# Patient Record
Sex: FEMALE | Race: WHITE | Employment: OTHER
[De-identification: names, ages, dates, MRNs, and addresses within clinical notes are randomized per-mention and may not be internally consistent; named-entity substitution may affect disease eponyms.]

---

## 2017-01-03 ENCOUNTER — OFFICE VISIT (OUTPATIENT)
Dept: FAMILY MEDICINE CLINIC | Facility: CLINIC | Age: 67
End: 2017-01-03

## 2017-01-03 VITALS
OXYGEN SATURATION: 98 % | WEIGHT: 224 LBS | HEART RATE: 87 BPM | SYSTOLIC BLOOD PRESSURE: 124 MMHG | DIASTOLIC BLOOD PRESSURE: 80 MMHG | BODY MASS INDEX: 32.14 KG/M2

## 2017-01-03 DIAGNOSIS — R10.31 RIGHT LOWER QUADRANT ABDOMINAL PAIN: Primary | ICD-10-CM

## 2017-01-03 PROCEDURE — 99214 OFFICE O/P EST MOD 30 MIN: CPT | Performed by: FAMILY MEDICINE

## 2017-01-11 DIAGNOSIS — R10.31 RIGHT LOWER QUADRANT ABDOMINAL PAIN: ICD-10-CM

## 2017-01-30 ENCOUNTER — TELEPHONE (OUTPATIENT)
Dept: FAMILY MEDICINE CLINIC | Facility: CLINIC | Age: 67
End: 2017-01-30

## 2017-01-30 DIAGNOSIS — R10.31 RIGHT LOWER QUADRANT ABDOMINAL PAIN: Primary | ICD-10-CM

## 2017-02-10 ENCOUNTER — HOSPITAL ENCOUNTER (OUTPATIENT)
Age: 67
Setting detail: SPECIMEN
Discharge: HOME | End: 2017-02-10
Payer: MEDICARE

## 2017-02-10 ENCOUNTER — OFFICE VISIT (OUTPATIENT)
Dept: GASTROENTEROLOGY | Facility: CLINIC | Age: 67
End: 2017-02-10

## 2017-02-10 VITALS
TEMPERATURE: 97.6 F | SYSTOLIC BLOOD PRESSURE: 151 MMHG | DIASTOLIC BLOOD PRESSURE: 88 MMHG | HEIGHT: 70 IN | OXYGEN SATURATION: 97 % | RESPIRATION RATE: 14 BRPM | WEIGHT: 228 LBS | HEART RATE: 78 BPM | BODY MASS INDEX: 32.64 KG/M2

## 2017-02-10 DIAGNOSIS — R10.31 RLQ ABDOMINAL PAIN: Primary | ICD-10-CM

## 2017-02-10 DIAGNOSIS — R10.31 RLQ ABDOMINAL PAIN: ICD-10-CM

## 2017-02-10 LAB
BILIRUBIN URINE: NEGATIVE
COLOR: YELLOW
COMMENT UA: NORMAL
GLUCOSE URINE: NEGATIVE
KETONES, URINE: NEGATIVE
LEUKOCYTE ESTERASE, URINE: NEGATIVE
NITRITE, URINE: NEGATIVE
PH UA: 5.5 (ref 5–8)
PROTEIN UA: NEGATIVE
SPECIFIC GRAVITY UA: 1.02 (ref 1–1.03)
TURBIDITY: CLEAR
URINE HGB: NEGATIVE
UROBILINOGEN, URINE: NORMAL

## 2017-02-10 PROCEDURE — 99204 OFFICE O/P NEW MOD 45 MIN: CPT | Performed by: INTERNAL MEDICINE

## 2017-02-10 ASSESSMENT — ENCOUNTER SYMPTOMS
TROUBLE SWALLOWING: 0
NAUSEA: 0
ABDOMINAL DISTENTION: 1
COUGH: 0
RESPIRATORY NEGATIVE: 1
DIARRHEA: 0
ANAL BLEEDING: 0
SHORTNESS OF BREATH: 0
VOMITING: 0
WHEEZING: 0
BLOOD IN STOOL: 0
CONSTIPATION: 1
ABDOMINAL PAIN: 1
RECTAL PAIN: 0
SINUS PRESSURE: 0
SORE THROAT: 0

## 2017-02-14 RX ORDER — POLYETHYLENE GLYCOL 3350 17 G/17G
POWDER, FOR SOLUTION ORAL
Qty: 255 G | Refills: 0 | Status: SHIPPED | OUTPATIENT
Start: 2017-02-14 | End: 2017-03-12

## 2017-02-15 ENCOUNTER — PATIENT MESSAGE (OUTPATIENT)
Dept: FAMILY MEDICINE CLINIC | Facility: CLINIC | Age: 67
End: 2017-02-15

## 2017-02-23 ENCOUNTER — HOSPITAL ENCOUNTER (OUTPATIENT)
Age: 67
Setting detail: SPECIMEN
Discharge: HOME | End: 2017-02-23
Payer: MEDICARE

## 2017-02-23 DIAGNOSIS — I10 ESSENTIAL HYPERTENSION: ICD-10-CM

## 2017-02-23 DIAGNOSIS — Z11.59 NEED FOR HEPATITIS C SCREENING TEST: ICD-10-CM

## 2017-02-23 DIAGNOSIS — E55.9 VITAMIN D DEFICIENCY: ICD-10-CM

## 2017-02-23 LAB
ABSOLUTE EOS #: 0.1 K/UL (ref 0–0.4)
ABSOLUTE LYMPH #: 1.4 K/UL (ref 1–4.8)
ABSOLUTE MONO #: 0.4 K/UL (ref 0.1–1.2)
ALBUMIN SERPL-MCNC: 4.3 G/DL (ref 3.5–5.2)
ALBUMIN/GLOBULIN RATIO: 1.6 (ref 1–2.5)
ALP BLD-CCNC: 59 U/L (ref 35–104)
ALT SERPL-CCNC: 16 U/L (ref 5–33)
ANION GAP SERPL CALCULATED.3IONS-SCNC: 13 MMOL/L (ref 9–17)
AST SERPL-CCNC: 21 U/L
BASOPHILS # BLD: 1 % (ref 0–2)
BASOPHILS ABSOLUTE: 0 K/UL (ref 0–0.2)
BILIRUB SERPL-MCNC: 0.36 MG/DL (ref 0.3–1.2)
BUN BLDV-MCNC: 12 MG/DL (ref 8–23)
BUN/CREAT BLD: NORMAL (ref 9–20)
CALCIUM SERPL-MCNC: 9.3 MG/DL (ref 8.6–10.4)
CHLORIDE BLD-SCNC: 103 MMOL/L (ref 98–107)
CHOLESTEROL/HDL RATIO: 2.1
CHOLESTEROL: 164 MG/DL
CO2: 25 MMOL/L (ref 20–31)
CREAT SERPL-MCNC: 0.6 MG/DL (ref 0.5–0.9)
DIFFERENTIAL TYPE: ABNORMAL
EOSINOPHILS RELATIVE PERCENT: 1 % (ref 1–4)
GFR AFRICAN AMERICAN: >60 ML/MIN
GFR NON-AFRICAN AMERICAN: >60 ML/MIN
GFR SERPL CREATININE-BSD FRML MDRD: NORMAL ML/MIN/{1.73_M2}
GFR SERPL CREATININE-BSD FRML MDRD: NORMAL ML/MIN/{1.73_M2}
GLUCOSE BLD-MCNC: 90 MG/DL (ref 70–99)
HCT VFR BLD CALC: 36 % (ref 36–46)
HDLC SERPL-MCNC: 77 MG/DL
HEMOGLOBIN: 12.2 G/DL (ref 12–16)
HEPATITIS C ANTIBODY: NONREACTIVE
LDL CHOLESTEROL: 72 MG/DL (ref 0–130)
LYMPHOCYTES # BLD: 33 % (ref 24–44)
MCH RBC QN AUTO: 33.2 PG (ref 26–34)
MCHC RBC AUTO-ENTMCNC: 33.8 G/DL (ref 31–37)
MCV RBC AUTO: 98.3 FL (ref 80–100)
MONOCYTES # BLD: 10 % (ref 2–11)
PDW BLD-RTO: 16.6 % (ref 12.5–15.4)
PLATELET # BLD: 140 K/UL (ref 140–450)
PLATELET ESTIMATE: ABNORMAL
PMV BLD AUTO: 8.9 FL (ref 6–12)
POTASSIUM SERPL-SCNC: 4.2 MMOL/L (ref 3.7–5.3)
RBC # BLD: 3.66 M/UL (ref 4–5.2)
RBC # BLD: ABNORMAL 10*6/UL
SEG NEUTROPHILS: 55 % (ref 36–66)
SEGMENTED NEUTROPHILS ABSOLUTE COUNT: 2.4 K/UL (ref 1.8–7.7)
SODIUM BLD-SCNC: 141 MMOL/L (ref 135–144)
TOTAL PROTEIN: 7 G/DL (ref 6.4–8.3)
TRIGL SERPL-MCNC: 75 MG/DL
TSH SERPL DL<=0.05 MIU/L-ACNC: 1.78 MIU/L (ref 0.3–5)
VITAMIN D 25-HYDROXY: 37.1 NG/ML (ref 30–100)
VLDLC SERPL CALC-MCNC: NORMAL MG/DL (ref 1–30)
WBC # BLD: 4.3 K/UL (ref 3.5–11)
WBC # BLD: ABNORMAL 10*3/UL

## 2017-03-02 ENCOUNTER — OFFICE VISIT (OUTPATIENT)
Dept: FAMILY MEDICINE CLINIC | Facility: CLINIC | Age: 67
End: 2017-03-02

## 2017-03-02 VITALS
SYSTOLIC BLOOD PRESSURE: 120 MMHG | BODY MASS INDEX: 32.03 KG/M2 | WEIGHT: 223.25 LBS | OXYGEN SATURATION: 98 % | HEART RATE: 78 BPM | DIASTOLIC BLOOD PRESSURE: 70 MMHG

## 2017-03-02 DIAGNOSIS — E78.00 PURE HYPERCHOLESTEROLEMIA: ICD-10-CM

## 2017-03-02 DIAGNOSIS — I10 ESSENTIAL HYPERTENSION: Primary | ICD-10-CM

## 2017-03-02 PROCEDURE — 99213 OFFICE O/P EST LOW 20 MIN: CPT | Performed by: FAMILY MEDICINE

## 2017-04-04 DIAGNOSIS — R10.31 RLQ ABDOMINAL PAIN: ICD-10-CM

## 2017-04-07 ENCOUNTER — OFFICE VISIT (OUTPATIENT)
Dept: GASTROENTEROLOGY | Age: 67
End: 2017-04-07
Payer: MEDICARE

## 2017-04-07 VITALS
SYSTOLIC BLOOD PRESSURE: 157 MMHG | BODY MASS INDEX: 33.36 KG/M2 | WEIGHT: 233 LBS | TEMPERATURE: 97.6 F | HEART RATE: 71 BPM | OXYGEN SATURATION: 97 % | HEIGHT: 70 IN | DIASTOLIC BLOOD PRESSURE: 84 MMHG

## 2017-04-07 DIAGNOSIS — K63.5 HYPERPLASTIC POLYP OF INTESTINE: ICD-10-CM

## 2017-04-07 DIAGNOSIS — K63.5 COLON POLYP: ICD-10-CM

## 2017-04-07 DIAGNOSIS — D36.9 ADENOMATOUS POLYP: ICD-10-CM

## 2017-04-07 DIAGNOSIS — R10.31 RLQ ABDOMINAL PAIN: Primary | ICD-10-CM

## 2017-04-07 PROCEDURE — 99213 OFFICE O/P EST LOW 20 MIN: CPT | Performed by: INTERNAL MEDICINE

## 2017-04-07 ASSESSMENT — ENCOUNTER SYMPTOMS
SHORTNESS OF BREATH: 0
RESPIRATORY NEGATIVE: 1
CONSTIPATION: 0
SINUS PRESSURE: 0
ANAL BLEEDING: 0
ABDOMINAL DISTENTION: 0
ABDOMINAL PAIN: 0
GASTROINTESTINAL NEGATIVE: 1
SORE THROAT: 0
WHEEZING: 0
DIARRHEA: 0
TROUBLE SWALLOWING: 0
RECTAL PAIN: 0
VOMITING: 0
BLOOD IN STOOL: 0
COUGH: 0
NAUSEA: 0

## 2017-05-05 RX ORDER — PRAVASTATIN SODIUM 40 MG
TABLET ORAL
Qty: 90 TABLET | Refills: 3 | Status: SHIPPED | OUTPATIENT
Start: 2017-05-05 | End: 2018-04-05 | Stop reason: SDUPTHER

## 2017-07-17 RX ORDER — METOPROLOL TARTRATE 50 MG/1
TABLET, FILM COATED ORAL
Qty: 180 TABLET | Refills: 2 | Status: SHIPPED | OUTPATIENT
Start: 2017-07-17 | End: 2018-04-05 | Stop reason: SDUPTHER

## 2017-09-13 ENCOUNTER — OFFICE VISIT (OUTPATIENT)
Dept: FAMILY MEDICINE CLINIC | Age: 67
End: 2017-09-13
Payer: MEDICARE

## 2017-09-13 VITALS
BODY MASS INDEX: 32.96 KG/M2 | HEIGHT: 70 IN | HEART RATE: 76 BPM | OXYGEN SATURATION: 98 % | DIASTOLIC BLOOD PRESSURE: 70 MMHG | SYSTOLIC BLOOD PRESSURE: 110 MMHG | WEIGHT: 230.2 LBS

## 2017-09-13 DIAGNOSIS — E78.00 PURE HYPERCHOLESTEROLEMIA: ICD-10-CM

## 2017-09-13 DIAGNOSIS — R79.89 LOW SERUM VITAMIN D: ICD-10-CM

## 2017-09-13 DIAGNOSIS — D36.9 ADENOMATOUS POLYP: ICD-10-CM

## 2017-09-13 DIAGNOSIS — R79.89 LOW VITAMIN D LEVEL: ICD-10-CM

## 2017-09-13 DIAGNOSIS — I10 ESSENTIAL HYPERTENSION: Primary | ICD-10-CM

## 2017-09-13 PROCEDURE — G0008 ADMIN INFLUENZA VIRUS VAC: HCPCS | Performed by: FAMILY MEDICINE

## 2017-09-13 PROCEDURE — 90688 IIV4 VACCINE SPLT 0.5 ML IM: CPT | Performed by: FAMILY MEDICINE

## 2017-09-13 PROCEDURE — 99213 OFFICE O/P EST LOW 20 MIN: CPT | Performed by: FAMILY MEDICINE

## 2017-09-13 ASSESSMENT — PATIENT HEALTH QUESTIONNAIRE - PHQ9
2. FEELING DOWN, DEPRESSED OR HOPELESS: 0
SUM OF ALL RESPONSES TO PHQ QUESTIONS 1-9: 0
1. LITTLE INTEREST OR PLEASURE IN DOING THINGS: 0
SUM OF ALL RESPONSES TO PHQ9 QUESTIONS 1 & 2: 0

## 2018-03-28 ENCOUNTER — HOSPITAL ENCOUNTER (OUTPATIENT)
Age: 68
Setting detail: SPECIMEN
Discharge: HOME | End: 2018-03-28
Payer: MEDICARE

## 2018-03-28 DIAGNOSIS — I10 ESSENTIAL HYPERTENSION: ICD-10-CM

## 2018-03-28 DIAGNOSIS — R79.89 LOW VITAMIN D LEVEL: ICD-10-CM

## 2018-03-28 DIAGNOSIS — R79.89 LOW SERUM VITAMIN D: ICD-10-CM

## 2018-03-28 LAB
ABSOLUTE EOS #: 0.1 K/UL (ref 0–0.44)
ABSOLUTE IMMATURE GRANULOCYTE: <0.03 K/UL (ref 0–0.3)
ABSOLUTE LYMPH #: 1.41 K/UL (ref 1.1–3.7)
ABSOLUTE MONO #: 0.53 K/UL (ref 0.1–1.2)
ALBUMIN SERPL-MCNC: 4.2 G/DL (ref 3.5–5.2)
ALBUMIN/GLOBULIN RATIO: 1.5 (ref 1–2.5)
ALP BLD-CCNC: 56 U/L (ref 35–104)
ALT SERPL-CCNC: 17 U/L (ref 5–33)
ANION GAP SERPL CALCULATED.3IONS-SCNC: 14 MMOL/L (ref 9–17)
AST SERPL-CCNC: 22 U/L
BASOPHILS # BLD: 0 % (ref 0–2)
BASOPHILS ABSOLUTE: <0.03 K/UL (ref 0–0.2)
BILIRUB SERPL-MCNC: 0.4 MG/DL (ref 0.3–1.2)
BUN BLDV-MCNC: 15 MG/DL (ref 8–23)
BUN/CREAT BLD: NORMAL (ref 9–20)
CALCIUM SERPL-MCNC: 9 MG/DL (ref 8.6–10.4)
CHLORIDE BLD-SCNC: 105 MMOL/L (ref 98–107)
CHOLESTEROL/HDL RATIO: 2.3
CHOLESTEROL: 176 MG/DL
CO2: 24 MMOL/L (ref 20–31)
CREAT SERPL-MCNC: 0.54 MG/DL (ref 0.5–0.9)
DIFFERENTIAL TYPE: ABNORMAL
EOSINOPHILS RELATIVE PERCENT: 3 % (ref 1–4)
GFR AFRICAN AMERICAN: >60 ML/MIN
GFR NON-AFRICAN AMERICAN: >60 ML/MIN
GFR SERPL CREATININE-BSD FRML MDRD: NORMAL ML/MIN/{1.73_M2}
GFR SERPL CREATININE-BSD FRML MDRD: NORMAL ML/MIN/{1.73_M2}
GLUCOSE BLD-MCNC: 96 MG/DL (ref 70–99)
HCT VFR BLD CALC: 37 % (ref 36.3–47.1)
HDLC SERPL-MCNC: 78 MG/DL
HEMOGLOBIN: 11.7 G/DL (ref 11.9–15.1)
IMMATURE GRANULOCYTES: 0 %
LDL CHOLESTEROL: 80 MG/DL (ref 0–130)
LYMPHOCYTES # BLD: 36 % (ref 24–43)
MCH RBC QN AUTO: 32.2 PG (ref 25.2–33.5)
MCHC RBC AUTO-ENTMCNC: 31.6 G/DL (ref 28.4–34.8)
MCV RBC AUTO: 101.9 FL (ref 82.6–102.9)
MONOCYTES # BLD: 13 % (ref 3–12)
NRBC AUTOMATED: 0 PER 100 WBC
PDW BLD-RTO: 17.4 % (ref 11.8–14.4)
PLATELET # BLD: ABNORMAL K/UL (ref 138–453)
PLATELET ESTIMATE: ABNORMAL
PLATELET, FLUORESCENCE: 109 K/UL (ref 138–453)
PLATELET, IMMATURE FRACTION: 6.6 % (ref 1.1–10.3)
PMV BLD AUTO: ABNORMAL FL (ref 8.1–13.5)
POTASSIUM SERPL-SCNC: 4.2 MMOL/L (ref 3.7–5.3)
RBC # BLD: 3.63 M/UL (ref 3.95–5.11)
RBC # BLD: ABNORMAL 10*6/UL
SEG NEUTROPHILS: 48 % (ref 36–65)
SEGMENTED NEUTROPHILS ABSOLUTE COUNT: 1.92 K/UL (ref 1.5–8.1)
SODIUM BLD-SCNC: 143 MMOL/L (ref 135–144)
TOTAL PROTEIN: 7 G/DL (ref 6.4–8.3)
TRIGL SERPL-MCNC: 90 MG/DL
TSH SERPL DL<=0.05 MIU/L-ACNC: 1.97 MIU/L (ref 0.3–5)
VITAMIN D 25-HYDROXY: 36 NG/ML (ref 30–100)
VLDLC SERPL CALC-MCNC: NORMAL MG/DL (ref 1–30)
WBC # BLD: 4 K/UL (ref 3.5–11.3)
WBC # BLD: ABNORMAL 10*3/UL

## 2018-04-05 ENCOUNTER — OFFICE VISIT (OUTPATIENT)
Dept: FAMILY MEDICINE CLINIC | Age: 68
End: 2018-04-05
Payer: MEDICARE

## 2018-04-05 VITALS
HEART RATE: 69 BPM | DIASTOLIC BLOOD PRESSURE: 80 MMHG | OXYGEN SATURATION: 98 % | HEIGHT: 70 IN | BODY MASS INDEX: 33.79 KG/M2 | SYSTOLIC BLOOD PRESSURE: 130 MMHG | WEIGHT: 236 LBS

## 2018-04-05 DIAGNOSIS — I10 ESSENTIAL HYPERTENSION: Primary | ICD-10-CM

## 2018-04-05 DIAGNOSIS — E78.00 PURE HYPERCHOLESTEROLEMIA: ICD-10-CM

## 2018-04-05 PROCEDURE — 99213 OFFICE O/P EST LOW 20 MIN: CPT | Performed by: FAMILY MEDICINE

## 2018-04-05 RX ORDER — PRAVASTATIN SODIUM 40 MG
TABLET ORAL
Qty: 90 TABLET | Refills: 3 | Status: SHIPPED | OUTPATIENT
Start: 2018-04-05 | End: 2019-05-23 | Stop reason: SDUPTHER

## 2018-04-05 RX ORDER — MELOXICAM 15 MG/1
15 TABLET ORAL DAILY PRN
Qty: 90 TABLET | Refills: 3 | Status: SHIPPED | OUTPATIENT
Start: 2018-04-05 | End: 2020-04-30 | Stop reason: ALTCHOICE

## 2018-04-05 RX ORDER — METOPROLOL TARTRATE 50 MG/1
TABLET, FILM COATED ORAL
Qty: 180 TABLET | Refills: 3 | Status: SHIPPED | OUTPATIENT
Start: 2018-04-05 | End: 2018-10-08 | Stop reason: SDUPTHER

## 2018-09-25 ENCOUNTER — TELEPHONE (OUTPATIENT)
Dept: FAMILY MEDICINE CLINIC | Age: 68
End: 2018-09-25

## 2018-09-25 DIAGNOSIS — R19.7 DIARRHEA, UNSPECIFIED TYPE: Primary | ICD-10-CM

## 2018-09-25 RX ORDER — DIPHENOXYLATE HYDROCHLORIDE AND ATROPINE SULFATE 2.5; .025 MG/1; MG/1
1 TABLET ORAL 4 TIMES DAILY PRN
Qty: 20 TABLET | Refills: 0 | Status: SHIPPED | OUTPATIENT
Start: 2018-09-25 | End: 2018-09-30

## 2018-09-25 NOTE — TELEPHONE ENCOUNTER
Diarrhea    Patient complains of diarrhea:  Onset started 3 pm Sunday afternoon , got a little better for  Awhile, started again last night  Diarrhea approximately  16  times per day  Diarrhea is described as watery no formation, brown in color  Other symptoms include fatigue  Can patient keep fluids down yes  Does eating make it worse  yes    *This condition is eligible for an eVisit. If not already active, sign patient up for MyChart to improve access and communication with the provider. *

## 2018-10-08 ENCOUNTER — OFFICE VISIT (OUTPATIENT)
Dept: FAMILY MEDICINE CLINIC | Age: 68
End: 2018-10-08
Payer: MEDICARE

## 2018-10-08 VITALS
DIASTOLIC BLOOD PRESSURE: 70 MMHG | OXYGEN SATURATION: 98 % | HEART RATE: 70 BPM | BODY MASS INDEX: 27.91 KG/M2 | WEIGHT: 194.5 LBS | SYSTOLIC BLOOD PRESSURE: 110 MMHG

## 2018-10-08 DIAGNOSIS — E78.00 PURE HYPERCHOLESTEROLEMIA: ICD-10-CM

## 2018-10-08 DIAGNOSIS — I10 ESSENTIAL HYPERTENSION: Primary | ICD-10-CM

## 2018-10-08 DIAGNOSIS — Z23 NEED FOR PNEUMOCOCCAL VACCINATION: ICD-10-CM

## 2018-10-08 DIAGNOSIS — Z23 NEED FOR INFLUENZA VACCINATION: ICD-10-CM

## 2018-10-08 PROCEDURE — 90688 IIV4 VACCINE SPLT 0.5 ML IM: CPT | Performed by: FAMILY MEDICINE

## 2018-10-08 PROCEDURE — G0008 ADMIN INFLUENZA VIRUS VAC: HCPCS | Performed by: FAMILY MEDICINE

## 2018-10-08 PROCEDURE — G0009 ADMIN PNEUMOCOCCAL VACCINE: HCPCS | Performed by: FAMILY MEDICINE

## 2018-10-08 PROCEDURE — 99214 OFFICE O/P EST MOD 30 MIN: CPT | Performed by: FAMILY MEDICINE

## 2018-10-08 PROCEDURE — 90670 PCV13 VACCINE IM: CPT | Performed by: FAMILY MEDICINE

## 2018-10-08 RX ORDER — METOPROLOL TARTRATE 50 MG/1
TABLET, FILM COATED ORAL
Qty: 90 TABLET | Refills: 3 | Status: SHIPPED | OUTPATIENT
Start: 2018-10-08 | End: 2019-10-09 | Stop reason: SDUPTHER

## 2018-10-08 ASSESSMENT — PATIENT HEALTH QUESTIONNAIRE - PHQ9
1. LITTLE INTEREST OR PLEASURE IN DOING THINGS: 0
2. FEELING DOWN, DEPRESSED OR HOPELESS: 0
SUM OF ALL RESPONSES TO PHQ QUESTIONS 1-9: 0
SUM OF ALL RESPONSES TO PHQ QUESTIONS 1-9: 0
SUM OF ALL RESPONSES TO PHQ9 QUESTIONS 1 & 2: 0

## 2018-10-08 NOTE — PROGRESS NOTES
HYPERTENSION visit     BP Readings from Last 3 Encounters:   04/05/18 130/80   09/13/17 110/70   04/07/17 (!) 157/84       LDL Cholesterol (mg/dL)   Date Value   03/28/2018 80     HDL (mg/dL)   Date Value   03/28/2018 78     BUN (mg/dL)   Date Value   03/28/2018 15     CREATININE (mg/dL)   Date Value   03/28/2018 0.54     Glucose (mg/dL)   Date Value   03/28/2018 96   06/07/2012 91              Have you changed or started any medications since your last visit including any over-the-counter medicines, vitamins, or herbal medicines? no   Have you stopped taking any of your medications? Is so, why? -  no  Are you having any side effects from any of your medications? - no  How often do you miss doses of your medication? rare      Have you seen any other physician or provider since your last visit?  no   Have you had any other diagnostic tests since your last visit?  no   Have you been seen in the emergency room and/or had an admission in a hospital since we last saw you?  no   Have you had your routine dental cleaning in the past 6 months? Do you have an active MyChart account? If no, what is the barrier?   Yes    Patient Care Team:  Hermelindo Hagen MD as PCP - General (Family Medicine)  Hermelindo Hagen MD as PCP - S Attributed Provider  Rafy Allan (Allergy & Immunology)    Medical History Review  Past Medical, Family, and Social History reviewed and  contribute to the patient presenting condition    Health Maintenance   Topic Date Due    Shingles Vaccine (1 of 2 - 2 Dose Series) 07/28/2013    Pneumococcal low/med risk (2 of 2 - PCV13) 08/31/2016    Flu vaccine (1) 09/01/2018    Breast cancer screen  09/13/2019    DTaP/Tdap/Td vaccine (2 - Td) 06/09/2020    Colon cancer screen colonoscopy  03/10/2022    Lipid screen  03/28/2023    DEXA (modify frequency per FRAX score)  Completed    Hepatitis C screen  Completed

## 2019-04-02 ENCOUNTER — HOSPITAL ENCOUNTER (OUTPATIENT)
Age: 69
Setting detail: SPECIMEN
Discharge: HOME | End: 2019-04-02
Payer: MEDICARE

## 2019-04-02 DIAGNOSIS — I10 ESSENTIAL HYPERTENSION: ICD-10-CM

## 2019-04-02 LAB
ABSOLUTE EOS #: 0.08 K/UL (ref 0–0.44)
ABSOLUTE IMMATURE GRANULOCYTE: <0.03 K/UL (ref 0–0.3)
ABSOLUTE LYMPH #: 1.14 K/UL (ref 1.1–3.7)
ABSOLUTE MONO #: 0.47 K/UL (ref 0.1–1.2)
ALBUMIN SERPL-MCNC: 4.5 G/DL (ref 3.5–5.2)
ALBUMIN/GLOBULIN RATIO: 1.8 (ref 1–2.5)
ALP BLD-CCNC: 52 U/L (ref 35–104)
ALT SERPL-CCNC: 17 U/L (ref 5–33)
ANION GAP SERPL CALCULATED.3IONS-SCNC: 13 MMOL/L (ref 9–17)
AST SERPL-CCNC: 31 U/L
BASOPHILS # BLD: 1 % (ref 0–2)
BASOPHILS ABSOLUTE: <0.03 K/UL (ref 0–0.2)
BILIRUB SERPL-MCNC: 0.46 MG/DL (ref 0.3–1.2)
BUN BLDV-MCNC: 16 MG/DL (ref 8–23)
BUN/CREAT BLD: ABNORMAL (ref 9–20)
CALCIUM SERPL-MCNC: 9.5 MG/DL (ref 8.6–10.4)
CHLORIDE BLD-SCNC: 105 MMOL/L (ref 98–107)
CHOLESTEROL/HDL RATIO: 2
CHOLESTEROL: 161 MG/DL
CO2: 23 MMOL/L (ref 20–31)
CREAT SERPL-MCNC: 0.48 MG/DL (ref 0.5–0.9)
DIFFERENTIAL TYPE: ABNORMAL
EOSINOPHILS RELATIVE PERCENT: 3 % (ref 1–4)
GFR AFRICAN AMERICAN: >60 ML/MIN
GFR NON-AFRICAN AMERICAN: >60 ML/MIN
GFR SERPL CREATININE-BSD FRML MDRD: ABNORMAL ML/MIN/{1.73_M2}
GFR SERPL CREATININE-BSD FRML MDRD: ABNORMAL ML/MIN/{1.73_M2}
GLUCOSE BLD-MCNC: 82 MG/DL (ref 70–99)
HCT VFR BLD CALC: 35.2 % (ref 36.3–47.1)
HDLC SERPL-MCNC: 82 MG/DL
HEMOGLOBIN: 11.4 G/DL (ref 11.9–15.1)
IMMATURE GRANULOCYTES: 1 %
LDL CHOLESTEROL: 67 MG/DL (ref 0–130)
LYMPHOCYTES # BLD: 36 % (ref 24–43)
MCH RBC QN AUTO: 32.5 PG (ref 25.2–33.5)
MCHC RBC AUTO-ENTMCNC: 32.4 G/DL (ref 28.4–34.8)
MCV RBC AUTO: 100.3 FL (ref 82.6–102.9)
MONOCYTES # BLD: 15 % (ref 3–12)
NRBC AUTOMATED: 0 PER 100 WBC
PDW BLD-RTO: 19 % (ref 11.8–14.4)
PLATELET # BLD: ABNORMAL K/UL (ref 138–453)
PLATELET ESTIMATE: ABNORMAL
PLATELET, FLUORESCENCE: 79 K/UL (ref 138–453)
PLATELET, IMMATURE FRACTION: 9.9 % (ref 1.1–10.3)
PMV BLD AUTO: ABNORMAL FL (ref 8.1–13.5)
POTASSIUM SERPL-SCNC: 4.1 MMOL/L (ref 3.7–5.3)
RBC # BLD: 3.51 M/UL (ref 3.95–5.11)
RBC # BLD: ABNORMAL 10*6/UL
SEG NEUTROPHILS: 46 % (ref 36–65)
SEGMENTED NEUTROPHILS ABSOLUTE COUNT: 1.44 K/UL (ref 1.5–8.1)
SODIUM BLD-SCNC: 141 MMOL/L (ref 135–144)
TOTAL PROTEIN: 7 G/DL (ref 6.4–8.3)
TRIGL SERPL-MCNC: 59 MG/DL
TSH SERPL DL<=0.05 MIU/L-ACNC: 1.98 MIU/L (ref 0.3–5)
VLDLC SERPL CALC-MCNC: NORMAL MG/DL (ref 1–30)
WBC # BLD: 3.2 K/UL (ref 3.5–11.3)
WBC # BLD: ABNORMAL 10*3/UL

## 2019-04-08 ENCOUNTER — OFFICE VISIT (OUTPATIENT)
Dept: FAMILY MEDICINE CLINIC | Age: 69
End: 2019-04-08
Payer: MEDICARE

## 2019-04-08 VITALS
OXYGEN SATURATION: 99 % | SYSTOLIC BLOOD PRESSURE: 134 MMHG | WEIGHT: 187.5 LBS | DIASTOLIC BLOOD PRESSURE: 82 MMHG | BODY MASS INDEX: 26.84 KG/M2 | HEART RATE: 68 BPM | HEIGHT: 70 IN

## 2019-04-08 DIAGNOSIS — E78.00 PURE HYPERCHOLESTEROLEMIA: ICD-10-CM

## 2019-04-08 DIAGNOSIS — I10 ESSENTIAL HYPERTENSION: Primary | ICD-10-CM

## 2019-04-08 PROCEDURE — 99213 OFFICE O/P EST LOW 20 MIN: CPT | Performed by: FAMILY MEDICINE

## 2019-04-08 PROCEDURE — G8510 SCR DEP NEG, NO PLAN REQD: HCPCS | Performed by: FAMILY MEDICINE

## 2019-04-08 ASSESSMENT — PATIENT HEALTH QUESTIONNAIRE - PHQ9
1. LITTLE INTEREST OR PLEASURE IN DOING THINGS: 0
SUM OF ALL RESPONSES TO PHQ9 QUESTIONS 1 & 2: 0
SUM OF ALL RESPONSES TO PHQ QUESTIONS 1-9: 0
2. FEELING DOWN, DEPRESSED OR HOPELESS: 0
SUM OF ALL RESPONSES TO PHQ QUESTIONS 1-9: 0

## 2019-04-08 NOTE — PATIENT INSTRUCTIONS
about supplementation. Her educational sophistication was such that she was given this meta-analysis and was told that no evidence supported supplementation but that the decision was hers. What she decided is not yet known. These results clearly show that recommendations for calcium, vitamin D, or combined supplementation in frail older adults or those living in institutions should not be applied to community-living adults. The difference in outcomes with supplementation between these groups might be explained by higher fracture risk in frail or institutionalized elders. Juan Manuel Alanis MD reviewing Lorna rivas. CRUZ 2017 Dec 26. CITATION(S):  Ivery Skiff J-G et al. Association between calcium or vitamin D supplementation and fracture incidence in community-dwelling older adults: A systematic review and meta-analysis. CRUZ 2017 Dec 26; 318:2466.   [PubMed® abstract]                  The following is the website to get the FRAX score;    https://www.ki work.NetDevices/                  Below is from the [employer]  ([email or web address])

## 2019-04-08 NOTE — PROGRESS NOTES
HYPERTENSION visit     BP Readings from Last 3 Encounters:   10/08/18 110/70   04/05/18 130/80   09/13/17 110/70       LDL Cholesterol (mg/dL)   Date Value   04/02/2019 67     HDL (mg/dL)   Date Value   04/02/2019 82     BUN (mg/dL)   Date Value   04/02/2019 16     CREATININE (mg/dL)   Date Value   04/02/2019 0.48 (L)     Glucose (mg/dL)   Date Value   04/02/2019 82   06/07/2012 91              Have you changed or started any medications since your last visit including any over-the-counter medicines, vitamins, or herbal medicines? no   Have you stopped taking any of your medications? Is so, why? -  no  Are you having any side effects from any of your medications? - no  How often do you miss doses of your medication? rare      Have you seen any other physician or provider since your last visit?  no   Have you had any other diagnostic tests since your last visit?  no   Have you been seen in the emergency room and/or had an admission in a hospital since we last saw you?  no   Have you had your routine dental cleaning in the past 6 months? Do you have an active MyChart account? If no, what is the barrier?   Yes    Patient Care Team:  Mateo Carrington MD as PCP - General (Family Medicine)  Mateo Carrington MD as PCP - S Attributed Provider  Gisel Berger (Allergy & Immunology)    Medical History Review  Past Medical, Family, and Social History reviewed and  contribute to the patient presenting condition    Health Maintenance   Topic Date Due    Shingles Vaccine (3 of 3) 10/21/2018    Breast cancer screen  09/13/2019    DTaP/Tdap/Td vaccine (2 - Td) 06/09/2020    Colon cancer screen colonoscopy  03/10/2022    Lipid screen  04/02/2024    DEXA (modify frequency per FRAX score)  Completed    Flu vaccine  Completed    Pneumococcal 65+ years Vaccine  Completed    Hepatitis C screen  Completed

## 2019-04-08 NOTE — PROGRESS NOTES
Subjective:  Joselyn Abdul presents for   Chief Complaint   Patient presents with    Hypertension    Hyperlipidemia   feeling well    Had a nother dexa scan - it was normla    Still dong [employer]      Patient Active Problem List   Diagnosis    HTN (hypertension)    High cholesterol    Hyperlipidemia    Primary osteoarthritis involving multiple joints    RLQ abdominal pain    Colon polyp    Adenomatous polyp    Hyperplastic polyp of intestine       Review of Systems:  · General: no significant weight changes. · Respiratory: no cough, pleuritic chest pain, dyspnea, or wheezing  · Cardiovascular: no pain, NJ, orthopnea, palpitations, or claudication  · Gastrointestinal: no chronic nausea, vomiting, heartburn, diarrhea, constipation, bloating, or abdominal pain. No bloody or black stools. Objective:  Physical Exam   Vitals:   Vitals:    04/08/19 1025   BP: 134/82   Pulse: 68   SpO2: 99%   Weight: 187 lb 8 oz (85 kg)   Height: 5' 10\" (1.778 m)     Wt Readings from Last 3 Encounters:   04/08/19 187 lb 8 oz (85 kg)   10/08/18 194 lb 8 oz (88.2 kg)   04/05/18 236 lb (107 kg)     Ht Readings from Last 3 Encounters:   04/08/19 5' 10\" (1.778 m)   04/05/18 5' 10\" (1.778 m)   09/13/17 5' 10\" (1.778 m)     Body mass index is 26.9 kg/m². Constitutional: She is oriented to person, place, and time. She appears well-developed and well-nourished and in no acute distress. Answers all my questions appropriately. Head: Normocephalic and atraumatic. Eyes:conjunctiva appear normal.  Nose: pink, non-edematous mucosa. No polyps. No septal deviation  Throat: no erythema, tonsillar hypertrophy or exudate. No ulcerations noted. Lips/Teeth/Gums all appear normal.  Neck: Normal range of motion. Neck supple. No tracheal deviation present. No abnormal lymphadenopathy. No JVD noted. Carotids are clear bilaterally. No thyroid masses noted. Heart: RRR without murmur. No S3, S4, or gallop noted.   Chest: Clear to auscultation bilaterally. Good breath sounds noted. No rales, wheezes, or rhonchi noted. No respiratory retractions noted. Wall has symmetrical movement with respirations. Assessment:   Encounter Diagnoses   Name Primary?  Essential hypertension Yes    Pure hypercholesterolemia          Plan:   reveiwed her dexa scan - it looks fine. As fa as ACP is concerned she doesn't have to repeat it. Handout given    Her weight is good    There are no discontinued medications. THE ABOVE NOTED DISCONTINUED MEDS MAY ONLY BE FROM 'CLEANING UP' THE MED LIST AND WERE NOT ACTUALLY CANCELED;  SEE CHART FOR DETAILS! No orders of the defined types were placed in this encounter. No orders of the defined types were placed in this encounter. Return in about 6 months (around 10/8/2019). Patient Instructions   WHO FRAX risk assessment tool    Alendronate = Fosamax  Risedronate = Actonel  Zoledronic acid = Reclast (IV infusion yearly)  Ibandronate = Boniva    Denosumab = Prolia (SQ injection every 6 months)    Teriparatide = Forteo (SQ injection daily for 2 years only)    Raloxifene = Evista    Information sourced from  [facility]:  Supplemental Calcium or Vitamin D Doesnt Lower Fracture Risk in Community-Dwelling Adults  Guidelines that recommend supplementation for older adults living in institutions should not be applied to adults living independently. Several guidelines recommend calcium, vitamin D, or both types of supplementation to prevent fractures in older adults (focusing mostly on frail elders and those living in institutions; [place] Int 2010; 19:3285). In this meta-analysis, researchers identified 33 randomized controlled trials that involved community-dwelling adults (age, ?48; total patients, 51,145). The trials comprised 44 separate studies (14 of calcium supplementation alone, 17 of vitamin D supplementation alone, and 13 of combined supplementation).  Studies that involved patients with corticosteroid-induced osteoporosis, and those in which other drugs or dietary interventions were combined with vitamin D or calcium supplementation were excluded. The pooled analyses for all three study clusters showed no benefit for any supplementation intervention in reducing relative risk for hip fracture (the primary outcome). Secondary outcomes (vertebral, nonvertebral, and total fractures) also were not affected by any of the interventions. The overall results applied to subgroups with 25-hydroxyvitamin D levels lower than 20 ng/mL or daily dietary calcium intake lower than 900 mg, to women, and to people with previous fractures. COMMENT  A colleague of [facility] recently saw a healthy 79-year-old woman who was persistent in her desire for a vitamin D test. When the result came back at 13 ng/mL, she asked about supplementation. Her educational sophistication was such that she was given this meta-analysis and was told that no evidence supported supplementation but that the decision was hers. What she decided is not yet known. These results clearly show that recommendations for calcium, vitamin D, or combined supplementation in frail older adults or those living in institutions should not be applied to community-living adults. The difference in outcomes with supplementation between these groups might be explained by higher fracture risk in frail or institutionalized elders. Juan Manuel Mckinney MD reviewing Susannah rivas. CRUZ 2017 Dec 26. CITATION(S):  Mike KATHLEEN et al. Association between calcium or vitamin D supplementation and fracture incidence in community-dwelling older adults: A systematic review and meta-analysis. CRUZ 2017 Dec 26; 318:3746.   [PubMed® abstract]                  The following is the website to get the FRAX score;    https://www.Ment[employer] Me.[employer]/                  Below is from the

## 2019-05-23 RX ORDER — PRAVASTATIN SODIUM 40 MG
TABLET ORAL
Qty: 90 TABLET | Refills: 3 | Status: SHIPPED | OUTPATIENT
Start: 2019-05-23 | End: 2020-08-07

## 2019-06-10 ENCOUNTER — OFFICE VISIT (OUTPATIENT)
Dept: FAMILY MEDICINE CLINIC | Age: 69
End: 2019-06-10
Payer: MEDICARE

## 2019-06-10 VITALS
HEART RATE: 64 BPM | WEIGHT: 191 LBS | OXYGEN SATURATION: 99 % | BODY MASS INDEX: 27.41 KG/M2 | SYSTOLIC BLOOD PRESSURE: 130 MMHG | DIASTOLIC BLOOD PRESSURE: 78 MMHG

## 2019-06-10 DIAGNOSIS — I88.9 LYMPHADENITIS: ICD-10-CM

## 2019-06-10 DIAGNOSIS — R53.83 FATIGUE, UNSPECIFIED TYPE: Primary | ICD-10-CM

## 2019-06-10 DIAGNOSIS — R51.9 NONINTRACTABLE HEADACHE, UNSPECIFIED CHRONICITY PATTERN, UNSPECIFIED HEADACHE TYPE: ICD-10-CM

## 2019-06-10 PROCEDURE — 99214 OFFICE O/P EST MOD 30 MIN: CPT | Performed by: FAMILY MEDICINE

## 2019-06-10 RX ORDER — AMOXICILLIN 875 MG/1
875 TABLET, COATED ORAL 2 TIMES DAILY
Qty: 20 TABLET | Refills: 0 | Status: SHIPPED | OUTPATIENT
Start: 2019-06-10 | End: 2019-06-20

## 2019-06-10 NOTE — PROGRESS NOTES
Subjective:  Joe Linder presents for   Chief Complaint   Patient presents with    Pharyngitis     can feel a bump in throat x3 weeks    Headache    Fatigue     This has been for a month. Has no temp    Sometimes feel like there is a foreign body in the throat. Appetite and \fluids are good    Tried tyelnol and it haelped a little. No cough now. Voice is not hoarse. Sleeping fine    Patient Active Problem List   Diagnosis    HTN (hypertension)    High cholesterol    Hyperlipidemia    Primary osteoarthritis involving multiple joints    RLQ abdominal pain    Colon polyp    Adenomatous polyp    Hyperplastic polyp of intestine       Review of Systems:  · General: no significant weight changes. · Respiratory: no cough, pleuritic chest pain, dyspnea, or wheezing  · Cardiovascular: no pain, NJ, orthopnea, palpitations, or claudication  · Gastrointestinal: no chronic nausea, vomiting, heartburn, diarrhea, constipation, bloating, or abdominal pain. No bloody or black stools. Objective:  Physical Exam   Vitals:   Vitals:    06/10/19 1306   BP: (!) 140/78   Site: Left Upper Arm   Position: Sitting   Cuff Size: Medium Adult   Pulse: 64   SpO2: 99%   Weight: 191 lb (86.6 kg)     Wt Readings from Last 3 Encounters:   06/10/19 191 lb (86.6 kg)   04/08/19 187 lb 8 oz (85 kg)   10/08/18 194 lb 8 oz (88.2 kg)     Ht Readings from Last 3 Encounters:   04/08/19 5' 10\" (1.778 m)   04/05/18 5' 10\" (1.778 m)   09/13/17 5' 10\" (1.778 m)     Body mass index is 27.41 kg/m². Constitutional: She is oriented to person, place, and time. She appears well-developed and well-nourished and in no acute distress. Answers all my questions appropriately. Head: Normocephalic and atraumatic. Eyes:conjunctiva appear normal.  Right Ear: External ear normal. TM is clear  Left Ear: External ear normal. TM is clear  Nose: pink, non-edematous mucosa. No polyps.   No septal deviation  Throat: no erythema, tonsillar

## 2019-06-10 NOTE — PROGRESS NOTES
HYPERTENSION visit     BP Readings from Last 3 Encounters:   04/08/19 134/82   10/08/18 110/70   04/05/18 130/80       LDL Cholesterol (mg/dL)   Date Value   04/02/2019 67     HDL (mg/dL)   Date Value   04/02/2019 82     BUN (mg/dL)   Date Value   04/02/2019 16     CREATININE (mg/dL)   Date Value   04/02/2019 0.48 (L)     Glucose (mg/dL)   Date Value   04/02/2019 82   06/07/2012 91              Have you changed or started any medications since your last visit including any over-the-counter medicines, vitamins, or herbal medicines? no   Have you stopped taking any of your medications? Is so, why? -  no  Are you having any side effects from any of your medications? - no  How often do you miss doses of your medication? rare      Have you seen any other physician or provider since your last visit?  no   Have you had any other diagnostic tests since your last visit?  no   Have you been seen in the emergency room and/or had an admission in a hospital since we last saw you?  no   Have you had your routine dental cleaning in the past 6 months? Do you have an active MyChart account? If no, what is the barrier?   Yes    Patient Care Team:  Yani Norman MD as PCP - General (Family Medicine)  Yani Norman MD as PCP - [facility]  Alonzo Acuña (Allergy & Immunology)    Medical History Review  Past Medical, Family, and Social History reviewed and contribute to the patient presenting condition    Health Maintenance   Topic Date Due    Shingles Vaccine (3 of 3) 10/21/2018    Breast cancer screen  09/13/2019    DTaP/Tdap/Td vaccine (2 - Td) 06/09/2020    Colon cancer screen colonoscopy  03/10/2022    Lipid screen  04/02/2024    DEXA (modify frequency per FRAX score)  Completed    Flu vaccine  Completed    Pneumococcal 65+ years Vaccine  Completed    Hepatitis C screen  Completed

## 2019-07-31 ENCOUNTER — PATIENT MESSAGE (OUTPATIENT)
Dept: FAMILY MEDICINE CLINIC | Age: 69
End: 2019-07-31

## 2019-10-09 ENCOUNTER — OFFICE VISIT (OUTPATIENT)
Dept: FAMILY MEDICINE CLINIC | Age: 69
End: 2019-10-09
Payer: MEDICARE

## 2019-10-09 VITALS
SYSTOLIC BLOOD PRESSURE: 120 MMHG | BODY MASS INDEX: 27.58 KG/M2 | OXYGEN SATURATION: 98 % | WEIGHT: 192.2 LBS | HEART RATE: 73 BPM | DIASTOLIC BLOOD PRESSURE: 72 MMHG | RESPIRATION RATE: 18 BRPM

## 2019-10-09 DIAGNOSIS — E78.00 PURE HYPERCHOLESTEROLEMIA: Chronic | ICD-10-CM

## 2019-10-09 DIAGNOSIS — I10 ESSENTIAL HYPERTENSION: Primary | Chronic | ICD-10-CM

## 2019-10-09 PROCEDURE — G0008 ADMIN INFLUENZA VIRUS VAC: HCPCS | Performed by: FAMILY MEDICINE

## 2019-10-09 PROCEDURE — 90653 IIV ADJUVANT VACCINE IM: CPT | Performed by: FAMILY MEDICINE

## 2019-10-09 PROCEDURE — 3288F FALL RISK ASSESSMENT DOCD: CPT | Performed by: FAMILY MEDICINE

## 2019-10-09 PROCEDURE — 99213 OFFICE O/P EST LOW 20 MIN: CPT | Performed by: FAMILY MEDICINE

## 2019-10-09 RX ORDER — METOPROLOL TARTRATE 50 MG/1
TABLET, FILM COATED ORAL
Qty: 90 TABLET | Refills: 3 | Status: SHIPPED | OUTPATIENT
Start: 2019-10-09 | End: 2021-04-28

## 2020-01-22 ENCOUNTER — OFFICE VISIT (OUTPATIENT)
Dept: FAMILY MEDICINE CLINIC | Age: 70
End: 2020-01-22
Payer: MEDICARE

## 2020-01-22 VITALS
BODY MASS INDEX: 28.12 KG/M2 | WEIGHT: 196 LBS | DIASTOLIC BLOOD PRESSURE: 76 MMHG | TEMPERATURE: 97.9 F | OXYGEN SATURATION: 99 % | SYSTOLIC BLOOD PRESSURE: 130 MMHG | HEART RATE: 71 BPM

## 2020-01-22 PROCEDURE — G8510 SCR DEP NEG, NO PLAN REQD: HCPCS | Performed by: FAMILY MEDICINE

## 2020-01-22 PROCEDURE — 99213 OFFICE O/P EST LOW 20 MIN: CPT | Performed by: FAMILY MEDICINE

## 2020-01-22 RX ORDER — AMMONIUM LACTATE 12 G/100G
CREAM TOPICAL
COMMUNITY
Start: 2019-11-11 | End: 2021-04-28

## 2020-01-22 RX ORDER — CETIRIZINE HYDROCHLORIDE 10 MG/1
10 TABLET ORAL DAILY
COMMUNITY

## 2020-01-22 RX ORDER — DOXYCYCLINE 100 MG/1
100 CAPSULE ORAL 2 TIMES DAILY WITH MEALS
Qty: 20 CAPSULE | Refills: 0 | Status: ON HOLD | OUTPATIENT
Start: 2020-01-22 | End: 2020-04-03

## 2020-01-22 ASSESSMENT — PATIENT HEALTH QUESTIONNAIRE - PHQ9
2. FEELING DOWN, DEPRESSED OR HOPELESS: 0
SUM OF ALL RESPONSES TO PHQ QUESTIONS 1-9: 0
SUM OF ALL RESPONSES TO PHQ9 QUESTIONS 1 & 2: 0
SUM OF ALL RESPONSES TO PHQ QUESTIONS 1-9: 0
1. LITTLE INTEREST OR PLEASURE IN DOING THINGS: 0

## 2020-01-22 NOTE — PROGRESS NOTES
Subjective:  Lilly Llamas presents for   Chief Complaint   Patient presents with    Headache     head cold x10 days     No fevers or chilss    Lots of sinus pressure. otc not helping    Minimal cough    Lots of head congestion    Ears are ok    Fluids are great. Patient Active Problem List   Diagnosis    HTN (hypertension)    High cholesterol    Hyperlipidemia    Primary osteoarthritis involving multiple joints    RLQ abdominal pain    Colon polyp    Adenomatous polyp    Hyperplastic polyp of intestine       Review of Systems:  · General: no significant weight changes. · Respiratory: no cough, pleuritic chest pain, dyspnea, or wheezing  · Cardiovascular: no pain, NJ, orthopnea, palpitations, or claudication  · Gastrointestinal: no chronic nausea, vomiting, heartburn, diarrhea, constipation, bloating, or abdominal pain. No bloody or black stools. Objective:  Physical Exam   Vitals:   Vitals:    01/22/20 0941   BP: 130/76   Site: Left Upper Arm   Position: Sitting   Cuff Size: Medium Adult   Pulse: 71   Temp: 97.9 °F (36.6 °C)   SpO2: 99%   Weight: 196 lb (88.9 kg)     Wt Readings from Last 3 Encounters:   01/22/20 196 lb (88.9 kg)   10/09/19 192 lb 3.2 oz (87.2 kg)   06/10/19 191 lb (86.6 kg)     Ht Readings from Last 3 Encounters:   04/08/19 5' 10\" (1.778 m)   04/05/18 5' 10\" (1.778 m)   09/13/17 5' 10\" (1.778 m)     Body mass index is 28.12 kg/m². Constitutional: She is oriented to person, place, and time. She appears well-developed and well-nourished and in no acute distress. Answers all my questions appropriately. Head: Normocephalic and atraumatic. Eyes:conjunctiva appear normal.  Right Ear: External ear normal. TM is clear  Left Ear: External ear normal. TM is clear  Nose: pink, non-edematous mucosa. No polyps. No septal deviation  Throat: no erythema, tonsillar hypertrophy or exudate. No ulcerations noted. Lips/Teeth/Gums all appear normal.  Neck: Normal range of motion.  Neck supple. No tracheal deviation present. No abnormal lymphadenopathy. No JVD noted. Carotids are clear bilaterally. No thyroid masses noted. Heart: RRR without murmur. No S3, S4, or gallop noted. Chest: Clear to auscultation bilaterally. Good breath sounds noted. No rales, wheezes, or rhonchi noted. No respiratory retractions noted. Wall has symmetrical movement with respirations. Assessment:   Encounter Diagnosis   Name Primary?  Subacute sinusitis, unspecified location Yes         Plan:   There are no discontinued medications. THE ABOVE NOTED DISCONTINUED MEDS MAY ONLY BE FROM 'CLEANING UP' THE MED LIST AND WERE NOT ACTUALLY CANCELED;  SEE CHART FOR DETAILS! Orders Placed This Encounter   Medications    doxycycline monohydrate (MONODOX) 100 MG capsule     Sig: Take 1 capsule by mouth 2 times daily (with meals) Avoid calcium, mtv's and dairy 2 hours before and after     Dispense:  20 capsule     Refill:  0     No orders of the defined types were placed in this encounter. No follow-ups on file. There are no Patient Instructions on file for this visit.   Data Unavailable

## 2020-03-09 ENCOUNTER — OFFICE VISIT (OUTPATIENT)
Dept: FAMILY MEDICINE CLINIC | Age: 70
End: 2020-03-09
Payer: MEDICARE

## 2020-03-09 VITALS
DIASTOLIC BLOOD PRESSURE: 80 MMHG | OXYGEN SATURATION: 100 % | WEIGHT: 198 LBS | SYSTOLIC BLOOD PRESSURE: 134 MMHG | HEART RATE: 71 BPM | BODY MASS INDEX: 28.41 KG/M2 | TEMPERATURE: 98.2 F

## 2020-03-09 PROCEDURE — 99213 OFFICE O/P EST LOW 20 MIN: CPT | Performed by: FAMILY MEDICINE

## 2020-03-09 NOTE — PROGRESS NOTES
Cefuroxime Axetil Hives    Biaxin [Clarithromycin] Rash    Sulfa Antibiotics Hives        Medical History:     Past Medical History:   Diagnosis Date    Hyperlipidemia     Hypertension     Seasonal allergies        Past Surgical History:   Procedure Laterality Date    COLONOSCOPY      COLONOSCOPY  03/10/2017    diverticulosis and [id number] and hyperplastic and adenoma . Has spasms ,redundant colon    JOINT REPLACEMENT      OVARY REMOVAL         Family History   Problem Relation Age of Onset    Cancer Brother         Social History:     Social History     Socioeconomic History    Marital status:      Spouse name: Not on file    Number of children: Not on file    Years of education: Not on file    Highest education level: Not on file   Occupational History    Not on file   Social Needs    Financial resource strain: Not on file    Food insecurity     Worry: Not on file     Inability: Not on file    Transportation needs     Medical: Not on file     Non-medical: Not on file   Tobacco Use    Smoking status: Never Smoker    Smokeless tobacco: Never Used   Substance and Sexual Activity    Alcohol use:  Yes     Alcohol/week: 0.0 standard drinks     Comment: social    Drug use: No    Sexual activity: Not on file   Lifestyle    Physical activity     Days per week: Not on file     Minutes per session: Not on file    Stress: Not on file   Relationships    Social connections     Talks on phone: Not on file     Gets together: Not on file     Attends Orthodoxy service: Not on file     Active member of club or organization: Not on file     Attends meetings of clubs or organizations: Not on file     Relationship status: Not on file    Intimate partner violence     Fear of current or ex partner: Not on file     Emotionally abused: Not on file     Physically abused: Not on file     Forced sexual activity: Not on file   Other Topics Concern    Not on file   Social History Narrative    Comment:    LDL Guidelines:     <100    Desirable   100-129   Near to/above Desirable   130-159   Borderline      >159   Undesirable     Direct (measured) LDL and calculated LDL are not interchangeable tests.  Chol/HDL Ratio 04/02/2019 2.0  <5 Final            Triglycerides 04/02/2019 59  <150 mg/dL Final    Comment:    Triglyceride Guidelines:     <150   Desirable   150-199  Borderline   200-499  High     >499   Very high   Based on AHA Guidelines for fasting triglyceride, October 2012.  VLDL 04/02/2019 NOT REPORTED  1 - 30 mg/dL Final    Glucose 04/02/2019 82  70 - 99 mg/dL Final    BUN 04/02/2019 16  8 - 23 mg/dL Final    CREATININE 04/02/2019 0.48* 0.50 - 0.90 mg/dL Final    Bun/Cre Ratio 04/02/2019 NOT REPORTED  9 - 20 Final    Calcium 04/02/2019 9.5  8.6 - 10.4 mg/dL Final    Sodium 04/02/2019 141  135 - 144 mmol/L Final    Potassium 04/02/2019 4.1  3.7 - 5.3 mmol/L Final    Chloride 04/02/2019 105  98 - 107 mmol/L Final    CO2 04/02/2019 23  20 - 31 mmol/L Final    Anion Gap 04/02/2019 13  9 - 17 mmol/L Final    Alkaline Phosphatase 04/02/2019 52  35 - 104 U/L Final    ALT 04/02/2019 17  5 - 33 U/L Final    AST 04/02/2019 31  <32 U/L Final    Total Bilirubin 04/02/2019 0.46  0.3 - 1.2 mg/dL Final    Total Protein 04/02/2019 7.0  6.4 - 8.3 g/dL Final    Alb 04/02/2019 4.5  3.5 - 5.2 g/dL Final    Albumin/Globulin Ratio 04/02/2019 1.8  1.0 - 2.5 Final    GFR Non- 04/02/2019 >60  >60 mL/min Final    GFR  04/02/2019 >60  >60 mL/min Final    GFR Comment 04/02/2019        Final    Comment: Average GFR for 61-76 years old:   80 mL/min/1.73sq m  Chronic Kidney Disease:   <60 mL/min/1.73sq m  Kidney failure:   <15 mL/min/1.73sq m              eGFR calculated using average adult body mass.  Additional eGFR calculator available at:        [email or web address]            GFR Staging 04/02/2019 NOT REPORTED   Final    WBC 04/02/2019 3.2* 3.5 - 11.3 k/uL Final    RBC 04/02/2019 3.51* 3.95 - 5.11 m/uL Final    Hemoglobin 04/02/2019 11.4* 11.9 - 15.1 g/dL Final    Hematocrit 04/02/2019 35.2* 36.3 - 47.1 % Final    MCV 04/02/2019 100.3  82.6 - 102.9 fL Final    MCH 04/02/2019 32.5  25.2 - 33.5 pg Final    MCHC 04/02/2019 32.4  28.4 - 34.8 g/dL Final    RDW 04/02/2019 19.0* 11.8 - 14.4 % Final    Platelets 01/07/1774 See Reflexed IPF Result  138 - 453 k/uL Final    MPV 04/02/2019 NOT REPORTED  8.1 - 13.5 fL Final    NRBC Automated 04/02/2019 0.0  0.0 per 100 WBC Final    Differential Type 04/02/2019 NOT REPORTED   Final    WBC Morphology 04/02/2019 NOT REPORTED   Final    RBC Morphology 04/02/2019 ANISOCYTOSIS PRESENT   Final    Platelet Estimate 92/34/0520 NOT REPORTED   Final    Seg Neutrophils 04/02/2019 46  36 - 65 % Final    Lymphocytes 04/02/2019 36  24 - 43 % Final    Monocytes 04/02/2019 15* 3 - 12 % Final    Eosinophils % 04/02/2019 3  1 - 4 % Final    Basophils 04/02/2019 1  0 - 2 % Final    Immature Granulocytes 04/02/2019 1* 0 % Final    Segs Absolute 04/02/2019 1.44* 1.50 - 8.10 k/uL Final    Absolute Lymph # 04/02/2019 1.14  1.10 - 3.70 k/uL Final    Absolute Mono # 04/02/2019 0.47  0.10 - 1.20 k/uL Final    Absolute Eos # 04/02/2019 0.08  0.00 - 0.44 k/uL Final    Basophils Absolute 04/02/2019 <0.03  0.00 - 0.20 k/uL Final    Absolute Immature Granulocyte 04/02/2019 <0.03  0.00 - 0.30 k/uL Final    Platelet, Immature Fraction 04/02/2019 9.9  1.1 - 10.3 % Final    Platelet, Fluorescence 04/02/2019 79* 138 - 453 k/uL Final       No results found for this visit on 03/09/20. Assessment/Plan:     Valentina De Los Santos was seen today for pharyngitis, fever and fatigue. Diagnoses and all orders for this visit:    Sore throat    Viral URI    Non-toxic on exam. Very mild symptoms. Suspect viral URI. Conservative measures discussed. OTC therapy recommended.     All questions answered and addressed to

## 2020-04-01 ENCOUNTER — TELEPHONE (OUTPATIENT)
Dept: ADMINISTRATIVE | Age: 70
End: 2020-04-01

## 2020-04-02 ENCOUNTER — TELEMEDICINE (OUTPATIENT)
Dept: FAMILY MEDICINE CLINIC | Age: 70
End: 2020-04-02
Payer: MEDICARE

## 2020-04-02 PROCEDURE — 99214 OFFICE O/P EST MOD 30 MIN: CPT | Performed by: FAMILY MEDICINE

## 2020-04-03 ENCOUNTER — HOSPITAL ENCOUNTER (OUTPATIENT)
Age: 70
Setting detail: SPECIMEN
Discharge: HOME | End: 2020-04-03
Payer: MEDICARE

## 2020-04-03 ENCOUNTER — HOSPITAL ENCOUNTER (INPATIENT)
Age: 70
LOS: 3 days | Discharge: HOME | DRG: 804 | End: 2020-04-06
Attending: EMERGENCY MEDICINE | Admitting: FAMILY MEDICINE
Payer: MEDICARE

## 2020-04-03 ENCOUNTER — APPOINTMENT (OUTPATIENT)
Dept: GENERAL RADIOLOGY | Age: 70
DRG: 804 | End: 2020-04-03
Payer: MEDICARE

## 2020-04-03 PROBLEM — D61.818 PANCYTOPENIA (HCC): Status: ACTIVE | Noted: 2020-04-03

## 2020-04-03 LAB
-: NORMAL
ABSOLUTE EOS #: 0 K/UL (ref 0–0.4)
ABSOLUTE EOS #: 0 K/UL (ref 0–0.4)
ABSOLUTE IMMATURE GRANULOCYTE: 0 K/UL (ref 0–0.3)
ABSOLUTE IMMATURE GRANULOCYTE: 0.01 K/UL (ref 0–0.3)
ABSOLUTE LYMPH #: 0.71 K/UL (ref 1–4.8)
ABSOLUTE LYMPH #: 0.78 K/UL (ref 1–4.8)
ABSOLUTE MONO #: 0.02 K/UL (ref 0.1–0.8)
ABSOLUTE MONO #: 0.04 K/UL (ref 0.2–0.8)
ABSOLUTE RETIC #: 0.02 M/UL (ref 0.03–0.08)
ALBUMIN SERPL-MCNC: 4 G/DL (ref 3.5–5.2)
ALBUMIN SERPL-MCNC: 4.2 G/DL (ref 3.5–5.2)
ALBUMIN/GLOBULIN RATIO: 1.4 (ref 1–2.5)
ALBUMIN/GLOBULIN RATIO: NORMAL (ref 1–2.5)
ALP BLD-CCNC: 53 U/L (ref 35–104)
ALP BLD-CCNC: 53 U/L (ref 35–104)
ALT SERPL-CCNC: 15 U/L (ref 5–33)
ALT SERPL-CCNC: 16 U/L (ref 5–33)
AMORPHOUS: NORMAL
ANION GAP SERPL CALCULATED.3IONS-SCNC: 11 MMOL/L (ref 9–17)
ANION GAP SERPL CALCULATED.3IONS-SCNC: 14 MMOL/L (ref 9–17)
AST SERPL-CCNC: 17 U/L
AST SERPL-CCNC: 18 U/L
BACTERIA: NORMAL
BASOPHILS # BLD: 0 %
BASOPHILS # BLD: 0 % (ref 0–2)
BASOPHILS ABSOLUTE: 0 K/UL (ref 0–0.2)
BASOPHILS ABSOLUTE: 0 K/UL (ref 0–0.2)
BILIRUB SERPL-MCNC: 0.32 MG/DL (ref 0.3–1.2)
BILIRUB SERPL-MCNC: 0.46 MG/DL (ref 0.3–1.2)
BILIRUBIN DIRECT: 0.11 MG/DL
BILIRUBIN URINE: NEGATIVE
BILIRUBIN, INDIRECT: 0.21 MG/DL (ref 0–1)
BUN BLDV-MCNC: 17 MG/DL (ref 8–23)
BUN BLDV-MCNC: 21 MG/DL (ref 8–23)
BUN/CREAT BLD: 41 (ref 9–20)
BUN/CREAT BLD: ABNORMAL (ref 9–20)
CALCIUM SERPL-MCNC: 9.3 MG/DL (ref 8.6–10.4)
CALCIUM SERPL-MCNC: 9.3 MG/DL (ref 8.6–10.4)
CASTS UA: NORMAL /LPF
CHLORIDE BLD-SCNC: 101 MMOL/L (ref 98–107)
CHLORIDE BLD-SCNC: 103 MMOL/L (ref 98–107)
CHOLESTEROL/HDL RATIO: 2.2
CHOLESTEROL: 132 MG/DL
CO2: 22 MMOL/L (ref 20–31)
CO2: 24 MMOL/L (ref 20–31)
COLOR: YELLOW
COMMENT UA: ABNORMAL
CREAT SERPL-MCNC: 0.49 MG/DL (ref 0.5–0.9)
CREAT SERPL-MCNC: 0.51 MG/DL (ref 0.5–0.9)
CRYSTALS, UA: NORMAL /HPF
DIFFERENTIAL TYPE: ABNORMAL
DIFFERENTIAL TYPE: ABNORMAL
EKG ATRIAL RATE: 78 BPM
EKG P AXIS: 44 DEGREES
EKG P-R INTERVAL: 160 MS
EKG Q-T INTERVAL: 378 MS
EKG QRS DURATION: 90 MS
EKG QTC CALCULATION (BAZETT): 430 MS
EKG R AXIS: 17 DEGREES
EKG T AXIS: 39 DEGREES
EKG VENTRICULAR RATE: 78 BPM
EOSINOPHILS RELATIVE PERCENT: 0 % (ref 1–4)
EOSINOPHILS RELATIVE PERCENT: 0 % (ref 1–4)
EPITHELIAL CELLS UA: NORMAL /HPF (ref 0–5)
FERRITIN: 419 UG/L (ref 13–150)
FOLATE: >20 NG/ML
GFR AFRICAN AMERICAN: >60 ML/MIN
GFR AFRICAN AMERICAN: >60 ML/MIN
GFR NON-AFRICAN AMERICAN: >60 ML/MIN
GFR NON-AFRICAN AMERICAN: >60 ML/MIN
GFR SERPL CREATININE-BSD FRML MDRD: ABNORMAL ML/MIN/{1.73_M2}
GLOBULIN: NORMAL G/DL (ref 1.5–3.8)
GLUCOSE BLD-MCNC: 103 MG/DL (ref 70–99)
GLUCOSE BLD-MCNC: 132 MG/DL (ref 70–99)
GLUCOSE URINE: NEGATIVE
HCT VFR BLD CALC: 20.5 % (ref 36.3–47.1)
HCT VFR BLD CALC: 22.4 % (ref 36.3–47.1)
HCT VFR BLD CALC: 23.1 % (ref 36.3–47.1)
HDLC SERPL-MCNC: 60 MG/DL
HEMOGLOBIN: 6.9 G/DL (ref 11.9–15.1)
HEMOGLOBIN: 7.3 G/DL (ref 11.9–15.1)
HEMOGLOBIN: 7.3 G/DL (ref 11.9–15.1)
IMMATURE GRANULOCYTES: 0 %
IMMATURE GRANULOCYTES: 1 %
IMMATURE RETIC FRACT: 12.9 % (ref 2.7–18.3)
INR BLD: 1
IRON SATURATION: 71 % (ref 20–55)
IRON: 187 UG/DL (ref 37–145)
KETONES, URINE: NEGATIVE
LACTATE DEHYDROGENASE: 228 U/L (ref 135–214)
LACTIC ACID: 0.6 MMOL/L (ref 0.5–2.2)
LDL CHOLESTEROL: 63 MG/DL (ref 0–130)
LEUKOCYTE ESTERASE, URINE: NEGATIVE
LYMPHOCYTES # BLD: 78 % (ref 24–44)
LYMPHOCYTES # BLD: 79 % (ref 24–44)
MCH RBC QN AUTO: 30.9 PG (ref 25.2–33.5)
MCH RBC QN AUTO: 31.6 PG (ref 25.2–33.5)
MCHC RBC AUTO-ENTMCNC: 31.6 G/DL (ref 28.4–34.8)
MCHC RBC AUTO-ENTMCNC: 32.6 G/DL (ref 28.4–34.8)
MCV RBC AUTO: 97 FL (ref 82.6–102.9)
MCV RBC AUTO: 97.9 FL (ref 82.6–102.9)
MONOCYTES # BLD: 2 % (ref 1–7)
MONOCYTES # BLD: 4 % (ref 1–7)
MORPHOLOGY: ABNORMAL
MUCUS: NORMAL
NITRITE, URINE: NEGATIVE
NRBC AUTOMATED: 0 PER 100 WBC
NRBC AUTOMATED: 0 PER 100 WBC
NUCLEATED RED BLOOD CELLS: 1 PER 100 WBC
OTHER OBSERVATIONS UA: NORMAL
PARTIAL THROMBOPLASTIN TIME: 24.9 SEC (ref 23–31)
PDW BLD-RTO: 19.8 % (ref 11.8–14.4)
PDW BLD-RTO: 19.9 % (ref 11.8–14.4)
PH UA: 7 (ref 5–8)
PLATELET # BLD: ABNORMAL K/UL (ref 138–453)
PLATELET # BLD: ABNORMAL K/UL (ref 138–453)
PLATELET ESTIMATE: ABNORMAL
PLATELET ESTIMATE: ABNORMAL
PLATELET, FLUORESCENCE: 10 K/UL (ref 138–453)
PLATELET, FLUORESCENCE: 12 K/UL (ref 138–453)
PLATELET, IMMATURE FRACTION: 8 % (ref 1.1–10.3)
PLATELET, IMMATURE FRACTION: 9.4 % (ref 1.1–10.3)
PMV BLD AUTO: ABNORMAL FL (ref 8.1–13.5)
PMV BLD AUTO: ABNORMAL FL (ref 8.1–13.5)
POTASSIUM SERPL-SCNC: 3.9 MMOL/L (ref 3.7–5.3)
POTASSIUM SERPL-SCNC: 4 MMOL/L (ref 3.7–5.3)
PROTEIN UA: NEGATIVE
PROTHROMBIN TIME: 10.5 SEC (ref 9.7–11.6)
RBC # BLD: 2.31 M/UL (ref 3.95–5.11)
RBC # BLD: 2.36 M/UL (ref 3.95–5.11)
RBC # BLD: ABNORMAL 10*6/UL
RBC # BLD: ABNORMAL 10*6/UL
RBC UA: NORMAL /HPF (ref 0–2)
RENAL EPITHELIAL, UA: NORMAL /HPF
RETIC %: 0.9 % (ref 0.5–1.9)
RETIC HEMOGLOBIN: 36.2 PG (ref 28.2–35.7)
SEG NEUTROPHILS: 16 % (ref 36–66)
SEG NEUTROPHILS: 20 % (ref 36–66)
SEGMENTED NEUTROPHILS ABSOLUTE COUNT: 0.14 K/UL (ref 1.8–7.7)
SEGMENTED NEUTROPHILS ABSOLUTE COUNT: 0.2 K/UL (ref 1.8–7.7)
SODIUM BLD-SCNC: 137 MMOL/L (ref 135–144)
SODIUM BLD-SCNC: 138 MMOL/L (ref 135–144)
SPECIFIC GRAVITY UA: 1.01 (ref 1–1.03)
TOTAL IRON BINDING CAPACITY: 263 UG/DL (ref 250–450)
TOTAL PROTEIN: 6.9 G/DL (ref 6.4–8.3)
TOTAL PROTEIN: 7.1 G/DL (ref 6.4–8.3)
TRICHOMONAS: NORMAL
TRIGL SERPL-MCNC: 43 MG/DL
TSH SERPL DL<=0.05 MIU/L-ACNC: 2.07 MIU/L (ref 0.3–5)
TURBIDITY: CLEAR
UNSATURATED IRON BINDING CAPACITY: 76 UG/DL (ref 112–347)
URINE HGB: ABNORMAL
UROBILINOGEN, URINE: NORMAL
VITAMIN B-12: 458 PG/ML (ref 232–1245)
VLDLC SERPL CALC-MCNC: NORMAL MG/DL (ref 1–30)
WBC # BLD: 0.9 K/UL (ref 3.5–11.3)
WBC # BLD: 1 K/UL (ref 3.5–11.3)
WBC # BLD: ABNORMAL 10*3/UL
WBC # BLD: ABNORMAL 10*3/UL
WBC UA: NORMAL /HPF (ref 0–5)
YEAST: NORMAL

## 2020-04-03 PROCEDURE — 80048 BASIC METABOLIC PNL TOTAL CA: CPT

## 2020-04-03 PROCEDURE — 1200000000 HC SEMI PRIVATE

## 2020-04-03 PROCEDURE — 36415 COLL VENOUS BLD VENIPUNCTURE: CPT

## 2020-04-03 PROCEDURE — 86920 COMPATIBILITY TEST SPIN: CPT

## 2020-04-03 PROCEDURE — 82746 ASSAY OF FOLIC ACID SERUM: CPT

## 2020-04-03 PROCEDURE — 86850 RBC ANTIBODY SCREEN: CPT

## 2020-04-03 PROCEDURE — 85014 HEMATOCRIT: CPT

## 2020-04-03 PROCEDURE — 85018 HEMOGLOBIN: CPT

## 2020-04-03 PROCEDURE — 99223 1ST HOSP IP/OBS HIGH 75: CPT | Performed by: FAMILY MEDICINE

## 2020-04-03 PROCEDURE — 85730 THROMBOPLASTIN TIME PARTIAL: CPT

## 2020-04-03 PROCEDURE — 83615 LACTATE (LD) (LDH) ENZYME: CPT

## 2020-04-03 PROCEDURE — 93005 ELECTROCARDIOGRAM TRACING: CPT | Performed by: EMERGENCY MEDICINE

## 2020-04-03 PROCEDURE — 85055 RETICULATED PLATELET ASSAY: CPT

## 2020-04-03 PROCEDURE — 81001 URINALYSIS AUTO W/SCOPE: CPT

## 2020-04-03 PROCEDURE — 83540 ASSAY OF IRON: CPT

## 2020-04-03 PROCEDURE — 85045 AUTOMATED RETICULOCYTE COUNT: CPT

## 2020-04-03 PROCEDURE — 83550 IRON BINDING TEST: CPT

## 2020-04-03 PROCEDURE — 71046 X-RAY EXAM CHEST 2 VIEWS: CPT

## 2020-04-03 PROCEDURE — 82607 VITAMIN B-12: CPT

## 2020-04-03 PROCEDURE — 83605 ASSAY OF LACTIC ACID: CPT

## 2020-04-03 PROCEDURE — 86901 BLOOD TYPING SEROLOGIC RH(D): CPT

## 2020-04-03 PROCEDURE — 6370000000 HC RX 637 (ALT 250 FOR IP): Performed by: NURSE PRACTITIONER

## 2020-04-03 PROCEDURE — 99284 EMERGENCY DEPT VISIT MOD MDM: CPT

## 2020-04-03 PROCEDURE — 80076 HEPATIC FUNCTION PANEL: CPT

## 2020-04-03 PROCEDURE — 87040 BLOOD CULTURE FOR BACTERIA: CPT

## 2020-04-03 PROCEDURE — 85610 PROTHROMBIN TIME: CPT

## 2020-04-03 PROCEDURE — 86900 BLOOD TYPING SEROLOGIC ABO: CPT

## 2020-04-03 PROCEDURE — 82728 ASSAY OF FERRITIN: CPT

## 2020-04-03 PROCEDURE — 85025 COMPLETE CBC W/AUTO DIFF WBC: CPT

## 2020-04-03 RX ORDER — ACETAMINOPHEN 325 MG/1
650 TABLET ORAL EVERY 6 HOURS PRN
Status: DISCONTINUED | OUTPATIENT
Start: 2020-04-03 | End: 2020-04-06 | Stop reason: HOSPADM

## 2020-04-03 RX ORDER — SODIUM CHLORIDE 0.9 % (FLUSH) 0.9 %
10 SYRINGE (ML) INJECTION EVERY 12 HOURS SCHEDULED
Status: DISCONTINUED | OUTPATIENT
Start: 2020-04-03 | End: 2020-04-06 | Stop reason: HOSPADM

## 2020-04-03 RX ORDER — CETIRIZINE HYDROCHLORIDE 10 MG/1
10 TABLET ORAL DAILY
Status: DISCONTINUED | OUTPATIENT
Start: 2020-04-03 | End: 2020-04-06 | Stop reason: HOSPADM

## 2020-04-03 RX ORDER — POLYETHYLENE GLYCOL 3350 17 G/17G
17 POWDER, FOR SOLUTION ORAL DAILY PRN
Status: DISCONTINUED | OUTPATIENT
Start: 2020-04-03 | End: 2020-04-06 | Stop reason: HOSPADM

## 2020-04-03 RX ORDER — PRAVASTATIN SODIUM 40 MG
40 TABLET ORAL NIGHTLY
Status: DISCONTINUED | OUTPATIENT
Start: 2020-04-03 | End: 2020-04-06 | Stop reason: HOSPADM

## 2020-04-03 RX ORDER — MAGNESIUM SULFATE 1 G/100ML
1 INJECTION INTRAVENOUS PRN
Status: DISCONTINUED | OUTPATIENT
Start: 2020-04-03 | End: 2020-04-06 | Stop reason: HOSPADM

## 2020-04-03 RX ORDER — METOPROLOL TARTRATE 50 MG/1
50 TABLET, FILM COATED ORAL DAILY
Status: DISCONTINUED | OUTPATIENT
Start: 2020-04-03 | End: 2020-04-06 | Stop reason: HOSPADM

## 2020-04-03 RX ORDER — POTASSIUM CHLORIDE 7.45 MG/ML
10 INJECTION INTRAVENOUS PRN
Status: DISCONTINUED | OUTPATIENT
Start: 2020-04-03 | End: 2020-04-06 | Stop reason: HOSPADM

## 2020-04-03 RX ORDER — AZELASTINE 1 MG/ML
2 SPRAY, METERED NASAL 2 TIMES DAILY
Status: DISCONTINUED | OUTPATIENT
Start: 2020-04-03 | End: 2020-04-04

## 2020-04-03 RX ORDER — AMMONIUM LACTATE 12 G/100G
LOTION TOPICAL PRN
Status: DISCONTINUED | OUTPATIENT
Start: 2020-04-03 | End: 2020-04-06 | Stop reason: HOSPADM

## 2020-04-03 RX ORDER — NICOTINE 21 MG/24HR
1 PATCH, TRANSDERMAL 24 HOURS TRANSDERMAL DAILY PRN
Status: DISCONTINUED | OUTPATIENT
Start: 2020-04-03 | End: 2020-04-06 | Stop reason: HOSPADM

## 2020-04-03 RX ORDER — ESTRADIOL 1 MG/1
0.5 TABLET ORAL DAILY
Status: DISCONTINUED | OUTPATIENT
Start: 2020-04-03 | End: 2020-04-06 | Stop reason: HOSPADM

## 2020-04-03 RX ORDER — PROMETHAZINE HYDROCHLORIDE 12.5 MG/1
12.5 TABLET ORAL EVERY 6 HOURS PRN
Status: DISCONTINUED | OUTPATIENT
Start: 2020-04-03 | End: 2020-04-06 | Stop reason: HOSPADM

## 2020-04-03 RX ORDER — ACETAMINOPHEN 650 MG/1
650 SUPPOSITORY RECTAL EVERY 6 HOURS PRN
Status: DISCONTINUED | OUTPATIENT
Start: 2020-04-03 | End: 2020-04-06 | Stop reason: HOSPADM

## 2020-04-03 RX ORDER — SODIUM CHLORIDE 0.9 % (FLUSH) 0.9 %
10 SYRINGE (ML) INJECTION PRN
Status: DISCONTINUED | OUTPATIENT
Start: 2020-04-03 | End: 2020-04-06 | Stop reason: HOSPADM

## 2020-04-03 RX ORDER — POTASSIUM CHLORIDE 20 MEQ/1
40 TABLET, EXTENDED RELEASE ORAL PRN
Status: DISCONTINUED | OUTPATIENT
Start: 2020-04-03 | End: 2020-04-06 | Stop reason: HOSPADM

## 2020-04-03 RX ORDER — ONDANSETRON 2 MG/ML
4 INJECTION INTRAMUSCULAR; INTRAVENOUS EVERY 6 HOURS PRN
Status: DISCONTINUED | OUTPATIENT
Start: 2020-04-03 | End: 2020-04-06 | Stop reason: HOSPADM

## 2020-04-03 RX ADMIN — PRAVASTATIN SODIUM 40 MG: 40 TABLET ORAL at 21:46

## 2020-04-03 ASSESSMENT — ENCOUNTER SYMPTOMS
WHEEZING: 0
SHORTNESS OF BREATH: 0
SORE THROAT: 0
COUGH: 0
EYE DISCHARGE: 0
COLOR CHANGE: 0
DIARRHEA: 0
BLOOD IN STOOL: 0
CHEST TIGHTNESS: 0
CONSTIPATION: 0
VOMITING: 0
NAUSEA: 0
EYE REDNESS: 0
RHINORRHEA: 0
ABDOMINAL PAIN: 0

## 2020-04-03 ASSESSMENT — PAIN SCALES - GENERAL: PAINLEVEL_OUTOF10: 0

## 2020-04-03 NOTE — PROGRESS NOTES
Patient admitted to  2011 from ED. Ambulatory to bed and alert and oriented x 4. Oriented to room and use of call light.

## 2020-04-03 NOTE — ED PROVIDER NOTES
diverticulosis and kthyfl-cafykttfn-tcwwxoy and hyperplastic and adenoma . Has spasms ,redundant colon    JOINT REPLACEMENT      OVARY REMOVAL       CURRENT MEDICATIONS       Previous Medications    AMMONIUM LACTATE (AMLACTIN) 12 % CREAM    APPLY AFTER SHOWERING TO SKIN FOR DRYNESS    AZELASTINE (ASTELIN) 137 MCG/SPRAY NASAL SPRAY    2 sprays by Nasal route 2 times daily. Use in each nostril as directed     CETIRIZINE (ZYRTEC ALLERGY) 10 MG TABLET    Take 10 mg by mouth daily    DOXYCYCLINE MONOHYDRATE (MONODOX) 100 MG CAPSULE    Take 1 capsule by mouth 2 times daily (with meals) Avoid calcium, mtv's and dairy 2 hours before and after    ESTRADIOL (ESTRACE) 1 MG TABLET    Take 0.5 mg by mouth daily     FEXOFENADINE (ALLEGRA) 180 MG TABLET    Take 180 mg by mouth daily. MELOXICAM (MOBIC) 15 MG TABLET    Take 1 tablet by mouth daily as needed for Pain    METOPROLOL TARTRATE (LOPRESSOR) 50 MG TABLET    50mg once a day    MULTIPLE VITAMINS-MINERALS (ICAPS AREDS 2 PO)    Take by mouth daily    PRAVASTATIN (PRAVACHOL) 40 MG TABLET    TAKE 1 TABLET DAILY    VITAMIN D (CHOLECALCIFEROL) 1000 UNITS CAPS CAPSULE    Take 2 capsules by mouth daily. ALLERGIES     is allergic to amoxicillin-pot clavulanate; cefuroxime axetil; biaxin [clarithromycin]; and sulfa antibiotics. FAMILY HISTORY     She indicated that her mother is . She indicated that her father is . She indicated that her brother is . SOCIAL HISTORY       Social History     Tobacco Use    Smoking status: Never Smoker    Smokeless tobacco: Never Used   Substance Use Topics    Alcohol use: Yes     Alcohol/week: 0.0 standard drinks     Comment: social    Drug use: No     PHYSICAL EXAM     INITIAL VITALS: BP (!) 150/72   Pulse 76   Temp 99.2 °F (37.3 °C) (Oral)   Resp 14   Ht 5' 10\" (1.778 m)   Wt 194 lb (88 kg)   SpO2 98%   BMI 27.84 kg/m²    Physical Exam  Constitutional:       Appearance: Normal appearance.  She is well-developed. She is not ill-appearing or toxic-appearing. HENT:      Head: Normocephalic and atraumatic. Eyes:      Conjunctiva/sclera: Conjunctivae normal.      Pupils: Pupils are equal, round, and reactive to light. Neck:      Musculoskeletal: Normal range of motion and neck supple. Trachea: Trachea normal.   Cardiovascular:      Rate and Rhythm: Normal rate and regular rhythm. Heart sounds: S1 normal and S2 normal. No murmur. Pulmonary:      Effort: Pulmonary effort is normal. No accessory muscle usage or respiratory distress. Breath sounds: Normal breath sounds. Chest:      Chest wall: No deformity or tenderness. Abdominal:      General: Bowel sounds are normal. There is no distension or abdominal bruit. Palpations: Abdomen is not rigid. Tenderness: There is no abdominal tenderness. There is no guarding or rebound. Skin:     General: Skin is warm. Findings: No rash. Neurological:      Mental Status: She is alert and oriented to person, place, and time. GCS: GCS eye subscore is 4. GCS verbal subscore is 5. GCS motor subscore is 6. Psychiatric:         Speech: Speech normal.         MEDICAL DECISION MAKIN-year-old female presents with complaints of abnormal labs. Plan is recheck labs, we will add some further laboratory studies and reevaluate. 4:44 PM EDT  Discussed with the admitting physician who will place orders and obtain an hematology oncology consult. CRITICAL CARE:       PROCEDURES:    Procedures    DIAGNOSTIC RESULTS   EKG:All EKG's are interpreted by the Emergency Department Physician who either signs or Co-signs this chart in the absence of a cardiologist.        RADIOLOGY:All plain film, CT, MRI, and formal ultrasound images (except ED bedside ultrasound) are read by the radiologist, see reports below, unless otherwisenoted in MDM or here.   No orders to display     LABS: All lab results were reviewed by myself, and all abnormals are listed below. Labs Reviewed   CBC WITH AUTO DIFFERENTIAL - Abnormal; Notable for the following components:       Result Value    WBC 0.9 (*)     RBC 2.31 (*)     Hemoglobin 7.3 (*)     Hematocrit 22.4 (*)     RDW 19.9 (*)     Seg Neutrophils 16 (*)     Lymphocytes 79 (*)     Eosinophils % 0 (*)     Immature Granulocytes 1 (*)     Segs Absolute 0.14 (*)     Absolute Lymph # 0.71 (*)     Absolute Mono # 0.04 (*)     All other components within normal limits   BASIC METABOLIC PANEL - Abnormal; Notable for the following components:    Glucose 132 (*)     Bun/Cre Ratio 41 (*)     All other components within normal limits   IMMATURE PLATELET FRACTION - Abnormal; Notable for the following components:    Platelet, Fluorescence 12 (*)     All other components within normal limits   APTT   PROTIME-INR   HEPATIC FUNCTION PANEL   TYPE AND SCREEN       EMERGENCY DEPARTMENTCOURSE:         Vitals:    Vitals:    04/03/20 1558 04/03/20 1600 04/03/20 1609 04/03/20 1612   BP:       Pulse: 78 79 76 76   Resp: 11 15  14   Temp:       TempSrc:       SpO2: 98% 98%  98%   Weight:       Height:           The patient was given the following medications while in the emergency department:  No orders of the defined types were placed in this encounter. CONSULTS:  IP CONSULT TO HOSPITALIST    FINAL IMPRESSION      1. Pancytopenia [facility])          DISPOSITION/PLAN   DISPOSITION Decision To Admit 04/03/2020 04:44:15 PM      PATIENT REFERRED TO:  No follow-up provider specified.   DISCHARGE MEDICATIONS:  New Prescriptions    No medications on file     Karena Granger MD  Attending Emergency Physician                   Karena Granger MD  04/03/20 6211

## 2020-04-03 NOTE — H&P
[address]      HISTORY AND PHYSICAL EXAMINATION            Date:   4/3/2020  Patient name:  Yessenia Restrepo  Date of admission:  4/3/2020  3:20 PM  MRN:   [id number]  Account:  [de-identified]  YOB: 1950  PCP:    Crystal James MD  Room:   [place] [address]  Code Status:    No Order Full Code     Chief Complaint:     Chief Complaint   Patient presents with    Labs Only     abnormal CBC sent by PCP       History Obtained From:     Patient, Electronic medical record    History of Present Illness: The patient is a 78 y.o. Non-/non  female who presents with Labs Only (abnormal CBC sent by PCP)   and she is admitted to the hospital for the management of  Pancytopenia ([facility] [address] Patient was sent to emergency room by PCP after blood work showed pancytopenia. Blood work was done for routine annual visit. Patient had no major symptoms. Retrospectively she thinks that she was feeling fatigue, palpitations. She also noticed some skin rash for last few days. Patient denies any weight loss, fever, chills, cough, sore throat. She has underlying history of hypertension, hyperlipidemia, seasonal allergies. Patient is on metoprolol, pravastatin. She also takes multivitamin supplements and \"cure A Med\" turmeric supplement which she buys online. Past Medical History:     Past Medical History:   Diagnosis Date    Hyperlipidemia     Hypertension     Seasonal allergies         Past Surgical History:     Past Surgical History:   Procedure Laterality Date    COLONOSCOPY      COLONOSCOPY  03/10/2017    diverticulosis and recwjl-wkwswfjrv-lmtygww and hyperplastic and adenoma . Has spasms ,redundant colon    JOINT REPLACEMENT      OVARY REMOVAL          Medications Prior to Admission:     Prior to Admission medications    Medication Sig Start Date End Date Taking?  Authorizing Provider   cetirizine (ZYRTEC ALLERGY) 10 MG tablet Take 10 mg by mouth daily   Yes Historical and wheezing. Cardiovascular: Negative for chest pain and palpitations. Gastrointestinal: Negative for abdominal pain, blood in stool, constipation, diarrhea, nausea and vomiting. Genitourinary: Negative for dysuria, enuresis, frequency and hematuria. Musculoskeletal: Negative for arthralgias and myalgias. Skin: Positive for rash. Neurological: Negative for dizziness, weakness, light-headedness and headaches. Hematological: Does not bruise/bleed easily. Psychiatric/Behavioral: Negative for dysphoric mood and sleep disturbance. Physical Exam:   BP (!) 150/72   Pulse 76   Temp 99.2 °F (37.3 °C) (Oral)   Resp 14   Ht 5' 10\" (1.778 m)   Wt 194 lb (88 kg)   SpO2 98%   BMI 27.84 kg/m²   Temp (24hrs), Av.2 °F (37.3 °C), Min:99.2 °F (37.3 °C), Max:99.2 °F (37.3 °C)    No results for input(s): POCGLU in the last 72 hours. No intake or output data in the 24 hours ending 20 0092  Physical Exam  Vitals signs and nursing note reviewed. Constitutional:       General: She is not in acute distress. Appearance: She is not diaphoretic. HENT:      Head: Normocephalic and atraumatic. Nose:      Right Sinus: No maxillary sinus tenderness or frontal sinus tenderness. Left Sinus: No maxillary sinus tenderness or frontal sinus tenderness. Mouth/Throat:      Pharynx: No oropharyngeal exudate. Eyes:      General: No scleral icterus. Conjunctiva/sclera: Conjunctivae normal.      Pupils: Pupils are equal, round, and reactive to light. Comments: Palor    Neck:      Musculoskeletal: Full passive range of motion without pain and neck supple. Thyroid: No thyromegaly. Vascular: No JVD. Cardiovascular:      Rate and Rhythm: Normal rate and regular rhythm. Pulses:           Dorsalis pedis pulses are 2+ on the right side and 2+ on the left side. Heart sounds: Normal heart sounds. No murmur.    Pulmonary:      Effort: Pulmonary effort is normal.      Breath Absolute Lymph # 0.78 (L) 1.0 - 4.8 k/uL    Absolute Mono # 0.02 (L) 0.1 - 0.8 k/uL    Absolute Eos # 0.00 0.0 - 0.4 k/uL    Basophils Absolute 0.00 0.0 - 0.2 k/uL    Morphology ANISOCYTOSIS PRESENT     Morphology 1+ SCHISTOCYTES     Morphology 1+ ELLIPTOCYTES    Comprehensive Metabolic Panel    Collection Time: 04/03/20  8:19 AM   Result Value Ref Range    Glucose 103 (H) 70 - 99 mg/dL    BUN 17 8 - 23 mg/dL    CREATININE 0.49 (L) 0.50 - 0.90 mg/dL    Bun/Cre Ratio NOT REPORTED 9 - 20    Calcium 9.3 8.6 - 10.4 mg/dL    Sodium 138 135 - 144 mmol/L    Potassium 4.0 3.7 - 5.3 mmol/L    Chloride 103 98 - 107 mmol/L    CO2 24 20 - 31 mmol/L    Anion Gap 11 9 - 17 mmol/L    Alkaline Phosphatase 53 35 - 104 U/L    ALT 16 5 - 33 U/L    AST 17 <32 U/L    Total Bilirubin 0.46 0.3 - 1.2 mg/dL    Total Protein 6.9 6.4 - 8.3 g/dL    Alb 4.0 3.5 - 5.2 g/dL    Albumin/Globulin Ratio 1.4 1.0 - 2.5    GFR Non-African American >60 >60 mL/min    GFR African American >60 >60 mL/min    GFR Comment          GFR Staging NOT REPORTED    Immature Platelet Fraction    Collection Time: 04/03/20  8:19 AM   Result Value Ref Range    Platelet, Immature Fraction 9.4 1.1 - 10.3 %    Platelet, Fluorescence 10 (LL) 138 - 453 k/uL   EKG 12 Lead    Collection Time: 04/03/20  3:36 PM   Result Value Ref Range    Ventricular Rate 78 BPM    Atrial Rate 78 BPM    P-R Interval 160 ms    QRS Duration 90 ms    Q-T Interval 378 ms    QTc Calculation (Bazett) 430 ms    P Axis 44 degrees    R Axis 17 degrees    T Axis 39 degrees   APTT    Collection Time: 04/03/20  3:45 PM   Result Value Ref Range    PTT 24.9 23 - 31 sec   Protime-INR    Collection Time: 04/03/20  3:45 PM   Result Value Ref Range    Protime 10.5 9.7 - 11.6 sec    INR 1.0    TYPE AND SCREEN    Collection Time: 04/03/20  3:45 PM   Result Value Ref Range    Expiration Date 04/06/2020,2359     Arm Band Number BE [id number]     ABO/Rh A POSITIVE     Antibody Screen NEGATIVE    CBC Auto Differential Collection Time: 04/03/20  3:45 PM   Result Value Ref Range    WBC 0.9 (LL) 3.5 - 11.3 k/uL    RBC 2.31 (L) 3.95 - 5.11 m/uL    Hemoglobin 7.3 (L) 11.9 - 15.1 g/dL    Hematocrit 22.4 (L) 36.3 - 47.1 %    MCV 97.0 82.6 - 102.9 fL    MCH 31.6 25.2 - 33.5 pg    MCHC 32.6 28.4 - 34.8 g/dL    RDW 19.9 (H) 11.8 - 14.4 %    Platelets See Reflexed IPF Result 138 - 453 k/uL    MPV Abnormal 8.1 - 13.5 fL    NRBC Automated 0.0 0.0 per 100 WBC    Differential Type NOT REPORTED     WBC Morphology NOT REPORTED     RBC Morphology NOT REPORTED     Platelet Estimate NOT REPORTED     Seg Neutrophils 16 (L) 36 - 66 %    Lymphocytes 79 (H) 24 - 44 %    Monocytes 4 1 - 7 %    Eosinophils % 0 (L) 1 - 4 %    Basophils 0 %    Immature Granulocytes 1 (H) 0 %    Segs Absolute 0.14 (L) 1.8 - 7.7 k/uL    Absolute Lymph # 0.71 (L) 1.0 - 4.8 k/uL    Absolute Mono # 0.04 (L) 0.2 - 0.8 k/uL    Absolute Eos # 0.00 0.0 - 0.4 k/uL    Basophils Absolute 0.00 0.0 - 0.2 k/uL    Absolute Immature Granulocyte 0.01 0.00 - 0.30 k/uL    Morphology ANISOCYTOSIS PRESENT     Morphology 1+ SCHISTOCYTES    Basic Metabolic Panel    Collection Time: 04/03/20  3:45 PM   Result Value Ref Range    Glucose 132 (H) 70 - 99 mg/dL    BUN 21 8 - 23 mg/dL    CREATININE 0.51 0.50 - 0.90 mg/dL    Bun/Cre Ratio 41 (H) 9 - 20    Calcium 9.3 8.6 - 10.4 mg/dL    Sodium 137 135 - 144 mmol/L    Potassium 3.9 3.7 - 5.3 mmol/L    Chloride 101 98 - 107 mmol/L    CO2 22 20 - 31 mmol/L    Anion Gap 14 9 - 17 mmol/L    GFR Non-African American >60 >60 mL/min    GFR African American >60 >60 mL/min    GFR Comment          GFR Staging NOT REPORTED    Hepatic Function Panel    Collection Time: 04/03/20  3:45 PM   Result Value Ref Range    Alb 4.2 3.5 - 5.2 g/dL    Alkaline Phosphatase 53 35 - 104 U/L    ALT 15 5 - 33 U/L    AST 18 <32 U/L    Total Bilirubin 0.32 0.3 - 1.2 mg/dL    Bilirubin, Direct 0.11 <0.31 mg/dL    Bilirubin, Indirect 0.21 0.00 - 1.00 mg/dL    Total Protein 7.1 6.4 - 8.3 g/dL    Globulin NOT REPORTED 1.5 - 3.8 g/dL    Albumin/Globulin Ratio NOT REPORTED 1.0 - 2.5   Immature Platelet Fraction    Collection Time: 04/03/20  3:45 PM   Result Value Ref Range    Platelet, Immature Fraction 8.0 1.1 - 10.3 %    Platelet, Fluorescence 12 (LL) 138 - 453 k/uL       Imaging/Diagonstics:    Xr Chest Standard (2 Vw)    Result Date: 4/3/2020  No acute cardiopulmonary disease. Assessment :      Primary Problem  Pancytopenia [facility])    Active Hospital Problems    Diagnosis Date Noted    Pancytopenia [facility]) [D61.818] 04/03/2020    Hyperlipidemia [E78.5] 08/31/2015    HTN (hypertension) [I10] 05/16/2012       Plan:     Patient status Admit as inpatient in the  Med/Surge    1. Pancytopenia-check peripheral smear, reticulocyte count, B12, folate, lactic acid, LDH, urinalysis, chest x-ray, blood cultures. Monitor off antibiotics. Consult hematology. Neutropenic precautions. 2. Essential hypertension-continue metoprolol  3. Dyslipidemia on pravastatin. Consultations:   IP CONSULT TO HOSPITALIST  IP CONSULT TO ONCOLOGY    Patient is admitted as inpatient status because of co-morbidities listed above, severity of signs and symptoms as outlined, requirement for current medical therapies and most importantly because of direct risk to patient if care not provided in a hospital setting.     Saritha Urbano MD  4/3/2020    Copy sent to Dr. Ana Wise MD    (Please note that portions of this note were completed with a voice recognition program. Efforts were made to edit the dictations but occasionally words are mis-transcribed.)

## 2020-04-04 LAB
ANION GAP SERPL CALCULATED.3IONS-SCNC: 9 MMOL/L (ref 9–17)
BUN BLDV-MCNC: 14 MG/DL (ref 8–23)
BUN/CREAT BLD: ABNORMAL (ref 9–20)
C-REACTIVE PROTEIN: 7.3 MG/L (ref 0–5)
CALCIUM SERPL-MCNC: 8.6 MG/DL (ref 8.6–10.4)
CHLORIDE BLD-SCNC: 109 MMOL/L (ref 98–107)
CO2: 24 MMOL/L (ref 20–31)
CREAT SERPL-MCNC: <0.4 MG/DL (ref 0.5–0.9)
GFR AFRICAN AMERICAN: ABNORMAL ML/MIN
GFR NON-AFRICAN AMERICAN: ABNORMAL ML/MIN
GFR SERPL CREATININE-BSD FRML MDRD: ABNORMAL ML/MIN/{1.73_M2}
GFR SERPL CREATININE-BSD FRML MDRD: ABNORMAL ML/MIN/{1.73_M2}
GLUCOSE BLD-MCNC: 100 MG/DL (ref 70–99)
HCT VFR BLD CALC: 21.9 % (ref 36.3–47.1)
HCT VFR BLD CALC: 23 % (ref 36.3–47.1)
HCT VFR BLD CALC: 23.6 % (ref 36.3–47.1)
HCT VFR BLD CALC: 24.7 % (ref 36.3–47.1)
HEMOGLOBIN: 7.3 G/DL (ref 11.9–15.1)
HEMOGLOBIN: 7.6 G/DL (ref 11.9–15.1)
HEMOGLOBIN: 7.9 G/DL (ref 11.9–15.1)
HEMOGLOBIN: 8.3 G/DL (ref 11.9–15.1)
INR BLD: 1.1
MCH RBC QN AUTO: 31.6 PG (ref 25.2–33.5)
MCHC RBC AUTO-ENTMCNC: 33.3 G/DL (ref 28.4–34.8)
MCV RBC AUTO: 94.8 FL (ref 82.6–102.9)
NRBC AUTOMATED: 0 PER 100 WBC
PDW BLD-RTO: 18.7 % (ref 11.8–14.4)
PLATELET # BLD: ABNORMAL K/UL (ref 138–453)
PLATELET, FLUORESCENCE: 10 K/UL (ref 138–453)
PLATELET, IMMATURE FRACTION: 7.4 % (ref 1.1–10.3)
PMV BLD AUTO: ABNORMAL FL (ref 8.1–13.5)
POTASSIUM SERPL-SCNC: 4.1 MMOL/L (ref 3.7–5.3)
PROTHROMBIN TIME: 10.9 SEC (ref 9.7–11.6)
RBC # BLD: 2.31 M/UL (ref 3.95–5.11)
SODIUM BLD-SCNC: 142 MMOL/L (ref 135–144)
VITAMIN D 25-HYDROXY: 44.6 NG/ML (ref 30–100)
WBC # BLD: 1.1 K/UL (ref 3.5–11.3)

## 2020-04-04 PROCEDURE — 86140 C-REACTIVE PROTEIN: CPT

## 2020-04-04 PROCEDURE — 85018 HEMOGLOBIN: CPT

## 2020-04-04 PROCEDURE — 86078 PHYS BLOOD BANK SERV REACTJ: CPT

## 2020-04-04 PROCEDURE — 99223 1ST HOSP IP/OBS HIGH 75: CPT | Performed by: INTERNAL MEDICINE

## 2020-04-04 PROCEDURE — 6360000002 HC RX W HCPCS

## 2020-04-04 PROCEDURE — P9035 PLATELET PHERES LEUKOREDUCED: HCPCS

## 2020-04-04 PROCEDURE — P9040 RBC LEUKOREDUCED IRRADIATED: HCPCS

## 2020-04-04 PROCEDURE — 6370000000 HC RX 637 (ALT 250 FOR IP): Performed by: NURSE PRACTITIONER

## 2020-04-04 PROCEDURE — 86880 COOMBS TEST DIRECT: CPT

## 2020-04-04 PROCEDURE — 85014 HEMATOCRIT: CPT

## 2020-04-04 PROCEDURE — 80048 BASIC METABOLIC PNL TOTAL CA: CPT

## 2020-04-04 PROCEDURE — 30243R1 TRANSFUSION OF NONAUTOLOGOUS PLATELETS INTO CENTRAL VEIN, PERCUTANEOUS APPROACH: ICD-10-PCS | Performed by: FAMILY MEDICINE

## 2020-04-04 PROCEDURE — 6360000002 HC RX W HCPCS: Performed by: INTERNAL MEDICINE

## 2020-04-04 PROCEDURE — 86900 BLOOD TYPING SEROLOGIC ABO: CPT

## 2020-04-04 PROCEDURE — 85055 RETICULATED PLATELET ASSAY: CPT

## 2020-04-04 PROCEDURE — 1200000000 HC SEMI PRIVATE

## 2020-04-04 PROCEDURE — 85610 PROTHROMBIN TIME: CPT

## 2020-04-04 PROCEDURE — 36430 TRANSFUSION BLD/BLD COMPNT: CPT

## 2020-04-04 PROCEDURE — 99232 SBSQ HOSP IP/OBS MODERATE 35: CPT | Performed by: FAMILY MEDICINE

## 2020-04-04 PROCEDURE — 85027 COMPLETE CBC AUTOMATED: CPT

## 2020-04-04 PROCEDURE — 36415 COLL VENOUS BLD VENIPUNCTURE: CPT

## 2020-04-04 PROCEDURE — 82306 VITAMIN D 25 HYDROXY: CPT

## 2020-04-04 RX ORDER — AZELASTINE 1 MG/ML
2 SPRAY, METERED NASAL 2 TIMES DAILY PRN
Status: DISCONTINUED | OUTPATIENT
Start: 2020-04-04 | End: 2020-04-06 | Stop reason: HOSPADM

## 2020-04-04 RX ORDER — 0.9 % SODIUM CHLORIDE 0.9 %
20 INTRAVENOUS SOLUTION INTRAVENOUS ONCE
Status: DISCONTINUED | OUTPATIENT
Start: 2020-04-04 | End: 2020-04-05

## 2020-04-04 RX ORDER — DIPHENHYDRAMINE HYDROCHLORIDE 50 MG/ML
INJECTION INTRAMUSCULAR; INTRAVENOUS
Status: COMPLETED
Start: 2020-04-04 | End: 2020-04-04

## 2020-04-04 RX ORDER — DEXAMETHASONE SODIUM PHOSPHATE 10 MG/ML
10 INJECTION INTRAMUSCULAR; INTRAVENOUS ONCE
Status: COMPLETED | OUTPATIENT
Start: 2020-04-04 | End: 2020-04-04

## 2020-04-04 RX ORDER — DIPHENHYDRAMINE HYDROCHLORIDE 50 MG/ML
25 INJECTION INTRAMUSCULAR; INTRAVENOUS ONCE
Status: COMPLETED | OUTPATIENT
Start: 2020-04-04 | End: 2020-04-04

## 2020-04-04 RX ADMIN — CETIRIZINE HYDROCHLORIDE 10 MG: 10 TABLET, FILM COATED ORAL at 08:48

## 2020-04-04 RX ADMIN — ESTRADIOL 0.5 MG: 1 TABLET ORAL at 08:48

## 2020-04-04 RX ADMIN — DIPHENHYDRAMINE HYDROCHLORIDE 25 MG: 50 INJECTION INTRAMUSCULAR; INTRAVENOUS at 13:24

## 2020-04-04 RX ADMIN — PRAVASTATIN SODIUM 40 MG: 40 TABLET ORAL at 20:08

## 2020-04-04 RX ADMIN — DIPHENHYDRAMINE HYDROCHLORIDE 25 MG: 50 INJECTION, SOLUTION INTRAMUSCULAR; INTRAVENOUS at 13:24

## 2020-04-04 RX ADMIN — METOPROLOL TARTRATE 50 MG: 50 TABLET, FILM COATED ORAL at 08:48

## 2020-04-04 RX ADMIN — DEXAMETHASONE SODIUM PHOSPHATE 10 MG: 10 INJECTION INTRAMUSCULAR; INTRAVENOUS at 13:30

## 2020-04-04 ASSESSMENT — ENCOUNTER SYMPTOMS
VOMITING: 0
CHEST TIGHTNESS: 0
CONSTIPATION: 0
WHEEZING: 0
DIARRHEA: 0
SHORTNESS OF BREATH: 0
RHINORRHEA: 0
BLOOD IN STOOL: 0
ABDOMINAL PAIN: 0
COUGH: 0
NAUSEA: 0

## 2020-04-04 ASSESSMENT — PAIN SCALES - GENERAL: PAINLEVEL_OUTOF10: 0

## 2020-04-04 NOTE — CONSULTS
_                         Today's Date: 4/4/2020  Patient Name: Myles Gaspar  Date of admission: 4/3/2020  3:20 PM  Patient's age: 78 y. o., 1950  Admission Dx: Pancytopenia [facility]) [T25.044]      Requesting Physician: King Jony MD    CHIEF COMPLAINT: Increasing weakness and fatigue. Increasing bruising. Consult for severe pancytopenia. History Obtained From:  patient, electronic medical record    HISTORY OF PRESENT ILLNESS:      The patient is a 78 y.o.  female who is admitted to the hospital for further management of severe pancytopenia. The patient was clinically fine. Actually she is busy doing boxes with house movement. She noticed increasing weakness and fatigue. This is getting worse over the last few weeks. She gets shortness of breath on exertion. No dizziness. She denies any active bleeding. She has increasing bruising. No fever or infections. No enlarged lymph nodes. No weight loss or decreased appetite. The patient had blood work which showed severe pancytopenia. She is receiving platelet transfusion. No history of smoking or alcohol drinking. Past Medical History:   has a past medical history of Arthritis, Hyperlipidemia, Hypertension, Pneumonia, and Seasonal allergies. Past Surgical History:   has a past surgical history that includes Colonoscopy; joint replacement; Ovary removal; and Colonoscopy (03/10/2017). Family History: family history includes Cancer in her brother. Social History:   reports that she has never smoked. She has never used smokeless tobacco. She reports current alcohol use. She reports that she does not use drugs. Medications:    Prior to Admission medications    Medication Sig Start Date End Date Taking?  Authorizing Provider   ammonium lactate (AMLACTIN) 12 % cream APPLY AFTER SHOWERING TO SKIN FOR DRYNESS 11/11/19  Yes Historical Provider, MD   cetirizine (ZYRTEC ALLERGY) 10 MG · Musculoskeletal: No muscle aches or pains. No limitation of movement. No back pain. No gait disturbance, No joint complaints. · Dermatologic: No skin rashes or pruritus. No skin lesions or discolorations. · Psychiatric: No depression, anxiety, or stress or signs of schizophrenia. No change in mood or affect. · Hematologic: As above. · Infectious disease: No fever, chills or frequent infections. · Endocrine: No problems with obesity. No polydipsia or polyuria. No temperature intolerance. · Neurologic: No headaches or dizziness. No weakness or numbness of the extremities. No changes in balance, coordination,  memory, mentation, behavior. · Allergic/Immunologic: No nasal congestion or hives. No repeated infections.        PHYSICAL EXAM:      /64   Pulse 71   Temp 98.5 °F (36.9 °C) (Oral)   Resp 14   Ht 5' 10\" (1.778 m)   Wt 195 lb (88.5 kg)   SpO2 99%   BMI 27.98 kg/m²    Temp (24hrs), Av.5 °F (36.9 °C), Min:98.1 °F (36.7 °C), Max:99.2 °F (37.3 °C)      General appearance - well appearing, not in pain or distress  Mental status - good mood, alert and oriented  Eyes - pupils equal and reactive, extraocular eye movements intact  Ears - bilateral TM's and external ear canals normal  Nose - normal and patent, no erythema, discharge or polyps  Mouth - mucous membranes moist, pharynx normal without lesions  Neck - supple, no significant adenopathy  Lymphatics - no palpable lymphadenopathy, no hepatosplenomegaly  Chest - clear to auscultation, no wheezes, rales or rhonchi, symmetric air entry  Heart - normal rate, regular rhythm, normal S1, S2, no murmurs, rubs, clicks or gallops  Abdomen - soft, nontender, nondistended, no masses or organomegaly  Neurological - alert, oriented, normal speech, no focal findings or movement disorder noted  Musculoskeletal - no joint tenderness, deformity or swelling  Extremities - peripheral pulses normal, no pedal edema, no clubbing or cyanosis  Skin -multiple skin bruisings. DATA:      Labs:       CBC:   Recent Labs     04/03/20  1545  04/04/20  0605 04/04/20  1030   WBC 0.9*  --  1.1*  --    HGB 7.3*   < > 7.3* 8.3*   HCT 22.4*   < > 21.9* 24.7*   PLT See Reflexed IPF Result  --  See Reflexed IPF Result  --     < > = values in this interval not displayed. BMP:   Recent Labs     04/03/20  1545 04/04/20  0605    142   K 3.9 4.1   CO2 22 24   BUN 21 14   CREATININE 0.51 <0.40*   LABGLOM >60 CANNOT BE CALCULATED   GLUCOSE 132* 100*     PT/INR:   Recent Labs     04/03/20  1545 04/04/20  0605   PROTIME 10.5 10.9   INR 1.0 1.1     APTT:  Recent Labs     04/03/20  1545   APTT 24.9     LIVER PROFILE:  Recent Labs     04/03/20  0819 04/03/20  1545   AST 17 18   ALT 16 15   LABALBU 4.0 4.2               IMPRESSION:    Primary Problem  Pancytopenia [facility])    Active Hospital Problems    Diagnosis Date Noted    [facility] [D61.818] 04/03/2020    Hyperlipidemia [E78.5] 08/31/2015    HTN (hypertension) [I10] 05/16/2012       RECOMMENDATIONS:  1. Records and labs and images were reviewed and discussed with the patient. Case was discussed with the PCP as well. 2. All records showed platelet counts of 79 in April 2019. Repeated labs this week showed severe pancytopenia. Patient asymptomatic. Her iron studies and vitamin B12 and folate are normal.  3. The overall hematologic picture is most consistent with bone marrow disease. Concern about possible underlying leukemia. We will do flow cytometry and will check BCR ABL. Patient will need bone marrow testing. Explained the procedure. She agreed. Likely will be done by interventional radiology on Monday. Further recommendations will be based on the results after establishing a diagnosis. 4. Patient is receiving platelets today. She will have daily labs and she will be transfused as needed.   5. Patient's questions were answered to the best of her satisfaction and she verbalized full understanding and agreement. 6. Thank you for allowing us to participate in the care of this pleasant patient. Discussed with patient and Nurse. MD Kayla Beal MD  Cell: [phone]    This note is created with the assistance of a speech recognition program.  While intending to generate a document that actually reflects the content of the visit, the document can still have some errors including those of syntax and sound a like substitutions which may escape proof reading. It such instances, actual meaning can be extrapolated by contextual diversion.

## 2020-04-04 NOTE — FLOWSHEET NOTE
Most of the pts hives have disappeared. Some small non raised hives still present on lower extremities. Pt resting quietly in bed.

## 2020-04-04 NOTE — PROGRESS NOTES
98 % -- --   04/03/20 1915 (!) 159/83 98.1 °F (36.7 °C) Oral 86 16 100 % 5' 10\" (1.778 m) 197 lb 1.6 oz (89.4 kg)   04/03/20 1612 -- -- -- 76 14 98 % -- --   04/03/20 1609 -- -- -- 76 -- -- -- --   04/03/20 1600 -- -- -- 79 15 98 % -- --   04/03/20 1558 -- -- -- 78 11 98 % -- --   04/03/20 1522 (!) 150/72 99.2 °F (37.3 °C) Oral 80 16 99 % 5' 10\" (1.778 m) 194 lb (88 kg)     Intake / output   04/03 0701 - 04/04 0700  In: 319.5   Out: 500 [Urine:500]  Physical Exam:  Physical Exam  Vitals signs and nursing note reviewed. Constitutional:       General: She is not in acute distress. Appearance: She is not diaphoretic. HENT:      Head: Normocephalic and atraumatic. Nose:      Right Sinus: No maxillary sinus tenderness or frontal sinus tenderness. Left Sinus: No maxillary sinus tenderness or frontal sinus tenderness. Mouth/Throat:      Pharynx: No oropharyngeal exudate. Eyes:      General: No scleral icterus. Conjunctiva/sclera: Conjunctivae normal.      Pupils: Pupils are equal, round, and reactive to light. Neck:      Musculoskeletal: Full passive range of motion without pain and neck supple. Thyroid: No thyromegaly. Vascular: No JVD. Cardiovascular:      Rate and Rhythm: Normal rate and regular rhythm. Pulses:           Dorsalis pedis pulses are 2+ on the right side and 2+ on the left side. Heart sounds: Normal heart sounds. No murmur. Pulmonary:      Effort: Pulmonary effort is normal.      Breath sounds: Normal breath sounds. No wheezing or rales. Abdominal:      Palpations: Abdomen is soft. There is no mass. Tenderness: There is no abdominal tenderness. Lymphadenopathy:      Head:      Right side of head: No submandibular adenopathy. Left side of head: No submandibular adenopathy. Cervical: No cervical adenopathy. Skin:     General: Skin is warm. Findings: Petechiae present.    Neurological:      Mental Status: She is alert and oriented

## 2020-04-05 LAB
ABO/RH: NORMAL
ABSOLUTE EOS #: 0 K/UL (ref 0–0.4)
ABSOLUTE IMMATURE GRANULOCYTE: 0.01 K/UL (ref 0–0.3)
ABSOLUTE LYMPH #: 0.72 K/UL (ref 1–4.8)
ABSOLUTE MONO #: 0.03 K/UL (ref 0.2–0.8)
ANTIBODY SCREEN: NEGATIVE
ARM BAND NUMBER: NORMAL
BASOPHILS # BLD: 0 %
BASOPHILS ABSOLUTE: 0 K/UL (ref 0–0.2)
BLD PROD TYP BPU: NORMAL
BLD PROD TYP BPU: NORMAL
CROSSMATCH RESULT: NORMAL
DIFFERENTIAL TYPE: ABNORMAL
DISPENSE STATUS BLOOD BANK: NORMAL
DISPENSE STATUS BLOOD BANK: NORMAL
EOSINOPHILS RELATIVE PERCENT: 0 % (ref 1–4)
EXPIRATION DATE: NORMAL
HCT VFR BLD CALC: 22.5 % (ref 36.3–47.1)
HEMOGLOBIN: 7.2 G/DL (ref 11.9–15.1)
IMMATURE GRANULOCYTES: 1 %
LYMPHOCYTES # BLD: 80 % (ref 24–44)
MCH RBC QN AUTO: 31.6 PG (ref 25.2–33.5)
MCHC RBC AUTO-ENTMCNC: 32 G/DL (ref 28.4–34.8)
MCV RBC AUTO: 98.7 FL (ref 82.6–102.9)
MONOCYTES # BLD: 3 % (ref 1–7)
MORPHOLOGY: ABNORMAL
MORPHOLOGY: ABNORMAL
NRBC AUTOMATED: 0 PER 100 WBC
PDW BLD-RTO: 19.6 % (ref 11.8–14.4)
PLATELET # BLD: ABNORMAL K/UL (ref 138–453)
PLATELET ESTIMATE: ABNORMAL
PLATELET, FLUORESCENCE: 32 K/UL (ref 138–453)
PLATELET, IMMATURE FRACTION: 2.7 % (ref 1.1–10.3)
PMV BLD AUTO: ABNORMAL FL (ref 8.1–13.5)
RBC # BLD: 2.28 M/UL (ref 3.95–5.11)
RBC # BLD: ABNORMAL 10*6/UL
SEG NEUTROPHILS: 16 % (ref 36–66)
SEGMENTED NEUTROPHILS ABSOLUTE COUNT: 0.14 K/UL (ref 1.8–7.7)
TRANSFUSION STATUS: NORMAL
TRANSFUSION STATUS: NORMAL
UNIT DIVISION: 0
UNIT DIVISION: 0
UNIT NUMBER: NORMAL
UNIT NUMBER: NORMAL
WBC # BLD: 0.9 K/UL (ref 3.5–11.3)
WBC # BLD: ABNORMAL 10*3/UL

## 2020-04-05 PROCEDURE — 99232 SBSQ HOSP IP/OBS MODERATE 35: CPT | Performed by: FAMILY MEDICINE

## 2020-04-05 PROCEDURE — 36415 COLL VENOUS BLD VENIPUNCTURE: CPT

## 2020-04-05 PROCEDURE — 81206 BCR/ABL1 GENE MAJOR BP: CPT

## 2020-04-05 PROCEDURE — 6370000000 HC RX 637 (ALT 250 FOR IP): Performed by: NURSE PRACTITIONER

## 2020-04-05 PROCEDURE — 2580000003 HC RX 258: Performed by: NURSE PRACTITIONER

## 2020-04-05 PROCEDURE — 88184 FLOWCYTOMETRY/ TC 1 MARKER: CPT

## 2020-04-05 PROCEDURE — 1200000000 HC SEMI PRIVATE

## 2020-04-05 PROCEDURE — 85055 RETICULATED PLATELET ASSAY: CPT

## 2020-04-05 PROCEDURE — 88185 FLOWCYTOMETRY/TC ADD-ON: CPT

## 2020-04-05 PROCEDURE — 99232 SBSQ HOSP IP/OBS MODERATE 35: CPT | Performed by: INTERNAL MEDICINE

## 2020-04-05 PROCEDURE — 85025 COMPLETE CBC W/AUTO DIFF WBC: CPT

## 2020-04-05 RX ADMIN — PRAVASTATIN SODIUM 40 MG: 40 TABLET ORAL at 21:07

## 2020-04-05 RX ADMIN — Medication 10 ML: at 21:11

## 2020-04-05 RX ADMIN — ESTRADIOL 0.5 MG: 1 TABLET ORAL at 08:43

## 2020-04-05 RX ADMIN — METOPROLOL TARTRATE 50 MG: 50 TABLET, FILM COATED ORAL at 08:43

## 2020-04-05 RX ADMIN — CETIRIZINE HYDROCHLORIDE 10 MG: 10 TABLET, FILM COATED ORAL at 08:43

## 2020-04-05 ASSESSMENT — ENCOUNTER SYMPTOMS
SHORTNESS OF BREATH: 0
RHINORRHEA: 0
CHEST TIGHTNESS: 0
ABDOMINAL PAIN: 0
DIARRHEA: 0
WHEEZING: 0
NAUSEA: 0
COUGH: 0
BLOOD IN STOOL: 0
VOMITING: 0
CONSTIPATION: 0

## 2020-04-05 NOTE — PLAN OF CARE
Problem: Pain:  Goal: Pain level will decrease  Description: Pain level will decrease  4/5/2020 0048 by Fermin Leyva RN  Outcome: Ongoing     Problem: PROTECTIVE PRECAUTIONS  Goal: Patient will remain free of nosocomial Infections  4/5/2020 0048 by Fermin Leyva RN  Outcome: Ongoing

## 2020-04-05 NOTE — PROGRESS NOTES
reviewed. Constitutional:       General: She is not in acute distress. Appearance: She is not diaphoretic. HENT:      Head: Normocephalic and atraumatic. Nose:      Right Sinus: No maxillary sinus tenderness or frontal sinus tenderness. Left Sinus: No maxillary sinus tenderness or frontal sinus tenderness. Mouth/Throat:      Pharynx: No oropharyngeal exudate. Eyes:      General: No scleral icterus. Conjunctiva/sclera: Conjunctivae normal.      Pupils: Pupils are equal, round, and reactive to light. Neck:      Musculoskeletal: Full passive range of motion without pain and neck supple. Thyroid: No thyromegaly. Vascular: No JVD. Cardiovascular:      Rate and Rhythm: Normal rate and regular rhythm. Pulses:           Dorsalis pedis pulses are 2+ on the right side and 2+ on the left side. Heart sounds: Normal heart sounds. No murmur. Pulmonary:      Effort: Pulmonary effort is normal.      Breath sounds: Normal breath sounds. No wheezing or rales. Abdominal:      Palpations: Abdomen is soft. There is no mass. Tenderness: There is no abdominal tenderness. Lymphadenopathy:      Head:      Right side of head: No submandibular adenopathy. Left side of head: No submandibular adenopathy. Cervical: No cervical adenopathy. Skin:     General: Skin is warm. Findings: Petechiae present. Neurological:      Mental Status: She is alert and oriented to person, place, and time. Motor: No tremor. Psychiatric:         Behavior: Behavior is cooperative.            Laboratory findings:    Recent Labs     04/03/20  0819 04/03/20  1545  04/04/20  0605 04/04/20  1030 04/04/20  1610 04/04/20  2307   WBC 1.0* 0.9*  --  1.1*  --   --   --    HGB 7.3* 7.3*   < > 7.3* 8.3* 7.9* 7.6*   HCT 23.1* 22.4*   < > 21.9* 24.7* 23.6* 23.0*   PLT See Reflexed IPF Result See Reflexed IPF Result  --  See Reflexed IPF Result  --   --   --    INR  --  1.0  --  1.1  --   -- --     < > = values in this interval not displayed. Recent Labs     04/03/20  0819 04/03/20  1545 04/04/20  0605    137 142   K 4.0 3.9 4.1    101 109*   CO2 24 22 24   GLUCOSE 103* 132* 100*   BUN 17 21 14   CREATININE 0.49* 0.51 <0.40*   CALCIUM 9.3 9.3 8.6     Recent Labs     04/03/20  0819 04/03/20  1545   PROT 6.9 7.1   LABALBU 4.0 4.2   TSH 2.07  --    AST 17 18   ALT 16 15   LDH  --  228*   ALKPHOS 53 53   BILITOT 0.46 0.32   BILIDIR  --  0.11   CHOL 132  --    HDL 60  --    LDLCHOLESTEROL 63  --    CHOLHDLRATIO 2.2  --    TRIG 43  --    VLDL NOT REPORTED  --           Specific Gravity, UA   Date Value Ref Range Status   04/03/2020 1.015 1.005 - 1.030 Final     Protein, UA   Date Value Ref Range Status   04/03/2020 NEGATIVE NEGATIVE Final     RBC, UA   Date Value Ref Range Status   04/03/2020 2 TO 5 0 - 2 /HPF Final     Bacteria, UA   Date Value Ref Range Status   04/03/2020 NOT REPORTED None Final     Nitrite, Urine   Date Value Ref Range Status   04/03/2020 NEGATIVE NEGATIVE Final     WBC, UA   Date Value Ref Range Status   04/03/2020 0 TO 2 0 - 5 /HPF Final     Leukocyte Esterase, Urine   Date Value Ref Range Status   04/03/2020 NEGATIVE NEGATIVE Final       Imaging / Clinical Data :-   Xr Chest Standard (2 Vw)    Result Date: 4/3/2020  No acute cardiopulmonary disease. Clinical Course : stable  Assessment and Plan  :        1. Pancytopenia- peripheral smear pending, flow cytometry. Will need bone marrow biopsy . Transfuse Platelet . 2. Essential hypertension-continue metoprolol  3. Dyslipidemia on pravastatin. 4. Transfusion reaction - PLT stopped. imporved  with decadron and Benadryl.        Monitor off antibiotics. Continue to monitor vitals , Intake / output ,  Cell count , HGB , Kidney function, oxygenation  as indicated . Plan and updates discussed with patient ,  answers  explained to satisfaction.    Plan discussed with Staff    RN     (Please note that portions of this

## 2020-04-06 ENCOUNTER — APPOINTMENT (OUTPATIENT)
Dept: CT IMAGING | Age: 70
DRG: 804 | End: 2020-04-06
Payer: MEDICARE

## 2020-04-06 VITALS
RESPIRATION RATE: 19 BRPM | OXYGEN SATURATION: 100 % | TEMPERATURE: 99.1 F | SYSTOLIC BLOOD PRESSURE: 138 MMHG | WEIGHT: 200.1 LBS | HEART RATE: 82 BPM | DIASTOLIC BLOOD PRESSURE: 72 MMHG | BODY MASS INDEX: 28.65 KG/M2 | HEIGHT: 70 IN

## 2020-04-06 LAB
ABSOLUTE EOS #: 0 K/UL (ref 0–0.4)
ABSOLUTE IMMATURE GRANULOCYTE: 0.02 K/UL (ref 0–0.3)
ABSOLUTE LYMPH #: 1.39 K/UL (ref 1–4.8)
ABSOLUTE MONO #: 0.03 K/UL (ref 0.2–0.8)
BASOPHILS # BLD: 0 %
BASOPHILS ABSOLUTE: 0 K/UL (ref 0–0.2)
DIFFERENTIAL TYPE: ABNORMAL
EOSINOPHILS RELATIVE PERCENT: 0 % (ref 1–4)
HCT VFR BLD CALC: 23.2 % (ref 36.3–47.1)
HEMOGLOBIN: 7.7 G/DL (ref 11.9–15.1)
IMMATURE GRANULOCYTES: 1 %
LYMPHOCYTES # BLD: 87 % (ref 24–44)
MCH RBC QN AUTO: 31.7 PG (ref 25.2–33.5)
MCHC RBC AUTO-ENTMCNC: 33.2 G/DL (ref 28.4–34.8)
MCV RBC AUTO: 95.5 FL (ref 82.6–102.9)
MONOCYTES # BLD: 2 % (ref 1–7)
MORPHOLOGY: ABNORMAL
MORPHOLOGY: ABNORMAL
NRBC AUTOMATED: 0 PER 100 WBC
PDW BLD-RTO: 19.3 % (ref 11.8–14.4)
PLATELET # BLD: ABNORMAL K/UL (ref 138–453)
PLATELET ESTIMATE: ABNORMAL
PLATELET, FLUORESCENCE: 30 K/UL (ref 138–453)
PLATELET, IMMATURE FRACTION: 4.2 % (ref 1.1–10.3)
PMV BLD AUTO: ABNORMAL FL (ref 8.1–13.5)
RBC # BLD: 2.43 M/UL (ref 3.95–5.11)
RBC # BLD: ABNORMAL 10*6/UL
SEG NEUTROPHILS: 10 % (ref 36–66)
SEGMENTED NEUTROPHILS ABSOLUTE COUNT: 0.16 K/UL (ref 1.8–7.7)
WBC # BLD: 1.6 K/UL (ref 3.5–11.3)
WBC # BLD: ABNORMAL 10*3/UL

## 2020-04-06 PROCEDURE — 88280 CHROMOSOME KARYOTYPE STUDY: CPT

## 2020-04-06 PROCEDURE — 0QB23ZX EXCISION OF RIGHT PELVIC BONE, PERCUTANEOUS APPROACH, DIAGNOSTIC: ICD-10-PCS | Performed by: RADIOLOGY

## 2020-04-06 PROCEDURE — 88184 FLOWCYTOMETRY/ TC 1 MARKER: CPT

## 2020-04-06 PROCEDURE — 88185 FLOWCYTOMETRY/TC ADD-ON: CPT

## 2020-04-06 PROCEDURE — 77012 CT SCAN FOR NEEDLE BIOPSY: CPT

## 2020-04-06 PROCEDURE — 88271 CYTOGENETICS DNA PROBE: CPT

## 2020-04-06 PROCEDURE — 6360000002 HC RX W HCPCS: Performed by: RADIOLOGY

## 2020-04-06 PROCEDURE — 2580000003 HC RX 258: Performed by: NURSE PRACTITIONER

## 2020-04-06 PROCEDURE — 88237 TISSUE CULTURE BONE MARROW: CPT

## 2020-04-06 PROCEDURE — 99232 SBSQ HOSP IP/OBS MODERATE 35: CPT | Performed by: FAMILY MEDICINE

## 2020-04-06 PROCEDURE — 81455 SO/HL 51/>GSAP DNA/DNA&RNA: CPT

## 2020-04-06 PROCEDURE — 6370000000 HC RX 637 (ALT 250 FOR IP): Performed by: NURSE PRACTITIONER

## 2020-04-06 PROCEDURE — 99232 SBSQ HOSP IP/OBS MODERATE 35: CPT | Performed by: INTERNAL MEDICINE

## 2020-04-06 PROCEDURE — 88275 CYTOGENETICS 100-300: CPT

## 2020-04-06 PROCEDURE — 2709999900 CT BIOPSY BONE MARROW

## 2020-04-06 PROCEDURE — 85055 RETICULATED PLATELET ASSAY: CPT

## 2020-04-06 PROCEDURE — 88305 TISSUE EXAM BY PATHOLOGIST: CPT

## 2020-04-06 PROCEDURE — 88313 SPECIAL STAINS GROUP 2: CPT

## 2020-04-06 PROCEDURE — 88311 DECALCIFY TISSUE: CPT

## 2020-04-06 PROCEDURE — 85025 COMPLETE CBC W/AUTO DIFF WBC: CPT

## 2020-04-06 PROCEDURE — 07DR3ZX EXTRACTION OF ILIAC BONE MARROW, PERCUTANEOUS APPROACH, DIAGNOSTIC: ICD-10-PCS | Performed by: RADIOLOGY

## 2020-04-06 PROCEDURE — 88264 CHROMOSOME ANALYSIS 20-25: CPT

## 2020-04-06 PROCEDURE — 36415 COLL VENOUS BLD VENIPUNCTURE: CPT

## 2020-04-06 RX ORDER — FENTANYL CITRATE 50 UG/ML
INJECTION, SOLUTION INTRAMUSCULAR; INTRAVENOUS
Status: COMPLETED | OUTPATIENT
Start: 2020-04-06 | End: 2020-04-06

## 2020-04-06 RX ORDER — M-VIT,TX,IRON,MINS/CALC/FOLIC 27MG-0.4MG
1 TABLET ORAL DAILY
COMMUNITY
End: 2021-04-28

## 2020-04-06 RX ORDER — MIDAZOLAM HYDROCHLORIDE 1 MG/ML
INJECTION INTRAMUSCULAR; INTRAVENOUS
Status: COMPLETED | OUTPATIENT
Start: 2020-04-06 | End: 2020-04-06

## 2020-04-06 RX ADMIN — Medication 10 ML: at 08:48

## 2020-04-06 RX ADMIN — MIDAZOLAM 1 MG: 1 INJECTION INTRAMUSCULAR; INTRAVENOUS at 13:44

## 2020-04-06 RX ADMIN — METOPROLOL TARTRATE 50 MG: 50 TABLET, FILM COATED ORAL at 08:53

## 2020-04-06 RX ADMIN — Medication 50 MCG: at 13:44

## 2020-04-06 RX ADMIN — ESTRADIOL 0.5 MG: 1 TABLET ORAL at 08:53

## 2020-04-06 RX ADMIN — CETIRIZINE HYDROCHLORIDE 10 MG: 10 TABLET, FILM COATED ORAL at 08:53

## 2020-04-06 ASSESSMENT — ENCOUNTER SYMPTOMS
COUGH: 0
SHORTNESS OF BREATH: 0
WHEEZING: 0
BLOOD IN STOOL: 0
VOMITING: 0
CONSTIPATION: 0
NAUSEA: 0
DIARRHEA: 0
RHINORRHEA: 0
CHEST TIGHTNESS: 0
ABDOMINAL PAIN: 0

## 2020-04-06 NOTE — DISCHARGE INSTR - DIET

## 2020-04-06 NOTE — PROGRESS NOTES
MCG (1000 UT) TABS tablet Take 3,000 Units by mouth daily   Yes Historical Provider, MD   Multiple Vitamins-Minerals (THERAPEUTIC MULTIVITAMIN-MINERALS) tablet Take 1 tablet by mouth daily   Yes Historical Provider, MD   ammonium lactate (AMLACTIN) 12 % cream APPLY AFTER SHOWERING TO SKIN FOR DRYNESS 11/11/19  Yes Historical Provider, MD   cetirizine (ZYRTEC ALLERGY) 10 MG tablet Take 10 mg by mouth daily   Yes Historical Provider, MD   metoprolol tartrate (LOPRESSOR) 50 MG tablet 50mg once a day 10/9/19  Yes Steve Acharya MD   pravastatin (PRAVACHOL) 40 MG tablet TAKE 1 TABLET DAILY 5/23/19  Yes Steve Acharya MD   Multiple Vitamins-Minerals (ICAPS AREDS 2 PO) Take by mouth daily   Yes Historical Provider, MD   meloxicam (MOBIC) 15 MG tablet Take 1 tablet by mouth daily as needed for Pain 4/5/18  Yes Steve Acharya MD   estradiol (ESTRACE) 1 MG tablet Take 0.5 mg by mouth daily  1/11/16  Yes Historical Provider, MD   azelastine (ASTELIN) 137 MCG/SPRAY nasal spray 2 sprays by Nasal route 2 times daily as needed Use in each nostril as directed    Yes Historical Provider, MD     Current Facility-Administered Medications   Medication Dose Route Frequency Provider Last Rate Last Dose    azelastine (ASTELIN) 0.1 % nasal spray 2 spray  2 spray Each Nostril BID PRN Cleave Furbish, APRN - NP        ammonium lactate (LAC-HYDRIN) 12 % lotion   Topical PRN Cleave Furbish, APRN - NP        cetirizine (ZYRTEC) tablet 10 mg  10 mg Oral Daily Cleave Furbish, APRN - NP   10 mg at 04/06/20 1438    estradiol (ESTRACE) tablet 0.5 mg  0.5 mg Oral Daily Cleave Furbish, APRN - NP   0.5 mg at 04/06/20 0853    metoprolol tartrate (LOPRESSOR) tablet 50 mg  50 mg Oral Daily Cleave Furbish, APRN - NP   50 mg at 04/06/20 0853    pravastatin (PRAVACHOL) tablet 40 mg  40 mg Oral Nightly Cleave Furbish, APRN - NP   40 mg at 04/05/20 2107    sodium chloride flush 0.9 % injection 10 mL  10 mL Intravenous 2 times per day Cleave Furbish, APRN - NP   10 mL at 04/06/20 0848    sodium chloride flush 0.9 % injection 10 mL  10 mL Intravenous PRN Katharina Rising, APRN - NP        potassium chloride (KLOR-CON M) extended release tablet 40 mEq  40 mEq Oral PRN Katharina Rising, APRN - NP        Or    potassium bicarb-citric acid (EFFER-K) effervescent tablet 40 mEq  40 mEq Oral PRN Katharina Rising, APRN - NP        Or   Aga Manna potassium chloride 10 mEq/100 mL IVPB (Peripheral Line)  10 mEq Intravenous PRN Katharina Rising, APRN - NP        magnesium sulfate 1 g in dextrose 5% 100 mL IVPB  1 g Intravenous PRN Katharina Rising, APRN - NP        acetaminophen (TYLENOL) tablet 650 mg  650 mg Oral Q6H PRN Katharina Rising, APRN - NP        Or   Aga Manna acetaminophen (TYLENOL) suppository 650 mg  650 mg Rectal Q6H PRN Katharina Rising, APRN - NP        polyethylene glycol (GLYCOLAX) packet 17 g  17 g Oral Daily PRN Katharina Rising, APRN - NP        promethazine (PHENERGAN) tablet 12.5 mg  12.5 mg Oral Q6H PRN Katharina Rising, APRN - NP        Or    ondansetron (ZOFRAN) injection 4 mg  4 mg Intravenous Q6H PRN Katharina Rising, APRN - NP        nicotine (NICODERM CQ) 21 MG/24HR 1 patch  1 patch Transdermal Daily PRN Katharina Rising, APRN - NP           Allergies:  Amoxicillin-pot clavulanate; Cefuroxime axetil; Biaxin [clarithromycin]; and Sulfa antibiotics    REVIEW OF SYSTEMS:      · General: Positive for weakness and fatigue. . No unanticipated weight loss or decreased appetite. No fever or chills. · Eyes: No blurred vision, eye pain or double vision. · Ears: No hearing problems or drainage. No tinnitus. · Throat: No sore throat, problems with swallowing or dysphagia. · Respiratory: No cough, sputum or hemoptysis. No shortness of breath. No pleuritic chest pain. · Cardiovascular: No chest pain, orthopnea or PND. No lower extremity edema. No palpitation. · Gastrointestinal: No problems with swallowing. No abdominal pain or bloating. No nausea or vomiting. No diarrhea or constipation. No GI bleeding. · Genitourinary: No dysuria, hematuria, frequency or urgency. · Musculoskeletal: No muscle aches or pains. No limitation of movement. No back pain. No gait disturbance, No joint complaints. · Dermatologic: No skin rashes or pruritus. No skin lesions or discolorations. · Psychiatric: No depression, anxiety, or stress or signs of schizophrenia. No change in mood or affect. · Hematologic: As above. · Infectious disease: No fever, chills or frequent infections. · Endocrine: No problems with obesity. No polydipsia or polyuria. No temperature intolerance. · Neurologic: No headaches or dizziness. No weakness or numbness of the extremities. No changes in balance, coordination,  memory, mentation, behavior. · Allergic/Immunologic: No nasal congestion or hives. No repeated infections.        PHYSICAL EXAM:      /72   Pulse 82   Temp 99.1 °F (37.3 °C) (Oral)   Resp 19   Ht 5' 10\" (1.778 m)   Wt 200 lb 1.6 oz (90.8 kg)   SpO2 100%   BMI 28.71 kg/m²    Temp (24hrs), Av.3 °F (36.8 °C), Min:97.8 °F (36.6 °C), Max:99.1 °F (37.3 °C)      General appearance - well appearing, not in pain or distress  Mental status - good mood, alert and oriented  Eyes - pupils equal and reactive, extraocular eye movements intact  Ears - bilateral TM's and external ear canals normal  Nose - normal and patent, no erythema, discharge or polyps  Mouth - mucous membranes moist, pharynx normal without lesions  Neck - supple, no significant adenopathy  Lymphatics - no palpable lymphadenopathy, no hepatosplenomegaly  Chest - clear to auscultation, no wheezes, rales or rhonchi, symmetric air entry  Heart - normal rate, regular rhythm, normal S1, S2, no murmurs, rubs, clicks or gallops  Abdomen - soft, nontender, nondistended, no masses or organomegaly  Neurological - alert, oriented, normal speech, no focal findings or movement disorder noted  Musculoskeletal - no joint tenderness, deformity or swelling  Extremities - peripheral pulses normal, no pedal edema, no clubbing or cyanosis  Skin -multiple skin bruisings. DATA:      Labs:       CBC:   Recent Labs     04/05/20  0650 04/06/20  0529   WBC 0.9* 1.6*   HGB 7.2* 7.7*   HCT 22.5* 23.2*   PLT See Reflexed IPF Result See Reflexed IPF Result     BMP:   Recent Labs     04/03/20  1545 04/04/20  0605    142   K 3.9 4.1   CO2 22 24   BUN 21 14   CREATININE 0.51 <0.40*   LABGLOM >60 CANNOT BE CALCULATED   GLUCOSE 132* 100*     PT/INR:   Recent Labs     04/03/20  1545 04/04/20  0605   PROTIME 10.5 10.9   INR 1.0 1.1     APTT:  Recent Labs     04/03/20  1545   APTT 24.9     LIVER PROFILE:  Recent Labs     04/03/20  1545   AST 18   ALT 15   LABALBU 4.2               IMPRESSION:    Primary Problem  Pancytopenia [facility])    Active Hospital Problems    Diagnosis Date Noted    Pancytopenia ([facility] [address] [D61.818] 04/03/2020    Hyperlipidemia [E78.5] 08/31/2015    HTN (hypertension) [I10] 05/16/2012       RECOMMENDATIONS:  1. Records and labs and images were reviewed and discussed with the patient. Case was discussed with the PCP as well. 2. All records showed platelet counts of 79 in April 2019. Repeated labs this week showed severe pancytopenia. Patient asymptomatic. Her iron studies and vitamin B12 and folate are normal.  3. The overall hematologic picture is most consistent with bone marrow disease. Concern about possible underlying leukemia. 4.   We will do flow cytometry and will check BCR ABL. Results pending. 5. Bone marrow test was done today. Tolerated well. 6. Okay for discharge today. We will see her next week to discuss the results of the bone marrow test and further management plans. 7. Patient's questions were answered to the best of her satisfaction and she verbalized full understanding and agreement. Discussed with patient and Nurse.     MD Pat Colunga  Cell: [phone]    This note is created

## 2020-04-06 NOTE — PROGRESS NOTES
Provider, MD   cetirizine (ZYRTEC ALLERGY) 10 MG tablet Take 10 mg by mouth daily   Yes Historical Provider, MD   metoprolol tartrate (LOPRESSOR) 50 MG tablet 50mg once a day 10/9/19  Yes Erika Pang MD   pravastatin (PRAVACHOL) 40 MG tablet TAKE 1 TABLET DAILY 5/23/19  Yes Erika Pang MD   Multiple Vitamins-Minerals (ICAPS AREDS 2 PO) Take by mouth daily   Yes Historical Provider, MD   meloxicam (MOBIC) 15 MG tablet Take 1 tablet by mouth daily as needed for Pain 4/5/18  Yes Erika Pang MD   estradiol (ESTRACE) 1 MG tablet Take 0.5 mg by mouth daily  1/11/16  Yes Historical Provider, MD   Vitamin D (CHOLECALCIFEROL) 1000 UNITS CAPS capsule Take 2 capsules by mouth daily.  12/12/14  Yes Erika Pang MD   azelastine (ASTELIN) 137 MCG/SPRAY nasal spray 2 sprays by Nasal route 2 times daily as needed Use in each nostril as directed     Historical Provider, MD     Current Facility-Administered Medications   Medication Dose Route Frequency Provider Last Rate Last Dose    azelastine (ASTELIN) 0.1 % nasal spray 2 spray  2 spray Each Nostril BID PRN Dora Cue, APRN - NP        ammonium lactate (LAC-HYDRIN) 12 % lotion   Topical PRN Dora Cue, APRN - NP        cetirizine (ZYRTEC) tablet 10 mg  10 mg Oral Daily Dora Cue, APRN - NP   10 mg at 04/05/20 8645    estradiol (ESTRACE) tablet 0.5 mg  0.5 mg Oral Daily Dora Cue, APRN - NP   0.5 mg at 04/05/20 0843    metoprolol tartrate (LOPRESSOR) tablet 50 mg  50 mg Oral Daily Dora Cue, APRN - NP   50 mg at 04/05/20 0843    pravastatin (PRAVACHOL) tablet 40 mg  40 mg Oral Nightly Dora Cue, APRN - NP   40 mg at 04/05/20 2107    sodium chloride flush 0.9 % injection 10 mL  10 mL Intravenous 2 times per day Dora Cue, APRN - NP   10 mL at 04/05/20 2111    sodium chloride flush 0.9 % injection 10 mL  10 mL Intravenous PRN Dora Cue, APRN - NP        potassium chloride (KLOR-CON M) extended release tablet 40 mEq  40 mEq Oral program.  While intending to generate a document that actually reflects the content of the visit, the document can still have some errors including those of syntax and sound a like substitutions which may escape proof reading. It such instances, actual meaning can be extrapolated by contextual diversion.

## 2020-04-06 NOTE — PROGRESS NOTES
Writer reviewed discharge instructions with patient. Patient verbalize understanding signature obtained.  Patient discharge with belongings no scripts

## 2020-04-06 NOTE — DISCHARGE SUMMARY
[address]      Discharge Summary     Patient ID: Justin Vazquez  :  1950   MRN: [id number]     ACCOUNT:  [de-identified]   Patient Location :   Patient's PCP: Honora Eisenmenger, MD  Admit Date: 4/3/2020   Discharge Date: 2020     Length of Stay: 3  Code Status:  Full Code  Admitting Physician: Wen Cee MD  Discharge Physician: Wen Cee MD     Active Discharge Diagnosis :     Primary Problem  Pancytopenia [facility])      [place] Problems    Diagnosis Date Noted    Pancytopenia [facility]) [D61.818] 2020    Hyperlipidemia [E78.5] 2015    HTN (hypertension) [I10] 2012       Admission Condition:  fair     Discharged Condition: stable    Hospital Stay:     Hospital Course:  Justin Vazquez is a 78 y.o. female who was admitted for the management of   [facility] , presented to ER with Labs Only (abnormal CBC sent by PCP)    Patient was sent to emergency room by PCP after blood work showed pancytopenia. [address] [place] [address] work was done for routine annual visit. Mame Navarro had no major symptoms.  Retrospectively she thinks that she was feeling fatigue, palpitations.  She also noticed some skin rash for last few days.  Patient denies any weight loss, fever, chills, cough, sore throat.  She has underlying history of hypertension, hyperlipidemia, seasonal allergies.  Patient is on metoprolol, pravastatin.  She also takes multivitamin supplements and \"cure A Med\" turmeric supplement which she buys online. on chart review, patient had thrombocytopenia about a year ago to 78. Patient was PLT transfusion and was stopped due to allergic reaction. she underwent bone marrow biopsy for further evaluation. Significant therapeutic interventions:   1. Pancytopenia- peripheral smear pending, flow cytometry,  Bone marrow biopsy pending. follow up with Oncology Dr Stephon Hale. 2. Essential hypertension-continue metoprolol  3.  Dyslipidemia on of discharge. Discharge plan:     Disposition: Home    Physician Follow Up:     Davis Rios MD  [address] [facility] [address] [facility] [address]  [address] [address] [address] [place] [address]  [phone]    Schedule an appointment as soon as possible for a visit in 1 week  hospital follow up       Requiring Further Evaluation/Follow Up POST HOSPITALIZATION/Incidental Findings: follow up with oncology in 1 week     Diet: regular diet    Activity: As tolerated. Instructions to Patient: follow up ws advised. Fall precautions . Discharge Medications:      Medication List      CONTINUE taking these medications    ammonium lactate 12 % cream  Commonly known as:  AMLACTIN     Astelin 0.1 % nasal spray  Generic drug:  azelastine     estradiol 1 MG tablet  Commonly known as:  ESTRACE     * ICAPS AREDS 2 PO     * therapeutic multivitamin-minerals tablet     meloxicam 15 MG tablet  Commonly known as:  MOBIC  Take 1 tablet by mouth daily as needed for Pain     metoprolol tartrate 50 MG tablet  Commonly known as:  LOPRESSOR  50mg once a day     pravastatin 40 MG tablet  Commonly known as:  PRAVACHOL  TAKE 1 TABLET DAILY     vitamin D 25 MCG (1000 UT) Tabs tablet  Commonly known as:  CHOLECALCIFEROL     ZyrTEC Allergy 10 MG tablet  Generic drug:  cetirizine         * This list has 2 medication(s) that are the same as other medications prescribed for you. Read the directions carefully, and ask your doctor or other care provider to review them with you. Time Spent on discharge is  33 mins in patient examination, evaluation, counseling as well as medication reconciliation, prescriptions for required medications, discharge plan and follow up. Electronically signed by   Monica Basurto MD  4/6/2020        Thank you Dr. Davis Rios MD for the opportunity to be involved in this patient's care.

## 2020-04-06 NOTE — PROGRESS NOTES
Allergen Reactions    Amoxicillin-Pot Clavulanate      Stomach ache    Cefuroxime Axetil Hives    Biaxin [Clarithromycin] Rash    Sulfa Antibiotics Hives      Medications :  cetirizine, 10 mg, Oral, Daily  estradiol, 0.5 mg, Oral, Daily  metoprolol tartrate, 50 mg, Oral, Daily  pravastatin, 40 mg, Oral, Nightly  sodium chloride flush, 10 mL, Intravenous, 2 times per day        Review of Systems   Review of Systems   Constitutional: Positive for fatigue. Negative for appetite change, fever and unexpected weight change. HENT: Negative for congestion, rhinorrhea and sneezing. Eyes: Negative for visual disturbance. Respiratory: Negative for cough, chest tightness, shortness of breath and wheezing. Cardiovascular: Negative for chest pain and palpitations. Gastrointestinal: Negative for abdominal pain, blood in stool, constipation, diarrhea, nausea and vomiting. Genitourinary: Negative for dysuria, enuresis, frequency and hematuria. Musculoskeletal: Negative for arthralgias and myalgias. Skin: Negative for rash. Neurological: Negative for dizziness, weakness, light-headedness and headaches. Hematological: Does not bruise/bleed easily. Psychiatric/Behavioral: Negative for dysphoric mood and sleep disturbance.      Objective :      Current Vitals : Temp: 98.4 °F (36.9 °C),  Pulse: 73, Resp: 16, BP: (!) 132/59, SpO2: 98 %  Last 24 Hrs Vitals   Patient Vitals for the past 24 hrs:   BP Temp Temp src Pulse Resp SpO2 Weight   04/06/20 0708 (!) 132/59 98.4 °F (36.9 °C) Oral 73 16 98 % --   04/06/20 0530 -- -- -- -- -- -- 200 lb 1.6 oz (90.8 kg)   04/06/20 0412 (!) 102/56 98.4 °F (36.9 °C) Oral 72 18 98 % --   04/06/20 0015 (!) 115/50 97.8 °F (36.6 °C) Oral 73 18 98 % --   04/05/20 1844 119/67 98.4 °F (36.9 °C) Oral 81 18 100 % --   04/05/20 1528 (!) 101/56 97.9 °F (36.6 °C) Oral 73 18 100 % --   04/05/20 1120 (!) 114/55 97.3 °F (36.3 °C) Oral 69 18 100 % --     Intake / output   No intake/output data recorded. Physical Exam:  Physical Exam  Vitals signs and nursing note reviewed. Constitutional:       General: She is not in acute distress. Appearance: She is not diaphoretic. HENT:      Head: Normocephalic and atraumatic. Nose:      Right Sinus: No maxillary sinus tenderness or frontal sinus tenderness. Left Sinus: No maxillary sinus tenderness or frontal sinus tenderness. Mouth/Throat:      Pharynx: No oropharyngeal exudate. Eyes:      General: No scleral icterus. Conjunctiva/sclera: Conjunctivae normal.      Pupils: Pupils are equal, round, and reactive to light. Neck:      Musculoskeletal: Full passive range of motion without pain and neck supple. Thyroid: No thyromegaly. Vascular: No JVD. Cardiovascular:      Rate and Rhythm: Normal rate and regular rhythm. Pulses:           Dorsalis pedis pulses are 2+ on the right side and 2+ on the left side. Heart sounds: Normal heart sounds. No murmur. Pulmonary:      Effort: Pulmonary effort is normal.      Breath sounds: Normal breath sounds. No wheezing or rales. Abdominal:      Palpations: Abdomen is soft. There is no mass. Tenderness: There is no abdominal tenderness. Lymphadenopathy:      Head:      Right side of head: No submandibular adenopathy. Left side of head: No submandibular adenopathy. Cervical: No cervical adenopathy. Skin:     General: Skin is warm. Findings: Petechiae present. Neurological:      Mental Status: She is alert and oriented to person, place, and time. Motor: No tremor. Psychiatric:         Behavior: Behavior is cooperative.            Laboratory findings:    Recent Labs     04/03/20  1545  04/04/20  0605  04/04/20  2307 04/05/20  0650 04/06/20  0529   WBC 0.9*  --  1.1*  --   --  0.9* 1.6*   HGB 7.3*   < > 7.3*   < > 7.6* 7.2* 7.7*   HCT 22.4*   < > 21.9*   < > 23.0* 22.5* 23.2*   PLT See Reflexed IPF Result  --  See Reflexed IPF Result  --   --  See mis-transcribed.)      Casey Corral MD  4/6/2020

## 2020-04-07 ENCOUNTER — TELEPHONE (OUTPATIENT)
Dept: INTERNAL MEDICINE CLINIC | Age: 70
End: 2020-04-07

## 2020-04-07 LAB
ABO/RH: NORMAL
DAT, POLYSPECIFIC: NEGATIVE
FLOW CYTOMETRY BL: NORMAL
PATHOLOGIST: NORMAL
SEROLOGICAL REPORT: NORMAL
SURGICAL PATHOLOGY REPORT: NORMAL
TRANSFUSION REACTION REPORT: NORMAL

## 2020-04-07 NOTE — TELEPHONE ENCOUNTER
[facility] Trans[facility] Initial Follow Up Call    Outreach made within 2 business days of discharge: Yes    Patient: Alan Ambrose Patient : 1950   MRN: [id number]  Reason for Admission: There are no discharge diagnoses documented for the most recent discharge. Discharge Date: 20       Spoke with: patient    Discharge department/facility: [facility] Interactive Patient Contact:  Was patient able to fill all prescriptions: Yes  Was patient instructed to bring all medications to the follow-up visit: Yes  Is patient taking all medications as directed in the discharge summary? Yes  Does patient understand their discharge instructions: Yes  Does patient have questions or concerns that need addressed prior to 7-14 day follow up office visit: no    Appt 20 with hematology so no need for appt with PCP.  Does have one scheduled 20    Scheduled appointment with PCP within 7-14 days    Follow Up  Future Appointments   Date Time Provider Raquel Strauss   2020 12:30 PM Carmen Kennedy MD [address] [place] [address]   2020 10:00 AM MD Boyd Roberts RN

## 2020-04-08 LAB
BONE MARROW REPORT: NORMAL
FLOW CYTOMETRY, BM: NORMAL

## 2020-04-10 LAB
CULTURE: NORMAL
CULTURE: NORMAL
Lab: NORMAL
Lab: NORMAL
SPECIMEN DESCRIPTION: NORMAL
SPECIMEN DESCRIPTION: NORMAL

## 2020-04-11 LAB — CHROMOSOME STUDY: NORMAL

## 2020-04-12 LAB
BCR-ABL QUANTITATIVE: NOT DETECTED
BCR-ABL1, PERCENT: 0 %
BCR/ABL SOURCE: NORMAL
EER BCR-ABL1, MAJOR: NORMAL

## 2020-04-14 ENCOUNTER — TELEPHONE (OUTPATIENT)
Dept: ONCOLOGY | Age: 70
End: 2020-04-14

## 2020-04-14 ENCOUNTER — HOSPITAL ENCOUNTER (OUTPATIENT)
Facility: MEDICAL CENTER | Age: 70
Discharge: HOME | End: 2020-04-14
Payer: MEDICARE

## 2020-04-14 ENCOUNTER — OFFICE VISIT (OUTPATIENT)
Dept: ONCOLOGY | Age: 70
End: 2020-04-14
Payer: MEDICARE

## 2020-04-14 VITALS
WEIGHT: 194.2 LBS | DIASTOLIC BLOOD PRESSURE: 85 MMHG | HEART RATE: 80 BPM | TEMPERATURE: 96.8 F | BODY MASS INDEX: 27.86 KG/M2 | RESPIRATION RATE: 18 BRPM | SYSTOLIC BLOOD PRESSURE: 134 MMHG

## 2020-04-14 DIAGNOSIS — C92.00 ACUTE MYELOID LEUKEMIA NOT HAVING ACHIEVED REMISSION (HCC): ICD-10-CM

## 2020-04-14 LAB
ABSOLUTE EOS #: 0 K/UL (ref 0–0.4)
ABSOLUTE IMMATURE GRANULOCYTE: 0 K/UL (ref 0–0.3)
ABSOLUTE LYMPH #: 1.23 K/UL (ref 1–4.8)
ABSOLUTE MONO #: 0.07 K/UL (ref 0.2–0.8)
ATYPICAL LYMPHOCYTE ABSOLUTE COUNT: 0.03 K/UL
ATYPICAL LYMPHOCYTES: 2 %
BASOPHILS # BLD: 0 %
BASOPHILS ABSOLUTE: 0 K/UL (ref 0–0.2)
DIFFERENTIAL TYPE: ABNORMAL
EOSINOPHILS RELATIVE PERCENT: 0 % (ref 1–4)
HCT VFR BLD CALC: 23 % (ref 36.3–47.1)
HEMOGLOBIN: 7.6 G/DL (ref 11.9–15.1)
IMMATURE GRANULOCYTES: 0 %
LYMPHOCYTES # BLD: 88 % (ref 24–44)
MCH RBC QN AUTO: 31.7 PG (ref 25.2–33.5)
MCHC RBC AUTO-ENTMCNC: 33 G/DL (ref 28.4–34.8)
MCV RBC AUTO: 95.8 FL (ref 82.6–102.9)
MONOCYTES # BLD: 5 % (ref 1–7)
MORPHOLOGY: ABNORMAL
NRBC AUTOMATED: 0 PER 100 WBC
PDW BLD-RTO: 18.6 % (ref 11.8–14.4)
PLATELET # BLD: ABNORMAL K/UL (ref 138–453)
PLATELET ESTIMATE: ABNORMAL
PLATELET, FLUORESCENCE: 6 K/UL (ref 138–453)
PLATELET, IMMATURE FRACTION: 9.7 % (ref 1.1–10.3)
PMV BLD AUTO: ABNORMAL FL (ref 8.1–13.5)
RBC # BLD: 2.4 M/UL (ref 3.95–5.11)
RBC # BLD: ABNORMAL 10*6/UL
SEG NEUTROPHILS: 5 % (ref 36–66)
SEGMENTED NEUTROPHILS ABSOLUTE COUNT: 0.07 K/UL (ref 1.8–7.7)
WBC # BLD: 1.4 K/UL (ref 3.5–11.3)
WBC # BLD: ABNORMAL 10*3/UL

## 2020-04-14 PROCEDURE — 85025 COMPLETE CBC W/AUTO DIFF WBC: CPT

## 2020-04-14 PROCEDURE — 85055 RETICULATED PLATELET ASSAY: CPT

## 2020-04-14 PROCEDURE — 36415 COLL VENOUS BLD VENIPUNCTURE: CPT

## 2020-04-14 PROCEDURE — 99215 OFFICE O/P EST HI 40 MIN: CPT | Performed by: INTERNAL MEDICINE

## 2020-04-14 PROCEDURE — 99212 OFFICE O/P EST SF 10 MIN: CPT | Performed by: INTERNAL MEDICINE

## 2020-04-14 RX ORDER — SODIUM CHLORIDE 0.9 % (FLUSH) 0.9 %
5 SYRINGE (ML) INJECTION PRN
Status: CANCELLED | OUTPATIENT
Start: 2020-04-24

## 2020-04-14 RX ORDER — SODIUM CHLORIDE 9 MG/ML
20 INJECTION, SOLUTION INTRAVENOUS ONCE
Status: CANCELLED | OUTPATIENT
Start: 2020-05-04

## 2020-04-14 RX ORDER — SODIUM CHLORIDE 9 MG/ML
INJECTION, SOLUTION INTRAVENOUS CONTINUOUS
Status: CANCELLED | OUTPATIENT
Start: 2020-05-05

## 2020-04-14 RX ORDER — SODIUM CHLORIDE 0.9 % (FLUSH) 0.9 %
10 SYRINGE (ML) INJECTION PRN
Status: CANCELLED | OUTPATIENT
Start: 2020-05-06

## 2020-04-14 RX ORDER — SODIUM CHLORIDE 0.9 % (FLUSH) 0.9 %
10 SYRINGE (ML) INJECTION PRN
Status: CANCELLED | OUTPATIENT
Start: 2020-04-23

## 2020-04-14 RX ORDER — PALONOSETRON 0.05 MG/ML
0.25 INJECTION, SOLUTION INTRAVENOUS ONCE
Status: CANCELLED | OUTPATIENT
Start: 2020-04-20

## 2020-04-14 RX ORDER — SODIUM CHLORIDE 9 MG/ML
INJECTION, SOLUTION INTRAVENOUS CONTINUOUS
Status: CANCELLED | OUTPATIENT
Start: 2020-04-23

## 2020-04-14 RX ORDER — HEPARIN SODIUM (PORCINE) LOCK FLUSH IV SOLN 100 UNIT/ML 100 UNIT/ML
500 SOLUTION INTRAVENOUS PRN
Status: CANCELLED | OUTPATIENT
Start: 2020-05-05

## 2020-04-14 RX ORDER — METHYLPREDNISOLONE SODIUM SUCCINATE 125 MG/2ML
125 INJECTION, POWDER, LYOPHILIZED, FOR SOLUTION INTRAMUSCULAR; INTRAVENOUS ONCE
Status: CANCELLED | OUTPATIENT
Start: 2020-04-22

## 2020-04-14 RX ORDER — SODIUM CHLORIDE 0.9 % (FLUSH) 0.9 %
5 SYRINGE (ML) INJECTION PRN
Status: CANCELLED | OUTPATIENT
Start: 2020-04-22

## 2020-04-14 RX ORDER — DIPHENHYDRAMINE HYDROCHLORIDE 50 MG/ML
50 INJECTION INTRAMUSCULAR; INTRAVENOUS ONCE
Status: CANCELLED | OUTPATIENT
Start: 2020-04-23

## 2020-04-14 RX ORDER — DIPHENHYDRAMINE HYDROCHLORIDE 50 MG/ML
50 INJECTION INTRAMUSCULAR; INTRAVENOUS ONCE
Status: CANCELLED | OUTPATIENT
Start: 2020-04-20

## 2020-04-14 RX ORDER — METHYLPREDNISOLONE SODIUM SUCCINATE 125 MG/2ML
125 INJECTION, POWDER, LYOPHILIZED, FOR SOLUTION INTRAMUSCULAR; INTRAVENOUS ONCE
Status: CANCELLED | OUTPATIENT
Start: 2020-05-06

## 2020-04-14 RX ORDER — SODIUM CHLORIDE 0.9 % (FLUSH) 0.9 %
10 SYRINGE (ML) INJECTION PRN
Status: CANCELLED | OUTPATIENT
Start: 2020-04-20

## 2020-04-14 RX ORDER — SODIUM CHLORIDE 0.9 % (FLUSH) 0.9 %
5 SYRINGE (ML) INJECTION PRN
Status: CANCELLED | OUTPATIENT
Start: 2020-05-04

## 2020-04-14 RX ORDER — SODIUM CHLORIDE 0.9 % (FLUSH) 0.9 %
10 SYRINGE (ML) INJECTION PRN
Status: CANCELLED | OUTPATIENT
Start: 2020-05-04

## 2020-04-14 RX ORDER — SODIUM CHLORIDE 9 MG/ML
INJECTION, SOLUTION INTRAVENOUS CONTINUOUS
Status: CANCELLED | OUTPATIENT
Start: 2020-04-22

## 2020-04-14 RX ORDER — SODIUM CHLORIDE 9 MG/ML
20 INJECTION, SOLUTION INTRAVENOUS ONCE
Status: CANCELLED | OUTPATIENT
Start: 2020-04-23

## 2020-04-14 RX ORDER — SODIUM CHLORIDE 0.9 % (FLUSH) 0.9 %
10 SYRINGE (ML) INJECTION PRN
Status: CANCELLED | OUTPATIENT
Start: 2020-04-22

## 2020-04-14 RX ORDER — EPINEPHRINE 1 MG/ML
0.3 INJECTION, SOLUTION, CONCENTRATE INTRAVENOUS PRN
Status: CANCELLED | OUTPATIENT
Start: 2020-05-06

## 2020-04-14 RX ORDER — HEPARIN SODIUM (PORCINE) LOCK FLUSH IV SOLN 100 UNIT/ML 100 UNIT/ML
500 SOLUTION INTRAVENOUS PRN
Status: CANCELLED | OUTPATIENT
Start: 2020-04-20

## 2020-04-14 RX ORDER — METHYLPREDNISOLONE SODIUM SUCCINATE 125 MG/2ML
125 INJECTION, POWDER, LYOPHILIZED, FOR SOLUTION INTRAMUSCULAR; INTRAVENOUS ONCE
Status: CANCELLED | OUTPATIENT
Start: 2020-05-05

## 2020-04-14 RX ORDER — EPINEPHRINE 1 MG/ML
0.3 INJECTION, SOLUTION, CONCENTRATE INTRAVENOUS PRN
Status: CANCELLED | OUTPATIENT
Start: 2020-04-20

## 2020-04-14 RX ORDER — HEPARIN SODIUM (PORCINE) LOCK FLUSH IV SOLN 100 UNIT/ML 100 UNIT/ML
500 SOLUTION INTRAVENOUS PRN
Status: CANCELLED | OUTPATIENT
Start: 2020-04-23

## 2020-04-14 RX ORDER — SODIUM CHLORIDE 9 MG/ML
20 INJECTION, SOLUTION INTRAVENOUS ONCE
Status: CANCELLED | OUTPATIENT
Start: 2020-05-06

## 2020-04-14 RX ORDER — EPINEPHRINE 1 MG/ML
0.3 INJECTION, SOLUTION, CONCENTRATE INTRAVENOUS PRN
Status: CANCELLED | OUTPATIENT
Start: 2020-04-23

## 2020-04-14 RX ORDER — SODIUM CHLORIDE 9 MG/ML
INJECTION, SOLUTION INTRAVENOUS CONTINUOUS
Status: CANCELLED | OUTPATIENT
Start: 2020-04-24

## 2020-04-14 RX ORDER — EPINEPHRINE 1 MG/ML
0.3 INJECTION, SOLUTION, CONCENTRATE INTRAVENOUS PRN
Status: CANCELLED | OUTPATIENT
Start: 2020-04-24

## 2020-04-14 RX ORDER — SODIUM CHLORIDE 9 MG/ML
20 INJECTION, SOLUTION INTRAVENOUS ONCE
Status: CANCELLED | OUTPATIENT
Start: 2020-04-24

## 2020-04-14 RX ORDER — HEPARIN SODIUM (PORCINE) LOCK FLUSH IV SOLN 100 UNIT/ML 100 UNIT/ML
500 SOLUTION INTRAVENOUS PRN
Status: CANCELLED | OUTPATIENT
Start: 2020-05-04

## 2020-04-14 RX ORDER — SODIUM CHLORIDE 0.9 % (FLUSH) 0.9 %
5 SYRINGE (ML) INJECTION PRN
Status: CANCELLED | OUTPATIENT
Start: 2020-05-06

## 2020-04-14 RX ORDER — SODIUM CHLORIDE 9 MG/ML
20 INJECTION, SOLUTION INTRAVENOUS ONCE
Status: CANCELLED | OUTPATIENT
Start: 2020-05-05

## 2020-04-14 RX ORDER — SODIUM CHLORIDE 0.9 % (FLUSH) 0.9 %
5 SYRINGE (ML) INJECTION PRN
Status: CANCELLED | OUTPATIENT
Start: 2020-04-20

## 2020-04-14 RX ORDER — EPINEPHRINE 1 MG/ML
0.3 INJECTION, SOLUTION, CONCENTRATE INTRAVENOUS PRN
Status: CANCELLED | OUTPATIENT
Start: 2020-05-04

## 2020-04-14 RX ORDER — DIPHENHYDRAMINE HYDROCHLORIDE 50 MG/ML
50 INJECTION INTRAMUSCULAR; INTRAVENOUS ONCE
Status: CANCELLED | OUTPATIENT
Start: 2020-05-06

## 2020-04-14 RX ORDER — METHYLPREDNISOLONE SODIUM SUCCINATE 125 MG/2ML
125 INJECTION, POWDER, LYOPHILIZED, FOR SOLUTION INTRAMUSCULAR; INTRAVENOUS ONCE
Status: CANCELLED | OUTPATIENT
Start: 2020-04-23

## 2020-04-14 RX ORDER — SODIUM CHLORIDE 0.9 % (FLUSH) 0.9 %
10 SYRINGE (ML) INJECTION PRN
Status: CANCELLED | OUTPATIENT
Start: 2020-04-24

## 2020-04-14 RX ORDER — DIPHENHYDRAMINE HYDROCHLORIDE 50 MG/ML
50 INJECTION INTRAMUSCULAR; INTRAVENOUS ONCE
Status: CANCELLED | OUTPATIENT
Start: 2020-05-05

## 2020-04-14 RX ORDER — SODIUM CHLORIDE 0.9 % (FLUSH) 0.9 %
5 SYRINGE (ML) INJECTION PRN
Status: CANCELLED | OUTPATIENT
Start: 2020-04-23

## 2020-04-14 RX ORDER — EPINEPHRINE 1 MG/ML
0.3 INJECTION, SOLUTION, CONCENTRATE INTRAVENOUS PRN
Status: CANCELLED | OUTPATIENT
Start: 2020-04-22

## 2020-04-14 RX ORDER — DIPHENHYDRAMINE HYDROCHLORIDE 50 MG/ML
50 INJECTION INTRAMUSCULAR; INTRAVENOUS ONCE
Status: CANCELLED | OUTPATIENT
Start: 2020-05-04

## 2020-04-14 RX ORDER — HEPARIN SODIUM (PORCINE) LOCK FLUSH IV SOLN 100 UNIT/ML 100 UNIT/ML
500 SOLUTION INTRAVENOUS PRN
Status: CANCELLED | OUTPATIENT
Start: 2020-05-06

## 2020-04-14 RX ORDER — SODIUM CHLORIDE 0.9 % (FLUSH) 0.9 %
10 SYRINGE (ML) INJECTION PRN
Status: CANCELLED | OUTPATIENT
Start: 2020-05-05

## 2020-04-14 RX ORDER — SODIUM CHLORIDE 9 MG/ML
20 INJECTION, SOLUTION INTRAVENOUS ONCE
Status: CANCELLED | OUTPATIENT
Start: 2020-04-20

## 2020-04-14 RX ORDER — SODIUM CHLORIDE 9 MG/ML
INJECTION, SOLUTION INTRAVENOUS CONTINUOUS
Status: CANCELLED | OUTPATIENT
Start: 2020-04-20

## 2020-04-14 RX ORDER — METHYLPREDNISOLONE SODIUM SUCCINATE 125 MG/2ML
125 INJECTION, POWDER, LYOPHILIZED, FOR SOLUTION INTRAMUSCULAR; INTRAVENOUS ONCE
Status: CANCELLED | OUTPATIENT
Start: 2020-04-24

## 2020-04-14 RX ORDER — SODIUM CHLORIDE 0.9 % (FLUSH) 0.9 %
5 SYRINGE (ML) INJECTION PRN
Status: CANCELLED | OUTPATIENT
Start: 2020-05-05

## 2020-04-14 RX ORDER — SODIUM CHLORIDE 9 MG/ML
20 INJECTION, SOLUTION INTRAVENOUS ONCE
Status: CANCELLED | OUTPATIENT
Start: 2020-04-22

## 2020-04-14 RX ORDER — DIPHENHYDRAMINE HYDROCHLORIDE 50 MG/ML
50 INJECTION INTRAMUSCULAR; INTRAVENOUS ONCE
Status: CANCELLED | OUTPATIENT
Start: 2020-04-22

## 2020-04-14 RX ORDER — HEPARIN SODIUM (PORCINE) LOCK FLUSH IV SOLN 100 UNIT/ML 100 UNIT/ML
500 SOLUTION INTRAVENOUS PRN
Status: CANCELLED | OUTPATIENT
Start: 2020-04-24

## 2020-04-14 RX ORDER — METHYLPREDNISOLONE SODIUM SUCCINATE 125 MG/2ML
125 INJECTION, POWDER, LYOPHILIZED, FOR SOLUTION INTRAMUSCULAR; INTRAVENOUS ONCE
Status: CANCELLED | OUTPATIENT
Start: 2020-05-04

## 2020-04-14 RX ORDER — EPINEPHRINE 1 MG/ML
0.3 INJECTION, SOLUTION, CONCENTRATE INTRAVENOUS PRN
Status: CANCELLED | OUTPATIENT
Start: 2020-05-05

## 2020-04-14 RX ORDER — METHYLPREDNISOLONE SODIUM SUCCINATE 125 MG/2ML
125 INJECTION, POWDER, LYOPHILIZED, FOR SOLUTION INTRAMUSCULAR; INTRAVENOUS ONCE
Status: CANCELLED | OUTPATIENT
Start: 2020-04-20

## 2020-04-14 RX ORDER — HEPARIN SODIUM (PORCINE) LOCK FLUSH IV SOLN 100 UNIT/ML 100 UNIT/ML
500 SOLUTION INTRAVENOUS PRN
Status: CANCELLED | OUTPATIENT
Start: 2020-04-22

## 2020-04-14 RX ORDER — DIPHENHYDRAMINE HYDROCHLORIDE 50 MG/ML
50 INJECTION INTRAMUSCULAR; INTRAVENOUS ONCE
Status: CANCELLED | OUTPATIENT
Start: 2020-04-24

## 2020-04-14 RX ORDER — SODIUM CHLORIDE 9 MG/ML
INJECTION, SOLUTION INTRAVENOUS CONTINUOUS
Status: CANCELLED | OUTPATIENT
Start: 2020-05-04

## 2020-04-14 RX ORDER — SODIUM CHLORIDE 9 MG/ML
INJECTION, SOLUTION INTRAVENOUS CONTINUOUS
Status: CANCELLED | OUTPATIENT
Start: 2020-05-06

## 2020-04-14 NOTE — PROGRESS NOTES
Marvin Shetty was in for md exam today. Received notice of labs after she had left. Reviewed labs with md  Orders received for pltx transfusion, sd irradiated. Pink slip to clerical to coordinate. Orders placed in epic as well.

## 2020-04-14 NOTE — PROGRESS NOTES
_    Chief Complaint   Patient presents with    Follow-up     review status of disease    Medication Check     medications reviewed with patient. verified they are all correct. DIAGNOSIS:       AML  Pancytopenia      CURRENT THERAPY:         Transfusion as needed  Start Vidaza/ Venetoclax    BRIEF CASE HISTORY:      The patient is a 78 y.o.  female who is admitted to the hospital for further management of severe pancytopenia. The patient was clinically fine. Actually she is busy doing boxes with house movement. She noticed increasing weakness and fatigue. This is getting worse over the last few weeks. She gets shortness of breath on exertion. No dizziness. She denies any active bleeding. She has increasing bruising. No fever or infections. No enlarged lymph nodes. No weight loss or decreased appetite. The patient had blood work which showed severe pancytopenia. She is receiving platelet transfusion. No history of smoking or alcohol drinking. BM test done. Results showed AML. INTERIM HISTORY:   The patient is seen for follow up after recent hospitalization. She was admitted with pancytopenia. BM test was done. It showed AML. Cytogenetic still pending. Patient continues to have easy bruising. No active bleeding. No fever or infection. She feels tired. Past Medical History:   has a past medical history of Arthritis, Hyperlipidemia, Hypertension, Pneumonia, and Seasonal allergies. Past Surgical History:   has a past surgical history that includes Colonoscopy; joint replacement; Ovary removal; and Colonoscopy (03/10/2017). Family History: family history includes Cancer in her brother. Social History:   reports that she has never smoked. She has never used smokeless tobacco. She reports current alcohol use. She reports that she does not use drugs.    Medications:    Prior to Admission medications    Medication Sig Start Date End Date Taking? Authorizing Provider   Venetoclax 100 MG TABS One tablet (100 mg) po Day 1, two tablets (200 mg) day 2, four tablets (400 mg) daily starting day 3 and continue. 4/14/20  Yes Miesha Claudio MD   vitamin D (CHOLECALCIFEROL) 25 MCG (1000 UT) TABS tablet Take 3,000 Units by mouth daily    Historical Provider, MD   Multiple Vitamins-Minerals (THERAPEUTIC MULTIVITAMIN-MINERALS) tablet Take 1 tablet by mouth daily    Historical Provider, MD   ammonium lactate (AMLACTIN) 12 % cream APPLY AFTER SHOWERING TO SKIN FOR DRYNESS 11/11/19   Historical Provider, MD   cetirizine (ZYRTEC ALLERGY) 10 MG tablet Take 10 mg by mouth daily    Historical Provider, MD   metoprolol tartrate (LOPRESSOR) 50 MG tablet 50mg once a day 10/9/19   Tiffany Peter MD   pravastatin (PRAVACHOL) 40 MG tablet TAKE 1 TABLET DAILY 5/23/19   Tiffany Peter MD   Multiple Vitamins-Minerals (ICAPS AREDS 2 PO) Take by mouth daily    Historical Provider, MD   meloxicam (MOBIC) 15 MG tablet Take 1 tablet by mouth daily as needed for Pain 4/5/18   Tiffany Peter MD   estradiol (ESTRACE) 1 MG tablet Take 0.5 mg by mouth daily  1/11/16   Historical Provider, MD   azelastine (ASTELIN) 137 MCG/SPRAY nasal spray 2 sprays by Nasal route 2 times daily as needed Use in each nostril as directed     Historical Provider, MD     Current Outpatient Medications   Medication Sig Dispense Refill    Venetoclax 100 MG TABS One tablet (100 mg) po Day 1, two tablets (200 mg) day 2, four tablets (400 mg) daily starting day 3 and continue.  120 tablet 1    vitamin D (CHOLECALCIFEROL) 25 MCG (1000 UT) TABS tablet Take 3,000 Units by mouth daily      Multiple Vitamins-Minerals (THERAPEUTIC MULTIVITAMIN-MINERALS) tablet Take 1 tablet by mouth daily      ammonium lactate (AMLACTIN) 12 % cream APPLY AFTER SHOWERING TO SKIN FOR DRYNESS      cetirizine (ZYRTEC ALLERGY) 10 MG tablet Take 10 mg by mouth daily      metoprolol tartrate (LOPRESSOR) 50 MG tablet PCP as well. 2. Old records showed platelet counts of 79 in April 2019. Repeated labs this week showed severe pancytopenia. 3. 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   4. Explained to patient and her daughter the nature of AML, prognosis and treatment. Considering patient's age and current ongoing Corona Virus epidemic, I believe 7+3 is very risky. I recommend treatment with Vidaza and Venetoclax with supportive transfusions as needed. Explained benefits and side effects and she agreed. 5. I will also refer to [facility] for second Opinion. 6. Patient's questions were answered to the best of her satisfaction and she verbalized full understanding and agreement. 7. Time spent during this visit included 40 minutes of face to face discussion. MD Kayla Martin MD  Cell: [phone]    This note is created with the assistance of a speech recognition program.  While intending to generate a document that actually reflects the content of the visit, the document can still have some errors including those of syntax and sound a like substitutions which may escape proof reading. It such instances, actual meaning can be extrapolated by contextual diversion.

## 2020-04-15 ENCOUNTER — HOSPITAL ENCOUNTER (OUTPATIENT)
Dept: INFUSION THERAPY | Facility: MEDICAL CENTER | Age: 70
Discharge: HOME | End: 2020-04-15
Payer: MEDICARE

## 2020-04-15 ENCOUNTER — TELEPHONE (OUTPATIENT)
Dept: ONCOLOGY | Age: 70
End: 2020-04-15

## 2020-04-15 VITALS
RESPIRATION RATE: 18 BRPM | HEART RATE: 73 BPM | TEMPERATURE: 98.8 F | SYSTOLIC BLOOD PRESSURE: 107 MMHG | DIASTOLIC BLOOD PRESSURE: 56 MMHG

## 2020-04-15 PROCEDURE — 36430 TRANSFUSION BLD/BLD COMPNT: CPT

## 2020-04-15 PROCEDURE — 6360000002 HC RX W HCPCS: Performed by: INTERNAL MEDICINE

## 2020-04-15 PROCEDURE — 86900 BLOOD TYPING SEROLOGIC ABO: CPT

## 2020-04-15 PROCEDURE — 96374 THER/PROPH/DIAG INJ IV PUSH: CPT

## 2020-04-15 PROCEDURE — 86901 BLOOD TYPING SEROLOGIC RH(D): CPT

## 2020-04-15 PROCEDURE — 2580000003 HC RX 258: Performed by: INTERNAL MEDICINE

## 2020-04-15 PROCEDURE — 96375 TX/PRO/DX INJ NEW DRUG ADDON: CPT

## 2020-04-15 PROCEDURE — 86905 BLOOD TYPING RBC ANTIGENS: CPT

## 2020-04-15 PROCEDURE — 86850 RBC ANTIBODY SCREEN: CPT

## 2020-04-15 PROCEDURE — P9037 PLATE PHERES LEUKOREDU IRRAD: HCPCS

## 2020-04-15 RX ORDER — 0.9 % SODIUM CHLORIDE 0.9 %
20 INTRAVENOUS SOLUTION INTRAVENOUS ONCE
Status: COMPLETED | OUTPATIENT
Start: 2020-04-15 | End: 2020-04-15

## 2020-04-15 RX ORDER — DIPHENHYDRAMINE HYDROCHLORIDE 50 MG/ML
25 INJECTION INTRAMUSCULAR; INTRAVENOUS ONCE
Status: COMPLETED | OUTPATIENT
Start: 2020-04-15 | End: 2020-04-15

## 2020-04-15 RX ORDER — DEXAMETHASONE SODIUM PHOSPHATE 10 MG/ML
10 INJECTION INTRAMUSCULAR; INTRAVENOUS ONCE
Status: COMPLETED | OUTPATIENT
Start: 2020-04-15 | End: 2020-04-15

## 2020-04-15 RX ADMIN — DIPHENHYDRAMINE HYDROCHLORIDE 25 MG: 50 INJECTION, SOLUTION INTRAMUSCULAR; INTRAVENOUS at 11:43

## 2020-04-15 RX ADMIN — SODIUM CHLORIDE 500 ML: 9 INJECTION, SOLUTION INTRAVENOUS at 11:42

## 2020-04-15 RX ADMIN — DEXAMETHASONE SODIUM PHOSPHATE 10 MG: 10 INJECTION INTRAMUSCULAR; INTRAVENOUS at 11:42

## 2020-04-15 NOTE — PROGRESS NOTES
Malu Almanza arrives ambulatory alone  Order for platelet transfusion with pre medications  Malu Almanza reports that she had platelet transfusion in hospital and had hives during infusion   Procedure reviewed and consent for blood transfusion signed and given to   Iv started with #24 cathlon to rt hand per policy  Good blood return noted  See [facility] for pre medications  1212 platelets initiated at 100ml/hr x 10 minutes  No reaction noted  1222  Iv rate increased to 350ml/hr x 10 minutes   No reaction noted  1240 iv rate increased to 750ml/hr until completion of infusion  No reaction noted  Iv line flushed for thirty minutes and no reaction noted  Iv line discontinued with needle intact  Pressure dressing applied and asked to keep pressure until AM  Malu Almanza reports she has appointment at [address] [place] [address] on Tuesday at 1:30pm  Discharged per ambulatory

## 2020-04-16 ENCOUNTER — TELEPHONE (OUTPATIENT)
Dept: ONCOLOGY | Age: 70
End: 2020-04-16

## 2020-04-16 LAB
BLD PROD TYP BPU: NORMAL
DISPENSE STATUS BLOOD BANK: NORMAL
TRANSFUSION STATUS: NORMAL
UNIT DIVISION: 0
UNIT NUMBER: NORMAL

## 2020-04-16 NOTE — TELEPHONE ENCOUNTER
2nd RN check of chemotherapy orders. Ht = 177.8 cm  Wt = 88.1 kg  BSA = 2.09    Cycle = 28 days    Vidaza 75 mg/m2 = 156.75 mg SQ Days 1 through 7    Agree with above.

## 2020-04-17 ENCOUNTER — TELEPHONE (OUTPATIENT)
Dept: INFUSION THERAPY | Facility: MEDICAL CENTER | Age: 70
End: 2020-04-17

## 2020-04-17 ENCOUNTER — HOSPITAL ENCOUNTER (OUTPATIENT)
Facility: MEDICAL CENTER | Age: 70
Discharge: HOME | End: 2020-04-17
Payer: MEDICARE

## 2020-04-17 ENCOUNTER — TELEPHONE (OUTPATIENT)
Dept: INFUSION THERAPY | Age: 70
End: 2020-04-17

## 2020-04-17 ENCOUNTER — HOSPITAL ENCOUNTER (OUTPATIENT)
Dept: ONCOLOGY | Age: 70
Setting detail: OBSERVATION
Discharge: HOME | End: 2020-04-17
Attending: INTERNAL MEDICINE | Admitting: INTERNAL MEDICINE
Payer: MEDICARE

## 2020-04-17 VITALS
TEMPERATURE: 98.6 F | HEART RATE: 86 BPM | DIASTOLIC BLOOD PRESSURE: 74 MMHG | OXYGEN SATURATION: 97 % | SYSTOLIC BLOOD PRESSURE: 137 MMHG | RESPIRATION RATE: 18 BRPM

## 2020-04-17 DIAGNOSIS — C92.00 ACUTE MYELOID LEUKEMIA NOT HAVING ACHIEVED REMISSION (HCC): ICD-10-CM

## 2020-04-17 LAB
ABO/RH: NORMAL
ABSOLUTE EOS #: 0 K/UL (ref 0–0.4)
ABSOLUTE IMMATURE GRANULOCYTE: 0.01 K/UL (ref 0–0.3)
ABSOLUTE LYMPH #: 1.1 K/UL (ref 1–4.8)
ABSOLUTE MONO #: 0.05 K/UL (ref 0.2–0.8)
ALBUMIN SERPL-MCNC: 4.2 G/DL (ref 3.5–5.2)
ALBUMIN/GLOBULIN RATIO: ABNORMAL (ref 1–2.5)
ALP BLD-CCNC: 61 U/L (ref 35–104)
ALT SERPL-CCNC: 17 U/L (ref 5–33)
ANION GAP SERPL CALCULATED.3IONS-SCNC: 7 MMOL/L (ref 9–17)
ANTIBODY SCREEN: NEGATIVE
ANTIGEN TYPE, PATIENT: NORMAL
ARM BAND NUMBER: NORMAL
AST SERPL-CCNC: 17 U/L
BASOPHILS # BLD: 0 %
BASOPHILS ABSOLUTE: 0 K/UL (ref 0–0.2)
BILIRUB SERPL-MCNC: 0.31 MG/DL (ref 0.3–1.2)
BUN BLDV-MCNC: 23 MG/DL (ref 8–23)
BUN/CREAT BLD: 33 (ref 9–20)
CALCIUM SERPL-MCNC: 9.2 MG/DL (ref 8.6–10.4)
CHLORIDE BLD-SCNC: 104 MMOL/L (ref 98–107)
CO2: 24 MMOL/L (ref 20–31)
CREAT SERPL-MCNC: 0.7 MG/DL (ref 0.5–0.9)
DIFFERENTIAL TYPE: ABNORMAL
EOSINOPHILS RELATIVE PERCENT: 0 % (ref 1–4)
EXPIRATION DATE: NORMAL
GFR AFRICAN AMERICAN: >60 ML/MIN
GFR NON-AFRICAN AMERICAN: >60 ML/MIN
GFR SERPL CREATININE-BSD FRML MDRD: ABNORMAL ML/MIN/{1.73_M2}
GFR SERPL CREATININE-BSD FRML MDRD: ABNORMAL ML/MIN/{1.73_M2}
GLUCOSE BLD-MCNC: 132 MG/DL (ref 70–99)
HCT VFR BLD CALC: 22.4 % (ref 36.3–47.1)
HEMOGLOBIN: 7.3 G/DL (ref 11.9–15.1)
IMMATURE GRANULOCYTES: 1 %
LACTATE DEHYDROGENASE: 226 U/L (ref 135–214)
LYMPHOCYTES # BLD: 84 % (ref 24–44)
MCH RBC QN AUTO: 31.9 PG (ref 25.2–33.5)
MCHC RBC AUTO-ENTMCNC: 32.6 G/DL (ref 28.4–34.8)
MCV RBC AUTO: 97.8 FL (ref 82.6–102.9)
MONOCYTES # BLD: 4 % (ref 1–7)
MORPHOLOGY: ABNORMAL
NRBC AUTOMATED: 0 PER 100 WBC
PDW BLD-RTO: 19.1 % (ref 11.8–14.4)
PHOSPHORUS: 3.5 MG/DL (ref 2.6–4.5)
PLATELET # BLD: ABNORMAL K/UL (ref 138–453)
PLATELET ESTIMATE: ABNORMAL
PLATELET, FLUORESCENCE: 6 K/UL (ref 138–453)
PLATELET, IMMATURE FRACTION: 9.4 % (ref 1.1–10.3)
PMV BLD AUTO: ABNORMAL FL (ref 8.1–13.5)
POTASSIUM SERPL-SCNC: 3.8 MMOL/L (ref 3.7–5.3)
RBC # BLD: 2.29 M/UL (ref 3.95–5.11)
RBC # BLD: ABNORMAL 10*6/UL
SEG NEUTROPHILS: 11 % (ref 36–66)
SEGMENTED NEUTROPHILS ABSOLUTE COUNT: 0.14 K/UL (ref 1.8–7.7)
SODIUM BLD-SCNC: 135 MMOL/L (ref 135–144)
TOTAL PROTEIN: 6.9 G/DL (ref 6.4–8.3)
URIC ACID: 3.1 MG/DL (ref 2.4–5.7)
WBC # BLD: 1.3 K/UL (ref 3.5–11.3)
WBC # BLD: ABNORMAL 10*3/UL

## 2020-04-17 PROCEDURE — 36415 COLL VENOUS BLD VENIPUNCTURE: CPT

## 2020-04-17 PROCEDURE — 2580000003 HC RX 258: Performed by: INTERNAL MEDICINE

## 2020-04-17 PROCEDURE — 85025 COMPLETE CBC W/AUTO DIFF WBC: CPT

## 2020-04-17 PROCEDURE — 6360000002 HC RX W HCPCS: Performed by: INTERNAL MEDICINE

## 2020-04-17 PROCEDURE — 96374 THER/PROPH/DIAG INJ IV PUSH: CPT

## 2020-04-17 PROCEDURE — 96365 THER/PROPH/DIAG IV INF INIT: CPT

## 2020-04-17 PROCEDURE — 96375 TX/PRO/DX INJ NEW DRUG ADDON: CPT

## 2020-04-17 PROCEDURE — 83615 LACTATE (LD) (LDH) ENZYME: CPT

## 2020-04-17 PROCEDURE — P9037 PLATE PHERES LEUKOREDU IRRAD: HCPCS

## 2020-04-17 PROCEDURE — G0378 HOSPITAL OBSERVATION PER HR: HCPCS

## 2020-04-17 PROCEDURE — 84100 ASSAY OF PHOSPHORUS: CPT

## 2020-04-17 PROCEDURE — 85055 RETICULATED PLATELET ASSAY: CPT

## 2020-04-17 PROCEDURE — 36430 TRANSFUSION BLD/BLD COMPNT: CPT

## 2020-04-17 PROCEDURE — 80053 COMPREHEN METABOLIC PANEL: CPT

## 2020-04-17 PROCEDURE — 84550 ASSAY OF BLOOD/URIC ACID: CPT

## 2020-04-17 RX ORDER — DEXAMETHASONE SODIUM PHOSPHATE 10 MG/ML
10 INJECTION INTRAMUSCULAR; INTRAVENOUS ONCE
Status: COMPLETED | OUTPATIENT
Start: 2020-04-17 | End: 2020-04-17

## 2020-04-17 RX ORDER — DIPHENHYDRAMINE HYDROCHLORIDE 50 MG/ML
25 INJECTION INTRAMUSCULAR; INTRAVENOUS ONCE
Status: COMPLETED | OUTPATIENT
Start: 2020-04-17 | End: 2020-04-17

## 2020-04-17 RX ORDER — 0.9 % SODIUM CHLORIDE 0.9 %
20 INTRAVENOUS SOLUTION INTRAVENOUS ONCE
Status: COMPLETED | OUTPATIENT
Start: 2020-04-17 | End: 2020-04-17

## 2020-04-17 RX ADMIN — DEXAMETHASONE SODIUM PHOSPHATE 10 MG: 10 INJECTION INTRAMUSCULAR; INTRAVENOUS at 17:56

## 2020-04-17 RX ADMIN — SODIUM CHLORIDE 20 ML: 9 INJECTION, SOLUTION INTRAVENOUS at 17:56

## 2020-04-17 RX ADMIN — DIPHENHYDRAMINE HYDROCHLORIDE 25 MG: 50 INJECTION, SOLUTION INTRAMUSCULAR; INTRAVENOUS at 17:55

## 2020-04-17 NOTE — TELEPHONE ENCOUNTER
Lab calling PLT CT 6 today drawn at 1306. At 1415 approx, writer called [facility] and spoke with Dr Sky Gao and orders received, pt to start oral chemotherapy and to go to [place] next week and pt received platelets on Tuesday. Dr Sky Gao order received for 1 unit irradiated platelets and premedicate with 10 mg IV dexamethasone and benadryl 25 mg IV prior to platelet infusion, due to pt having past reactions. Spoke with Jeannette Cook RN and Gloria Reyes in [address]. Determined pt will need to go to [facility] [place]. Writer spoke with staff at [facility] [place] [phone] and instructions to fax orders to [phone], due to department moved r/t corona virus. Pt instructed to got to ER at [facility] to admitting desk to let them know she will need directed to [address] [place] and to arrive at 1700. Writer called and notified pt of above instructions and MD order faxed to above number with confirmation. Pt states understanding of instructions.

## 2020-04-18 ENCOUNTER — CARE COORDINATION (OUTPATIENT)
Dept: FAMILY MEDICINE CLINIC | Age: 70
End: 2020-04-18

## 2020-04-18 NOTE — CARE COORDINATION
Patient contacted regarding Brayan Tellez. Care Transition Nurse/ Ambulatory Care Manager contacted the patient by telephone to perform post discharge assessment. Verified name and  with patient as identifiers. Provided introduction to self, and explanation of the CTN/ACM role, and reason for call due to risk factors for infection and/or exposure to COVID-19. Symptoms reviewed with patient who verbalized the following symptoms: no new symptoms. Due to no new or worsening symptoms encounter was not routed to provider for escalation. Patient has following risk factors of: immunocompromised. CTN/ACM reviewed discharge instructions, medical action plan and red flags such as increased shortness of breath, increasing fever and signs of decompensation with patient who verbalized understanding. Discussed exposure protocols and quarantine with CDC Guidelines What to do if you are sick with coronavirus disease .  Patient was given an opportunity for questions and concerns. The patient agrees to contact the [facility] exposure line [phone], [facility]: [phone]) and PCP office for questions related to their healthcare. CTN/ACM provided contact information for future needs. Reviewed and educated patient on any new and changed medications related to discharge diagnosis     Patient/family/caregiver given information for GetWell Loop and agrees to enroll yes  Patient's preferred e-mail: [email or web address]   Patient's preferred phone number: [phone]  Based on Loop alert triggers, patient will be contacted by nurse care manager for worsening symptoms. Pt will be further monitored by COVID Loop Team based on severity of symptoms and risk factors.

## 2020-04-19 LAB
BLD PROD TYP BPU: NORMAL
BLD PROD TYP BPU: NORMAL
DISPENSE STATUS BLOOD BANK: NORMAL
DISPENSE STATUS BLOOD BANK: NORMAL
TRANSFUSION STATUS: NORMAL
TRANSFUSION STATUS: NORMAL
UNIT DIVISION: 0
UNIT DIVISION: 0
UNIT NUMBER: NORMAL
UNIT NUMBER: NORMAL

## 2020-04-20 ENCOUNTER — HOSPITAL ENCOUNTER (OUTPATIENT)
Facility: MEDICAL CENTER | Age: 70
End: 2020-04-20
Payer: MEDICARE

## 2020-04-20 ENCOUNTER — TELEPHONE (OUTPATIENT)
Dept: ONCOLOGY | Age: 70
End: 2020-04-20

## 2020-04-20 ENCOUNTER — HOSPITAL ENCOUNTER (OUTPATIENT)
Dept: INFUSION THERAPY | Facility: MEDICAL CENTER | Age: 70
Discharge: HOME | End: 2020-04-20
Payer: MEDICARE

## 2020-04-20 ENCOUNTER — HOSPITAL ENCOUNTER (OUTPATIENT)
Facility: MEDICAL CENTER | Age: 70
Discharge: HOME | End: 2020-04-20
Payer: MEDICARE

## 2020-04-20 VITALS
TEMPERATURE: 98.5 F | RESPIRATION RATE: 20 BRPM | SYSTOLIC BLOOD PRESSURE: 138 MMHG | HEART RATE: 83 BPM | DIASTOLIC BLOOD PRESSURE: 79 MMHG

## 2020-04-20 DIAGNOSIS — C92.00 ACUTE MYELOID LEUKEMIA NOT HAVING ACHIEVED REMISSION (HCC): ICD-10-CM

## 2020-04-20 DIAGNOSIS — C92.00 ACUTE MYELOID LEUKEMIA NOT HAVING ACHIEVED REMISSION (HCC): Primary | ICD-10-CM

## 2020-04-20 LAB
ABO/RH: NORMAL
ABSOLUTE EOS #: 0 K/UL (ref 0–0.4)
ABSOLUTE IMMATURE GRANULOCYTE: 0.01 K/UL (ref 0–0.3)
ABSOLUTE LYMPH #: 0.84 K/UL (ref 1–4.8)
ABSOLUTE MONO #: 0.03 K/UL (ref 0.2–0.8)
ANTIBODY SCREEN: NEGATIVE
ARM BAND NUMBER: NORMAL
BASOPHILS # BLD: 0 %
BASOPHILS ABSOLUTE: 0 K/UL (ref 0–0.2)
DIFFERENTIAL TYPE: ABNORMAL
EOSINOPHILS RELATIVE PERCENT: 0 % (ref 1–4)
EXPIRATION DATE: NORMAL
HCT VFR BLD CALC: 22.4 % (ref 36.3–47.1)
HEMOGLOBIN: 7.4 G/DL (ref 11.9–15.1)
IMMATURE GRANULOCYTES: 1 %
LYMPHOCYTES # BLD: 84 % (ref 24–44)
MCH RBC QN AUTO: 31.8 PG (ref 25.2–33.5)
MCHC RBC AUTO-ENTMCNC: 33 G/DL (ref 28.4–34.8)
MCV RBC AUTO: 96.1 FL (ref 82.6–102.9)
MONOCYTES # BLD: 3 % (ref 1–7)
MORPHOLOGY: ABNORMAL
NRBC AUTOMATED: 0 PER 100 WBC
PDW BLD-RTO: 18.8 % (ref 11.8–14.4)
PLATELET # BLD: ABNORMAL K/UL (ref 138–453)
PLATELET ESTIMATE: ABNORMAL
PLATELET, FLUORESCENCE: 7 K/UL (ref 138–453)
PLATELET, IMMATURE FRACTION: 9.4 % (ref 1.1–10.3)
PMV BLD AUTO: ABNORMAL FL (ref 8.1–13.5)
RBC # BLD: 2.33 M/UL (ref 3.95–5.11)
RBC # BLD: ABNORMAL 10*6/UL
SEG NEUTROPHILS: 12 % (ref 36–66)
SEGMENTED NEUTROPHILS ABSOLUTE COUNT: 0.12 K/UL (ref 1.8–7.7)
WBC # BLD: 1 K/UL (ref 3.5–11.3)
WBC # BLD: ABNORMAL 10*3/UL

## 2020-04-20 PROCEDURE — 36430 TRANSFUSION BLD/BLD COMPNT: CPT

## 2020-04-20 PROCEDURE — 6360000002 HC RX W HCPCS: Performed by: INTERNAL MEDICINE

## 2020-04-20 PROCEDURE — 85055 RETICULATED PLATELET ASSAY: CPT

## 2020-04-20 PROCEDURE — 85025 COMPLETE CBC W/AUTO DIFF WBC: CPT

## 2020-04-20 PROCEDURE — 96401 CHEMO ANTI-NEOPL SQ/IM: CPT

## 2020-04-20 PROCEDURE — 96374 THER/PROPH/DIAG INJ IV PUSH: CPT

## 2020-04-20 PROCEDURE — P9037 PLATE PHERES LEUKOREDU IRRAD: HCPCS

## 2020-04-20 PROCEDURE — 2580000003 HC RX 258: Performed by: INTERNAL MEDICINE

## 2020-04-20 PROCEDURE — 96375 TX/PRO/DX INJ NEW DRUG ADDON: CPT

## 2020-04-20 PROCEDURE — 86900 BLOOD TYPING SEROLOGIC ABO: CPT

## 2020-04-20 PROCEDURE — 86901 BLOOD TYPING SEROLOGIC RH(D): CPT

## 2020-04-20 PROCEDURE — 36415 COLL VENOUS BLD VENIPUNCTURE: CPT

## 2020-04-20 PROCEDURE — 86850 RBC ANTIBODY SCREEN: CPT

## 2020-04-20 RX ORDER — ONDANSETRON 2 MG/ML
4 INJECTION INTRAMUSCULAR; INTRAVENOUS ONCE
Status: COMPLETED | OUTPATIENT
Start: 2020-04-20 | End: 2020-04-20

## 2020-04-20 RX ORDER — DEXAMETHASONE SODIUM PHOSPHATE 10 MG/ML
10 INJECTION INTRAMUSCULAR; INTRAVENOUS ONCE
Status: COMPLETED | OUTPATIENT
Start: 2020-04-20 | End: 2020-04-20

## 2020-04-20 RX ORDER — 0.9 % SODIUM CHLORIDE 0.9 %
20 INTRAVENOUS SOLUTION INTRAVENOUS ONCE
Status: COMPLETED | OUTPATIENT
Start: 2020-04-20 | End: 2020-04-20

## 2020-04-20 RX ORDER — DIPHENHYDRAMINE HYDROCHLORIDE 50 MG/ML
25 INJECTION INTRAMUSCULAR; INTRAVENOUS ONCE
Status: COMPLETED | OUTPATIENT
Start: 2020-04-20 | End: 2020-04-20

## 2020-04-20 RX ORDER — ACETAMINOPHEN 325 MG/1
650 TABLET ORAL ONCE
Status: DISCONTINUED | OUTPATIENT
Start: 2020-04-20 | End: 2020-04-21 | Stop reason: HOSPADM

## 2020-04-20 RX ORDER — PROCHLORPERAZINE MALEATE 10 MG
TABLET ORAL
Qty: 60 TABLET | Refills: 3 | Status: SHIPPED | OUTPATIENT
Start: 2020-04-20 | End: 2020-05-01

## 2020-04-20 RX ADMIN — AZACITIDINE 160 MG: 100 INJECTION, POWDER, LYOPHILIZED, FOR SOLUTION INTRAVENOUS; SUBCUTANEOUS at 16:25

## 2020-04-20 RX ADMIN — ONDANSETRON 4 MG: 2 INJECTION INTRAMUSCULAR; INTRAVENOUS at 15:24

## 2020-04-20 RX ADMIN — DIPHENHYDRAMINE HYDROCHLORIDE 25 MG: 50 INJECTION, SOLUTION INTRAMUSCULAR; INTRAVENOUS at 15:29

## 2020-04-20 RX ADMIN — SODIUM CHLORIDE 500 ML: 9 INJECTION, SOLUTION INTRAVENOUS at 14:39

## 2020-04-20 RX ADMIN — DEXAMETHASONE SODIUM PHOSPHATE 10 MG: 10 INJECTION INTRAMUSCULAR; INTRAVENOUS at 15:21

## 2020-04-20 NOTE — TELEPHONE ENCOUNTER
[facility]  PLTX= 7000  WBC= 1.0  HGB= 7.4    PHONE DR Kem SMALLS IN BLOOD BANK NOTIFIED AND ORDER PLACED FOR ONE UNIT IRRADIATED SINGLE DONOR PLTX. PRE MED ORDERS DEXAMETHASONE 10 MG IVP AND BENADRYL 25 MG IVP. PINK SLIP TO OFFICE TO ADD PLTX AND PRE MED TO SCHEDULE. [facility] UPDATED AS WELL.

## 2020-04-20 NOTE — PROGRESS NOTES
[facility] Platelet count=7. Platelets ordered. Vitals obtained. Labs reviewed. IV started. Premeds given for transfusion and chemo. Platelets hung per MAR. Infusing No complaints. Vitals taken and placed on flow sheet. Completed over 30 minutes. No complaints or problems. IV discontinued intact, dressing to site. Vidaza given per [facility], band aids to sites. Has a return visit scheduled. Discharged ambulatory per self.

## 2020-04-21 ENCOUNTER — HOSPITAL ENCOUNTER (OUTPATIENT)
Facility: MEDICAL CENTER | Age: 70
End: 2020-04-21
Payer: MEDICARE

## 2020-04-21 ENCOUNTER — HOSPITAL ENCOUNTER (OUTPATIENT)
Facility: MEDICAL CENTER | Age: 70
Discharge: HOME | End: 2020-04-21
Payer: MEDICARE

## 2020-04-21 DIAGNOSIS — C92.00 ACUTE MYELOID LEUKEMIA NOT HAVING ACHIEVED REMISSION (HCC): ICD-10-CM

## 2020-04-21 LAB
BLD PROD TYP BPU: NORMAL
DISPENSE STATUS BLOOD BANK: NORMAL
MISCELLANEOUS LAB TEST RESULT: NORMAL
TEST NAME: NORMAL
TRANSFUSION STATUS: NORMAL
UNIT DIVISION: 0
UNIT NUMBER: NORMAL

## 2020-04-22 ENCOUNTER — HOSPITAL ENCOUNTER (OUTPATIENT)
Dept: INFUSION THERAPY | Facility: MEDICAL CENTER | Age: 70
Discharge: HOME | End: 2020-04-22
Payer: MEDICARE

## 2020-04-22 VITALS
HEART RATE: 65 BPM | TEMPERATURE: 98.6 F | SYSTOLIC BLOOD PRESSURE: 118 MMHG | DIASTOLIC BLOOD PRESSURE: 70 MMHG | RESPIRATION RATE: 20 BRPM

## 2020-04-22 DIAGNOSIS — C92.00 ACUTE MYELOID LEUKEMIA NOT HAVING ACHIEVED REMISSION (HCC): Primary | ICD-10-CM

## 2020-04-22 LAB
ABSOLUTE EOS #: 0 K/UL (ref 0–0.4)
ABSOLUTE IMMATURE GRANULOCYTE: 0.02 K/UL (ref 0–0.3)
ABSOLUTE LYMPH #: 1.45 K/UL (ref 1–4.8)
ABSOLUTE MONO #: 0.05 K/UL (ref 0.2–0.8)
ALBUMIN SERPL-MCNC: 4.3 G/DL (ref 3.5–5.2)
ALBUMIN/GLOBULIN RATIO: ABNORMAL (ref 1–2.5)
ALP BLD-CCNC: 65 U/L (ref 35–104)
ALT SERPL-CCNC: 16 U/L (ref 5–33)
ANION GAP SERPL CALCULATED.3IONS-SCNC: 15 MMOL/L (ref 9–17)
AST SERPL-CCNC: 15 U/L
BASOPHILS # BLD: 0 %
BASOPHILS ABSOLUTE: 0 K/UL (ref 0–0.2)
BILIRUB SERPL-MCNC: 0.69 MG/DL (ref 0.3–1.2)
BUN BLDV-MCNC: 27 MG/DL (ref 8–23)
BUN/CREAT BLD: 48 (ref 9–20)
CALCIUM SERPL-MCNC: 9.7 MG/DL (ref 8.6–10.4)
CHLORIDE BLD-SCNC: 99 MMOL/L (ref 98–107)
CO2: 23 MMOL/L (ref 20–31)
CREAT SERPL-MCNC: 0.56 MG/DL (ref 0.5–0.9)
DIFFERENTIAL TYPE: ABNORMAL
EOSINOPHILS RELATIVE PERCENT: 0 % (ref 1–4)
GFR AFRICAN AMERICAN: >60 ML/MIN
GFR NON-AFRICAN AMERICAN: >60 ML/MIN
GFR SERPL CREATININE-BSD FRML MDRD: ABNORMAL ML/MIN/{1.73_M2}
GFR SERPL CREATININE-BSD FRML MDRD: ABNORMAL ML/MIN/{1.73_M2}
GLUCOSE BLD-MCNC: 95 MG/DL (ref 70–99)
HCT VFR BLD CALC: 23.7 % (ref 36.3–47.1)
HEMOGLOBIN: 7.8 G/DL (ref 11.9–15.1)
IMMATURE GRANULOCYTES: 1 %
LACTATE DEHYDROGENASE: 289 U/L (ref 135–214)
LYMPHOCYTES # BLD: 91 % (ref 24–44)
MCH RBC QN AUTO: 31.3 PG (ref 25.2–33.5)
MCHC RBC AUTO-ENTMCNC: 32.9 G/DL (ref 28.4–34.8)
MCV RBC AUTO: 95.2 FL (ref 82.6–102.9)
MONOCYTES # BLD: 3 % (ref 1–7)
MORPHOLOGY: ABNORMAL
MORPHOLOGY: ABNORMAL
NRBC AUTOMATED: 0 PER 100 WBC
PDW BLD-RTO: 18.7 % (ref 11.8–14.4)
PHOSPHORUS: 2.6 MG/DL (ref 2.6–4.5)
PLATELET # BLD: ABNORMAL K/UL (ref 138–453)
PLATELET ESTIMATE: ABNORMAL
PLATELET, FLUORESCENCE: 6 K/UL (ref 138–453)
PLATELET, IMMATURE FRACTION: 9 % (ref 1.1–10.3)
PMV BLD AUTO: ABNORMAL FL (ref 8.1–13.5)
POTASSIUM SERPL-SCNC: 3.5 MMOL/L (ref 3.7–5.3)
RBC # BLD: 2.49 M/UL (ref 3.95–5.11)
RBC # BLD: ABNORMAL 10*6/UL
SEG NEUTROPHILS: 5 % (ref 36–66)
SEGMENTED NEUTROPHILS ABSOLUTE COUNT: 0.08 K/UL (ref 1.8–7.7)
SODIUM BLD-SCNC: 137 MMOL/L (ref 135–144)
TOTAL PROTEIN: 7 G/DL (ref 6.4–8.3)
URIC ACID: 2.3 MG/DL (ref 2.4–5.7)
WBC # BLD: 1.6 K/UL (ref 3.5–11.3)
WBC # BLD: ABNORMAL 10*3/UL

## 2020-04-22 PROCEDURE — P9037 PLATE PHERES LEUKOREDU IRRAD: HCPCS

## 2020-04-22 PROCEDURE — 2580000003 HC RX 258: Performed by: INTERNAL MEDICINE

## 2020-04-22 PROCEDURE — 6360000002 HC RX W HCPCS: Performed by: INTERNAL MEDICINE

## 2020-04-22 PROCEDURE — 36415 COLL VENOUS BLD VENIPUNCTURE: CPT

## 2020-04-22 PROCEDURE — 81380 HLA I TYPING 1 LOCUS HR: CPT

## 2020-04-22 PROCEDURE — 96401 CHEMO ANTI-NEOPL SQ/IM: CPT

## 2020-04-22 PROCEDURE — 83615 LACTATE (LD) (LDH) ENZYME: CPT

## 2020-04-22 PROCEDURE — 86807 CYTOTOXIC ANTIBODY SCREENING: CPT

## 2020-04-22 PROCEDURE — 96374 THER/PROPH/DIAG INJ IV PUSH: CPT

## 2020-04-22 PROCEDURE — 84100 ASSAY OF PHOSPHORUS: CPT

## 2020-04-22 PROCEDURE — 85025 COMPLETE CBC W/AUTO DIFF WBC: CPT

## 2020-04-22 PROCEDURE — 96375 TX/PRO/DX INJ NEW DRUG ADDON: CPT

## 2020-04-22 PROCEDURE — 84550 ASSAY OF BLOOD/URIC ACID: CPT

## 2020-04-22 PROCEDURE — 80053 COMPREHEN METABOLIC PANEL: CPT

## 2020-04-22 PROCEDURE — 36430 TRANSFUSION BLD/BLD COMPNT: CPT

## 2020-04-22 PROCEDURE — 85055 RETICULATED PLATELET ASSAY: CPT

## 2020-04-22 RX ORDER — DEXAMETHASONE SODIUM PHOSPHATE 10 MG/ML
10 INJECTION INTRAMUSCULAR; INTRAVENOUS ONCE
Status: COMPLETED | OUTPATIENT
Start: 2020-04-22 | End: 2020-04-22

## 2020-04-22 RX ORDER — DIPHENHYDRAMINE HYDROCHLORIDE 50 MG/ML
25 INJECTION INTRAMUSCULAR; INTRAVENOUS ONCE
Status: COMPLETED | OUTPATIENT
Start: 2020-04-22 | End: 2020-04-22

## 2020-04-22 RX ORDER — 0.9 % SODIUM CHLORIDE 0.9 %
500 INTRAVENOUS SOLUTION INTRAVENOUS ONCE
Status: COMPLETED | OUTPATIENT
Start: 2020-04-22 | End: 2020-04-22

## 2020-04-22 RX ADMIN — WATER 160 MG: 1 INJECTION INTRAMUSCULAR; INTRAVENOUS; SUBCUTANEOUS at 11:07

## 2020-04-22 RX ADMIN — DEXAMETHASONE SODIUM PHOSPHATE 10 MG: 10 INJECTION INTRAMUSCULAR; INTRAVENOUS at 11:01

## 2020-04-22 RX ADMIN — DIPHENHYDRAMINE HYDROCHLORIDE 25 MG: 50 INJECTION, SOLUTION INTRAMUSCULAR; INTRAVENOUS at 11:04

## 2020-04-22 RX ADMIN — SODIUM CHLORIDE 500 ML: 9 INJECTION, SOLUTION INTRAVENOUS at 09:55

## 2020-04-22 NOTE — PROGRESS NOTES
Patient here for Vidaza injection and possible platelet transfusion. Labs done. Platelets=6. Needs HLA typing per [facility]. Vitals obtained. IV started per Seble Carmona RN. Premeds given per [facility]. Vidaza given per [facility], band aids to sites. Platelets hung per flowsheet. Vitals obtained. No complaints. Completed. Tolerated well. Blood tubes here for HLA typing test.  Blood drawn from IV site easily. Tubes labelled and written order sent to lab. IV removed intact, dressing to site. Vomiting x 2 in past 20 minutes. Has nausea medication at home. Told her to take one when she gets home, at bedtime and tomorrow before she comes in. Voices understanding. Given tissue box and emesis basin in a bag to take with her. Discharged ambulatory per self.

## 2020-04-23 ENCOUNTER — TELEPHONE (OUTPATIENT)
Dept: ONCOLOGY | Age: 70
End: 2020-04-23

## 2020-04-23 ENCOUNTER — OFFICE VISIT (OUTPATIENT)
Dept: ONCOLOGY | Age: 70
End: 2020-04-23
Payer: MEDICARE

## 2020-04-23 ENCOUNTER — HOSPITAL ENCOUNTER (OUTPATIENT)
Dept: INFUSION THERAPY | Facility: MEDICAL CENTER | Age: 70
Discharge: HOME | End: 2020-04-23
Payer: MEDICARE

## 2020-04-23 VITALS
SYSTOLIC BLOOD PRESSURE: 130 MMHG | WEIGHT: 195.5 LBS | TEMPERATURE: 97.5 F | HEART RATE: 69 BPM | BODY MASS INDEX: 28.05 KG/M2 | RESPIRATION RATE: 20 BRPM | DIASTOLIC BLOOD PRESSURE: 73 MMHG

## 2020-04-23 DIAGNOSIS — C92.00 ACUTE MYELOID LEUKEMIA NOT HAVING ACHIEVED REMISSION (HCC): Primary | ICD-10-CM

## 2020-04-23 PROCEDURE — 2580000003 HC RX 258: Performed by: INTERNAL MEDICINE

## 2020-04-23 PROCEDURE — 6360000002 HC RX W HCPCS: Performed by: INTERNAL MEDICINE

## 2020-04-23 PROCEDURE — 96401 CHEMO ANTI-NEOPL SQ/IM: CPT

## 2020-04-23 PROCEDURE — 99211 OFF/OP EST MAY X REQ PHY/QHP: CPT

## 2020-04-23 PROCEDURE — 99214 OFFICE O/P EST MOD 30 MIN: CPT | Performed by: INTERNAL MEDICINE

## 2020-04-23 RX ORDER — LEVOFLOXACIN 500 MG/1
500 TABLET, FILM COATED ORAL DAILY
Status: ON HOLD | COMMUNITY
Start: 2020-04-21 | End: 2020-05-25 | Stop reason: HOSPADM

## 2020-04-23 RX ORDER — ACYCLOVIR 400 MG/1
400 TABLET ORAL EVERY 12 HOURS
Status: ON HOLD | COMMUNITY
Start: 2020-04-21 | End: 2020-05-25

## 2020-04-23 RX ORDER — AMINOCAPROIC ACID 500 MG/1
1 TABLET ORAL 2 TIMES DAILY
Status: ON HOLD | COMMUNITY
Start: 2020-04-22 | End: 2020-05-25 | Stop reason: HOSPADM

## 2020-04-23 RX ORDER — ALLOPURINOL 300 MG/1
300 TABLET ORAL DAILY
COMMUNITY
Start: 2020-04-21 | End: 2020-05-28 | Stop reason: ALTCHOICE

## 2020-04-23 RX ADMIN — WATER 160 MG: 1 INJECTION INTRAMUSCULAR; INTRAVENOUS; SUBCUTANEOUS at 09:54

## 2020-04-23 NOTE — PROGRESS NOTES
Patient arrive ambulatory for cycle 1 day 3 treatment after having met with physician in front office. Complaint of some increased nausea after yesterday injection; instructed to continue with anti-emetic medications. Physician note reviewed; order reviewed. Patient tolerate injections well; does have bruising from previous injections.   Patient ambulate off unit per self at discharge; instructed to stop at  for AVS.

## 2020-04-23 NOTE — PROGRESS NOTES
that she has never smoked. She has never used smokeless tobacco. She reports current alcohol use. She reports that she does not use drugs. Medications:    Prior to Admission medications    Medication Sig Start Date End Date Taking? Authorizing Provider   acyclovir (ZOVIRAX) 400 MG tablet Take 400 mg by mouth every 12 hours 4/21/20 5/21/20 Yes Historical Provider, MD   allopurinol (ZYLOPRIM) 300 MG tablet Take 300 mg by mouth daily 4/21/20 5/5/20 Yes Historical Provider, MD   levoFLOXacin (LEVAQUIN) 500 MG tablet Take 500 mg by mouth daily 4/21/20 5/21/20 Yes Historical Provider, MD   prochlorperazine (COMPAZINE) 10 MG tablet Take one tablet by mouth daily prior to Vidaza injection and one tablet every 6 hours as needed for nausea. 4/20/20  Yes Asuncion Mcpherson MD   Venetoclax 100 MG TABS One tablet (100 mg) po Day 1, two tablets (200 mg) day 2, four tablets (400 mg) daily starting day 3 and continue.  4/14/20  Yes Valentine Durham MD   vitamin D (CHOLECALCIFEROL) 25 MCG (1000 UT) TABS tablet Take 3,000 Units by mouth daily   Yes Historical Provider, MD   Multiple Vitamins-Minerals (THERAPEUTIC MULTIVITAMIN-MINERALS) tablet Take 1 tablet by mouth daily   Yes Historical Provider, MD   ammonium lactate (AMLACTIN) 12 % cream APPLY AFTER SHOWERING TO SKIN FOR DRYNESS 11/11/19  Yes Historical Provider, MD   cetirizine (ZYRTEC ALLERGY) 10 MG tablet Take 10 mg by mouth daily   Yes Historical Provider, MD   metoprolol tartrate (LOPRESSOR) 50 MG tablet 50mg once a day 10/9/19  Yes Zita Jose MD   pravastatin (PRAVACHOL) 40 MG tablet TAKE 1 TABLET DAILY 5/23/19  Yes Zita Jose MD   Multiple Vitamins-Minerals (ICAPS AREDS 2 PO) Take by mouth daily   Yes Historical Provider, MD   meloxicam (MOBIC) 15 MG tablet Take 1 tablet by mouth daily as needed for Pain 4/5/18  Yes Zita Jose MD   estradiol (ESTRACE) 1 MG tablet Take 0.5 mg by mouth daily  1/11/16  Yes Historical Provider, MD   azelastine (ASTELIN) Prescribe Zofran. 5. Labs will be done 3 times weekly. Supportive transfusion as needed. 6. Patient's questions were answered to the best of her satisfaction and she verbalized full understanding and agreement. MD Kayla Carrasco MD  Cell: [phone]    This note is created with the assistance of a speech recognition program.  While intending to generate a document that actually reflects the content of the visit, the document can still have some errors including those of syntax and sound a like substitutions which may escape proof reading. It such instances, actual meaning can be extrapolated by contextual diversion.

## 2020-04-23 NOTE — TELEPHONE ENCOUNTER
Sofía Lowe MD VISIT & [id number] [facility] IN TO SEE PATIENT  ORDERS RECEIVED  CBC 3X'S WEEKLY  CONTINUE VIDAZA  RV 4/28  CDP 4/28,4/30,5/1  MD VISIT 04/28/20 @10:45AM  [email or web address]  AVS PRINTED AND GIVEN TO PATIENT WITH INSTRUCTIONS  PATIENT DISCHARGED TO [address] [place] [address]

## 2020-04-24 ENCOUNTER — HOSPITAL ENCOUNTER (OUTPATIENT)
Facility: MEDICAL CENTER | Age: 70
End: 2020-04-24
Payer: MEDICARE

## 2020-04-24 ENCOUNTER — HOSPITAL ENCOUNTER (OUTPATIENT)
Dept: INFUSION THERAPY | Facility: MEDICAL CENTER | Age: 70
Discharge: HOME | End: 2020-04-24
Payer: MEDICARE

## 2020-04-24 ENCOUNTER — HOSPITAL ENCOUNTER (OUTPATIENT)
Facility: MEDICAL CENTER | Age: 70
Discharge: HOME | End: 2020-04-24
Payer: MEDICARE

## 2020-04-24 VITALS
HEART RATE: 76 BPM | SYSTOLIC BLOOD PRESSURE: 105 MMHG | RESPIRATION RATE: 18 BRPM | DIASTOLIC BLOOD PRESSURE: 54 MMHG | TEMPERATURE: 98.9 F

## 2020-04-24 DIAGNOSIS — C92.00 ACUTE MYELOID LEUKEMIA NOT HAVING ACHIEVED REMISSION (HCC): ICD-10-CM

## 2020-04-24 DIAGNOSIS — C92.00 ACUTE MYELOID LEUKEMIA NOT HAVING ACHIEVED REMISSION (HCC): Primary | ICD-10-CM

## 2020-04-24 LAB
ABSOLUTE EOS #: 0 K/UL (ref 0–0.44)
ABSOLUTE IMMATURE GRANULOCYTE: 0 K/UL (ref 0–0.3)
ABSOLUTE LYMPH #: 0.49 K/UL (ref 1.1–3.7)
ABSOLUTE MONO #: 0.07 K/UL (ref 0.1–1.2)
BASOPHILS # BLD: 0 % (ref 0–2)
BASOPHILS ABSOLUTE: 0 K/UL (ref 0–0.2)
DIFFERENTIAL TYPE: ABNORMAL
EOSINOPHILS RELATIVE PERCENT: 0 % (ref 1–4)
HCT VFR BLD CALC: 22.1 % (ref 36.3–47.1)
HEMOGLOBIN: 7 G/DL (ref 11.9–15.1)
IMMATURE GRANULOCYTES: 0 %
LYMPHOCYTES # BLD: 70 % (ref 24–43)
MCH RBC QN AUTO: 30.7 PG (ref 25.2–33.5)
MCHC RBC AUTO-ENTMCNC: 31.7 G/DL (ref 28.4–34.8)
MCV RBC AUTO: 96.9 FL (ref 82.6–102.9)
MONOCYTES # BLD: 10 % (ref 3–12)
MORPHOLOGY: ABNORMAL
NRBC AUTOMATED: 0 PER 100 WBC
PDW BLD-RTO: 18.3 % (ref 11.8–14.4)
PLATELET # BLD: ABNORMAL K/UL (ref 138–453)
PLATELET ESTIMATE: ABNORMAL
PLATELET, FLUORESCENCE: 6 K/UL (ref 138–453)
PLATELET, IMMATURE FRACTION: 10.4 % (ref 1.1–10.3)
PMV BLD AUTO: ABNORMAL FL (ref 8.1–13.5)
RBC # BLD: 2.28 M/UL (ref 3.95–5.11)
RBC # BLD: ABNORMAL 10*6/UL
SEG NEUTROPHILS: 20 % (ref 36–65)
SEGMENTED NEUTROPHILS ABSOLUTE COUNT: 0.14 K/UL (ref 1.5–8.1)
WBC # BLD: 0.7 K/UL (ref 3.5–11.3)
WBC # BLD: ABNORMAL 10*3/UL

## 2020-04-24 PROCEDURE — 2580000003 HC RX 258: Performed by: INTERNAL MEDICINE

## 2020-04-24 PROCEDURE — 85055 RETICULATED PLATELET ASSAY: CPT

## 2020-04-24 PROCEDURE — 85025 COMPLETE CBC W/AUTO DIFF WBC: CPT

## 2020-04-24 PROCEDURE — 96401 CHEMO ANTI-NEOPL SQ/IM: CPT

## 2020-04-24 PROCEDURE — 6360000002 HC RX W HCPCS: Performed by: INTERNAL MEDICINE

## 2020-04-24 PROCEDURE — 96375 TX/PRO/DX INJ NEW DRUG ADDON: CPT

## 2020-04-24 PROCEDURE — 96374 THER/PROPH/DIAG INJ IV PUSH: CPT

## 2020-04-24 PROCEDURE — 36415 COLL VENOUS BLD VENIPUNCTURE: CPT

## 2020-04-24 PROCEDURE — P9037 PLATE PHERES LEUKOREDU IRRAD: HCPCS

## 2020-04-24 PROCEDURE — 36430 TRANSFUSION BLD/BLD COMPNT: CPT

## 2020-04-24 RX ORDER — DEXAMETHASONE SODIUM PHOSPHATE 10 MG/ML
10 INJECTION INTRAMUSCULAR; INTRAVENOUS ONCE
Status: COMPLETED | OUTPATIENT
Start: 2020-04-24 | End: 2020-04-24

## 2020-04-24 RX ORDER — ONDANSETRON 4 MG/1
4 TABLET, ORALLY DISINTEGRATING ORAL 3 TIMES DAILY PRN
Qty: 21 TABLET | Refills: 0 | Status: SHIPPED | OUTPATIENT
Start: 2020-04-24 | End: 2021-04-28

## 2020-04-24 RX ORDER — 0.9 % SODIUM CHLORIDE 0.9 %
20 INTRAVENOUS SOLUTION INTRAVENOUS ONCE
Status: COMPLETED | OUTPATIENT
Start: 2020-04-24 | End: 2020-04-24

## 2020-04-24 RX ORDER — ONDANSETRON 2 MG/ML
4 INJECTION INTRAMUSCULAR; INTRAVENOUS ONCE
Status: COMPLETED | OUTPATIENT
Start: 2020-04-24 | End: 2020-04-24

## 2020-04-24 RX ORDER — DIPHENHYDRAMINE HYDROCHLORIDE 50 MG/ML
25 INJECTION INTRAMUSCULAR; INTRAVENOUS ONCE
Status: COMPLETED | OUTPATIENT
Start: 2020-04-24 | End: 2020-04-24

## 2020-04-24 RX ADMIN — SODIUM CHLORIDE 500 ML: 9 INJECTION, SOLUTION INTRAVENOUS at 09:38

## 2020-04-24 RX ADMIN — ONDANSETRON 4 MG: 2 INJECTION INTRAMUSCULAR; INTRAVENOUS at 09:39

## 2020-04-24 RX ADMIN — DIPHENHYDRAMINE HYDROCHLORIDE 25 MG: 50 INJECTION, SOLUTION INTRAMUSCULAR; INTRAVENOUS at 10:24

## 2020-04-24 RX ADMIN — WATER 160 MG: 1 INJECTION INTRAMUSCULAR; INTRAVENOUS; SUBCUTANEOUS at 10:53

## 2020-04-24 RX ADMIN — DEXAMETHASONE SODIUM PHOSPHATE 10 MG: 10 INJECTION INTRAMUSCULAR; INTRAVENOUS at 10:22

## 2020-04-24 ASSESSMENT — PAIN SCALES - GENERAL: PAINLEVEL_OUTOF10: 2

## 2020-04-24 NOTE — PROGRESS NOTES
Patient here for labs and Vidaza injections. Complains of rib pain due to vomiting last night. Vitals obtained. Labs reviewed and Dr. Vernell Burnham called. Order for platelets received. Platelet ordered and spoke to Praneethyonatan Ramesh in blood bank. HLA typed platelets will need 2-3 days to obtain. Irradiated ordered for today and told Janis Hsiehsins she can get those next week. IV started. Medicated with Zofran 4 mg per MAR. Platelets ready. Medicated with Benadryl and Dexamethasone per MAR. Platelets hung at 358 ml/hr . Infusing. No complaints. Completed. Tolerated well. Maria Victorialyn Littler given per [facility], Coflex wrapped around each arm to prevent bleeding and bruising. IV discontinued intact, dressing to site. Has a return visit scheduled. Discharged ambulatory per self.

## 2020-04-26 ENCOUNTER — APPOINTMENT (OUTPATIENT)
Dept: GENERAL RADIOLOGY | Age: 70
DRG: 809 | End: 2020-04-26
Payer: MEDICARE

## 2020-04-26 ENCOUNTER — HOSPITAL ENCOUNTER (INPATIENT)
Age: 70
LOS: 3 days | Discharge: HOME | DRG: 809 | End: 2020-04-29
Attending: EMERGENCY MEDICINE | Admitting: INTERNAL MEDICINE
Payer: MEDICARE

## 2020-04-26 LAB
-: ABNORMAL
ABSOLUTE EOS #: 0 K/UL (ref 0–0.44)
ABSOLUTE IMMATURE GRANULOCYTE: 0.01 K/UL (ref 0–0.3)
ABSOLUTE LYMPH #: 0.35 K/UL (ref 1.1–3.7)
ABSOLUTE MONO #: 0.05 K/UL (ref 0.1–1.2)
AMORPHOUS: ABNORMAL
ANION GAP SERPL CALCULATED.3IONS-SCNC: 14 MMOL/L (ref 9–17)
APPEARANCE: CLEAR
BACTERIA: ABNORMAL
BASOPHILS # BLD: 0 % (ref 0–2)
BASOPHILS ABSOLUTE: 0 K/UL (ref 0–0.2)
BILIRUBIN URINE: NEGATIVE
BILIRUBIN, POC: NEGATIVE
BLOOD URINE, POC: NORMAL
BUN BLDV-MCNC: 24 MG/DL (ref 8–23)
BUN/CREAT BLD: 36 (ref 9–20)
C-REACTIVE PROTEIN: 12 MG/L (ref 0–5)
CALCIUM SERPL-MCNC: 9.7 MG/DL (ref 8.6–10.4)
CASTS UA: ABNORMAL /LPF
CHLORIDE BLD-SCNC: 97 MMOL/L (ref 98–107)
CHP ED QC CHECK: YES
CLARITY, POC: CLEAR
CO2: 23 MMOL/L (ref 20–31)
COLOR, POC: YELLOW
COLOR: YELLOW
COMMENT UA: ABNORMAL
CREAT SERPL-MCNC: 0.67 MG/DL (ref 0.5–0.9)
CRYSTALS, UA: ABNORMAL /HPF
DIFFERENTIAL TYPE: ABNORMAL
EOSINOPHILS RELATIVE PERCENT: 0 % (ref 1–4)
EPITHELIAL CELLS UA: ABNORMAL /HPF (ref 0–5)
GFR AFRICAN AMERICAN: >60 ML/MIN
GFR NON-AFRICAN AMERICAN: >60 ML/MIN
GFR SERPL CREATININE-BSD FRML MDRD: ABNORMAL ML/MIN/{1.73_M2}
GFR SERPL CREATININE-BSD FRML MDRD: ABNORMAL ML/MIN/{1.73_M2}
GLUCOSE BLD-MCNC: 129 MG/DL (ref 70–99)
GLUCOSE URINE, POC: NEGATIVE
GLUCOSE URINE: NEGATIVE
HCT VFR BLD CALC: 18.5 % (ref 36.3–47.1)
HEMOGLOBIN: 6.3 G/DL (ref 11.9–15.1)
IMMATURE GRANULOCYTES: 2 %
KETONES, POC: NEGATIVE
KETONES, URINE: NEGATIVE
LACTATE DEHYDROGENASE: 208 U/L (ref 135–214)
LEUKOCYTE EST, POC: NEGATIVE
LEUKOCYTE ESTERASE, URINE: NEGATIVE
LYMPHOCYTES # BLD: 72 % (ref 24–43)
MCH RBC QN AUTO: 32 PG (ref 25.2–33.5)
MCHC RBC AUTO-ENTMCNC: 34.1 G/DL (ref 28.4–34.8)
MCV RBC AUTO: 93.9 FL (ref 82.6–102.9)
MONOCYTES # BLD: 9 % (ref 3–12)
MORPHOLOGY: ABNORMAL
MUCUS: ABNORMAL
NITRITE, POC: NEGATIVE
NITRITE, URINE: NEGATIVE
NRBC AUTOMATED: 0 PER 100 WBC
OTHER OBSERVATIONS UA: ABNORMAL
PDW BLD-RTO: 17.8 % (ref 11.8–14.4)
PH UA: 6 (ref 5–8)
PH, POC: 6
PLATELET # BLD: ABNORMAL K/UL (ref 138–453)
PLATELET ESTIMATE: ABNORMAL
PLATELET, FLUORESCENCE: 4 K/UL (ref 138–453)
PLATELET, IMMATURE FRACTION: 9.9 % (ref 1.1–10.3)
PMV BLD AUTO: ABNORMAL FL (ref 8.1–13.5)
POTASSIUM SERPL-SCNC: 3.8 MMOL/L (ref 3.7–5.3)
PROCALCITONIN: 0.05 NG/ML
PROTEIN UA: NEGATIVE
PROTEIN, POC: NEGATIVE
RBC # BLD: 1.97 M/UL (ref 3.95–5.11)
RBC # BLD: ABNORMAL 10*6/UL
RBC UA: ABNORMAL /HPF (ref 0–2)
RENAL EPITHELIAL, UA: ABNORMAL /HPF
SEG NEUTROPHILS: 17 % (ref 36–65)
SEGMENTED NEUTROPHILS ABSOLUTE COUNT: 0.09 K/UL (ref 1.5–8.1)
SODIUM BLD-SCNC: 134 MMOL/L (ref 135–144)
SPECIFIC GRAVITY UA: 1.01 (ref 1–1.03)
SPECIFIC GRAVITY, POC: 1.01
TRICHOMONAS: ABNORMAL
TURBIDITY: CLEAR
URINE HGB: ABNORMAL
UROBILINOGEN, POC: 0.2
UROBILINOGEN, URINE: NORMAL
WBC # BLD: 0.5 K/UL (ref 3.5–11.3)
WBC # BLD: ABNORMAL 10*3/UL
WBC UA: ABNORMAL /HPF (ref 0–5)
YEAST: ABNORMAL

## 2020-04-26 PROCEDURE — 87086 URINE CULTURE/COLONY COUNT: CPT

## 2020-04-26 PROCEDURE — 86850 RBC ANTIBODY SCREEN: CPT

## 2020-04-26 PROCEDURE — 85025 COMPLETE CBC W/AUTO DIFF WBC: CPT

## 2020-04-26 PROCEDURE — 84145 PROCALCITONIN (PCT): CPT

## 2020-04-26 PROCEDURE — 6360000002 HC RX W HCPCS: Performed by: INTERNAL MEDICINE

## 2020-04-26 PROCEDURE — P9037 PLATE PHERES LEUKOREDU IRRAD: HCPCS

## 2020-04-26 PROCEDURE — 80048 BASIC METABOLIC PNL TOTAL CA: CPT

## 2020-04-26 PROCEDURE — 99284 EMERGENCY DEPT VISIT MOD MDM: CPT

## 2020-04-26 PROCEDURE — 36430 TRANSFUSION BLD/BLD COMPNT: CPT

## 2020-04-26 PROCEDURE — 83615 LACTATE (LD) (LDH) ENZYME: CPT

## 2020-04-26 PROCEDURE — 81001 URINALYSIS AUTO W/SCOPE: CPT

## 2020-04-26 PROCEDURE — 86140 C-REACTIVE PROTEIN: CPT

## 2020-04-26 PROCEDURE — 36415 COLL VENOUS BLD VENIPUNCTURE: CPT

## 2020-04-26 PROCEDURE — P9040 RBC LEUKOREDUCED IRRADIATED: HCPCS

## 2020-04-26 PROCEDURE — 2580000003 HC RX 258: Performed by: INTERNAL MEDICINE

## 2020-04-26 PROCEDURE — 85055 RETICULATED PLATELET ASSAY: CPT

## 2020-04-26 PROCEDURE — 86901 BLOOD TYPING SEROLOGIC RH(D): CPT

## 2020-04-26 PROCEDURE — 86900 BLOOD TYPING SEROLOGIC ABO: CPT

## 2020-04-26 PROCEDURE — 6370000000 HC RX 637 (ALT 250 FOR IP): Performed by: INTERNAL MEDICINE

## 2020-04-26 PROCEDURE — 1200000000 HC SEMI PRIVATE

## 2020-04-26 PROCEDURE — 87040 BLOOD CULTURE FOR BACTERIA: CPT

## 2020-04-26 PROCEDURE — 99223 1ST HOSP IP/OBS HIGH 75: CPT | Performed by: INTERNAL MEDICINE

## 2020-04-26 PROCEDURE — 86920 COMPATIBILITY TEST SPIN: CPT

## 2020-04-26 PROCEDURE — 71045 X-RAY EXAM CHEST 1 VIEW: CPT

## 2020-04-26 RX ORDER — SODIUM CHLORIDE 0.9 % (FLUSH) 0.9 %
10 SYRINGE (ML) INJECTION EVERY 12 HOURS SCHEDULED
Status: DISCONTINUED | OUTPATIENT
Start: 2020-04-26 | End: 2020-04-29 | Stop reason: HOSPADM

## 2020-04-26 RX ORDER — ACETAMINOPHEN 325 MG/1
650 TABLET ORAL EVERY 6 HOURS PRN
Status: DISCONTINUED | OUTPATIENT
Start: 2020-04-26 | End: 2020-04-29 | Stop reason: HOSPADM

## 2020-04-26 RX ORDER — POTASSIUM CHLORIDE 7.45 MG/ML
10 INJECTION INTRAVENOUS PRN
Status: DISCONTINUED | OUTPATIENT
Start: 2020-04-26 | End: 2020-04-29 | Stop reason: HOSPADM

## 2020-04-26 RX ORDER — ACETAMINOPHEN 650 MG/1
650 SUPPOSITORY RECTAL EVERY 6 HOURS PRN
Status: DISCONTINUED | OUTPATIENT
Start: 2020-04-26 | End: 2020-04-29 | Stop reason: HOSPADM

## 2020-04-26 RX ORDER — DIPHENHYDRAMINE HYDROCHLORIDE 50 MG/ML
25 INJECTION INTRAMUSCULAR; INTRAVENOUS ONCE
Status: COMPLETED | OUTPATIENT
Start: 2020-04-26 | End: 2020-04-26

## 2020-04-26 RX ORDER — MAGNESIUM SULFATE 1 G/100ML
1 INJECTION INTRAVENOUS PRN
Status: DISCONTINUED | OUTPATIENT
Start: 2020-04-26 | End: 2020-04-29 | Stop reason: HOSPADM

## 2020-04-26 RX ORDER — 0.9 % SODIUM CHLORIDE 0.9 %
20 INTRAVENOUS SOLUTION INTRAVENOUS ONCE
Status: COMPLETED | OUTPATIENT
Start: 2020-04-26 | End: 2020-04-26

## 2020-04-26 RX ORDER — SODIUM CHLORIDE 0.9 % (FLUSH) 0.9 %
10 SYRINGE (ML) INJECTION PRN
Status: DISCONTINUED | OUTPATIENT
Start: 2020-04-26 | End: 2020-04-29 | Stop reason: HOSPADM

## 2020-04-26 RX ORDER — PROMETHAZINE HYDROCHLORIDE 25 MG/1
12.5 TABLET ORAL EVERY 6 HOURS PRN
Status: DISCONTINUED | OUTPATIENT
Start: 2020-04-26 | End: 2020-04-29 | Stop reason: HOSPADM

## 2020-04-26 RX ORDER — 0.9 % SODIUM CHLORIDE 0.9 %
20 INTRAVENOUS SOLUTION INTRAVENOUS ONCE
Status: DISCONTINUED | OUTPATIENT
Start: 2020-04-26 | End: 2020-04-29 | Stop reason: HOSPADM

## 2020-04-26 RX ORDER — POTASSIUM CHLORIDE 20 MEQ/1
40 TABLET, EXTENDED RELEASE ORAL PRN
Status: DISCONTINUED | OUTPATIENT
Start: 2020-04-26 | End: 2020-04-29 | Stop reason: HOSPADM

## 2020-04-26 RX ORDER — POLYETHYLENE GLYCOL 3350 17 G/17G
17 POWDER, FOR SOLUTION ORAL DAILY PRN
Status: DISCONTINUED | OUTPATIENT
Start: 2020-04-26 | End: 2020-04-29 | Stop reason: HOSPADM

## 2020-04-26 RX ORDER — ONDANSETRON 2 MG/ML
4 INJECTION INTRAMUSCULAR; INTRAVENOUS EVERY 6 HOURS PRN
Status: DISCONTINUED | OUTPATIENT
Start: 2020-04-26 | End: 2020-04-29 | Stop reason: HOSPADM

## 2020-04-26 RX ADMIN — DIPHENHYDRAMINE HYDROCHLORIDE 25 MG: 50 INJECTION, SOLUTION INTRAMUSCULAR; INTRAVENOUS at 18:40

## 2020-04-26 RX ADMIN — SODIUM CHLORIDE 20 ML: 9 INJECTION, SOLUTION INTRAVENOUS at 18:48

## 2020-04-26 RX ADMIN — ACETAMINOPHEN 650 MG: 325 TABLET ORAL at 18:40

## 2020-04-26 RX ADMIN — SODIUM CHLORIDE, PRESERVATIVE FREE 10 ML: 5 INJECTION INTRAVENOUS at 22:15

## 2020-04-26 ASSESSMENT — ENCOUNTER SYMPTOMS
COLOR CHANGE: 0
ABDOMINAL PAIN: 0
WHEEZING: 0
BLOOD IN STOOL: 0
COUGH: 0
VOMITING: 0
SHORTNESS OF BREATH: 0
EYE DISCHARGE: 0
DIARRHEA: 0
NAUSEA: 0
BACK PAIN: 0

## 2020-04-26 ASSESSMENT — PAIN SCALES - GENERAL
PAINLEVEL_OUTOF10: 0

## 2020-04-26 NOTE — H&P
°C)    No results for input(s): POCGLU in the last 72 hours. No intake or output data in the 24 hours ending 04/26/20 7987    Physical Exam  Vitals signs reviewed. Constitutional:       General: She is not in acute distress. Appearance: Normal appearance. She is not ill-appearing or diaphoretic. HENT:      Head: Normocephalic and atraumatic. Nose: No congestion or rhinorrhea. Eyes:      Extraocular Movements: Extraocular movements intact. Conjunctiva/sclera: Conjunctivae normal.      Pupils: Pupils are equal, round, and reactive to light. Neck:      Musculoskeletal: Normal range of motion and neck supple. Cardiovascular:      Rate and Rhythm: Normal rate and regular rhythm. Pulses: Normal pulses. Heart sounds: Normal heart sounds. No murmur. Pulmonary:      Effort: Pulmonary effort is normal.      Breath sounds: Normal breath sounds. No wheezing or rales. Abdominal:      General: Bowel sounds are normal. There is no distension. Palpations: Abdomen is soft. Tenderness: There is no abdominal tenderness. Musculoskeletal: Normal range of motion. General: No swelling or tenderness. Skin:     General: Skin is warm and dry. Findings: No rash. Comments: Ecchymosis noted to poor area in the extremities   Neurological:      General: No focal deficit present. Mental Status: She is alert and oriented to person, place, and time. Psychiatric:         Mood and Affect: Mood normal.         Thought Content:  Thought content normal.         Judgment: Judgment normal.         Investigations:      Laboratory Testing:  Recent Results (from the past 24 hour(s))   CBC with DIFF    Collection Time: 04/26/20  3:05 PM   Result Value Ref Range    WBC 0.5 (LL) 3.5 - 11.3 k/uL    RBC 1.97 (L) 3.95 - 5.11 m/uL    Hemoglobin 6.3 (LL) 11.9 - 15.1 g/dL    Hematocrit 18.5 (L) 36.3 - 47.1 %    MCV 93.9 82.6 - 102.9 fL    MCH 32.0 25.2 - 33.5 pg    MCHC 34.1 28.4 - 34.8 g/dL RDW 17.8 (H) 11.8 - 14.4 %    Platelets See Reflexed IPF Result 138 - 453 k/uL    MPV Abnormal 8.1 - 13.5 fL    NRBC Automated 0.0 0.0 per 100 WBC    Differential Type NOT REPORTED     WBC Morphology NOT REPORTED     RBC Morphology NOT REPORTED     Platelet Estimate NOT REPORTED     Seg Neutrophils 17 (L) 36 - 65 %    Lymphocytes 72 (H) 24 - 43 %    Monocytes 9 3 - 12 %    Eosinophils % 0 (L) 1 - 4 %    Basophils 0 0 - 2 %    Immature Granulocytes 2 (H) 0 %    Segs Absolute 0.09 (LL) 1.50 - 8.10 k/uL    Absolute Lymph # 0.35 (L) 1.10 - 3.70 k/uL    Absolute Mono # 0.05 (L) 0.10 - 1.20 k/uL    Absolute Eos # 0.00 0.00 - 0.44 k/uL    Basophils Absolute 0.00 0.00 - 0.20 k/uL    Absolute Immature Granulocyte 0.01 0.00 - 0.30 k/uL    Morphology ANISOCYTOSIS PRESENT    Basic Metabolic Prof    Collection Time: 04/26/20  3:05 PM   Result Value Ref Range    Glucose 129 (H) 70 - 99 mg/dL    BUN 24 (H) 8 - 23 mg/dL    CREATININE 0.67 0.50 - 0.90 mg/dL    Bun/Cre Ratio 36 (H) 9 - 20    Calcium 9.7 8.6 - 10.4 mg/dL    Sodium 134 (L) 135 - 144 mmol/L    Potassium 3.8 3.7 - 5.3 mmol/L    Chloride 97 (L) 98 - 107 mmol/L    CO2 23 20 - 31 mmol/L    Anion Gap 14 9 - 17 mmol/L    GFR Non-African American >60 >60 mL/min    GFR African American >60 >60 mL/min    GFR Comment          GFR Staging NOT REPORTED    LACTATE DEHYDROGENASE    Collection Time: 04/26/20  3:05 PM   Result Value Ref Range     135 - 214 U/L   C-Reactive Protein    Collection Time: 04/26/20  3:05 PM   Result Value Ref Range    CRP 12.0 (H) 0.0 - 5.0 mg/L   Procalcitonin    Collection Time: 04/26/20  3:05 PM   Result Value Ref Range    Procalcitonin 0.05 <0.09 ng/mL   Immature Platelet Fraction    Collection Time: 04/26/20  3:05 PM   Result Value Ref Range    Platelet, Immature Fraction 9.9 1.1 - 10.3 %    Platelet, Fluorescence 4 (LL) 138 - 453 k/uL   Urinalysis, Routine    Collection Time: 04/26/20  3:20 PM   Result Value Ref Range    Color, UA YELLOW Irradiated Plateletpheresis     Unit Divison 00     Dispense Status ISSUED     Transfusion Status OK TO TRANSFUSE        Imaging/Diagnostics:  Xr Chest Portable    Result Date: 4/26/2020  No acute cardiopulmonary abnormality. Assessment :      Hospital Problems           Last Modified POA    * (Principal) Pancytopenia ([facility] [address] 4/26/2020 Yes    Essential hypertension 4/26/2020 Yes    Hyperlipidemia (Chronic) 4/26/2020 Yes    Primary osteoarthritis involving multiple joints (Chronic) 4/26/2020 Yes    Acute myeloid leukemia not having achieved remission ([facility] [address] 4/26/2020 Yes          Plan:     Patient status inpatient in the  Med/Surge     78-year-old female with history of AML with pancytopenia recently diagnosed currently on chemotherapy as well as Levaquin and acyclovir for prophylaxis presenting with low-grade fever and fatigue. She is found to have severe pancytopenia with hemoglobin of less than 7, WBC of 0.5 and platelet of less than 10,000. 1. Continue with Levaquin  2. Obtain blood and urine culture  3. Chest x-ray is clean without any evidence of pneumonia  4. Observe patient for fever. If patient continues to spike fever will consider escalating antibiotics  5. Follow-up urine and blood culture in the morning  6. Oncology is following. Consultations:   IP CONSULT TO ONCOLOGY  IP CONSULT TO HOSPITALIST    Patient is admitted as inpatient status because of co-morbidities listed above, severity of signs and symptoms as outlined, requirement for current medical therapies and most importantly because of direct risk to patient if care not provided in a hospital setting.     Eusebio Allen MD  4/26/2020  5:47 PM    Copy sent to Dr. Leah Forrester MD

## 2020-04-26 NOTE — PROGRESS NOTES
Pt admitted to room 1020 from ER in stable condition. Oriented to room and call light/tv controls. Bed in lowest position, wheels locked, 2/4 side rails up  Call light in reach, room free of clutter, adequate lighting provided.

## 2020-04-26 NOTE — ED PROVIDER NOTES
remission (HCC)      allopurinol (ZYLOPRIM) 300 MG tablet Take 300 mg by mouth daily      aminocaproic acid (AMICAR) 500 MG tablet Take 1 g by mouth 2 times daily      levoFLOXacin (LEVAQUIN) 500 MG tablet Take 500 mg by mouth daily      Venetoclax 100 MG TABS One tablet (100 mg) po Day 1, two tablets (200 mg) day 2, four tablets (400 mg) daily starting day 3 and continue. Qty: 120 tablet, Refills: 1      vitamin D (CHOLECALCIFEROL) 25 MCG (1000 UT) TABS tablet Take 3,000 Units by mouth daily      Multiple Vitamins-Minerals (THERAPEUTIC MULTIVITAMIN-MINERALS) tablet Take 1 tablet by mouth daily      ammonium lactate (AMLACTIN) 12 % cream APPLY AFTER SHOWERING TO SKIN FOR DRYNESS      cetirizine (ZYRTEC ALLERGY) 10 MG tablet Take 10 mg by mouth daily      metoprolol tartrate (LOPRESSOR) 50 MG tablet 50mg once a day  Qty: 90 tablet, Refills: 3      pravastatin (PRAVACHOL) 40 MG tablet TAKE 1 TABLET DAILY  Qty: 90 tablet, Refills: 3      Multiple Vitamins-Minerals (ICAPS AREDS 2 PO) Take by mouth daily      estradiol (ESTRACE) 1 MG tablet Take 0.5 mg by mouth daily   Refills: 2      azelastine (ASTELIN) 137 MCG/SPRAY nasal spray 2 sprays by Nasal route 2 times daily as needed Use in each nostril as directed       acyclovir (ZOVIRAX) 400 MG tablet Take 400 mg by mouth every 12 hours      prochlorperazine (COMPAZINE) 10 MG tablet Take one tablet by mouth daily prior to Vidaza injection and one tablet every 6 hours as needed for nausea. Qty: 60 tablet, Refills: 3      meloxicam (MOBIC) 15 MG tablet Take 1 tablet by mouth daily as needed for Pain  Qty: 90 tablet, Refills: 3             ALLERGIES     Amoxicillin-pot clavulanate; Cefuroxime axetil; Biaxin [clarithromycin]; and Sulfa antibiotics    FAMILY HISTORY       Family History   Problem Relation Age of Onset    Cancer Brother           SOCIAL HISTORY       Social History     Socioeconomic History    Marital status:       Spouse name: None    Number of and normal weight. HENT:      Head: Normocephalic. Right Ear: External ear normal.      Left Ear: External ear normal.      Nose: Nose normal.      Mouth/Throat:      Mouth: Mucous membranes are moist.   Eyes:      Extraocular Movements: Extraocular movements intact. Conjunctiva/sclera: Conjunctivae normal.      Pupils: Pupils are equal, round, and reactive to light. Neck:      Musculoskeletal: Normal range of motion and neck supple. Pulmonary:      Effort: Pulmonary effort is normal. No respiratory distress. Musculoskeletal: Normal range of motion. Skin:     General: Skin is dry. Neurological:      Mental Status: She is alert and oriented to person, place, and time. GCS: GCS eye subscore is 4. GCS verbal subscore is 5. GCS motor subscore is 6. Cranial Nerves: Cranial nerves are intact. Sensory: Sensation is intact. Motor: Motor function is intact. Coordination: Coordination is intact. Gait: Gait is intact. Psychiatric:         Mood and Affect: Mood normal.         Behavior: Behavior normal.         Thought Content: Thought content normal.         Judgment: Judgment normal.           DIAGNOSTIC RESULTS     EKG:All EKG's are interpreted by the Emergency Department Physician who either signs or Co-signs this chart in the absence of a cardiologist.        RADIOLOGY:   Non-plain film images such as CT, Ultrasound and MRI are read by theradiologist. Plain radiographic images are visualized and preliminarily interpreted by the emergency physician with the below findings:      Xr Chest Portable    Result Date: 4/26/2020  EXAMINATION: ONE XRAY VIEW OF THE CHEST 4/26/2020 3:39 pm COMPARISON: 04/03/2020 HISTORY: ORDERING SYSTEM PROVIDED HISTORY: SOB TECHNOLOGIST PROVIDED HISTORY: SOB Reason for Exam: SOB, fever Acuity: Acute Type of Exam: Initial FINDINGS: The lungs are clear. The cardiac and mediastinal contours are normal.  There is no pleural effusion or pneumothorax.

## 2020-04-27 ENCOUNTER — HOSPITAL ENCOUNTER (OUTPATIENT)
Dept: INFUSION THERAPY | Facility: MEDICAL CENTER | Age: 70
End: 2020-04-27
Payer: MEDICARE

## 2020-04-27 LAB
-: ABNORMAL
AMORPHOUS: ABNORMAL
ANION GAP SERPL CALCULATED.3IONS-SCNC: 12 MMOL/L (ref 9–17)
BACTERIA: ABNORMAL
BILIRUBIN URINE: NEGATIVE
BLD PROD TYP BPU: NORMAL
BUN BLDV-MCNC: 15 MG/DL (ref 8–23)
BUN/CREAT BLD: 29 (ref 9–20)
CALCIUM SERPL-MCNC: 9.2 MG/DL (ref 8.6–10.4)
CASTS UA: ABNORMAL /LPF
CHLORIDE BLD-SCNC: 104 MMOL/L (ref 98–107)
CO2: 22 MMOL/L (ref 20–31)
COLOR: YELLOW
COMMENT UA: ABNORMAL
CREAT SERPL-MCNC: 0.51 MG/DL (ref 0.5–0.9)
CRYSTALS, UA: ABNORMAL /HPF
CULTURE: NORMAL
DISPENSE STATUS BLOOD BANK: NORMAL
EPITHELIAL CELLS UA: ABNORMAL /HPF (ref 0–5)
GFR AFRICAN AMERICAN: >60 ML/MIN
GFR NON-AFRICAN AMERICAN: >60 ML/MIN
GFR SERPL CREATININE-BSD FRML MDRD: ABNORMAL ML/MIN/{1.73_M2}
GFR SERPL CREATININE-BSD FRML MDRD: ABNORMAL ML/MIN/{1.73_M2}
GLUCOSE BLD-MCNC: 102 MG/DL (ref 70–99)
GLUCOSE URINE: NEGATIVE
HCT VFR BLD CALC: 19.8 % (ref 36.3–47.1)
HCT VFR BLD CALC: 23.7 % (ref 36.3–47.1)
HEMOGLOBIN: 6.6 G/DL (ref 11.9–15.1)
HEMOGLOBIN: 8.1 G/DL (ref 11.9–15.1)
INR BLD: 1.1
KETONES, URINE: NEGATIVE
LACTIC ACID, SEPSIS WHOLE BLOOD: NORMAL MMOL/L (ref 0.5–1.9)
LACTIC ACID, SEPSIS WHOLE BLOOD: NORMAL MMOL/L (ref 0.5–1.9)
LACTIC ACID, SEPSIS: 1 MMOL/L (ref 0.5–1.9)
LACTIC ACID, SEPSIS: 1.1 MMOL/L (ref 0.5–1.9)
LEUKOCYTE ESTERASE, URINE: NEGATIVE
Lab: NORMAL
MCH RBC QN AUTO: 31.4 PG (ref 25.2–33.5)
MCHC RBC AUTO-ENTMCNC: 34.2 G/DL (ref 28.4–34.8)
MCV RBC AUTO: 91.9 FL (ref 82.6–102.9)
MUCUS: ABNORMAL
NITRITE, URINE: NEGATIVE
NRBC AUTOMATED: ABNORMAL PER 100 WBC
OTHER OBSERVATIONS UA: ABNORMAL
PDW BLD-RTO: 16.3 % (ref 11.8–14.4)
PH UA: 7.5 (ref 5–8)
PLATELET # BLD: ABNORMAL K/UL (ref 138–453)
PLATELET, FLUORESCENCE: 4 K/UL (ref 138–453)
PLATELET, IMMATURE FRACTION: 10 % (ref 1.1–10.3)
PMV BLD AUTO: ABNORMAL FL (ref 6–12)
POTASSIUM SERPL-SCNC: 4.2 MMOL/L (ref 3.7–5.3)
PROTEIN UA: NEGATIVE
PROTHROMBIN TIME: 13.8 SEC (ref 11.5–14.2)
RBC # BLD: 2.58 M/UL (ref 3.95–5.11)
RBC UA: ABNORMAL /HPF (ref 0–2)
RENAL EPITHELIAL, UA: ABNORMAL /HPF
SEND OUT REPORT: NORMAL
SODIUM BLD-SCNC: 138 MMOL/L (ref 135–144)
SPECIFIC GRAVITY UA: 1.01 (ref 1–1.03)
SPECIMEN DESCRIPTION: NORMAL
TEST NAME: NORMAL
TRANSFUSION STATUS: NORMAL
TRICHOMONAS: ABNORMAL
TURBIDITY: ABNORMAL
UNIT DIVISION: 0
UNIT NUMBER: NORMAL
URINE HGB: NEGATIVE
UROBILINOGEN, URINE: NORMAL
WBC # BLD: 0.4 K/UL (ref 3.5–11.3)
WBC UA: ABNORMAL /HPF (ref 0–5)
YEAST: ABNORMAL

## 2020-04-27 PROCEDURE — 80048 BASIC METABOLIC PNL TOTAL CA: CPT

## 2020-04-27 PROCEDURE — 6360000002 HC RX W HCPCS: Performed by: INTERNAL MEDICINE

## 2020-04-27 PROCEDURE — 2580000003 HC RX 258: Performed by: INTERNAL MEDICINE

## 2020-04-27 PROCEDURE — 36415 COLL VENOUS BLD VENIPUNCTURE: CPT

## 2020-04-27 PROCEDURE — 97165 OT EVAL LOW COMPLEX 30 MIN: CPT

## 2020-04-27 PROCEDURE — 86900 BLOOD TYPING SEROLOGIC ABO: CPT

## 2020-04-27 PROCEDURE — 85055 RETICULATED PLATELET ASSAY: CPT

## 2020-04-27 PROCEDURE — 83605 ASSAY OF LACTIC ACID: CPT

## 2020-04-27 PROCEDURE — 85027 COMPLETE CBC AUTOMATED: CPT

## 2020-04-27 PROCEDURE — 1200000000 HC SEMI PRIVATE

## 2020-04-27 PROCEDURE — 36430 TRANSFUSION BLD/BLD COMPNT: CPT

## 2020-04-27 PROCEDURE — 99233 SBSQ HOSP IP/OBS HIGH 50: CPT | Performed by: INTERNAL MEDICINE

## 2020-04-27 PROCEDURE — 99222 1ST HOSP IP/OBS MODERATE 55: CPT | Performed by: INTERNAL MEDICINE

## 2020-04-27 PROCEDURE — 6370000000 HC RX 637 (ALT 250 FOR IP): Performed by: INTERNAL MEDICINE

## 2020-04-27 PROCEDURE — P9037 PLATE PHERES LEUKOREDU IRRAD: HCPCS

## 2020-04-27 PROCEDURE — 85014 HEMATOCRIT: CPT

## 2020-04-27 PROCEDURE — 97530 THERAPEUTIC ACTIVITIES: CPT

## 2020-04-27 PROCEDURE — 85610 PROTHROMBIN TIME: CPT

## 2020-04-27 PROCEDURE — 81001 URINALYSIS AUTO W/SCOPE: CPT

## 2020-04-27 PROCEDURE — 85018 HEMOGLOBIN: CPT

## 2020-04-27 PROCEDURE — P9040 RBC LEUKOREDUCED IRRADIATED: HCPCS

## 2020-04-27 PROCEDURE — 97161 PT EVAL LOW COMPLEX 20 MIN: CPT

## 2020-04-27 RX ORDER — LEVOFLOXACIN 500 MG/1
500 TABLET, FILM COATED ORAL DAILY
Status: DISCONTINUED | OUTPATIENT
Start: 2020-04-27 | End: 2020-04-27

## 2020-04-27 RX ORDER — DIPHENHYDRAMINE HYDROCHLORIDE 50 MG/ML
25 INJECTION INTRAMUSCULAR; INTRAVENOUS ONCE
Status: COMPLETED | OUTPATIENT
Start: 2020-04-27 | End: 2020-04-27

## 2020-04-27 RX ORDER — ONDANSETRON 4 MG/1
4 TABLET, ORALLY DISINTEGRATING ORAL 3 TIMES DAILY PRN
Status: DISCONTINUED | OUTPATIENT
Start: 2020-04-27 | End: 2020-04-29 | Stop reason: HOSPADM

## 2020-04-27 RX ORDER — METOPROLOL TARTRATE 50 MG/1
50 TABLET, FILM COATED ORAL DAILY
Status: DISCONTINUED | OUTPATIENT
Start: 2020-04-28 | End: 2020-04-29 | Stop reason: HOSPADM

## 2020-04-27 RX ORDER — PRAVASTATIN SODIUM 40 MG
40 TABLET ORAL NIGHTLY
Status: DISCONTINUED | OUTPATIENT
Start: 2020-04-27 | End: 2020-04-29 | Stop reason: HOSPADM

## 2020-04-27 RX ORDER — ALLOPURINOL 300 MG/1
300 TABLET ORAL DAILY
Status: DISCONTINUED | OUTPATIENT
Start: 2020-04-27 | End: 2020-04-29 | Stop reason: HOSPADM

## 2020-04-27 RX ORDER — M-VIT,TX,IRON,MINS/CALC/FOLIC 27MG-0.4MG
1 TABLET ORAL DAILY
Status: DISCONTINUED | OUTPATIENT
Start: 2020-04-27 | End: 2020-04-29 | Stop reason: HOSPADM

## 2020-04-27 RX ORDER — CETIRIZINE HYDROCHLORIDE 10 MG/1
10 TABLET ORAL DAILY
Status: DISCONTINUED | OUTPATIENT
Start: 2020-04-27 | End: 2020-04-29 | Stop reason: HOSPADM

## 2020-04-27 RX ORDER — ACYCLOVIR 200 MG/1
400 CAPSULE ORAL EVERY 12 HOURS
Status: DISCONTINUED | OUTPATIENT
Start: 2020-04-27 | End: 2020-04-29 | Stop reason: HOSPADM

## 2020-04-27 RX ORDER — VITAMIN B COMPLEX
3000 TABLET ORAL DAILY
Status: DISCONTINUED | OUTPATIENT
Start: 2020-04-27 | End: 2020-04-29 | Stop reason: HOSPADM

## 2020-04-27 RX ORDER — 0.9 % SODIUM CHLORIDE 0.9 %
20 INTRAVENOUS SOLUTION INTRAVENOUS ONCE
Status: COMPLETED | OUTPATIENT
Start: 2020-04-27 | End: 2020-04-27

## 2020-04-27 RX ORDER — 0.9 % SODIUM CHLORIDE 0.9 %
20 INTRAVENOUS SOLUTION INTRAVENOUS ONCE
Status: DISCONTINUED | OUTPATIENT
Start: 2020-04-27 | End: 2020-04-29 | Stop reason: HOSPADM

## 2020-04-27 RX ORDER — METOPROLOL TARTRATE 50 MG/1
50 TABLET, FILM COATED ORAL 2 TIMES DAILY
Status: DISCONTINUED | OUTPATIENT
Start: 2020-04-27 | End: 2020-04-27

## 2020-04-27 RX ORDER — ESTRADIOL 1 MG/1
0.5 TABLET ORAL DAILY
Status: DISCONTINUED | OUTPATIENT
Start: 2020-04-27 | End: 2020-04-29 | Stop reason: HOSPADM

## 2020-04-27 RX ORDER — MELOXICAM 7.5 MG/1
15 TABLET ORAL DAILY
Status: DISCONTINUED | OUTPATIENT
Start: 2020-04-27 | End: 2020-04-27

## 2020-04-27 RX ORDER — ACETAMINOPHEN 325 MG/1
650 TABLET ORAL ONCE
Status: COMPLETED | OUTPATIENT
Start: 2020-04-27 | End: 2020-04-27

## 2020-04-27 RX ORDER — PROCHLORPERAZINE MALEATE 10 MG
10 TABLET ORAL EVERY 6 HOURS PRN
Status: DISCONTINUED | OUTPATIENT
Start: 2020-04-27 | End: 2020-04-29 | Stop reason: HOSPADM

## 2020-04-27 RX ADMIN — MELATONIN 3000 UNITS: at 11:40

## 2020-04-27 RX ADMIN — ACYCLOVIR 400 MG: 200 CAPSULE ORAL at 23:05

## 2020-04-27 RX ADMIN — MEROPENEM 1 G: 1 INJECTION, POWDER, FOR SOLUTION INTRAVENOUS at 19:57

## 2020-04-27 RX ADMIN — ESTRADIOL 0.5 MG: 1 TABLET ORAL at 11:40

## 2020-04-27 RX ADMIN — ACETAMINOPHEN 650 MG: 325 TABLET ORAL at 06:03

## 2020-04-27 RX ADMIN — DIPHENHYDRAMINE HYDROCHLORIDE 25 MG: 50 INJECTION, SOLUTION INTRAMUSCULAR; INTRAVENOUS at 16:02

## 2020-04-27 RX ADMIN — SODIUM CHLORIDE 20 ML: 9 INJECTION, SOLUTION INTRAVENOUS at 16:03

## 2020-04-27 RX ADMIN — CETIRIZINE HYDROCHLORIDE 10 MG: 10 TABLET, FILM COATED ORAL at 11:40

## 2020-04-27 RX ADMIN — ACETAMINOPHEN 650 MG: 325 TABLET ORAL at 16:02

## 2020-04-27 RX ADMIN — PRAVASTATIN SODIUM 40 MG: 40 TABLET ORAL at 19:55

## 2020-04-27 RX ADMIN — MULTIPLE VITAMINS W/ MINERALS TAB 1 TABLET: TAB at 11:40

## 2020-04-27 RX ADMIN — SODIUM CHLORIDE, PRESERVATIVE FREE 10 ML: 5 INJECTION INTRAVENOUS at 19:58

## 2020-04-27 ASSESSMENT — PAIN SCALES - GENERAL
PAINLEVEL_OUTOF10: 0
PAINLEVEL_OUTOF10: 2
PAINLEVEL_OUTOF10: 0
PAINLEVEL_OUTOF10: 2
PAINLEVEL_OUTOF10: 0

## 2020-04-27 ASSESSMENT — ENCOUNTER SYMPTOMS
NAUSEA: 0
SHORTNESS OF BREATH: 0
VOMITING: 0
BACK PAIN: 0
WHEEZING: 0
BLOOD IN STOOL: 0
COLOR CHANGE: 0
EYE DISCHARGE: 0
ABDOMINAL PAIN: 0

## 2020-04-27 NOTE — CONSULTS
database accordingly. Past Medical History:     Past Medical History:   Diagnosis Date    Arthritis     Cancer ([facility] [address]     Hyperlipidemia     Hypertension     Leukemia ([facility]     Pneumonia     Seasonal allergies      Past Surgical  History:     Past Surgical History:   Procedure Laterality Date    COLONOSCOPY      COLONOSCOPY  03/10/2017    diverticulosis and leftag-[id number]-[id number] and hyperplastic and adenoma . Has spasms ,redundant colon    JOINT REPLACEMENT      OVARY REMOVAL       Medications:      sodium chloride  20 mL Intravenous Once    levoFLOXacin  500 mg Oral Daily    acyclovir  400 mg Oral Q12H    allopurinol  300 mg Oral Daily    cetirizine  10 mg Oral Daily    estradiol  0.5 mg Oral Daily    metoprolol tartrate  50 mg Oral BID    therapeutic multivitamin-minerals  1 tablet Oral Daily    pravastatin  40 mg Oral Nightly    Vitamin D  3,000 Units Oral Daily    sodium chloride  20 mL Intravenous Once    diphenhydrAMINE  25 mg Intravenous Once    acetaminophen  650 mg Oral Once    sodium chloride  20 mL Intravenous Once    sodium chloride flush  10 mL Intravenous 2 times per day     Social History:     Social History     Socioeconomic History    Marital status:      Spouse name: Not on file    Number of children: Not on file    Years of education: Not on file    Highest education level: Not on file   Occupational History    Not on file   Social Needs    Financial resource strain: Not on file    Food insecurity     Worry: Not on file     Inability: Not on file    Transportation needs     Medical: Not on file     Non-medical: Not on file   Tobacco Use    Smoking status: Never Smoker    Smokeless tobacco: Never Used   Substance and Sexual Activity    Alcohol use:  Yes     Alcohol/week: 0.0 standard drinks     Comment: social    Drug use: No    Sexual activity: Not on file   Lifestyle    Physical activity     Days per week: Not on file     Minutes per session: Not
asking us to see this patient.     DARRELL POSADAS [facility] MD [place]  Hematologist/Medical Oncologist  Cell: [phone]

## 2020-04-27 NOTE — PROGRESS NOTES
history that includes Colonoscopy; joint replacement; Ovary removal; and Colonoscopy (03/10/2017). Medications:    Reviewed in Epic     Allergies:  Amoxicillin-pot clavulanate; Cefuroxime axetil; Biaxin [clarithromycin]; and Sulfa antibiotics    Social History:   reports that she has never smoked. She has never used smokeless tobacco. She reports current alcohol use. She reports that she does not use drugs. Family History: family history includes Cancer in her brother. REVIEW OF SYSTEMS:    Constitutional: low grade fever , no chills. No night sweats, no weight loss  , significant fatigue  Eyes: No eye discharge, double vision, or eye pain   HEENT: negative for sore mouth, sore throat, hoarseness and voice change   Respiratory: negative for cough , sputum, dyspnea, wheezing, hemoptysis, chest pain   Cardiovascular: negative for chest pain, dyspnea, palpitations, orthopnea, PND   Gastrointestinal: + nausea,  negative for vomiting, diarrhea, constipation, abdominal pain, Dysphagia, hematemesis and hematochezia   Genitourinary: negative for frequency, dysuria, nocturia, urinary incontinence, and hematuria   Integument: negative for rash, skin lesions, bruises.    Hematologic/Lymphatic: negative for easy bruising, bleeding, lymphadenopathy, or petechiae   Endocrine: negative for heat or cold intolerance,weight changes, change in bowel habits and hair loss   Musculoskeletal: negative for myalgias, arthralgias, pain, joint swelling,and bone pain   Neurological: negative for headaches, dizziness, seizures, weakness, numbness    PHYSICAL EXAM:      /67   Pulse 66   Temp 97.5 °F (36.4 °C) (Oral)   Resp 16   Ht 5' 10\" (1.778 m)   Wt 197 lb 14.4 oz (89.8 kg)   SpO2 99%   BMI 28.40 kg/m²    Temp (24hrs), Av.2 °F (37.3 °C), Min:97.5 °F (36.4 °C), Max:101.1 °F (38.4 °C)    General appearance - well appearing, no in pain or distress , quite pale  Mental status - alert and cooperative   Eyes - pupils but the patient is allergic to cefepime and penicillin. Before I start her on meropenem. I decided to consult infectious disease for their antibiotic stewardship and management for this patient  3. Unfortunately she has severe pancytopenia mostly reactive secondary to her leukemia but also partially due to antineoplastic therapy. All antineoplastic therapy will be stopped. Because of uncontrolled leukemia, I feel comfortable offering her growth factors. 4. I also expect prolonged pancytopenia as the patient leukemia is untreated at this point. The patient was informed that we expect her to be in the hospital until we control her infection. Will await infectious disease input. 5. Continue home medication without any changes  6. Supportive transfusion as needed keep hemoglobin above 7 and platelets above 10      Discussed with patient and Nurse. Thank you for asking us to see this patient.     DARRELL POSADAS [facility] MD [place]  Hematologist/Medical Oncologist  Cell: [phone]

## 2020-04-27 NOTE — PROGRESS NOTES
Physical Therapy    Facility/Department: [facility]  Initial Assessment  D/C PT due to indep. NAME: Jessie Magallon  : 1950  MRN: [id number]    Date of Service: 2020    Discharge Recommendations:    Home Independently       Assessment   Assessment: Pt. is indep. and not in need of PT at this time. Encouraged pt. to be up amb. around room as she is not wanting to go in hallway at this time. Prognosis: Excellent  Patient Education: Provided pt. with [address] and Therapy brochure and handout. She just started chemo pill for leukemia. REQUIRES PT FOLLOW UP: No  Activity Tolerance  Activity Tolerance: Patient Tolerated treatment well       Patient Diagnosis(es): The primary encounter diagnosis was Pancytopenia ([facility] [address] Diagnoses of Acute myeloid leukemia (AML), M0 (HCC) and Fever, unspecified fever cause were also pertinent to this visit. has a past medical history of Arthritis, Cancer ([facility] [address] Hyperlipidemia, Hypertension, Leukemia ([facility] [address] Pneumonia, and Seasonal allergies. has a past surgical history that includes Colonoscopy; joint replacement; Ovary removal; and Colonoscopy (03/10/2017).     Restrictions  Position Activity Restriction  Other position/activity restrictions: LUE IV, up with assist   Vision/Hearing  Vision: Within Functional Limits  Vision Exceptions: Wears glasses at all times  Hearing: Within functional limits     Subjective  General  Chart Reviewed: Yes  Patient assessed for rehabilitation services?: Yes  Family / Caregiver Present: No  Follows Commands: Within Functional Limits  Subjective  Subjective: No C/O pain  Pain Screening  Patient Currently in Pain: Denies          Orientation  Orientation  Overall Orientation Status: Within Normal Limits  Social/Functional History  Social/Functional History  Lives With: Alone  Type of Home: House  Home Layout: One level(laundry on main )  Home Access: Stairs to enter with rails(garage entrance )  Entrance

## 2020-04-27 NOTE — PROGRESS NOTES
Occupational Therapy   Occupational Therapy Initial Assessment  Date: 2020   Patient Name: Janette Franco  MRN: [id number]     : 1950    RN Vernell Waldrop reports patient is medically stable for therapy treatment this date. Chart reviewed prior to treatment and patient is agreeable for therapy. All lines intact and patient positioned comfortably at end of treatment. All patient needs addressed prior to ending therapy session. Date of Service: 2020    Discharge Recommendations:  Home with assist PRN, Home independently       Assessment   Assessment: Pt does not need OT services at this facility. Pt is I in room. DC OT services after OT eval and 1 tx completed for education only. Prognosis: Good  Decision Making: Low Complexity  OT Education: OT Role;Plan of Care  Patient Education: EC/WS tech, call light use/fall prevention, recommendation for no therapy services needed, safety in function   REQUIRES OT FOLLOW UP: No  Activity Tolerance  Activity Tolerance: Patient Tolerated treatment well  Activity Tolerance: fair   Safety Devices  Safety Devices in place: Yes  Type of devices: Call light within reach; Left in chair;Nurse notified           Patient Diagnosis(es): The primary encounter diagnosis was Pancytopenia ([address] Diagnoses of Acute myeloid leukemia (AML), M0 (HCC) and Fever, unspecified fever cause were also pertinent to this visit. has a past medical history of Arthritis, Cancer ([address] Hyperlipidemia, Hypertension, Leukemia ([address] Pneumonia, and Seasonal allergies. has a past surgical history that includes Colonoscopy; joint replacement; Ovary removal; and Colonoscopy (03/10/2017). PER H&P:  Janette Franco is a 78 y.o.  Non-/non  female with history of AML with pancytopenia on chemotherapy (Vidaza plus Venetoclax)  Who resented to send an emergency room after she was advised to come in by her oncology when she called and reported low-grade fever of 100.2 and Ana Maria Bynum, OT

## 2020-04-27 NOTE — PROGRESS NOTES
discharge and visual disturbance. Respiratory: Negative for shortness of breath and wheezing. Cardiovascular: Negative for chest pain, palpitations and leg swelling. Gastrointestinal: Negative for abdominal pain, blood in stool, nausea and vomiting. Endocrine: Negative for cold intolerance and heat intolerance. Genitourinary: Negative for dysuria and frequency. Musculoskeletal: Negative for arthralgias, back pain and joint swelling. Skin: Negative for color change and rash. Neurological: Negative for dizziness, tremors, seizures and light-headedness. Psychiatric/Behavioral: Negative for agitation and confusion. Medications: Allergies: Allergies   Allergen Reactions    Amoxicillin-Pot Clavulanate      Stomach ache    Cefuroxime Axetil Hives    Biaxin [Clarithromycin] Rash    Sulfa Antibiotics Hives       Current Meds:   Scheduled Meds:    sodium chloride  20 mL Intravenous Once    levoFLOXacin  500 mg Oral Daily    acyclovir  400 mg Oral Q12H    allopurinol  300 mg Oral Daily    cetirizine  10 mg Oral Daily    estradiol  0.5 mg Oral Daily    meloxicam  15 mg Oral Daily    metoprolol tartrate  50 mg Oral BID    therapeutic multivitamin-minerals  1 tablet Oral Daily    pravastatin  40 mg Oral Daily    vitamin D  3,000 Units Oral Daily    sodium chloride  20 mL Intravenous Once    sodium chloride flush  10 mL Intravenous 2 times per day     Continuous Infusions:   PRN Meds: ondansetron, prochlorperazine, sodium chloride flush, potassium chloride **OR** potassium alternative oral replacement **OR** potassium chloride, magnesium sulfate, acetaminophen **OR** acetaminophen, polyethylene glycol, promethazine **OR** ondansetron    Data:     Past Medical History:   has a past medical history of Arthritis, Cancer ([facility] [address] Hyperlipidemia, Hypertension, Leukemia ([facility] [address] Pneumonia, and Seasonal allergies. Social History:   reports that she has never smoked.  She has never used

## 2020-04-27 NOTE — PLAN OF CARE
Problem: Falls - Risk of:  Goal: Will remain free from falls  Description: Will remain free from falls  Outcome: Ongoing  Note: Pt fall risk, fall band present, falling star, safety alarm activated and in use as needed. Hourly rounding performed. Pt encouraged to use call light. See Ritika Quach fall risk assessment. Goal: Absence of physical injury  Description: Absence of physical injury  Outcome: Ongoing  Note: Non-skid socks in place, up with assistance, bed in lowest position, bed exit & alarm as needed, provide toileting every 2 hours an d as needed. Problem: Activity:  Goal: Ability to perform activities at highest level will improve  Description: Ability to perform activities at highest level will improve  4/27/2020 1925 by Andrew Sargent RN  Outcome: Ongoing  Note: Assessed musculoskeletal functional level. Assessed ROM & ability to care for self. 4/27/2020 1031 by Leslie Guillaume RN  Outcome: Ongoing     Problem: Bowel/Gastric:  Goal: Occurrences of nausea will decrease  Description: Occurrences of nausea will decrease  4/27/2020 1925 by Andrew Sargent RN  Outcome: Ongoing  Note: Assessed for nausea & vomiting. Assessed ability to tolerate food and/or fluids, & nutritional intake. Medicated as needed for nausea & vomiting.   4/27/2020 1031 by Leslie Guillaume RN  Outcome: Ongoing  Goal: Bowel function will improve  Description: Bowel function will improve  Outcome: Ongoing  Note: Assessed bowel function. Assessed for active bowel sounds and/or flatus. Assessed for number, form, & frequency of stools. Problem: Cardiac:  Goal: Ability to maintain an adequate cardiac output will improve  Description: Ability to maintain an adequate cardiac output will improve  4/27/2020 1925 by Andrew Sargent RN  Outcome: Ongoing  Note: Assess cardiac monitor, heart rate, & pulse. Assess for any arrhythmias. Assessed IV administration. O2 provided as needed.    4/27/2020 1031 by Helena Davis

## 2020-04-27 NOTE — CARE COORDINATION
Case Management Initial Discharge Plan  Mortimer Niachristianne,         Readmission Risk              Risk of Unplanned Readmission:        13             Met with:patient to discuss discharge plans. Information verified: address, contacts, phone number, , insurance Yes  PCP: Connie Sky MD  Date of last visit: tele visit in     Insurance Provider: Rigo Hatch     Discharge Planning  Current Residence:  Private home   Living Arrangements:  [address] has 1 stories/-0 stairs to climb  Support Systems:  Family Members       Current Services PTA:  Chemo IM injection and oral medication   Agency: [facility] under Dr Julieta Hinkle     Patient able to perform ADL's:Independent  DME in home:  Georges Flor from [facility]    DME used to aid ambulation prior to admission: none   DME used during admission:  None     Potential Assistance Needed:  N/A    Pharmacy: [facility] [address]    Potential Assistance Purchasing Medications:  No  Does patient want to participate in local refill/ meds to beds program?  No    Patient agreeable to home care: No  [facility] [place] of choice provided:  n/a      Type of Home Care Services:  None  Patient expects to be discharged to:  home    Prior SNF/Rehab Placement and Facility: [facility] rehab   Agreeable to SNF/Rehab: No  [facility] [place] of choice provided: n/a   Evaluation: n/a    Expected Discharge date:  20  Follow Up Appointment: Best Day/ Time: Monday AM    Transportation provider: per family  Transportation arrangements needed for discharge: No    Discharge Plan:   Patient admitted with pancytopenia. Hem/onc following. Patient goes to the cancer center for Vidaza injections. She was to get day 5 of 7 today. Per DR Geronimo Mckee to hold treatment at this time. Patient has history of AML and per hem/onc acutely ill. Will continue to monitor for any home needs. She is very independent and an active . Will follow.      Electronically signed by Oj Mendez

## 2020-04-28 ENCOUNTER — HOSPITAL ENCOUNTER (OUTPATIENT)
Dept: INFUSION THERAPY | Facility: MEDICAL CENTER | Age: 70
End: 2020-04-28

## 2020-04-28 ENCOUNTER — HOSPITAL ENCOUNTER (OUTPATIENT)
Dept: INFUSION THERAPY | Facility: MEDICAL CENTER | Age: 70
End: 2020-04-28
Payer: MEDICARE

## 2020-04-28 LAB
ABO/RH: NORMAL
ANION GAP SERPL CALCULATED.3IONS-SCNC: 12 MMOL/L (ref 9–17)
ANTIBODY SCREEN: NEGATIVE
ARM BAND NUMBER: NORMAL
BLD PROD TYP BPU: NORMAL
BUN BLDV-MCNC: 15 MG/DL (ref 8–23)
BUN/CREAT BLD: 29 (ref 9–20)
CALCIUM SERPL-MCNC: 9 MG/DL (ref 8.6–10.4)
CHLORIDE BLD-SCNC: 102 MMOL/L (ref 98–107)
CO2: 21 MMOL/L (ref 20–31)
CREAT SERPL-MCNC: 0.51 MG/DL (ref 0.5–0.9)
CROSSMATCH RESULT: NORMAL
CROSSMATCH RESULT: NORMAL
DISPENSE STATUS BLOOD BANK: NORMAL
EXPIRATION DATE: NORMAL
GFR AFRICAN AMERICAN: >60 ML/MIN
GFR NON-AFRICAN AMERICAN: >60 ML/MIN
GFR SERPL CREATININE-BSD FRML MDRD: ABNORMAL ML/MIN/{1.73_M2}
GFR SERPL CREATININE-BSD FRML MDRD: ABNORMAL ML/MIN/{1.73_M2}
GLUCOSE BLD-MCNC: 97 MG/DL (ref 70–99)
HCT VFR BLD CALC: 22.9 % (ref 36.3–47.1)
HCT VFR BLD CALC: 25 % (ref 36.3–47.1)
HEMOGLOBIN: 7.7 G/DL (ref 11.9–15.1)
HEMOGLOBIN: 8.4 G/DL (ref 11.9–15.1)
MCH RBC QN AUTO: 30.9 PG (ref 25.2–33.5)
MCHC RBC AUTO-ENTMCNC: 33.6 G/DL (ref 28.4–34.8)
MCV RBC AUTO: 92 FL (ref 82.6–102.9)
NRBC AUTOMATED: 0 PER 100 WBC
PDW BLD-RTO: 16.4 % (ref 11.8–14.4)
PLATELET # BLD: ABNORMAL K/UL (ref 138–453)
PLATELET, FLUORESCENCE: 4 K/UL (ref 138–453)
PLATELET, IMMATURE FRACTION: 7.2 % (ref 1.1–10.3)
PMV BLD AUTO: ABNORMAL FL (ref 8.1–13.5)
POTASSIUM SERPL-SCNC: 4.3 MMOL/L (ref 3.7–5.3)
RBC # BLD: 2.49 M/UL (ref 3.95–5.11)
SODIUM BLD-SCNC: 135 MMOL/L (ref 135–144)
TRANSFUSION STATUS: NORMAL
UNIT DIVISION: 0
UNIT NUMBER: NORMAL
WBC # BLD: 0.5 K/UL (ref 3.5–11.3)

## 2020-04-28 PROCEDURE — 99232 SBSQ HOSP IP/OBS MODERATE 35: CPT | Performed by: INTERNAL MEDICINE

## 2020-04-28 PROCEDURE — 6360000002 HC RX W HCPCS: Performed by: INTERNAL MEDICINE

## 2020-04-28 PROCEDURE — 85018 HEMOGLOBIN: CPT

## 2020-04-28 PROCEDURE — 6370000000 HC RX 637 (ALT 250 FOR IP): Performed by: NURSE PRACTITIONER

## 2020-04-28 PROCEDURE — 85055 RETICULATED PLATELET ASSAY: CPT

## 2020-04-28 PROCEDURE — 1200000000 HC SEMI PRIVATE

## 2020-04-28 PROCEDURE — 36415 COLL VENOUS BLD VENIPUNCTURE: CPT

## 2020-04-28 PROCEDURE — 85027 COMPLETE CBC AUTOMATED: CPT

## 2020-04-28 PROCEDURE — 85014 HEMATOCRIT: CPT

## 2020-04-28 PROCEDURE — 2580000003 HC RX 258: Performed by: INTERNAL MEDICINE

## 2020-04-28 PROCEDURE — 80048 BASIC METABOLIC PNL TOTAL CA: CPT

## 2020-04-28 PROCEDURE — 6370000000 HC RX 637 (ALT 250 FOR IP): Performed by: INTERNAL MEDICINE

## 2020-04-28 PROCEDURE — APPSS30 APP SPLIT SHARED TIME 16-30 MINUTES: Performed by: NURSE PRACTITIONER

## 2020-04-28 RX ADMIN — MULTIPLE VITAMINS W/ MINERALS TAB 1 TABLET: TAB at 09:03

## 2020-04-28 RX ADMIN — MELATONIN 3000 UNITS: at 09:03

## 2020-04-28 RX ADMIN — PRAVASTATIN SODIUM 40 MG: 40 TABLET ORAL at 20:27

## 2020-04-28 RX ADMIN — ACYCLOVIR 400 MG: 200 CAPSULE ORAL at 11:36

## 2020-04-28 RX ADMIN — MEROPENEM 1 G: 1 INJECTION, POWDER, FOR SOLUTION INTRAVENOUS at 20:27

## 2020-04-28 RX ADMIN — MEROPENEM 1 G: 1 INJECTION, POWDER, FOR SOLUTION INTRAVENOUS at 04:16

## 2020-04-28 RX ADMIN — CETIRIZINE HYDROCHLORIDE 10 MG: 10 TABLET, FILM COATED ORAL at 09:03

## 2020-04-28 RX ADMIN — ESTRADIOL 0.5 MG: 1 TABLET ORAL at 09:03

## 2020-04-28 RX ADMIN — POLYETHYLENE GLYCOL 3350 17 G: 17 POWDER, FOR SOLUTION ORAL at 09:15

## 2020-04-28 RX ADMIN — ALLOPURINOL 300 MG: 300 TABLET ORAL at 09:03

## 2020-04-28 RX ADMIN — METOPROLOL TARTRATE 50 MG: 50 TABLET, FILM COATED ORAL at 09:03

## 2020-04-28 RX ADMIN — ACYCLOVIR 400 MG: 200 CAPSULE ORAL at 23:48

## 2020-04-28 RX ADMIN — SODIUM CHLORIDE, PRESERVATIVE FREE 10 ML: 5 INJECTION INTRAVENOUS at 09:49

## 2020-04-28 RX ADMIN — SODIUM CHLORIDE, PRESERVATIVE FREE 10 ML: 5 INJECTION INTRAVENOUS at 20:27

## 2020-04-28 RX ADMIN — MEROPENEM 1 G: 1 INJECTION, POWDER, FOR SOLUTION INTRAVENOUS at 11:36

## 2020-04-28 ASSESSMENT — ENCOUNTER SYMPTOMS
RESPIRATORY NEGATIVE: 1
GASTROINTESTINAL NEGATIVE: 1

## 2020-04-28 NOTE — PROGRESS NOTES
Today's Date: 4/28/2020  Patient Name: Milo Kraft  Date of admission: 4/26/2020  2:44 PM  Patient's age: 78 y. o., 1950  Admission Dx: Pancytopenia ([facility] [address] [S23.795]    Reason for Consult: management recommendations  Requesting Physician: Eusebio Baker MD    CHIEF COMPLAINT: Low-grade fever, pancytopenia    INTERIM HISTORY  The patient is seen and evaluated. Fever curves are coming down. Cultures are negative so far. Appreciate infectious disease input, she is on meropenem    HISTORY OF PRESENT ILLNESS:      The patient is a 78 y.o.  female who is admitted to the hospital for low-grade fever and pancytopenia. She is known to us with history of acute myeloid leukemia which was just diagnosed. She was started on the combination of Vidaza plus venetoclax which was started last Monday. Patient has pancytopenia upon presentation before any treatment. She has been transfusion dependent. As an outpatient, the patient is on allopurinol, prophylactic Levaquin and acyclovir as well as Zofran for nausea. The patient called today and she had a fever of 99.4. She was sent to emergency room. She does not have any temperature above 100.5. But her CBC showed hemoglobin under 7 and platelets under 10 so she will be admitted for work-up for her low-grade fever as well as transfusion support. Patient is evaluated in emergency room. She is complaining of severe fatigue. Other than her low-grade fever, she has no localizing symptoms, mainly no cough, no shortness of breath, no diarrhea, no dysuria or hematuria. No skin rash. She has multiple bruises. Past Medical History:   has a past medical history of Arthritis, Cancer ([address] Hyperlipidemia, Hypertension, Leukemia ([address] Pneumonia, and Seasonal allergies. Past Surgical History:   has a past surgical history that includes Colonoscopy; joint replacement; Ovary removal; and Colonoscopy (03/10/2017).      Medications:    Reviewed in Epic observe her at this point. We will only transfuse her if she is bleeding. 4. Continue to hold Vidaza and Geraldine Cruz at this point      Discussed with patient and Nurse. Thank you for asking us to see this patient.     DARRELL POSADAS [facility] MD [place]  Hematologist/Medical Oncologist  Cell: [phone]

## 2020-04-28 NOTE — PROGRESS NOTES
has had no urinary symptoms to report of. She was brought into the emergency room and due to a penicillin allergy I was asked to evaluate and help with antibiotic choice. Current evaluation:2020    /72   Pulse 87   Temp 97.5 °F (36.4 °C) (Oral)   Resp 20   Ht 5' 10\" (1.778 m)   Wt 193 lb 14.4 oz (88 kg)   SpO2 98%   BMI 27.82 kg/m²     Temperature Range: Temp: 97.5 °F (36.4 °C) Temp  Av.2 °F (36.8 °C)  Min: 97.5 °F (36.4 °C)  Max: 99.3 °F (37.4 °C)  The patient is seen and evaluated at bedside she is sitting up in the chair she is awake and alert in no acute distress. No subjective fever though she did have some mild sweats overnight. No abdominal pain nausea vomiting or diarrhea. No dysuria or frequency. Review of Systems   Constitutional: Negative. Respiratory: Negative. Cardiovascular: Negative. Gastrointestinal: Negative. Genitourinary: Negative. Musculoskeletal: Negative. Skin: Negative. Neurological: Negative. Psychiatric/Behavioral: Negative. Physical Examination :     Physical Exam  Constitutional:       Appearance: She is well-developed. HENT:      Head: Normocephalic and atraumatic. Neck:      Musculoskeletal: Normal range of motion and neck supple. Cardiovascular:      Rate and Rhythm: Regular rhythm. Heart sounds: Normal heart sounds. Pulmonary:      Effort: Pulmonary effort is normal.      Breath sounds: Normal breath sounds. Abdominal:      General: Bowel sounds are normal.      Palpations: Abdomen is soft. Skin:     General: Skin is warm and dry. Neurological:      Mental Status: She is alert and oriented to person, place, and time.          Laboratory data:   I have independently reviewed the followinglabs:  CBC with Differential:   Recent Labs     20  1505  20  0836 20  0502   WBC 0.5*  --  0.4* 0.5*   HGB 6.3*   < > 8.1* 7.7*   HCT 18.5*   < > 23.7* 22.9*   PLT See Reflexed IPF Result  --  See messaging  OFFICE: [phone]      Electronically signed by David Goetz MD on 4/28/2020 at 11:45 AM  Thank you for allowing us to participate in the care of this patient. Please call with questions. This note iscreated with the assistance of a speech recognition program.  While intending to generate a document that actually reflects the content of the visit, the document can still have some errors including those of syntax andsound a like substitutions which may escape proof reading. In such instances, actual meaning can be extrapolated by contextual diversion.

## 2020-04-28 NOTE — PROGRESS NOTES
4/27/2020 1647  Gross per 24 hour   Intake 310 ml   Output --   Net 310 ml       Labs:  Hematology:  Recent Labs     04/26/20  1505 04/27/20  0142 04/27/20  0836 04/28/20  0502   WBC 0.5*  --  0.4* 0.5*   RBC 1.97*  --  2.58* 2.49*   HGB 6.3* 6.6* 8.1* 7.7*   HCT 18.5* 19.8* 23.7* 22.9*   MCV 93.9  --  91.9 92.0   MCH 32.0  --  31.4 30.9   MCHC 34.1  --  34.2 33.6   RDW 17.8*  --  16.3* 16.4*   PLT See Reflexed IPF Result  --  See Reflexed IPF Result See Reflexed IPF Result   MPV Abnormal  --  NOT REPORTED Abnormal   CRP 12.0*  --   --   --    INR  --   --  1.1  --      Chemistry:  Recent Labs     04/26/20  1505 04/27/20  0836 04/28/20  0502   * 138 135   K 3.8 4.2 4.3   CL 97* 104 102   CO2 23 22 21   GLUCOSE 129* 102* 97   BUN 24* 15 15   CREATININE 0.67 0.51 0.51   ANIONGAP 14 12 12   LABGLOM >60 >60 >60   GFRAA >60 >60 >60   CALCIUM 9.7 9.2 9.0     Recent Labs     04/26/20  1505        ABG:No results found for: POCPH, PHART, PH, POCPCO2, KTZ1EOE, PCO2, POCPO2, PO2ART, PO2, POCHCO3, FBQ1QTO, HCO3, NBEA, PBEA, BEART, BE, THGBART, THB, GFY7JBH, FMUL7GEN, N7NAMXHN, O2SAT, FIO2  Lab Results   Component Value Date/Time    SPECIAL NOT REPORTED 04/26/2020 03:20 PM     Lab Results   Component Value Date/Time    CULTURE NO SIGNIFICANT GROWTH 04/26/2020 03:20 PM       Radiology:  Xr Chest Portable    Result Date: 4/26/2020  No acute cardiopulmonary abnormality. Physical Examination:        General appearance:  alert, cooperative and no distress  Mental Status:  oriented to person, place and time and normal affect  Lungs:  clear to auscultation bilaterally, normal effort  Heart:  regular rate and rhythm, no murmur  Abdomen:  soft, nontender, nondistended, normal bowel sounds, no masses, hepatomegaly, splenomegaly  Extremities:  no edema, redness, tenderness in the calves  Skin:  no gross lesions, rashes, induration.  Scattered bruising to bilateral arms     Assessment:        Hospital Problems

## 2020-04-29 ENCOUNTER — HOSPITAL ENCOUNTER (OUTPATIENT)
Dept: INFUSION THERAPY | Facility: MEDICAL CENTER | Age: 70
End: 2020-04-29
Payer: MEDICARE

## 2020-04-29 VITALS
OXYGEN SATURATION: 98 % | RESPIRATION RATE: 18 BRPM | WEIGHT: 189.5 LBS | SYSTOLIC BLOOD PRESSURE: 118 MMHG | HEIGHT: 70 IN | TEMPERATURE: 98.5 F | DIASTOLIC BLOOD PRESSURE: 55 MMHG | BODY MASS INDEX: 27.13 KG/M2 | HEART RATE: 80 BPM

## 2020-04-29 LAB
ABSOLUTE EOS #: 0 K/UL (ref 0–0.4)
ABSOLUTE IMMATURE GRANULOCYTE: 0 K/UL (ref 0–0.3)
ABSOLUTE LYMPH #: 0.51 K/UL (ref 1–4.8)
ABSOLUTE MONO #: 0.04 K/UL (ref 0.2–0.8)
BASOPHILS # BLD: 0 %
BASOPHILS ABSOLUTE: 0 K/UL (ref 0–0.2)
DIFFERENTIAL TYPE: ABNORMAL
EOSINOPHILS RELATIVE PERCENT: 0 % (ref 1–4)
HCT VFR BLD CALC: 21.9 % (ref 36.3–47.1)
HCT VFR BLD CALC: 23.1 % (ref 36.3–47.1)
HEMOGLOBIN: 7.6 G/DL (ref 11.9–15.1)
HEMOGLOBIN: 7.8 G/DL (ref 11.9–15.1)
IMMATURE GRANULOCYTES: 0 %
LYMPHOCYTES # BLD: 84 % (ref 24–44)
MCH RBC QN AUTO: 31.2 PG (ref 25.2–33.5)
MCHC RBC AUTO-ENTMCNC: 33.8 G/DL (ref 28.4–34.8)
MCV RBC AUTO: 92.4 FL (ref 82.6–102.9)
MONOCYTES # BLD: 7 % (ref 1–7)
MORPHOLOGY: ABNORMAL
NRBC AUTOMATED: 0 PER 100 WBC
PDW BLD-RTO: 16.2 % (ref 11.8–14.4)
PLATELET # BLD: ABNORMAL K/UL (ref 138–453)
PLATELET ESTIMATE: ABNORMAL
PLATELET, FLUORESCENCE: 2 K/UL (ref 138–453)
PLATELET, IMMATURE FRACTION: 8.4 % (ref 1.1–10.3)
PMV BLD AUTO: ABNORMAL FL (ref 8.1–13.5)
RBC # BLD: 2.5 M/UL (ref 3.95–5.11)
RBC # BLD: ABNORMAL 10*6/UL
SEG NEUTROPHILS: 9 % (ref 36–66)
SEGMENTED NEUTROPHILS ABSOLUTE COUNT: 0.05 K/UL (ref 1.8–7.7)
WBC # BLD: 0.6 K/UL (ref 3.5–11.3)
WBC # BLD: ABNORMAL 10*3/UL

## 2020-04-29 PROCEDURE — 2580000003 HC RX 258: Performed by: INTERNAL MEDICINE

## 2020-04-29 PROCEDURE — 99232 SBSQ HOSP IP/OBS MODERATE 35: CPT | Performed by: INTERNAL MEDICINE

## 2020-04-29 PROCEDURE — 85025 COMPLETE CBC W/AUTO DIFF WBC: CPT

## 2020-04-29 PROCEDURE — P9037 PLATE PHERES LEUKOREDU IRRAD: HCPCS

## 2020-04-29 PROCEDURE — 6370000000 HC RX 637 (ALT 250 FOR IP): Performed by: NURSE PRACTITIONER

## 2020-04-29 PROCEDURE — 36415 COLL VENOUS BLD VENIPUNCTURE: CPT

## 2020-04-29 PROCEDURE — 6360000002 HC RX W HCPCS: Performed by: INTERNAL MEDICINE

## 2020-04-29 PROCEDURE — 36430 TRANSFUSION BLD/BLD COMPNT: CPT

## 2020-04-29 PROCEDURE — APPSS60 APP SPLIT SHARED TIME 46-60 MINUTES: Performed by: NURSE PRACTITIONER

## 2020-04-29 PROCEDURE — 6370000000 HC RX 637 (ALT 250 FOR IP): Performed by: INTERNAL MEDICINE

## 2020-04-29 PROCEDURE — 85055 RETICULATED PLATELET ASSAY: CPT

## 2020-04-29 RX ORDER — LEVOFLOXACIN 500 MG/1
500 TABLET, FILM COATED ORAL DAILY
Status: DISCONTINUED | OUTPATIENT
Start: 2020-04-29 | End: 2020-04-29 | Stop reason: HOSPADM

## 2020-04-29 RX ORDER — DIPHENHYDRAMINE HYDROCHLORIDE 50 MG/ML
25 INJECTION INTRAMUSCULAR; INTRAVENOUS ONCE
Status: COMPLETED | OUTPATIENT
Start: 2020-04-29 | End: 2020-04-29

## 2020-04-29 RX ORDER — 0.9 % SODIUM CHLORIDE 0.9 %
20 INTRAVENOUS SOLUTION INTRAVENOUS ONCE
Status: COMPLETED | OUTPATIENT
Start: 2020-04-29 | End: 2020-04-29

## 2020-04-29 RX ADMIN — ALLOPURINOL 300 MG: 300 TABLET ORAL at 08:48

## 2020-04-29 RX ADMIN — LEVOFLOXACIN 500 MG: 500 TABLET, FILM COATED ORAL at 12:00

## 2020-04-29 RX ADMIN — MULTIPLE VITAMINS W/ MINERALS TAB 1 TABLET: TAB at 08:48

## 2020-04-29 RX ADMIN — MEROPENEM 1 G: 1 INJECTION, POWDER, FOR SOLUTION INTRAVENOUS at 04:22

## 2020-04-29 RX ADMIN — DIPHENHYDRAMINE HYDROCHLORIDE 25 MG: 50 INJECTION, SOLUTION INTRAMUSCULAR; INTRAVENOUS at 15:17

## 2020-04-29 RX ADMIN — CETIRIZINE HYDROCHLORIDE 10 MG: 10 TABLET, FILM COATED ORAL at 08:48

## 2020-04-29 RX ADMIN — ACYCLOVIR 400 MG: 200 CAPSULE ORAL at 10:58

## 2020-04-29 RX ADMIN — ACETAMINOPHEN 650 MG: 325 TABLET ORAL at 15:17

## 2020-04-29 RX ADMIN — POLYETHYLENE GLYCOL 3350 17 G: 17 POWDER, FOR SOLUTION ORAL at 09:01

## 2020-04-29 RX ADMIN — METOPROLOL TARTRATE 50 MG: 50 TABLET, FILM COATED ORAL at 08:48

## 2020-04-29 RX ADMIN — ESTRADIOL 0.5 MG: 1 TABLET ORAL at 08:48

## 2020-04-29 RX ADMIN — MELATONIN 3000 UNITS: at 08:48

## 2020-04-29 RX ADMIN — SODIUM CHLORIDE 20 ML: 0.9 INJECTION, SOLUTION INTRAVENOUS at 13:00

## 2020-04-29 RX ADMIN — SODIUM CHLORIDE, PRESERVATIVE FREE 10 ML: 5 INJECTION INTRAVENOUS at 08:49

## 2020-04-29 ASSESSMENT — PAIN SCALES - GENERAL: PAINLEVEL_OUTOF10: 0

## 2020-04-29 ASSESSMENT — ENCOUNTER SYMPTOMS
RESPIRATORY NEGATIVE: 1
GASTROINTESTINAL NEGATIVE: 1

## 2020-04-29 NOTE — PROGRESS NOTES
Writer observed patient for first 15 minutes of platelet transfusion, no adverse reactions noted. Vital signs remain within normal range. Will continue to monitor patient closely.

## 2020-04-29 NOTE — PLAN OF CARE
Problem: Falls - Risk of:  Goal: Will remain free from falls  Description: Will remain free from falls  4/29/2020 0949 by Susy Cowden, RN  Outcome: Ongoing  4/29/2020 0206 by Jesus Hale RN  Outcome: Ongoing  Note: Pt fall risk, fall band present, falling star, safety alarm activated and in use as needed. Bed in lowest position, call light within reach. Hourly rounding performed. Pt encouraged to use call light. See Diannia Estimable fall risk assessment. Patient offered toileting assistance during rounding. Hourly rounds performed. Goal: Absence of physical injury  Description: Absence of physical injury  Outcome: Ongoing     Problem: Activity:  Goal: Ability to perform activities at highest level will improve  Description: Ability to perform activities at highest level will improve  4/29/2020 0949 by Susy Cowden, RN  Outcome: Ongoing  4/29/2020 0206 by Jesus Hale RN  Outcome: Ongoing     Problem: Bowel/Gastric:  Goal: Occurrences of nausea will decrease  Description: Occurrences of nausea will decrease  Outcome: Ongoing  Note: Patient c/o constipation, patient received glycolax and prune juice and is ambulating around unit with mask in place. Will continue to monitor patient closely.   Goal: Bowel function will improve  Description: Bowel function will improve  Outcome: Ongoing     Problem: Cardiac:  Goal: Ability to maintain an adequate cardiac output will improve  Description: Ability to maintain an adequate cardiac output will improve  Outcome: Ongoing     Problem: Health Behavior:  Goal: Identification of resources available to assist in meeting health care needs will improve  Description: Identification of resources available to assist in meeting health care needs will improve  Outcome: Ongoing     Problem: Nutritional:  Goal: Maintenance of adequate weight for body size and type will improve  Description: Maintenance of adequate weight for body size and type will improve  Outcome: Ongoing     Problem:

## 2020-04-29 NOTE — PROGRESS NOTES
[facility]  Progress Note    Clau Aguillon  MRN: [id number]  Date: 4/29/2020    Reason for F/U :   Febrile neutropenia    Impression :   1. Acute myeloid leukemia  · On antineoplastic therapy started 4/20/2020  2. Pancytopenia related to the leukemia and chemotherapy  3. Febrile neutropenia    Recommendations:   · The patient is doing well clinically with no further fevers and the counts have remained stable. · The culture data has remained negative and from an infectious disease standpoint I am not opposed to her being discharged. · I will stop the IV antimicrobial therapy with meropenem and put her back on the oral levofloxacin. · The patient will need to be discharged on the levofloxacin as a prophylactic antibiotic. Infection Control Recommendations:   Universal precautions    Discharge Planning:   Patient will need Midline Catheter Insertion/ PICC line Insertion: No  Patient will need: Home IV , [facility],  SNF,  LTAC: Undetermined  Patient willneed outpatient wound care: No    MedicalDecision making / Summary of Stay:   Clau Aguillon is a 78y.o.-year-old female who was initially admitted on 4/26/2020. Elmer Martin has a known diagnosis of AML and has pancytopenia. She was started on Vidaza and Venetoclax on 4/20/2020 which she was tolerating well. The patient was seen at the [facility] clinic started on levofloxacin on Thursday as a prophylactic antibiotic. She did develop a low-grade fever in the morning that progressively started to get higher and overnight she developed chills as well as diaphoresis and due to these symptoms she was asked to come into the emergency room for evaluation. The patient does report some sinus drainage and intermittent cough related to this at times. She does not report any sputum production, abdominal pain, nausea, vomiting, or diarrhea. She actually reports that she is constipated. She has had no urinary symptoms to report of.   She

## 2020-04-29 NOTE — DISCHARGE INSTR - COC
Continuity of Care Form    Patient Name: Fabian Harada   :  1950  MRN:  [id number]    Admit date:  2020  Discharge date:  ***    Code Status Order: Full Code   Advance Directives:   Advance Care Flowsheet Documentation     Date/Time Healthcare Directive Type of Healthcare Directive Copy in [address] Agent's Name Healthcare Agent's Phone Number    [phone]  No, patient does not have an advance directive for healthcare treatment -- -- -- -- --          Admitting Physician:  Eusebio Dawn MD  PCP: Gianluca Shetty MD    Discharging Nurse: [facility] Unit/Room#: 1020/1020-01  Discharging Unit Phone Number: ***    Emergency Contact:   Extended Emergency Contact Information  Primary Emergency Contact: Quynh Mercer  Address: [place]  Home Phone: [phone]  Relation: Child    Past Surgical History:  Past Surgical History:   Procedure Laterality Date    COLONOSCOPY      COLONOSCOPY  03/10/2017    diverticulosis and scaroq-ggfqskofw-ofugmka and hyperplastic and adenoma .  Has spasms ,redundant colon    JOINT REPLACEMENT      OVARY REMOVAL         Immunization History:   Immunization History   Administered Date(s) Administered    Hepatitis A 2016, 10/13/2016    Hepatitis B (Recombivax HB) 2016, 2016, 10/13/2016    Influenza Nasal 2008    Influenza, Cherylynn Justice, IM, (6 mo and older Fluzone, Flulaval, Fluarix and 3 yrs and older Afluria) 10/13/2016, 2017, 10/08/2018    Influenza, Triv, inactivated, subunit, adjuvanted, IM (Fluad 65 yrs and older) 10/09/2019    Pneumococcal Conjugate 13-valent (Otyopcj41) 10/08/2018    Pneumococcal Polysaccharide (Nyxgnljip10) 2015    Td, unspecified formulation 2001    Tdap (Boostrix, Adacel) 2010    Zoster Live (Zostavax) 2013    Zoster Recombinant (Shingrix) 2018       Active Problems:  Patient Active Problem List   Diagnosis Code    Essential hypertension I10    Hyperlipidemia E78.5    Primary osteoarthritis involving multiple joints M15.0    Hyperplastic polyp of intestine K63.5    Pancytopenia (HCC) D61.818    Acute myeloid leukemia (AML), M0 (HCC) C92.00    Fever R50.9       Isolation/Infection:   Isolation          No Isolation        Patient Infection Status     None to display          Nurse Assessment:  Last Vital Signs: /60   Pulse 82   Temp 98.8 °F (37.1 °C) (Oral)   Resp 18   Ht 5' 10\" (1.778 m)   Wt 189 lb 8 oz (86 kg)   SpO2 95%   BMI 27.19 kg/m²     Last documented pain score (0-10 scale): Pain Level: 0  Last Weight:   Wt Readings from Last 1 Encounters:   04/29/20 189 lb 8 oz (86 kg)     Mental Status:  {IP PT MENTAL STATUS:20030}    IV Access:  { TRESSA IV ACCESS:671109339}    Nursing Mobility/ADLs:  Walking   {[facility] DME XACF:371851785}  Transfer  {[facility] DME HDKJ:232550401}  Bathing  {[facility] DME VXBK:465732681}  Dressing  {[facility] DME VJGS:270847464}  Toileting  {[facility] DME SGCK:352033963}  Feeding  {[facility] DME UQCL:753713344}  Med Admin  {[facility] DME COWM:719252995}  Med Delivery   {[facility] MED Delivery:380181366}    Wound Care Documentation and Therapy:  Puncture 04/06/20 Back Lower;Mid;Left (Active)   Number of days: 22        Elimination:  Continence:   · Bowel: {YES / AI:31278}  · Bladder: {YES / FV:50118}  Urinary Catheter: {Urinary Catheter:195173498}   Colostomy/Ileostomy/Ileal Conduit: {YES / PS:70500}       Date of Last BM: ***    Intake/Output Summary (Last 24 hours) at 4/29/2020 1030  Last data filed at 4/29/2020 0713  Gross per 24 hour   Intake 330 ml   Output --   Net 330 ml     I/O last 3 completed shifts:   In: 228 [I.V.:228]  Out: -     Safety Concerns:     [address] [employer] Safety Concerns:816991541}    Impairments/Disabilities:      [address] [employer] Impairments/Disabilities:278896974}    Nutrition Therapy:  Current Nutrition Therapy:   [address] TRESSA Diet List:881543905}    Routes of Feeding: {[facility]P DME Other Feedings:669837758}  Liquids: {Slp liquid thickness:15183}  Daily Fluid Restriction: {CHP DME Yes amt example:793880687}  Last Modified Barium Swallow with Video (Video Swallowing Test): {Done Not Done SRHM:403105404}    Treatments at the Time of Hospital Discharge:   Respiratory Treatments: ***  Oxygen Therapy:  {Therapy; copd oxygen:87871}  Ventilator:    {MH CC Vent BWQO:882544209}    Rehab Therapies: {THERAPEUTIC INTERVENTION:5561600117}  Weight Bearing Status/Restrictions: { CC Weight Bearin}  Other Medical Equipment (for information only, NOT a DME order):  {EQUIPMENT:573437865}  Other Treatments: ***    Patient's personal belongings (please select all that are sent with patient):  {CHP DME Belongings:867202683}    RN SIGNATURE:  {Esignature:623260186}    CASE MANAGEMENT/SOCIAL WORK SECTION    Inpatient Status Date: ***    Readmission Risk Assessment Score:  Readmission Risk              Risk of Unplanned Readmission:        15           Discharging to Facility/ Agency   · Name:   · Address:  · Phone:  · Fax:    Dialysis Facility (if applicable)   · Name:  · Address:  · Dialysis Schedule:  · Phone:  · Fax:    / signature: {Esignature:339313176}    PHYSICIAN SECTION    Prognosis: {Prognosis:0870560079}    Condition at Discharge: [facility] Patient Condition:575225774}    Rehab Potential (if transferring to Rehab): {Prognosis:7572462615}    Recommended Labs or Other Treatments After Discharge: ***    Physician Certification: I certify the above information and transfer of Milo Kraft  is necessary for the continuing treatment of the diagnosis listed and that she requires {Admit to Appropriate Level of Care:86448} for {GREATER/LESS:853076683} 30 days.      Update Admission H&P: {CHP DME Changes in WVTOL:290397569}    PHYSICIAN SIGNATURE:  Electronically signed by BRYN Arroyo NP on 20 at 10:45 AM EDT

## 2020-04-29 NOTE — DISCHARGE SUMMARY
06/07/2012     CMP:    Lab Results   Component Value Date    GLUCOSE 97 04/28/2020    GLUCOSE 91 06/07/2012     04/28/2020    K 4.3 04/28/2020     04/28/2020    CO2 21 04/28/2020    BUN 15 04/28/2020    CREATININE 0.51 04/28/2020    ANIONGAP 12 04/28/2020    ALKPHOS 65 04/22/2020    ALT 16 04/22/2020    AST 15 04/22/2020    BILITOT 0.69 04/22/2020    LABALBU 4.3 04/22/2020    LABALBU 4.4 06/07/2012    ALBUMIN NOT REPORTED 04/22/2020    LABGLOM >60 04/28/2020    GFRAA >60 04/28/2020    GFR      04/28/2020    GFR NOT REPORTED 04/28/2020    PROT 7.0 04/22/2020    CALCIUM 9.0 04/28/2020     U/A:    Lab Results   Component Value Date    COLORU YELLOW 04/27/2020    TURBIDITY SLIGHTLY CLOUDY 04/27/2020    SPECGRAV 1.010 04/27/2020    HGBUR NEGATIVE 04/27/2020    PHUR 7.5 04/27/2020    PROTEINU NEGATIVE 04/27/2020    GLUCOSEU NEGATIVE 04/27/2020    KETUA NEGATIVE 04/27/2020    BILIRUBINUR NEGATIVE 04/27/2020    BILIRUBINUR negative 04/26/2020    UROBILINOGEN Normal 04/27/2020    NITRU NEGATIVE 04/27/2020    LEUKOCYTESUR NEGATIVE 04/27/2020     Radiology:  Xr Chest Portable    Result Date: 4/26/2020  No acute cardiopulmonary abnormality. Consultations:    Consults:     Final Specialist Recommendations/Findings:   IP CONSULT TO ONCOLOGY  IP CONSULT TO HOSPITALIST  IP CONSULT TO INFECTIOUS DISEASES      The patient was seen and examined on day of discharge and this discharge summary is in conjunction with any daily progress note from day of discharge.     Discharge plan:     Disposition: Home    Physician Follow Up:   Ana Wise MD  [address]  [phone]    In 2 weeks      Iris Carrington, [address] [facility]  [phone]      office will call you to arrange follow up       Requiring Further Evaluation/Follow Up POST HOSPITALIZATION/Incidental Findings: chemotherapy regimine    Diet: regular diet    Activity: As tolerated    Instructions to Patient: take all medications as prescribed, monitor temps daily at home and call Dr. Lyla Brandt office if greater than >100F    Discharge Medications:      Medication List      CONTINUE taking these medications    acyclovir 400 MG tablet  Commonly known as:  ZOVIRAX     allopurinol 300 MG tablet  Commonly known as:  ZYLOPRIM     aminocaproic acid 500 MG tablet  Commonly known as:  AMICAR     ammonium lactate 12 % cream  Commonly known as:  AMLACTIN     Astelin 0.1 % nasal spray  Generic drug:  azelastine     estradiol 1 MG tablet  Commonly known as:  ESTRACE     * ICAPS AREDS 2 PO     * therapeutic multivitamin-minerals tablet     levoFLOXacin 500 MG tablet  Commonly known as:  LEVAQUIN     meloxicam 15 MG tablet  Commonly known as:  MOBIC  Take 1 tablet by mouth daily as needed for Pain     metoprolol tartrate 50 MG tablet  Commonly known as:  LOPRESSOR  50mg once a day     ondansetron 4 MG disintegrating tablet  Commonly known as:  ZOFRAN-ODT  Take 1 tablet by mouth 3 times daily as needed for Nausea or Vomiting     pravastatin 40 MG tablet  Commonly known as:  PRAVACHOL  TAKE 1 TABLET DAILY     prochlorperazine 10 MG tablet  Commonly known as:  COMPAZINE  Take one tablet by mouth daily prior to Vidaza injection and one tablet every 6 hours as needed for nausea. Venetoclax 100 MG Tabs  One tablet (100 mg) po Day 1, two tablets (200 mg) day 2, four tablets (400 mg) daily starting day 3 and continue. vitamin D 25 MCG (1000 UT) Tabs tablet  Commonly known as:  CHOLECALCIFEROL     ZyrTEC Allergy 10 MG tablet  Generic drug:  cetirizine         * This list has 2 medication(s) that are the same as other medications prescribed for you. Read the directions carefully, and ask your doctor or other care provider to review them with you. No discharge procedures on file.     Time Spent on discharge is  36 mins in patient examination, evaluation, counseling as well as medication reconciliation,

## 2020-04-30 ENCOUNTER — CARE COORDINATION (OUTPATIENT)
Dept: CASE MANAGEMENT | Age: 70
End: 2020-04-30

## 2020-04-30 ENCOUNTER — TELEPHONE (OUTPATIENT)
Dept: INFUSION THERAPY | Facility: MEDICAL CENTER | Age: 70
End: 2020-04-30

## 2020-04-30 PROCEDURE — 1111F DSCHRG MED/CURRENT MED MERGE: CPT | Performed by: FAMILY MEDICINE

## 2020-05-01 ENCOUNTER — HOSPITAL ENCOUNTER (OUTPATIENT)
Facility: MEDICAL CENTER | Age: 70
End: 2020-05-01
Payer: MEDICARE

## 2020-05-01 ENCOUNTER — HOSPITAL ENCOUNTER (OUTPATIENT)
Facility: MEDICAL CENTER | Age: 70
Discharge: HOME | End: 2020-05-01
Payer: MEDICARE

## 2020-05-01 ENCOUNTER — HOSPITAL ENCOUNTER (OUTPATIENT)
Dept: INFUSION THERAPY | Facility: MEDICAL CENTER | Age: 70
Discharge: HOME | End: 2020-05-01
Payer: MEDICARE

## 2020-05-01 VITALS
RESPIRATION RATE: 20 BRPM | DIASTOLIC BLOOD PRESSURE: 56 MMHG | SYSTOLIC BLOOD PRESSURE: 99 MMHG | HEART RATE: 73 BPM | TEMPERATURE: 98.1 F

## 2020-05-01 DIAGNOSIS — C92.00 ACUTE MYELOID LEUKEMIA NOT HAVING ACHIEVED REMISSION (HCC): ICD-10-CM

## 2020-05-01 LAB
ABSOLUTE EOS #: 0 K/UL (ref 0–0.4)
ABSOLUTE IMMATURE GRANULOCYTE: 0.01 K/UL (ref 0–0.3)
ABSOLUTE LYMPH #: 0.32 K/UL (ref 1–4.8)
ABSOLUTE MONO #: 0.03 K/UL (ref 0.2–0.8)
BASOPHILS # BLD: 0 %
BASOPHILS ABSOLUTE: 0 K/UL (ref 0–0.2)
DIFFERENTIAL TYPE: ABNORMAL
EOSINOPHILS RELATIVE PERCENT: 0 % (ref 1–4)
HCT VFR BLD CALC: 24.7 % (ref 36.3–47.1)
HEMOGLOBIN: 8.2 G/DL (ref 11.9–15.1)
IMMATURE GRANULOCYTES: 2 %
LYMPHOCYTES # BLD: 79 % (ref 24–44)
MCH RBC QN AUTO: 31.5 PG (ref 25.2–33.5)
MCHC RBC AUTO-ENTMCNC: 33.2 G/DL (ref 28.4–34.8)
MCV RBC AUTO: 95 FL (ref 82.6–102.9)
MONOCYTES # BLD: 8 % (ref 1–7)
NRBC AUTOMATED: ABNORMAL PER 100 WBC
PDW BLD-RTO: 15.8 % (ref 11.8–14.4)
PLATELET # BLD: ABNORMAL K/UL (ref 138–453)
PLATELET ESTIMATE: ABNORMAL
PLATELET, FLUORESCENCE: 5 K/UL (ref 138–453)
PLATELET, IMMATURE FRACTION: 5.9 % (ref 1.1–10.3)
PMV BLD AUTO: ABNORMAL FL (ref 8.1–13.5)
RBC # BLD: 2.6 M/UL (ref 3.95–5.11)
RBC # BLD: ABNORMAL 10*6/UL
SEG NEUTROPHILS: 11 % (ref 36–66)
SEGMENTED NEUTROPHILS ABSOLUTE COUNT: 0.04 K/UL (ref 1.8–7.7)
WBC # BLD: 0.4 K/UL (ref 3.5–11.3)
WBC # BLD: ABNORMAL 10*3/UL

## 2020-05-01 PROCEDURE — 96375 TX/PRO/DX INJ NEW DRUG ADDON: CPT

## 2020-05-01 PROCEDURE — 2580000003 HC RX 258: Performed by: INTERNAL MEDICINE

## 2020-05-01 PROCEDURE — P9037 PLATE PHERES LEUKOREDU IRRAD: HCPCS

## 2020-05-01 PROCEDURE — 6360000002 HC RX W HCPCS: Performed by: INTERNAL MEDICINE

## 2020-05-01 PROCEDURE — 85025 COMPLETE CBC W/AUTO DIFF WBC: CPT

## 2020-05-01 PROCEDURE — 36415 COLL VENOUS BLD VENIPUNCTURE: CPT

## 2020-05-01 PROCEDURE — 85055 RETICULATED PLATELET ASSAY: CPT

## 2020-05-01 PROCEDURE — 96374 THER/PROPH/DIAG INJ IV PUSH: CPT

## 2020-05-01 PROCEDURE — 36430 TRANSFUSION BLD/BLD COMPNT: CPT

## 2020-05-01 RX ORDER — DIPHENHYDRAMINE HYDROCHLORIDE 50 MG/ML
25 INJECTION INTRAMUSCULAR; INTRAVENOUS ONCE
Status: DISCONTINUED | OUTPATIENT
Start: 2020-05-01 | End: 2020-05-01

## 2020-05-01 RX ORDER — DIPHENHYDRAMINE HYDROCHLORIDE 50 MG/ML
25 INJECTION INTRAMUSCULAR; INTRAVENOUS ONCE
Status: COMPLETED | OUTPATIENT
Start: 2020-05-01 | End: 2020-05-01

## 2020-05-01 RX ORDER — 0.9 % SODIUM CHLORIDE 0.9 %
500 INTRAVENOUS SOLUTION INTRAVENOUS ONCE
Status: COMPLETED | OUTPATIENT
Start: 2020-05-01 | End: 2020-05-01

## 2020-05-01 RX ORDER — DEXAMETHASONE SODIUM PHOSPHATE 10 MG/ML
10 INJECTION INTRAMUSCULAR; INTRAVENOUS ONCE
Status: COMPLETED | OUTPATIENT
Start: 2020-05-01 | End: 2020-05-01

## 2020-05-01 RX ORDER — DEXAMETHASONE SODIUM PHOSPHATE 10 MG/ML
10 INJECTION INTRAMUSCULAR; INTRAVENOUS ONCE
Status: DISCONTINUED | OUTPATIENT
Start: 2020-05-01 | End: 2020-05-01

## 2020-05-01 RX ADMIN — DIPHENHYDRAMINE HYDROCHLORIDE 25 MG: 50 INJECTION, SOLUTION INTRAMUSCULAR; INTRAVENOUS at 14:28

## 2020-05-01 RX ADMIN — DEXAMETHASONE SODIUM PHOSPHATE 10 MG: 10 INJECTION INTRAMUSCULAR; INTRAVENOUS at 14:19

## 2020-05-01 RX ADMIN — SODIUM CHLORIDE 500 ML: 9 INJECTION, SOLUTION INTRAVENOUS at 14:03

## 2020-05-02 LAB
BLD PROD TYP BPU: NORMAL
CULTURE: NORMAL
CULTURE: NORMAL
DISPENSE STATUS BLOOD BANK: NORMAL
Lab: NORMAL
Lab: NORMAL
SPECIMEN DESCRIPTION: NORMAL
SPECIMEN DESCRIPTION: NORMAL
TRANSFUSION STATUS: NORMAL
UNIT DIVISION: 0
UNIT NUMBER: NORMAL

## 2020-05-04 ENCOUNTER — HOSPITAL ENCOUNTER (OUTPATIENT)
Facility: MEDICAL CENTER | Age: 70
Discharge: HOME | End: 2020-05-04
Payer: MEDICARE

## 2020-05-04 ENCOUNTER — HOSPITAL ENCOUNTER (OUTPATIENT)
Dept: INFUSION THERAPY | Facility: MEDICAL CENTER | Age: 70
Discharge: HOME | End: 2020-05-04
Payer: MEDICARE

## 2020-05-04 VITALS
HEART RATE: 58 BPM | RESPIRATION RATE: 20 BRPM | DIASTOLIC BLOOD PRESSURE: 52 MMHG | TEMPERATURE: 98.2 F | SYSTOLIC BLOOD PRESSURE: 133 MMHG

## 2020-05-04 DIAGNOSIS — C92.00 ACUTE MYELOID LEUKEMIA NOT HAVING ACHIEVED REMISSION (HCC): ICD-10-CM

## 2020-05-04 DIAGNOSIS — C92.00 ACUTE MYELOID LEUKEMIA NOT HAVING ACHIEVED REMISSION (HCC): Primary | ICD-10-CM

## 2020-05-04 LAB
ABSOLUTE EOS #: 0 K/UL (ref 0–0.4)
ABSOLUTE IMMATURE GRANULOCYTE: 0.01 K/UL (ref 0–0.3)
ABSOLUTE LYMPH #: 0.52 K/UL (ref 1–4.8)
ABSOLUTE MONO #: 0.03 K/UL (ref 0.2–0.8)
ALBUMIN SERPL-MCNC: 4.4 G/DL (ref 3.5–5.2)
ALBUMIN/GLOBULIN RATIO: ABNORMAL (ref 1–2.5)
ALP BLD-CCNC: 67 U/L (ref 35–104)
ALT SERPL-CCNC: 19 U/L (ref 5–33)
ANION GAP SERPL CALCULATED.3IONS-SCNC: 14 MMOL/L (ref 9–17)
AST SERPL-CCNC: 16 U/L
BASOPHILS # BLD: 0 %
BASOPHILS ABSOLUTE: 0 K/UL (ref 0–0.2)
BILIRUB SERPL-MCNC: 0.36 MG/DL (ref 0.3–1.2)
BUN BLDV-MCNC: 16 MG/DL (ref 8–23)
BUN/CREAT BLD: 36 (ref 9–20)
CALCIUM SERPL-MCNC: 9.5 MG/DL (ref 8.6–10.4)
CHLORIDE BLD-SCNC: 102 MMOL/L (ref 98–107)
CO2: 23 MMOL/L (ref 20–31)
CREAT SERPL-MCNC: 0.45 MG/DL (ref 0.5–0.9)
DIFFERENTIAL TYPE: ABNORMAL
EOSINOPHILS RELATIVE PERCENT: 0 % (ref 1–4)
GFR AFRICAN AMERICAN: >60 ML/MIN
GFR NON-AFRICAN AMERICAN: >60 ML/MIN
GFR SERPL CREATININE-BSD FRML MDRD: ABNORMAL ML/MIN/{1.73_M2}
GFR SERPL CREATININE-BSD FRML MDRD: ABNORMAL ML/MIN/{1.73_M2}
GLUCOSE BLD-MCNC: 95 MG/DL (ref 70–99)
HCT VFR BLD CALC: 23.4 % (ref 36.3–47.1)
HEMOGLOBIN: 7.8 G/DL (ref 11.9–15.1)
IMMATURE GRANULOCYTES: 2 %
LACTATE DEHYDROGENASE: 232 U/L (ref 135–214)
LYMPHOCYTES # BLD: 87 % (ref 24–44)
MCH RBC QN AUTO: 31.6 PG (ref 25.2–33.5)
MCHC RBC AUTO-ENTMCNC: 33.3 G/DL (ref 28.4–34.8)
MCV RBC AUTO: 94.7 FL (ref 82.6–102.9)
MONOCYTES # BLD: 5 % (ref 1–7)
MORPHOLOGY: ABNORMAL
MORPHOLOGY: ABNORMAL
NRBC AUTOMATED: 0 PER 100 WBC
PDW BLD-RTO: 15.8 % (ref 11.8–14.4)
PHOSPHORUS: 3 MG/DL (ref 2.6–4.5)
PLATELET # BLD: ABNORMAL K/UL (ref 138–453)
PLATELET ESTIMATE: ABNORMAL
PLATELET, FLUORESCENCE: 4 K/UL (ref 138–453)
PLATELET, IMMATURE FRACTION: 7 % (ref 1.1–10.3)
PMV BLD AUTO: ABNORMAL FL (ref 8.1–13.5)
POTASSIUM SERPL-SCNC: 3.8 MMOL/L (ref 3.7–5.3)
RBC # BLD: 2.47 M/UL (ref 3.95–5.11)
RBC # BLD: ABNORMAL 10*6/UL
SEG NEUTROPHILS: 6 % (ref 36–66)
SEGMENTED NEUTROPHILS ABSOLUTE COUNT: 0.04 K/UL (ref 1.8–7.7)
SODIUM BLD-SCNC: 139 MMOL/L (ref 135–144)
TOTAL PROTEIN: 6.9 G/DL (ref 6.4–8.3)
URIC ACID: 2.1 MG/DL (ref 2.4–5.7)
WBC # BLD: 0.6 K/UL (ref 3.5–11.3)
WBC # BLD: ABNORMAL 10*3/UL

## 2020-05-04 PROCEDURE — 96413 CHEMO IV INFUSION 1 HR: CPT

## 2020-05-04 PROCEDURE — 85025 COMPLETE CBC W/AUTO DIFF WBC: CPT

## 2020-05-04 PROCEDURE — 36415 COLL VENOUS BLD VENIPUNCTURE: CPT

## 2020-05-04 PROCEDURE — 83615 LACTATE (LD) (LDH) ENZYME: CPT

## 2020-05-04 PROCEDURE — 6360000002 HC RX W HCPCS: Performed by: INTERNAL MEDICINE

## 2020-05-04 PROCEDURE — 36430 TRANSFUSION BLD/BLD COMPNT: CPT

## 2020-05-04 PROCEDURE — 84100 ASSAY OF PHOSPHORUS: CPT

## 2020-05-04 PROCEDURE — P9037 PLATE PHERES LEUKOREDU IRRAD: HCPCS

## 2020-05-04 PROCEDURE — 84550 ASSAY OF BLOOD/URIC ACID: CPT

## 2020-05-04 PROCEDURE — 2580000003 HC RX 258: Performed by: INTERNAL MEDICINE

## 2020-05-04 PROCEDURE — 85055 RETICULATED PLATELET ASSAY: CPT

## 2020-05-04 PROCEDURE — 80053 COMPREHEN METABOLIC PANEL: CPT

## 2020-05-04 PROCEDURE — 96375 TX/PRO/DX INJ NEW DRUG ADDON: CPT

## 2020-05-04 RX ORDER — ONDANSETRON 2 MG/ML
4 INJECTION INTRAMUSCULAR; INTRAVENOUS ONCE
Status: COMPLETED | OUTPATIENT
Start: 2020-05-04 | End: 2020-05-04

## 2020-05-04 RX ORDER — 0.9 % SODIUM CHLORIDE 0.9 %
20 INTRAVENOUS SOLUTION INTRAVENOUS ONCE
Status: COMPLETED | OUTPATIENT
Start: 2020-05-04 | End: 2020-05-04

## 2020-05-04 RX ORDER — SODIUM CHLORIDE 9 MG/ML
20 INJECTION, SOLUTION INTRAVENOUS ONCE
Status: COMPLETED | OUTPATIENT
Start: 2020-05-04 | End: 2020-05-04

## 2020-05-04 RX ORDER — DIPHENHYDRAMINE HYDROCHLORIDE 50 MG/ML
25 INJECTION INTRAMUSCULAR; INTRAVENOUS ONCE
Status: COMPLETED | OUTPATIENT
Start: 2020-05-04 | End: 2020-05-04

## 2020-05-04 RX ORDER — DEXAMETHASONE SODIUM PHOSPHATE 10 MG/ML
10 INJECTION INTRAMUSCULAR; INTRAVENOUS ONCE
Status: COMPLETED | OUTPATIENT
Start: 2020-05-04 | End: 2020-05-04

## 2020-05-04 RX ADMIN — DEXAMETHASONE SODIUM PHOSPHATE 10 MG: 10 INJECTION INTRAMUSCULAR; INTRAVENOUS at 15:49

## 2020-05-04 RX ADMIN — SODIUM CHLORIDE 20 ML/HR: 9 INJECTION, SOLUTION INTRAVENOUS at 16:10

## 2020-05-04 RX ADMIN — AZACITIDINE 160 MG: 100 INJECTION, POWDER, LYOPHILIZED, FOR SOLUTION INTRAVENOUS; SUBCUTANEOUS at 16:12

## 2020-05-04 RX ADMIN — SODIUM CHLORIDE 500 ML: 9 INJECTION, SOLUTION INTRAVENOUS at 14:52

## 2020-05-04 RX ADMIN — ONDANSETRON 4 MG: 2 INJECTION INTRAMUSCULAR; INTRAVENOUS at 15:54

## 2020-05-04 RX ADMIN — DIPHENHYDRAMINE HYDROCHLORIDE 25 MG: 50 INJECTION, SOLUTION INTRAMUSCULAR; INTRAVENOUS at 15:57

## 2020-05-04 NOTE — PROGRESS NOTES
Pt arrives per ambulatory per self and PLT CT 4, Lenka CUEVAS called and order received from Dr Joy Christianson for 1 unit single donor, irradiated, HLA platelets to be given stat. When [facility] and Nafisa Solo RN called blood bank , staff states HLA platelets delivered when ordered on Friday 5/1/20 , but HLA platelets are not in blood bank. Ordered again at this time, but HLA , irrad, single donor platelets not availbale today from [employer], so only given irradiated single donor platelets. Pt premedicated for platelets and after orders and labs reviewed. Also per Claudia Sneed pharmacist , after speaking with Dr Nawaf Henao (Dr. Joy Christianson did not return phone call), due to low platelet count of 4, vidaza changed to IVPB and pt states has emesis a few hours after each injection, so given IVP zofran today , also. New line and NS infusing for vidaza infusion. Then NS and standard blood tubing hung to infuse platelets. Pt tolerated platelet infusion well and no reactions or complaints from IV vidaza or platelets. See Flowsheet for all vital signs. Then NS flushing line and pt discharged per ambulatory per self to return tomorrow.

## 2020-05-05 ENCOUNTER — HOSPITAL ENCOUNTER (OUTPATIENT)
Dept: INFUSION THERAPY | Facility: MEDICAL CENTER | Age: 70
Discharge: HOME | End: 2020-05-05
Payer: MEDICARE

## 2020-05-05 ENCOUNTER — TELEPHONE (OUTPATIENT)
Dept: INFUSION THERAPY | Facility: MEDICAL CENTER | Age: 70
End: 2020-05-05

## 2020-05-05 ENCOUNTER — TELEPHONE (OUTPATIENT)
Dept: ONCOLOGY | Age: 70
End: 2020-05-05

## 2020-05-05 ENCOUNTER — OFFICE VISIT (OUTPATIENT)
Dept: ONCOLOGY | Age: 70
End: 2020-05-05
Payer: MEDICARE

## 2020-05-05 VITALS
SYSTOLIC BLOOD PRESSURE: 133 MMHG | RESPIRATION RATE: 20 BRPM | TEMPERATURE: 98 F | DIASTOLIC BLOOD PRESSURE: 69 MMHG | HEART RATE: 76 BPM

## 2020-05-05 VITALS
TEMPERATURE: 98 F | BODY MASS INDEX: 28.44 KG/M2 | WEIGHT: 198.2 LBS | DIASTOLIC BLOOD PRESSURE: 69 MMHG | SYSTOLIC BLOOD PRESSURE: 133 MMHG | HEART RATE: 76 BPM

## 2020-05-05 DIAGNOSIS — C92.00 ACUTE MYELOID LEUKEMIA NOT HAVING ACHIEVED REMISSION (HCC): Primary | ICD-10-CM

## 2020-05-05 PROCEDURE — 99214 OFFICE O/P EST MOD 30 MIN: CPT | Performed by: INTERNAL MEDICINE

## 2020-05-05 PROCEDURE — 2580000003 HC RX 258: Performed by: INTERNAL MEDICINE

## 2020-05-05 PROCEDURE — 99212 OFFICE O/P EST SF 10 MIN: CPT | Performed by: INTERNAL MEDICINE

## 2020-05-05 PROCEDURE — 96401 CHEMO ANTI-NEOPL SQ/IM: CPT

## 2020-05-05 PROCEDURE — 6360000002 HC RX W HCPCS: Performed by: INTERNAL MEDICINE

## 2020-05-05 RX ORDER — METHYLPREDNISOLONE SODIUM SUCCINATE 125 MG/2ML
125 INJECTION, POWDER, LYOPHILIZED, FOR SOLUTION INTRAMUSCULAR; INTRAVENOUS ONCE
Status: CANCELLED | OUTPATIENT
Start: 2020-09-01

## 2020-05-05 RX ORDER — METHYLPREDNISOLONE SODIUM SUCCINATE 125 MG/2ML
125 INJECTION, POWDER, LYOPHILIZED, FOR SOLUTION INTRAMUSCULAR; INTRAVENOUS ONCE
Status: CANCELLED | OUTPATIENT
Start: 2020-08-31

## 2020-05-05 RX ORDER — SODIUM CHLORIDE 9 MG/ML
INJECTION, SOLUTION INTRAVENOUS CONTINUOUS
Status: CANCELLED | OUTPATIENT
Start: 2020-09-09

## 2020-05-05 RX ORDER — DIPHENHYDRAMINE HYDROCHLORIDE 50 MG/ML
50 INJECTION INTRAMUSCULAR; INTRAVENOUS ONCE
Status: CANCELLED | OUTPATIENT
Start: 2020-05-27

## 2020-05-05 RX ORDER — SODIUM CHLORIDE 9 MG/ML
20 INJECTION, SOLUTION INTRAVENOUS ONCE
Status: CANCELLED | OUTPATIENT
Start: 2020-05-28

## 2020-05-05 RX ORDER — SODIUM CHLORIDE 0.9 % (FLUSH) 0.9 %
5 SYRINGE (ML) INJECTION PRN
Status: CANCELLED | OUTPATIENT
Start: 2020-08-31

## 2020-05-05 RX ORDER — SODIUM CHLORIDE 9 MG/ML
INJECTION, SOLUTION INTRAVENOUS CONTINUOUS
Status: CANCELLED | OUTPATIENT
Start: 2020-09-01

## 2020-05-05 RX ORDER — HEPARIN SODIUM (PORCINE) LOCK FLUSH IV SOLN 100 UNIT/ML 100 UNIT/ML
500 SOLUTION INTRAVENOUS PRN
Status: CANCELLED | OUTPATIENT
Start: 2020-05-28

## 2020-05-05 RX ORDER — EPINEPHRINE 1 MG/ML
0.3 INJECTION, SOLUTION, CONCENTRATE INTRAVENOUS PRN
Status: CANCELLED | OUTPATIENT
Start: 2020-05-29

## 2020-05-05 RX ORDER — HEPARIN SODIUM (PORCINE) LOCK FLUSH IV SOLN 100 UNIT/ML 100 UNIT/ML
500 SOLUTION INTRAVENOUS PRN
Status: CANCELLED | OUTPATIENT
Start: 2020-09-01

## 2020-05-05 RX ORDER — HEPARIN SODIUM (PORCINE) LOCK FLUSH IV SOLN 100 UNIT/ML 100 UNIT/ML
500 SOLUTION INTRAVENOUS PRN
Status: CANCELLED | OUTPATIENT
Start: 2020-09-09

## 2020-05-05 RX ORDER — SODIUM CHLORIDE 9 MG/ML
INJECTION, SOLUTION INTRAVENOUS CONTINUOUS
Status: CANCELLED | OUTPATIENT
Start: 2020-09-02

## 2020-05-05 RX ORDER — HEPARIN SODIUM (PORCINE) LOCK FLUSH IV SOLN 100 UNIT/ML 100 UNIT/ML
500 SOLUTION INTRAVENOUS PRN
Status: CANCELLED | OUTPATIENT
Start: 2020-09-04

## 2020-05-05 RX ORDER — HEPARIN SODIUM (PORCINE) LOCK FLUSH IV SOLN 100 UNIT/ML 100 UNIT/ML
500 SOLUTION INTRAVENOUS PRN
Status: CANCELLED | OUTPATIENT
Start: 2020-05-27

## 2020-05-05 RX ORDER — SODIUM CHLORIDE 0.9 % (FLUSH) 0.9 %
5 SYRINGE (ML) INJECTION PRN
Status: CANCELLED | OUTPATIENT
Start: 2020-09-02

## 2020-05-05 RX ORDER — HEPARIN SODIUM (PORCINE) LOCK FLUSH IV SOLN 100 UNIT/ML 100 UNIT/ML
500 SOLUTION INTRAVENOUS PRN
Status: CANCELLED | OUTPATIENT
Start: 2020-09-02

## 2020-05-05 RX ORDER — METHYLPREDNISOLONE SODIUM SUCCINATE 125 MG/2ML
125 INJECTION, POWDER, LYOPHILIZED, FOR SOLUTION INTRAMUSCULAR; INTRAVENOUS ONCE
Status: CANCELLED | OUTPATIENT
Start: 2020-05-29

## 2020-05-05 RX ORDER — SODIUM CHLORIDE 0.9 % (FLUSH) 0.9 %
5 SYRINGE (ML) INJECTION PRN
Status: CANCELLED | OUTPATIENT
Start: 2020-05-29

## 2020-05-05 RX ORDER — SODIUM CHLORIDE 0.9 % (FLUSH) 0.9 %
5 SYRINGE (ML) INJECTION PRN
Status: CANCELLED | OUTPATIENT
Start: 2020-06-03

## 2020-05-05 RX ORDER — SODIUM CHLORIDE 0.9 % (FLUSH) 0.9 %
10 SYRINGE (ML) INJECTION PRN
Status: CANCELLED | OUTPATIENT
Start: 2020-05-26

## 2020-05-05 RX ORDER — SODIUM CHLORIDE 0.9 % (FLUSH) 0.9 %
10 SYRINGE (ML) INJECTION PRN
Status: CANCELLED | OUTPATIENT
Start: 2020-09-08

## 2020-05-05 RX ORDER — DIPHENHYDRAMINE HYDROCHLORIDE 50 MG/ML
50 INJECTION INTRAMUSCULAR; INTRAVENOUS ONCE
Status: CANCELLED | OUTPATIENT
Start: 2020-09-01

## 2020-05-05 RX ORDER — DIPHENHYDRAMINE HYDROCHLORIDE 50 MG/ML
50 INJECTION INTRAMUSCULAR; INTRAVENOUS ONCE
Status: CANCELLED | OUTPATIENT
Start: 2020-06-04

## 2020-05-05 RX ORDER — EPINEPHRINE 1 MG/ML
0.3 INJECTION, SOLUTION, CONCENTRATE INTRAVENOUS PRN
Status: CANCELLED | OUTPATIENT
Start: 2020-06-04

## 2020-05-05 RX ORDER — DIPHENHYDRAMINE HYDROCHLORIDE 50 MG/ML
50 INJECTION INTRAMUSCULAR; INTRAVENOUS ONCE
Status: CANCELLED | OUTPATIENT
Start: 2020-09-04

## 2020-05-05 RX ORDER — EPINEPHRINE 1 MG/ML
0.3 INJECTION, SOLUTION, CONCENTRATE INTRAVENOUS PRN
Status: CANCELLED | OUTPATIENT
Start: 2020-08-31

## 2020-05-05 RX ORDER — SODIUM CHLORIDE 9 MG/ML
INJECTION, SOLUTION INTRAVENOUS CONTINUOUS
Status: CANCELLED | OUTPATIENT
Start: 2020-05-27

## 2020-05-05 RX ORDER — SODIUM CHLORIDE 9 MG/ML
INJECTION, SOLUTION INTRAVENOUS CONTINUOUS
Status: CANCELLED | OUTPATIENT
Start: 2020-05-26

## 2020-05-05 RX ORDER — DIPHENHYDRAMINE HYDROCHLORIDE 50 MG/ML
50 INJECTION INTRAMUSCULAR; INTRAVENOUS ONCE
Status: CANCELLED | OUTPATIENT
Start: 2020-06-03

## 2020-05-05 RX ORDER — SODIUM CHLORIDE 9 MG/ML
20 INJECTION, SOLUTION INTRAVENOUS ONCE
Status: CANCELLED | OUTPATIENT
Start: 2020-05-27

## 2020-05-05 RX ORDER — EPINEPHRINE 1 MG/ML
0.3 INJECTION, SOLUTION, CONCENTRATE INTRAVENOUS PRN
Status: CANCELLED | OUTPATIENT
Start: 2020-09-04

## 2020-05-05 RX ORDER — SODIUM CHLORIDE 9 MG/ML
20 INJECTION, SOLUTION INTRAVENOUS ONCE
Status: CANCELLED | OUTPATIENT
Start: 2020-09-03

## 2020-05-05 RX ORDER — SODIUM CHLORIDE 0.9 % (FLUSH) 0.9 %
10 SYRINGE (ML) INJECTION PRN
Status: CANCELLED | OUTPATIENT
Start: 2020-09-01

## 2020-05-05 RX ORDER — DIPHENHYDRAMINE HYDROCHLORIDE 50 MG/ML
50 INJECTION INTRAMUSCULAR; INTRAVENOUS ONCE
Status: CANCELLED | OUTPATIENT
Start: 2020-08-31

## 2020-05-05 RX ORDER — SODIUM CHLORIDE 9 MG/ML
20 INJECTION, SOLUTION INTRAVENOUS ONCE
Status: CANCELLED | OUTPATIENT
Start: 2020-09-02

## 2020-05-05 RX ORDER — ONDANSETRON 2 MG/ML
4 INJECTION INTRAMUSCULAR; INTRAVENOUS ONCE
Status: CANCELLED | OUTPATIENT
Start: 2020-05-06

## 2020-05-05 RX ORDER — EPINEPHRINE 1 MG/ML
0.3 INJECTION, SOLUTION, CONCENTRATE INTRAVENOUS PRN
Status: CANCELLED | OUTPATIENT
Start: 2020-09-01

## 2020-05-05 RX ORDER — SODIUM CHLORIDE 0.9 % (FLUSH) 0.9 %
5 SYRINGE (ML) INJECTION PRN
Status: CANCELLED | OUTPATIENT
Start: 2020-06-01

## 2020-05-05 RX ORDER — SODIUM CHLORIDE 0.9 % (FLUSH) 0.9 %
10 SYRINGE (ML) INJECTION PRN
Status: CANCELLED | OUTPATIENT
Start: 2020-09-03

## 2020-05-05 RX ORDER — SODIUM CHLORIDE 0.9 % (FLUSH) 0.9 %
5 SYRINGE (ML) INJECTION PRN
Status: CANCELLED | OUTPATIENT
Start: 2020-09-01

## 2020-05-05 RX ORDER — SODIUM CHLORIDE 9 MG/ML
INJECTION, SOLUTION INTRAVENOUS CONTINUOUS
Status: CANCELLED | OUTPATIENT
Start: 2020-05-28

## 2020-05-05 RX ORDER — SODIUM CHLORIDE 9 MG/ML
20 INJECTION, SOLUTION INTRAVENOUS ONCE
Status: CANCELLED | OUTPATIENT
Start: 2020-05-29

## 2020-05-05 RX ORDER — METHYLPREDNISOLONE SODIUM SUCCINATE 125 MG/2ML
125 INJECTION, POWDER, LYOPHILIZED, FOR SOLUTION INTRAMUSCULAR; INTRAVENOUS ONCE
Status: CANCELLED | OUTPATIENT
Start: 2020-06-04

## 2020-05-05 RX ORDER — DIPHENHYDRAMINE HYDROCHLORIDE 50 MG/ML
50 INJECTION INTRAMUSCULAR; INTRAVENOUS ONCE
Status: CANCELLED | OUTPATIENT
Start: 2020-09-03

## 2020-05-05 RX ORDER — DIPHENHYDRAMINE HYDROCHLORIDE 50 MG/ML
50 INJECTION INTRAMUSCULAR; INTRAVENOUS ONCE
Status: CANCELLED | OUTPATIENT
Start: 2020-05-26

## 2020-05-05 RX ORDER — SODIUM CHLORIDE 0.9 % (FLUSH) 0.9 %
5 SYRINGE (ML) INJECTION PRN
Status: CANCELLED | OUTPATIENT
Start: 2020-09-09

## 2020-05-05 RX ORDER — EPINEPHRINE 1 MG/ML
0.3 INJECTION, SOLUTION, CONCENTRATE INTRAVENOUS PRN
Status: CANCELLED | OUTPATIENT
Start: 2020-05-26

## 2020-05-05 RX ORDER — SODIUM CHLORIDE 9 MG/ML
INJECTION, SOLUTION INTRAVENOUS CONTINUOUS
Status: CANCELLED | OUTPATIENT
Start: 2020-08-31

## 2020-05-05 RX ORDER — SODIUM CHLORIDE 9 MG/ML
INJECTION, SOLUTION INTRAVENOUS CONTINUOUS
Status: CANCELLED | OUTPATIENT
Start: 2020-06-03

## 2020-05-05 RX ORDER — HEPARIN SODIUM (PORCINE) LOCK FLUSH IV SOLN 100 UNIT/ML 100 UNIT/ML
500 SOLUTION INTRAVENOUS PRN
Status: CANCELLED | OUTPATIENT
Start: 2020-08-31

## 2020-05-05 RX ORDER — SODIUM CHLORIDE 0.9 % (FLUSH) 0.9 %
10 SYRINGE (ML) INJECTION PRN
Status: CANCELLED | OUTPATIENT
Start: 2020-09-09

## 2020-05-05 RX ORDER — EPINEPHRINE 1 MG/ML
0.3 INJECTION, SOLUTION, CONCENTRATE INTRAVENOUS PRN
Status: CANCELLED | OUTPATIENT
Start: 2020-05-28

## 2020-05-05 RX ORDER — SODIUM CHLORIDE 9 MG/ML
INJECTION, SOLUTION INTRAVENOUS CONTINUOUS
Status: CANCELLED | OUTPATIENT
Start: 2020-09-03

## 2020-05-05 RX ORDER — SODIUM CHLORIDE 9 MG/ML
20 INJECTION, SOLUTION INTRAVENOUS ONCE
Status: CANCELLED | OUTPATIENT
Start: 2020-09-01

## 2020-05-05 RX ORDER — SODIUM CHLORIDE 9 MG/ML
20 INJECTION, SOLUTION INTRAVENOUS ONCE
Status: CANCELLED | OUTPATIENT
Start: 2020-09-04

## 2020-05-05 RX ORDER — SODIUM CHLORIDE 9 MG/ML
INJECTION, SOLUTION INTRAVENOUS CONTINUOUS
Status: CANCELLED | OUTPATIENT
Start: 2020-09-04

## 2020-05-05 RX ORDER — HEPARIN SODIUM (PORCINE) LOCK FLUSH IV SOLN 100 UNIT/ML 100 UNIT/ML
500 SOLUTION INTRAVENOUS PRN
Status: CANCELLED | OUTPATIENT
Start: 2020-05-26

## 2020-05-05 RX ORDER — SODIUM CHLORIDE 0.9 % (FLUSH) 0.9 %
5 SYRINGE (ML) INJECTION PRN
Status: CANCELLED | OUTPATIENT
Start: 2020-09-03

## 2020-05-05 RX ORDER — SODIUM CHLORIDE 0.9 % (FLUSH) 0.9 %
5 SYRINGE (ML) INJECTION PRN
Status: CANCELLED | OUTPATIENT
Start: 2020-09-08

## 2020-05-05 RX ORDER — DIPHENHYDRAMINE HYDROCHLORIDE 50 MG/ML
50 INJECTION INTRAMUSCULAR; INTRAVENOUS ONCE
Status: CANCELLED | OUTPATIENT
Start: 2020-05-29

## 2020-05-05 RX ORDER — SODIUM CHLORIDE 9 MG/ML
INJECTION, SOLUTION INTRAVENOUS CONTINUOUS
Status: CANCELLED | OUTPATIENT
Start: 2020-05-29

## 2020-05-05 RX ORDER — SODIUM CHLORIDE 0.9 % (FLUSH) 0.9 %
5 SYRINGE (ML) INJECTION PRN
Status: CANCELLED | OUTPATIENT
Start: 2020-06-04

## 2020-05-05 RX ORDER — HEPARIN SODIUM (PORCINE) LOCK FLUSH IV SOLN 100 UNIT/ML 100 UNIT/ML
500 SOLUTION INTRAVENOUS PRN
Status: CANCELLED | OUTPATIENT
Start: 2020-09-08

## 2020-05-05 RX ORDER — EPINEPHRINE 1 MG/ML
0.3 INJECTION, SOLUTION, CONCENTRATE INTRAVENOUS PRN
Status: CANCELLED | OUTPATIENT
Start: 2020-09-02

## 2020-05-05 RX ORDER — SODIUM CHLORIDE 0.9 % (FLUSH) 0.9 %
10 SYRINGE (ML) INJECTION PRN
Status: CANCELLED | OUTPATIENT
Start: 2020-05-29

## 2020-05-05 RX ORDER — SODIUM CHLORIDE 0.9 % (FLUSH) 0.9 %
10 SYRINGE (ML) INJECTION PRN
Status: CANCELLED | OUTPATIENT
Start: 2020-06-04

## 2020-05-05 RX ORDER — DIPHENHYDRAMINE HYDROCHLORIDE 50 MG/ML
50 INJECTION INTRAMUSCULAR; INTRAVENOUS ONCE
Status: CANCELLED | OUTPATIENT
Start: 2020-09-02

## 2020-05-05 RX ORDER — SODIUM CHLORIDE 0.9 % (FLUSH) 0.9 %
10 SYRINGE (ML) INJECTION PRN
Status: CANCELLED | OUTPATIENT
Start: 2020-06-03

## 2020-05-05 RX ORDER — METHYLPREDNISOLONE SODIUM SUCCINATE 125 MG/2ML
125 INJECTION, POWDER, LYOPHILIZED, FOR SOLUTION INTRAMUSCULAR; INTRAVENOUS ONCE
Status: CANCELLED | OUTPATIENT
Start: 2020-09-02

## 2020-05-05 RX ORDER — METHYLPREDNISOLONE SODIUM SUCCINATE 125 MG/2ML
125 INJECTION, POWDER, LYOPHILIZED, FOR SOLUTION INTRAMUSCULAR; INTRAVENOUS ONCE
Status: CANCELLED | OUTPATIENT
Start: 2020-06-03

## 2020-05-05 RX ORDER — METHYLPREDNISOLONE SODIUM SUCCINATE 125 MG/2ML
125 INJECTION, POWDER, LYOPHILIZED, FOR SOLUTION INTRAMUSCULAR; INTRAVENOUS ONCE
Status: CANCELLED | OUTPATIENT
Start: 2020-09-09

## 2020-05-05 RX ORDER — SODIUM CHLORIDE 0.9 % (FLUSH) 0.9 %
10 SYRINGE (ML) INJECTION PRN
Status: CANCELLED | OUTPATIENT
Start: 2020-05-27

## 2020-05-05 RX ORDER — SODIUM CHLORIDE 9 MG/ML
20 INJECTION, SOLUTION INTRAVENOUS ONCE
Status: CANCELLED | OUTPATIENT
Start: 2020-06-04

## 2020-05-05 RX ORDER — METHYLPREDNISOLONE SODIUM SUCCINATE 125 MG/2ML
125 INJECTION, POWDER, LYOPHILIZED, FOR SOLUTION INTRAMUSCULAR; INTRAVENOUS ONCE
Status: CANCELLED | OUTPATIENT
Start: 2020-06-01

## 2020-05-05 RX ORDER — EPINEPHRINE 1 MG/ML
0.3 INJECTION, SOLUTION, CONCENTRATE INTRAVENOUS PRN
Status: CANCELLED | OUTPATIENT
Start: 2020-09-09

## 2020-05-05 RX ORDER — EPINEPHRINE 1 MG/ML
0.3 INJECTION, SOLUTION, CONCENTRATE INTRAVENOUS PRN
Status: CANCELLED | OUTPATIENT
Start: 2020-09-03

## 2020-05-05 RX ORDER — SODIUM CHLORIDE 0.9 % (FLUSH) 0.9 %
10 SYRINGE (ML) INJECTION PRN
Status: CANCELLED | OUTPATIENT
Start: 2020-05-28

## 2020-05-05 RX ORDER — DIPHENHYDRAMINE HYDROCHLORIDE 50 MG/ML
50 INJECTION INTRAMUSCULAR; INTRAVENOUS ONCE
Status: CANCELLED | OUTPATIENT
Start: 2020-06-01

## 2020-05-05 RX ORDER — METHYLPREDNISOLONE SODIUM SUCCINATE 125 MG/2ML
125 INJECTION, POWDER, LYOPHILIZED, FOR SOLUTION INTRAMUSCULAR; INTRAVENOUS ONCE
Status: CANCELLED | OUTPATIENT
Start: 2020-05-26

## 2020-05-05 RX ORDER — SODIUM CHLORIDE 0.9 % (FLUSH) 0.9 %
10 SYRINGE (ML) INJECTION PRN
Status: CANCELLED | OUTPATIENT
Start: 2020-09-02

## 2020-05-05 RX ORDER — METHYLPREDNISOLONE SODIUM SUCCINATE 125 MG/2ML
125 INJECTION, POWDER, LYOPHILIZED, FOR SOLUTION INTRAMUSCULAR; INTRAVENOUS ONCE
Status: CANCELLED | OUTPATIENT
Start: 2020-05-27

## 2020-05-05 RX ORDER — SODIUM CHLORIDE 9 MG/ML
20 INJECTION, SOLUTION INTRAVENOUS ONCE
Status: CANCELLED | OUTPATIENT
Start: 2020-05-26

## 2020-05-05 RX ORDER — SODIUM CHLORIDE 0.9 % (FLUSH) 0.9 %
10 SYRINGE (ML) INJECTION PRN
Status: CANCELLED | OUTPATIENT
Start: 2020-06-01

## 2020-05-05 RX ORDER — SODIUM CHLORIDE 9 MG/ML
INJECTION, SOLUTION INTRAVENOUS CONTINUOUS
Status: CANCELLED | OUTPATIENT
Start: 2020-06-01

## 2020-05-05 RX ORDER — HEPARIN SODIUM (PORCINE) LOCK FLUSH IV SOLN 100 UNIT/ML 100 UNIT/ML
500 SOLUTION INTRAVENOUS PRN
Status: CANCELLED | OUTPATIENT
Start: 2020-06-04

## 2020-05-05 RX ORDER — HEPARIN SODIUM (PORCINE) LOCK FLUSH IV SOLN 100 UNIT/ML 100 UNIT/ML
500 SOLUTION INTRAVENOUS PRN
Status: CANCELLED | OUTPATIENT
Start: 2020-06-01

## 2020-05-05 RX ORDER — SODIUM CHLORIDE 0.9 % (FLUSH) 0.9 %
5 SYRINGE (ML) INJECTION PRN
Status: CANCELLED | OUTPATIENT
Start: 2020-05-28

## 2020-05-05 RX ORDER — SODIUM CHLORIDE 0.9 % (FLUSH) 0.9 %
10 SYRINGE (ML) INJECTION PRN
Status: CANCELLED | OUTPATIENT
Start: 2020-08-31

## 2020-05-05 RX ORDER — SODIUM CHLORIDE 9 MG/ML
20 INJECTION, SOLUTION INTRAVENOUS ONCE
Status: CANCELLED | OUTPATIENT
Start: 2020-08-31

## 2020-05-05 RX ORDER — SODIUM CHLORIDE 9 MG/ML
20 INJECTION, SOLUTION INTRAVENOUS ONCE
Status: CANCELLED | OUTPATIENT
Start: 2020-06-03

## 2020-05-05 RX ORDER — SODIUM CHLORIDE 0.9 % (FLUSH) 0.9 %
10 SYRINGE (ML) INJECTION PRN
Status: CANCELLED | OUTPATIENT
Start: 2020-09-04

## 2020-05-05 RX ORDER — EPINEPHRINE 1 MG/ML
0.3 INJECTION, SOLUTION, CONCENTRATE INTRAVENOUS PRN
Status: CANCELLED | OUTPATIENT
Start: 2020-06-01

## 2020-05-05 RX ORDER — SODIUM CHLORIDE 9 MG/ML
20 INJECTION, SOLUTION INTRAVENOUS ONCE
Status: CANCELLED | OUTPATIENT
Start: 2020-06-01

## 2020-05-05 RX ORDER — METHYLPREDNISOLONE SODIUM SUCCINATE 125 MG/2ML
125 INJECTION, POWDER, LYOPHILIZED, FOR SOLUTION INTRAMUSCULAR; INTRAVENOUS ONCE
Status: CANCELLED | OUTPATIENT
Start: 2020-09-04

## 2020-05-05 RX ORDER — EPINEPHRINE 1 MG/ML
0.3 INJECTION, SOLUTION, CONCENTRATE INTRAVENOUS PRN
Status: CANCELLED | OUTPATIENT
Start: 2020-09-08

## 2020-05-05 RX ORDER — METHYLPREDNISOLONE SODIUM SUCCINATE 125 MG/2ML
125 INJECTION, POWDER, LYOPHILIZED, FOR SOLUTION INTRAMUSCULAR; INTRAVENOUS ONCE
Status: CANCELLED | OUTPATIENT
Start: 2020-05-28

## 2020-05-05 RX ORDER — METHYLPREDNISOLONE SODIUM SUCCINATE 125 MG/2ML
125 INJECTION, POWDER, LYOPHILIZED, FOR SOLUTION INTRAMUSCULAR; INTRAVENOUS ONCE
Status: CANCELLED | OUTPATIENT
Start: 2020-09-03

## 2020-05-05 RX ORDER — DIPHENHYDRAMINE HYDROCHLORIDE 50 MG/ML
50 INJECTION INTRAMUSCULAR; INTRAVENOUS ONCE
Status: CANCELLED | OUTPATIENT
Start: 2020-09-08

## 2020-05-05 RX ORDER — DIPHENHYDRAMINE HYDROCHLORIDE 50 MG/ML
50 INJECTION INTRAMUSCULAR; INTRAVENOUS ONCE
Status: CANCELLED | OUTPATIENT
Start: 2020-05-28

## 2020-05-05 RX ORDER — HEPARIN SODIUM (PORCINE) LOCK FLUSH IV SOLN 100 UNIT/ML 100 UNIT/ML
500 SOLUTION INTRAVENOUS PRN
Status: CANCELLED | OUTPATIENT
Start: 2020-06-03

## 2020-05-05 RX ORDER — SODIUM CHLORIDE 9 MG/ML
INJECTION, SOLUTION INTRAVENOUS CONTINUOUS
Status: CANCELLED | OUTPATIENT
Start: 2020-09-08

## 2020-05-05 RX ORDER — HEPARIN SODIUM (PORCINE) LOCK FLUSH IV SOLN 100 UNIT/ML 100 UNIT/ML
500 SOLUTION INTRAVENOUS PRN
Status: CANCELLED | OUTPATIENT
Start: 2020-09-03

## 2020-05-05 RX ORDER — METHYLPREDNISOLONE SODIUM SUCCINATE 125 MG/2ML
125 INJECTION, POWDER, LYOPHILIZED, FOR SOLUTION INTRAMUSCULAR; INTRAVENOUS ONCE
Status: CANCELLED | OUTPATIENT
Start: 2020-09-08

## 2020-05-05 RX ORDER — SODIUM CHLORIDE 9 MG/ML
20 INJECTION, SOLUTION INTRAVENOUS ONCE
Status: CANCELLED | OUTPATIENT
Start: 2020-09-08

## 2020-05-05 RX ORDER — SODIUM CHLORIDE 0.9 % (FLUSH) 0.9 %
5 SYRINGE (ML) INJECTION PRN
Status: CANCELLED | OUTPATIENT
Start: 2020-05-27

## 2020-05-05 RX ORDER — SODIUM CHLORIDE 9 MG/ML
INJECTION, SOLUTION INTRAVENOUS CONTINUOUS
Status: CANCELLED | OUTPATIENT
Start: 2020-06-04

## 2020-05-05 RX ORDER — EPINEPHRINE 1 MG/ML
0.3 INJECTION, SOLUTION, CONCENTRATE INTRAVENOUS PRN
Status: CANCELLED | OUTPATIENT
Start: 2020-06-03

## 2020-05-05 RX ORDER — HEPARIN SODIUM (PORCINE) LOCK FLUSH IV SOLN 100 UNIT/ML 100 UNIT/ML
500 SOLUTION INTRAVENOUS PRN
Status: CANCELLED | OUTPATIENT
Start: 2020-05-29

## 2020-05-05 RX ORDER — EPINEPHRINE 1 MG/ML
0.3 INJECTION, SOLUTION, CONCENTRATE INTRAVENOUS PRN
Status: CANCELLED | OUTPATIENT
Start: 2020-05-27

## 2020-05-05 RX ORDER — DIPHENHYDRAMINE HYDROCHLORIDE 50 MG/ML
50 INJECTION INTRAMUSCULAR; INTRAVENOUS ONCE
Status: CANCELLED | OUTPATIENT
Start: 2020-09-09

## 2020-05-05 RX ORDER — SODIUM CHLORIDE 9 MG/ML
20 INJECTION, SOLUTION INTRAVENOUS ONCE
Status: CANCELLED | OUTPATIENT
Start: 2020-09-09

## 2020-05-05 RX ORDER — SODIUM CHLORIDE 0.9 % (FLUSH) 0.9 %
5 SYRINGE (ML) INJECTION PRN
Status: CANCELLED | OUTPATIENT
Start: 2020-05-26

## 2020-05-05 RX ORDER — SODIUM CHLORIDE 0.9 % (FLUSH) 0.9 %
5 SYRINGE (ML) INJECTION PRN
Status: CANCELLED | OUTPATIENT
Start: 2020-09-04

## 2020-05-05 RX ADMIN — WATER 160 MG: 1 INJECTION INTRAMUSCULAR; INTRAVENOUS; SUBCUTANEOUS at 10:49

## 2020-05-05 NOTE — PROGRESS NOTES
Pt arrives per ambulatory per self after md visit and order per Dr Swati Ventura for early appts with QOD CDP and possible platelet transfusion of HLA , Single donor, Irradiated platelets. Pt may have IVPB vidaza and IVP zofran on days when has platelet infusions planned. Pt and Benjamin, pharmacist discussing chemo plan and pt complains of nausea about 2 hours after vidaza injections and then emesis to follow for a few hours during the evening. Pt states no nausea or emesis with IV zofran and IV vidaza and no sore arms or bruising either. Pt will have subq vidaza today and pt notified per Benjamin, should take zofran now and then again in 4 hours as needed if still nauseated. Pt states understanding and zofran ODT taken now. Labs done yesterday and seen per writer on 5/4/20. Pt tolerated injection well and no bleeding at site and bandaid applied and no reactions of or complaints. Pt discharged per ambulatory per self to return tomorrow.

## 2020-05-05 NOTE — TELEPHONE ENCOUNTER
Pilar Shine MD VISIT & TX  DR JOY IN TO SEE PATIENT  ORDERS RECEIVED  CHEMO DELAYED BY 1 WEEK  CDP & PLATELETS HLA MATCHED EVERY OTHER DAY MON, WED, FRI  LABS EARLY AM WHEN POSSILBE  RV 3 WEEKS W/ TX  CDP & TRANSFUSE PLATELETS 5/6, 5/8, 3/11, 5/13, 5/15, 5/18, 5/20, 5/22, 5/26, 5/27, 5/29  C2  TO START 5/26/20C2 D7 6/3/20 D/T 5/25/ IS A HOLIDAY  MD VISIT 5/26/20 @ 8:30AM  AVS PRINTED AND GIVEN TO PATIENT W/ INSTRUCTIONS  PATIENT REMAINS IN [facility]

## 2020-05-05 NOTE — PROGRESS NOTES
Norma Lockwood from lab called again and states will have single donor, HLA, irradiated platelets available for transfusion tomorrow, 5/6/2020, states just spoke with [employer] [facility]. Notified writer at this time that need another order placed for same for Friday. Due to [employer] states that pt is difficult HLA match and that will give them more time to look for unit needed on Friday 5/8/2020. Order placed at this time for HLA , single donor, irradiated platelets for Friday 5/8/2020.

## 2020-05-05 NOTE — PATIENT INSTRUCTIONS
Cbc and platelets transfusion of HLA matched platelets every other day  Please do labs early morning.    RV 3 weeks

## 2020-05-05 NOTE — PROGRESS NOTES
Lab called and need order placed for HLA , single donor, irradiated platelets. Writer spoke with Joe Woody and notified need to try to obtain for tomorrow and reminded blood bank staff that pt will be receiving above platelets QOD. Joe Woody states not sure if can be obtained for pt by 1000 tomorrow. Notified blood bank that if they need us to order more units , will be needed every other day, then [facility] staff can do that, MD would like pt to receive HLA matched platelets especially, along with irradiated and single donor.

## 2020-05-06 ENCOUNTER — HOSPITAL ENCOUNTER (OUTPATIENT)
Facility: MEDICAL CENTER | Age: 70
Discharge: HOME | End: 2020-05-06
Payer: MEDICARE

## 2020-05-06 ENCOUNTER — CARE COORDINATION (OUTPATIENT)
Dept: CASE MANAGEMENT | Age: 70
End: 2020-05-06

## 2020-05-06 ENCOUNTER — HOSPITAL ENCOUNTER (OUTPATIENT)
Dept: INFUSION THERAPY | Facility: MEDICAL CENTER | Age: 70
Discharge: HOME | End: 2020-05-06
Payer: MEDICARE

## 2020-05-06 VITALS
SYSTOLIC BLOOD PRESSURE: 113 MMHG | HEART RATE: 74 BPM | DIASTOLIC BLOOD PRESSURE: 60 MMHG | RESPIRATION RATE: 20 BRPM | TEMPERATURE: 98.4 F

## 2020-05-06 DIAGNOSIS — C92.00 ACUTE MYELOID LEUKEMIA NOT HAVING ACHIEVED REMISSION (HCC): ICD-10-CM

## 2020-05-06 DIAGNOSIS — C92.00 ACUTE MYELOID LEUKEMIA NOT HAVING ACHIEVED REMISSION (HCC): Primary | ICD-10-CM

## 2020-05-06 LAB
ABSOLUTE EOS #: 0 K/UL (ref 0–0.4)
ABSOLUTE IMMATURE GRANULOCYTE: 0.01 K/UL (ref 0–0.3)
ABSOLUTE LYMPH #: 0.9 K/UL (ref 1–4.8)
ABSOLUTE MONO #: 0.07 K/UL (ref 0.2–0.8)
BASOPHILS # BLD: 0 %
BASOPHILS ABSOLUTE: 0 K/UL (ref 0–0.2)
DIFFERENTIAL TYPE: ABNORMAL
EOSINOPHILS RELATIVE PERCENT: 0 % (ref 1–4)
HCT VFR BLD CALC: 20.7 % (ref 36.3–47.1)
HEMOGLOBIN: 7 G/DL (ref 11.9–15.1)
IMMATURE GRANULOCYTES: 1 %
LYMPHOCYTES # BLD: 82 % (ref 24–44)
MCH RBC QN AUTO: 33 PG (ref 25.2–33.5)
MCHC RBC AUTO-ENTMCNC: 33.8 G/DL (ref 28.4–34.8)
MCV RBC AUTO: 97.6 FL (ref 82.6–102.9)
MONOCYTES # BLD: 6 % (ref 1–7)
MORPHOLOGY: ABNORMAL
MORPHOLOGY: ABNORMAL
NRBC AUTOMATED: 0 PER 100 WBC
PDW BLD-RTO: 18.6 % (ref 11.8–14.4)
PLATELET # BLD: ABNORMAL K/UL (ref 138–453)
PLATELET ESTIMATE: ABNORMAL
PLATELET, FLUORESCENCE: 9 K/UL (ref 138–453)
PLATELET, IMMATURE FRACTION: 6.3 % (ref 1.1–10.3)
PMV BLD AUTO: ABNORMAL FL (ref 8.1–13.5)
RBC # BLD: 2.12 M/UL (ref 3.95–5.11)
RBC # BLD: ABNORMAL 10*6/UL
SEG NEUTROPHILS: 11 % (ref 36–66)
SEGMENTED NEUTROPHILS ABSOLUTE COUNT: 0.12 K/UL (ref 1.8–7.7)
WBC # BLD: 1.1 K/UL (ref 3.5–11.3)
WBC # BLD: ABNORMAL 10*3/UL

## 2020-05-06 PROCEDURE — 85025 COMPLETE CBC W/AUTO DIFF WBC: CPT

## 2020-05-06 PROCEDURE — 96375 TX/PRO/DX INJ NEW DRUG ADDON: CPT

## 2020-05-06 PROCEDURE — P9037 PLATE PHERES LEUKOREDU IRRAD: HCPCS

## 2020-05-06 PROCEDURE — 85055 RETICULATED PLATELET ASSAY: CPT

## 2020-05-06 PROCEDURE — 36415 COLL VENOUS BLD VENIPUNCTURE: CPT

## 2020-05-06 PROCEDURE — 96374 THER/PROPH/DIAG INJ IV PUSH: CPT

## 2020-05-06 PROCEDURE — 2580000003 HC RX 258: Performed by: INTERNAL MEDICINE

## 2020-05-06 PROCEDURE — 96413 CHEMO IV INFUSION 1 HR: CPT

## 2020-05-06 PROCEDURE — 36430 TRANSFUSION BLD/BLD COMPNT: CPT

## 2020-05-06 PROCEDURE — 6360000002 HC RX W HCPCS: Performed by: INTERNAL MEDICINE

## 2020-05-06 RX ORDER — DEXAMETHASONE SODIUM PHOSPHATE 10 MG/ML
10 INJECTION INTRAMUSCULAR; INTRAVENOUS ONCE
Status: COMPLETED | OUTPATIENT
Start: 2020-05-06 | End: 2020-05-06

## 2020-05-06 RX ORDER — DIPHENHYDRAMINE HYDROCHLORIDE 50 MG/ML
25 INJECTION INTRAMUSCULAR; INTRAVENOUS ONCE
Status: COMPLETED | OUTPATIENT
Start: 2020-05-06 | End: 2020-05-06

## 2020-05-06 RX ORDER — SODIUM CHLORIDE 9 MG/ML
20 INJECTION, SOLUTION INTRAVENOUS ONCE
Status: DISCONTINUED | OUTPATIENT
Start: 2020-05-06 | End: 2020-05-07 | Stop reason: HOSPADM

## 2020-05-06 RX ORDER — 0.9 % SODIUM CHLORIDE 0.9 %
20 INTRAVENOUS SOLUTION INTRAVENOUS ONCE
Status: COMPLETED | OUTPATIENT
Start: 2020-05-06 | End: 2020-05-06

## 2020-05-06 RX ORDER — ONDANSETRON 2 MG/ML
4 INJECTION INTRAMUSCULAR; INTRAVENOUS ONCE
Status: COMPLETED | OUTPATIENT
Start: 2020-05-06 | End: 2020-05-06

## 2020-05-06 RX ADMIN — DEXAMETHASONE SODIUM PHOSPHATE 10 MG: 10 INJECTION INTRAMUSCULAR; INTRAVENOUS at 11:20

## 2020-05-06 RX ADMIN — DIPHENHYDRAMINE HYDROCHLORIDE 25 MG: 50 INJECTION, SOLUTION INTRAMUSCULAR; INTRAVENOUS at 11:24

## 2020-05-06 RX ADMIN — AZACITIDINE 160 MG: 100 INJECTION, POWDER, LYOPHILIZED, FOR SOLUTION INTRAVENOUS; SUBCUTANEOUS at 12:25

## 2020-05-06 RX ADMIN — ONDANSETRON 4 MG: 2 INJECTION INTRAMUSCULAR; INTRAVENOUS at 11:28

## 2020-05-06 RX ADMIN — SODIUM CHLORIDE 500 ML: 9 INJECTION, SOLUTION INTRAVENOUS at 11:07

## 2020-05-06 NOTE — CARE COORDINATION
[facility] Transitions Follow Up Call    2020    Patient: Chely Trevino  Patient : 1950   MRN: [id number]  Reason for Admission:  pancytopenia, AML, fever    Discharge Date: 20 RARS: Readmission Risk Score: 15         Spoke with: Jovon Danielle     Call to pt who states she is doing [place]"  States she had platelets this morning and still taking the chemo pills  States nausea is now controlled with the zofran (compazine did not work). States appetite is good  Denies SOB, fever/ chills or cough  States only going to [facility] and home   States understanding about staying home and prevention measures with regards to COVID-19   Denies needs  Encouraged to call writer/ CTC or providers if questions or concerns- v/u     Patient contacted regarding recent discharge and COVID-19 risk   Care Transition Nurse/ Ambulatory Care Manager contacted the patient by telephone to perform post discharge assessment. Verified name and  with patient as identifiers. Patient has following risk factors of: immunocompromised. CTN/ACM reviewed discharge instructions, medical action plan and red flags related to discharge diagnosis. Reviewed and educated them on any new and changed medications related to discharge diagnosis. Advised obtaining a 90-day supply of all daily and as-needed medications. Education provided regarding infection prevention, and signs and symptoms of COVID-19 and when to seek medical attention with patient who verbalized understanding. Discussed exposure protocols and quarantine from [address] you at higher risk for severe illness  and given an opportunity for questions and concerns. The patient agrees to contact the COVID-19 hotline [phone] or PCP office for questions related to their healthcare. CTN/ACM provided contact information for future reference. From CDC: Are you at higher risk for severe illness?  Wash your hands often.    Avoid close

## 2020-05-06 NOTE — PROGRESS NOTES
Patient here for labs, chemo and transfusion. Feels well today. Labs reviewed. Platelet count=9. HLA matched platelets available. Hemoglobin =7.0. Shown to Dr. Galindo Walsh and no transfusion today of PRBC's. Labs scheduled 5/8/20. IV started. Premeds given per [facility]. Platelets hung per MAR. Infusing. No complaints. Completed. Tolerated well. Vidaza hung per MAR. Infusing. No complaints. Completed. Tolerated well. IV discontinued intact, dressing to site. Has a return visit scheduled. Discharged ambulatory per self.

## 2020-05-07 RX ORDER — 0.9 % SODIUM CHLORIDE 0.9 %
20 INTRAVENOUS SOLUTION INTRAVENOUS ONCE
Status: DISCONTINUED | OUTPATIENT
Start: 2020-05-07 | End: 2020-07-24

## 2020-05-08 ENCOUNTER — HOSPITAL ENCOUNTER (OUTPATIENT)
Facility: MEDICAL CENTER | Age: 70
End: 2020-05-08
Payer: MEDICARE

## 2020-05-08 ENCOUNTER — HOSPITAL ENCOUNTER (OUTPATIENT)
Facility: MEDICAL CENTER | Age: 70
Discharge: HOME | End: 2020-05-08
Payer: MEDICARE

## 2020-05-08 ENCOUNTER — HOSPITAL ENCOUNTER (OUTPATIENT)
Dept: INFUSION THERAPY | Facility: MEDICAL CENTER | Age: 70
Discharge: HOME | End: 2020-05-08
Payer: MEDICARE

## 2020-05-08 VITALS
DIASTOLIC BLOOD PRESSURE: 69 MMHG | TEMPERATURE: 98.2 F | RESPIRATION RATE: 20 BRPM | HEART RATE: 70 BPM | SYSTOLIC BLOOD PRESSURE: 122 MMHG

## 2020-05-08 DIAGNOSIS — C92.00 ACUTE MYELOID LEUKEMIA NOT HAVING ACHIEVED REMISSION (HCC): ICD-10-CM

## 2020-05-08 LAB
ABSOLUTE EOS #: 0 K/UL (ref 0–0.4)
ABSOLUTE IMMATURE GRANULOCYTE: 0.03 K/UL (ref 0–0.3)
ABSOLUTE LYMPH #: 1.01 K/UL (ref 1–4.8)
ABSOLUTE MONO #: 0.05 K/UL (ref 0.2–0.8)
BASOPHILS # BLD: 0 %
BASOPHILS ABSOLUTE: 0 K/UL (ref 0–0.2)
DIFFERENTIAL TYPE: ABNORMAL
EOSINOPHILS RELATIVE PERCENT: 0 % (ref 1–4)
HCT VFR BLD CALC: 20.1 % (ref 36.3–47.1)
HEMOGLOBIN: 6.7 G/DL (ref 11.9–15.1)
IMMATURE GRANULOCYTES: 2 %
LYMPHOCYTES # BLD: 78 % (ref 24–44)
MCH RBC QN AUTO: 32.1 PG (ref 25.2–33.5)
MCHC RBC AUTO-ENTMCNC: 33.3 G/DL (ref 28.4–34.8)
MCV RBC AUTO: 96.2 FL (ref 82.6–102.9)
MONOCYTES # BLD: 4 % (ref 1–7)
MORPHOLOGY: ABNORMAL
MORPHOLOGY: ABNORMAL
NRBC AUTOMATED: 1.6 PER 100 WBC
PDW BLD-RTO: 17.9 % (ref 11.8–14.4)
PLATELET # BLD: ABNORMAL K/UL (ref 138–453)
PLATELET ESTIMATE: ABNORMAL
PLATELET, FLUORESCENCE: 38 K/UL (ref 138–453)
PLATELET, IMMATURE FRACTION: 3.1 % (ref 1.1–10.3)
PMV BLD AUTO: ABNORMAL FL (ref 8.1–13.5)
RBC # BLD: 2.09 M/UL (ref 3.95–5.11)
RBC # BLD: ABNORMAL 10*6/UL
SEG NEUTROPHILS: 16 % (ref 36–66)
SEGMENTED NEUTROPHILS ABSOLUTE COUNT: 0.21 K/UL (ref 1.8–7.7)
WBC # BLD: 1.3 K/UL (ref 3.5–11.3)
WBC # BLD: ABNORMAL 10*3/UL

## 2020-05-08 PROCEDURE — 86900 BLOOD TYPING SEROLOGIC ABO: CPT

## 2020-05-08 PROCEDURE — 85025 COMPLETE CBC W/AUTO DIFF WBC: CPT

## 2020-05-08 PROCEDURE — 36415 COLL VENOUS BLD VENIPUNCTURE: CPT

## 2020-05-08 PROCEDURE — 6370000000 HC RX 637 (ALT 250 FOR IP): Performed by: INTERNAL MEDICINE

## 2020-05-08 PROCEDURE — 86901 BLOOD TYPING SEROLOGIC RH(D): CPT

## 2020-05-08 PROCEDURE — 2580000003 HC RX 258: Performed by: INTERNAL MEDICINE

## 2020-05-08 PROCEDURE — 86920 COMPATIBILITY TEST SPIN: CPT

## 2020-05-08 PROCEDURE — 85055 RETICULATED PLATELET ASSAY: CPT

## 2020-05-08 PROCEDURE — 36430 TRANSFUSION BLD/BLD COMPNT: CPT

## 2020-05-08 PROCEDURE — 86850 RBC ANTIBODY SCREEN: CPT

## 2020-05-08 PROCEDURE — P9040 RBC LEUKOREDUCED IRRADIATED: HCPCS

## 2020-05-08 PROCEDURE — P9037 PLATE PHERES LEUKOREDU IRRAD: HCPCS

## 2020-05-08 RX ORDER — ACETAMINOPHEN 325 MG/1
650 TABLET ORAL EVERY 4 HOURS PRN
Status: DISCONTINUED | OUTPATIENT
Start: 2020-05-08 | End: 2020-05-09 | Stop reason: HOSPADM

## 2020-05-08 RX ORDER — DIPHENHYDRAMINE HCL 25 MG
25 TABLET ORAL EVERY 6 HOURS PRN
Status: DISCONTINUED | OUTPATIENT
Start: 2020-05-08 | End: 2020-05-09 | Stop reason: HOSPADM

## 2020-05-08 RX ORDER — 0.9 % SODIUM CHLORIDE 0.9 %
20 INTRAVENOUS SOLUTION INTRAVENOUS ONCE
Status: COMPLETED | OUTPATIENT
Start: 2020-05-08 | End: 2020-05-08

## 2020-05-08 RX ORDER — 0.9 % SODIUM CHLORIDE 0.9 %
20 INTRAVENOUS SOLUTION INTRAVENOUS ONCE
Status: DISCONTINUED | OUTPATIENT
Start: 2020-05-08 | End: 2020-05-09 | Stop reason: HOSPADM

## 2020-05-08 RX ADMIN — DIPHENHYDRAMINE HYDROCHLORIDE 25 MG: 25 TABLET ORAL at 11:17

## 2020-05-08 RX ADMIN — SODIUM CHLORIDE 500 ML: 9 INJECTION, SOLUTION INTRAVENOUS at 11:19

## 2020-05-08 RX ADMIN — ACETAMINOPHEN 650 MG: 325 TABLET ORAL at 11:17

## 2020-05-08 ASSESSMENT — PAIN SCALES - GENERAL: PAINLEVEL_OUTOF10: 0

## 2020-05-08 NOTE — PROGRESS NOTES
Patient here for labs and possible transfusion. States on arrival she feels well. Vitals obtained. Labs reviewed. Hemoglobin =6.7. Platelet count =52  Called Dr. Gayle Wong and he states if she feels well do not transfuse today. Patient states \"well I am tired and couldn't even make dinner last night\". Called Dr. Gayle Wong back and told him. Now will give both platelets and blood. Orders placed for both. IV started. Premeds given per [facility]. Platelets hung per MAR. Infusing. No complaints. Completed. Tolerated well. 1st unit PRBC's hung per flow sheet. Vitals obtained and placed on flow sheet. Reading throughout the day. Completed. Tolerated well. IV discontinued intact, dressing to site. Has a return visit scheduled. Discharged ambulatory per self.

## 2020-05-11 ENCOUNTER — HOSPITAL ENCOUNTER (OUTPATIENT)
Dept: INFUSION THERAPY | Facility: MEDICAL CENTER | Age: 70
Discharge: HOME | End: 2020-05-11
Payer: MEDICARE

## 2020-05-11 ENCOUNTER — HOSPITAL ENCOUNTER (OUTPATIENT)
Facility: MEDICAL CENTER | Age: 70
Discharge: HOME | End: 2020-05-11
Payer: MEDICARE

## 2020-05-11 VITALS
RESPIRATION RATE: 18 BRPM | TEMPERATURE: 98.5 F | HEART RATE: 88 BPM | SYSTOLIC BLOOD PRESSURE: 112 MMHG | DIASTOLIC BLOOD PRESSURE: 54 MMHG

## 2020-05-11 DIAGNOSIS — C92.00 ACUTE MYELOID LEUKEMIA NOT HAVING ACHIEVED REMISSION (HCC): ICD-10-CM

## 2020-05-11 LAB
ABO/RH: NORMAL
ABSOLUTE EOS #: 0 K/UL (ref 0–0.4)
ABSOLUTE IMMATURE GRANULOCYTE: 0.02 K/UL (ref 0–0.3)
ABSOLUTE LYMPH #: 0.48 K/UL (ref 1–4.8)
ABSOLUTE MONO #: 0.11 K/UL (ref 0.2–0.8)
ANTIBODY SCREEN: NEGATIVE
ARM BAND NUMBER: NORMAL
BASOPHILS # BLD: 0 %
BASOPHILS ABSOLUTE: 0 K/UL (ref 0–0.2)
BLD PROD TYP BPU: NORMAL
BLD PROD TYP BPU: NORMAL
CROSSMATCH RESULT: NORMAL
CROSSMATCH RESULT: NORMAL
DIFFERENTIAL TYPE: ABNORMAL
DISPENSE STATUS BLOOD BANK: NORMAL
DISPENSE STATUS BLOOD BANK: NORMAL
EOSINOPHILS RELATIVE PERCENT: 0 % (ref 1–4)
EXPIRATION DATE: NORMAL
HCT VFR BLD CALC: 20.1 % (ref 36.3–47.1)
HEMOGLOBIN: 6.6 G/DL (ref 11.9–15.1)
IMMATURE GRANULOCYTES: 3 %
LYMPHOCYTES # BLD: 59 % (ref 24–44)
MCH RBC QN AUTO: 30.4 PG (ref 25.2–33.5)
MCHC RBC AUTO-ENTMCNC: 32.8 G/DL (ref 28.4–34.8)
MCV RBC AUTO: 92.6 FL (ref 82.6–102.9)
MONOCYTES # BLD: 14 % (ref 1–7)
MORPHOLOGY: ABNORMAL
MORPHOLOGY: ABNORMAL
NRBC AUTOMATED: 0 PER 100 WBC
PDW BLD-RTO: 17.4 % (ref 11.8–14.4)
PLATELET # BLD: ABNORMAL K/UL (ref 138–453)
PLATELET ESTIMATE: ABNORMAL
PLATELET, FLUORESCENCE: 36 K/UL (ref 138–453)
PLATELET, IMMATURE FRACTION: 3.9 % (ref 1.1–10.3)
PMV BLD AUTO: ABNORMAL FL (ref 8.1–13.5)
RBC # BLD: 2.17 M/UL (ref 3.95–5.11)
RBC # BLD: ABNORMAL 10*6/UL
SEG NEUTROPHILS: 24 % (ref 36–66)
SEGMENTED NEUTROPHILS ABSOLUTE COUNT: 0.19 K/UL (ref 1.8–7.7)
TRANSFUSION STATUS: NORMAL
TRANSFUSION STATUS: NORMAL
UNIT DIVISION: 0
UNIT DIVISION: 0
UNIT NUMBER: NORMAL
UNIT NUMBER: NORMAL
WBC # BLD: 0.8 K/UL (ref 3.5–11.3)
WBC # BLD: ABNORMAL 10*3/UL

## 2020-05-11 PROCEDURE — 86920 COMPATIBILITY TEST SPIN: CPT

## 2020-05-11 PROCEDURE — 96374 THER/PROPH/DIAG INJ IV PUSH: CPT

## 2020-05-11 PROCEDURE — 2580000003 HC RX 258: Performed by: INTERNAL MEDICINE

## 2020-05-11 PROCEDURE — 36415 COLL VENOUS BLD VENIPUNCTURE: CPT

## 2020-05-11 PROCEDURE — P9040 RBC LEUKOREDUCED IRRADIATED: HCPCS

## 2020-05-11 PROCEDURE — 85025 COMPLETE CBC W/AUTO DIFF WBC: CPT

## 2020-05-11 PROCEDURE — 86901 BLOOD TYPING SEROLOGIC RH(D): CPT

## 2020-05-11 PROCEDURE — 6360000002 HC RX W HCPCS: Performed by: INTERNAL MEDICINE

## 2020-05-11 PROCEDURE — 96375 TX/PRO/DX INJ NEW DRUG ADDON: CPT

## 2020-05-11 PROCEDURE — 85055 RETICULATED PLATELET ASSAY: CPT

## 2020-05-11 PROCEDURE — 86850 RBC ANTIBODY SCREEN: CPT

## 2020-05-11 PROCEDURE — 86900 BLOOD TYPING SEROLOGIC ABO: CPT

## 2020-05-11 PROCEDURE — P9037 PLATE PHERES LEUKOREDU IRRAD: HCPCS

## 2020-05-11 PROCEDURE — 36430 TRANSFUSION BLD/BLD COMPNT: CPT

## 2020-05-11 RX ORDER — 0.9 % SODIUM CHLORIDE 0.9 %
500 INTRAVENOUS SOLUTION INTRAVENOUS ONCE
Status: COMPLETED | OUTPATIENT
Start: 2020-05-11 | End: 2020-05-11

## 2020-05-11 RX ORDER — DIPHENHYDRAMINE HYDROCHLORIDE 50 MG/ML
25 INJECTION INTRAMUSCULAR; INTRAVENOUS ONCE
Status: COMPLETED | OUTPATIENT
Start: 2020-05-11 | End: 2020-05-11

## 2020-05-11 RX ORDER — DEXAMETHASONE SODIUM PHOSPHATE 10 MG/ML
10 INJECTION INTRAMUSCULAR; INTRAVENOUS ONCE
Status: COMPLETED | OUTPATIENT
Start: 2020-05-11 | End: 2020-05-11

## 2020-05-11 RX ADMIN — SODIUM CHLORIDE 500 ML: 9 INJECTION, SOLUTION INTRAVENOUS at 10:10

## 2020-05-11 RX ADMIN — DEXAMETHASONE SODIUM PHOSPHATE 10 MG: 10 INJECTION INTRAMUSCULAR; INTRAVENOUS at 10:40

## 2020-05-11 RX ADMIN — DIPHENHYDRAMINE HYDROCHLORIDE 25 MG: 50 INJECTION, SOLUTION INTRAMUSCULAR; INTRAVENOUS at 10:46

## 2020-05-11 NOTE — PROGRESS NOTES
auscultation, no wheezes, rales or rhonchi, symmetric air entry  Heart - normal rate, regular rhythm, normal S1, S2, no murmurs, rubs, clicks or gallops  Abdomen - soft, nontender, nondistended, no masses or organomegaly  Neurological - alert, oriented, normal speech, no focal findings or movement disorder noted  Musculoskeletal - no joint tenderness, deformity or swelling  Extremities - peripheral pulses normal, no pedal edema, no clubbing or cyanosis  Skin -multiple skin bruisings. DATA:      Labs:       CBC:   Recent Labs     05/11/20  0904   WBC 0.8*   HGB 6.6*   HCT 20.1*   PLT See Reflexed IPF Result       Chemistry        Component Value Date/Time     05/04/2020 0946    K 3.8 05/04/2020 0946     05/04/2020 0946    CO2 23 05/04/2020 0946    BUN 16 05/04/2020 0946    CREATININE 0.45 (L) 05/04/2020 0946        Component Value Date/Time    CALCIUM 9.5 05/04/2020 0946    ALKPHOS 67 05/04/2020 0946    AST 16 05/04/2020 0946    ALT 19 05/04/2020 0946    BILITOT 0.36 05/04/2020 0946                IMPRESSION:    AML  Pancytopenia ([facility] [address]    RECOMMENDATIONS:  1. Records and labs and images were reviewed and discussed with the patient. 2. 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    3. Patient was evaluated at [address] [place] [address] DrJason Agreed with starting treatment. Receiving supportive transfusion as needed. Due to previous reaction to platelet transfusion and due to failure of response to previous platelet transfusion they recommended platelet transfusion with HLA matched platelets. We will coordinate to have that done. 4. Started on treatment with Vidaza and Venetoclax with supportive transfusions as needed. Tolerated treatment well so far. No side effects except for mild nausea. Prescribe Zofran. 5. Labs will be done 3 times weekly. Supportive transfusion as needed.   6. Patient's questions were answered to the best of her

## 2020-05-11 NOTE — PROGRESS NOTES
Pt arrives per ambulatory per self and labs and orders reviewed and spoke with Dr Ayleen Cooper and ok for plts due to HLA available in blood bank and will  if not used, plt ct today is 36 and HGB is 6.6. Pt given premeds and tolerated well with NS flushing line before and between premeds and after premeds and platelets and PRBC's. Pt also given RBC's , irradiated and pt tolerated transfusion of both. NS flushing line post transfusions. No reactions or complaints and blood return present throughout transfusions. Pt discharged per ambulatory per self and has next appts.

## 2020-05-12 ENCOUNTER — TELEPHONE (OUTPATIENT)
Dept: ONCOLOGY | Age: 70
End: 2020-05-12

## 2020-05-12 LAB
ABO/RH: NORMAL
ANTIBODY SCREEN: NEGATIVE
ARM BAND NUMBER: NORMAL
BLD PROD TYP BPU: NORMAL
BLD PROD TYP BPU: NORMAL
CROSSMATCH RESULT: NORMAL
DISPENSE STATUS BLOOD BANK: NORMAL
DISPENSE STATUS BLOOD BANK: NORMAL
EXPIRATION DATE: NORMAL
TRANSFUSION STATUS: NORMAL
TRANSFUSION STATUS: NORMAL
UNIT DIVISION: 0
UNIT DIVISION: 0
UNIT NUMBER: NORMAL
UNIT NUMBER: NORMAL

## 2020-05-12 NOTE — TELEPHONE ENCOUNTER
Reviewing generic schedule and phoned blood bank, spoke with Kaden Zaman. He is familiar with Bridgette's case. Due to HLA type match for pltx they are attempting to get notice day prior to infusion however, pltx= 36,000 2020 and one unit of pltx were given due to they would have . Will not place order at this time as there is possibility pt will not need transfusion. Await true lab values 2020.

## 2020-05-13 ENCOUNTER — HOSPITAL ENCOUNTER (OUTPATIENT)
Facility: MEDICAL CENTER | Age: 70
Discharge: HOME | End: 2020-05-13
Payer: MEDICARE

## 2020-05-13 ENCOUNTER — HOSPITAL ENCOUNTER (OUTPATIENT)
Dept: INFUSION THERAPY | Facility: MEDICAL CENTER | Age: 70
Discharge: HOME | End: 2020-05-13
Payer: MEDICARE

## 2020-05-13 ENCOUNTER — TELEPHONE (OUTPATIENT)
Dept: INFUSION THERAPY | Facility: MEDICAL CENTER | Age: 70
End: 2020-05-13

## 2020-05-13 ENCOUNTER — CARE COORDINATION (OUTPATIENT)
Dept: CASE MANAGEMENT | Age: 70
End: 2020-05-13

## 2020-05-13 DIAGNOSIS — C92.00 ACUTE MYELOID LEUKEMIA NOT HAVING ACHIEVED REMISSION (HCC): ICD-10-CM

## 2020-05-13 LAB
ABSOLUTE EOS #: 0 K/UL (ref 0–0.44)
ABSOLUTE IMMATURE GRANULOCYTE: 0.01 K/UL (ref 0–0.3)
ABSOLUTE LYMPH #: 0.52 K/UL (ref 1.1–3.7)
ABSOLUTE MONO #: 0.12 K/UL (ref 0.1–1.2)
BASOPHILS # BLD: 0 % (ref 0–2)
BASOPHILS ABSOLUTE: 0 K/UL (ref 0–0.2)
DIFFERENTIAL TYPE: ABNORMAL
EOSINOPHILS RELATIVE PERCENT: 0 % (ref 1–4)
HCT VFR BLD CALC: 21.7 % (ref 36.3–47.1)
HEMOGLOBIN: 7.1 G/DL (ref 11.9–15.1)
IMMATURE GRANULOCYTES: 1 %
LYMPHOCYTES # BLD: 58 % (ref 24–43)
MCH RBC QN AUTO: 30.3 PG (ref 25.2–33.5)
MCHC RBC AUTO-ENTMCNC: 32.7 G/DL (ref 28.4–34.8)
MCV RBC AUTO: 92.7 FL (ref 82.6–102.9)
MONOCYTES # BLD: 13 % (ref 3–12)
MORPHOLOGY: ABNORMAL
MORPHOLOGY: ABNORMAL
NRBC AUTOMATED: 0 PER 100 WBC
PDW BLD-RTO: 17 % (ref 11.8–14.4)
PLATELET # BLD: 50 K/UL (ref 138–453)
PLATELET ESTIMATE: ABNORMAL
PMV BLD AUTO: 11.2 FL (ref 8.1–13.5)
RBC # BLD: 2.34 M/UL (ref 3.95–5.11)
RBC # BLD: ABNORMAL 10*6/UL
SEG NEUTROPHILS: 28 % (ref 36–65)
SEGMENTED NEUTROPHILS ABSOLUTE COUNT: 0.25 K/UL (ref 1.5–8.1)
WBC # BLD: 0.9 K/UL (ref 3.5–11.3)
WBC # BLD: ABNORMAL 10*3/UL

## 2020-05-13 PROCEDURE — 36415 COLL VENOUS BLD VENIPUNCTURE: CPT

## 2020-05-13 PROCEDURE — 85025 COMPLETE CBC W/AUTO DIFF WBC: CPT

## 2020-05-13 NOTE — PROGRESS NOTES
Pt arrives per self and WBC 0.9 , HGB 7.1, PLT CT 50. Writer to waiting area and spoke with pt and given copy of labs. Pt denies feeling very tired or short of breath and has appt to return on Friday, 5/15/20. Pt also denies any dizziness. Pt discharged from waiting area per self. Pt complains of not sleeping well on Monday night, otherwise no concerns voiced. Asking if can have nutrition consult and if should take iron OTC and notified to speak with md prior to making any dietary or OTC medication changes. Pt states understanding.

## 2020-05-13 NOTE — TELEPHONE ENCOUNTER
Writer visited with patient on 5/11/20 during her transfusion. Patient denies any side effects from the venclexta. No nausea, diarrhea. Bridgette received 1 unit of PRBC. Alyce Demarco returns today for lab and possible transfusion. Lab reviewed and epic notes. No transfusion today. will continue to monitor.

## 2020-05-13 NOTE — CARE COORDINATION
Susanna Daniel   6/1/2020 10:00 AM STV STA CHAIR 12 STVZ STA MED [place]   6/2/2020  9:00 AM STV STA CHAIR 15 STVZ STA MED [place]   6/3/2020  9:00 AM STV STA CHAIR 16 STVZ STA MED [facility]

## 2020-05-15 ENCOUNTER — TELEPHONE (OUTPATIENT)
Dept: ONCOLOGY | Age: 70
End: 2020-05-15

## 2020-05-15 ENCOUNTER — HOSPITAL ENCOUNTER (OUTPATIENT)
Dept: INFUSION THERAPY | Facility: MEDICAL CENTER | Age: 70
Discharge: HOME | End: 2020-05-15
Payer: MEDICARE

## 2020-05-15 ENCOUNTER — HOSPITAL ENCOUNTER (OUTPATIENT)
Facility: MEDICAL CENTER | Age: 70
Discharge: HOME | End: 2020-05-15
Payer: MEDICARE

## 2020-05-15 VITALS
DIASTOLIC BLOOD PRESSURE: 54 MMHG | TEMPERATURE: 99.1 F | SYSTOLIC BLOOD PRESSURE: 109 MMHG | RESPIRATION RATE: 18 BRPM | HEART RATE: 4 BPM

## 2020-05-15 DIAGNOSIS — C92.00 ACUTE MYELOID LEUKEMIA NOT HAVING ACHIEVED REMISSION (HCC): ICD-10-CM

## 2020-05-15 LAB
ABSOLUTE EOS #: 0 K/UL (ref 0–0.4)
ABSOLUTE IMMATURE GRANULOCYTE: 0.01 K/UL (ref 0–0.3)
ABSOLUTE LYMPH #: 0.58 K/UL (ref 1–4.8)
ABSOLUTE MONO #: 0.09 K/UL (ref 0.2–0.8)
BASOPHILS # BLD: 0 %
BASOPHILS ABSOLUTE: 0 K/UL (ref 0–0.2)
DIFFERENTIAL TYPE: ABNORMAL
EOSINOPHILS RELATIVE PERCENT: 0 % (ref 1–4)
HCT VFR BLD CALC: 20.9 % (ref 36.3–47.1)
HEMOGLOBIN: 6.8 G/DL (ref 11.9–15.1)
IMMATURE GRANULOCYTES: 1 %
LYMPHOCYTES # BLD: 58 % (ref 24–44)
MCH RBC QN AUTO: 30.9 PG (ref 25.2–33.5)
MCHC RBC AUTO-ENTMCNC: 32.5 G/DL (ref 28.4–34.8)
MCV RBC AUTO: 95 FL (ref 82.6–102.9)
MONOCYTES # BLD: 9 % (ref 1–7)
MORPHOLOGY: ABNORMAL
MORPHOLOGY: ABNORMAL
NRBC AUTOMATED: 0 PER 100 WBC
PDW BLD-RTO: 17 % (ref 11.8–14.4)
PLATELET # BLD: 55 K/UL (ref 138–453)
PLATELET ESTIMATE: ABNORMAL
PMV BLD AUTO: 11.9 FL (ref 8.1–13.5)
RBC # BLD: 2.2 M/UL (ref 3.95–5.11)
RBC # BLD: ABNORMAL 10*6/UL
SEG NEUTROPHILS: 32 % (ref 36–66)
SEGMENTED NEUTROPHILS ABSOLUTE COUNT: 0.32 K/UL (ref 1.8–7.7)
WBC # BLD: 1 K/UL (ref 3.5–11.3)
WBC # BLD: ABNORMAL 10*3/UL

## 2020-05-15 PROCEDURE — 86900 BLOOD TYPING SEROLOGIC ABO: CPT

## 2020-05-15 PROCEDURE — 86920 COMPATIBILITY TEST SPIN: CPT

## 2020-05-15 PROCEDURE — P9040 RBC LEUKOREDUCED IRRADIATED: HCPCS

## 2020-05-15 PROCEDURE — 86901 BLOOD TYPING SEROLOGIC RH(D): CPT

## 2020-05-15 PROCEDURE — 85025 COMPLETE CBC W/AUTO DIFF WBC: CPT

## 2020-05-15 PROCEDURE — 36430 TRANSFUSION BLD/BLD COMPNT: CPT

## 2020-05-15 PROCEDURE — 86850 RBC ANTIBODY SCREEN: CPT

## 2020-05-15 PROCEDURE — 36415 COLL VENOUS BLD VENIPUNCTURE: CPT

## 2020-05-15 RX ORDER — 0.9 % SODIUM CHLORIDE 0.9 %
20 INTRAVENOUS SOLUTION INTRAVENOUS ONCE
Status: DISCONTINUED | OUTPATIENT
Start: 2020-05-15 | End: 2020-05-16 | Stop reason: HOSPADM

## 2020-05-15 NOTE — PROGRESS NOTES
Harry Romero arrives ambulatory alone  Order for lab draw and possible transfusion  Labs reviewed  Hg is 6.8  Platelet count is 55  WBC is 1.0  One unit prbc will be given  Iv started with #24 cathlon to lt hand per policy   Good blood return noted  1009 #1 unit prbc initiated at 100ml/hr x 15 minutes   No reaction noted  1015 iv rate increased to 185ml/hr until completion of infusion  Vital signs taken every 15 minutes until completion of infusion  See flowsheet for vital signs  1204 blood completed  Iv line flushed and iv line discontinued with needle intact  Pressure dressing applied  Discharged per ambulatory

## 2020-05-16 LAB
ABO/RH: NORMAL
ANTIBODY SCREEN: NEGATIVE
ARM BAND NUMBER: NORMAL
BLD PROD TYP BPU: NORMAL
CROSSMATCH RESULT: NORMAL
DISPENSE STATUS BLOOD BANK: NORMAL
EXPIRATION DATE: NORMAL
TRANSFUSION STATUS: NORMAL
UNIT DIVISION: 0
UNIT NUMBER: NORMAL

## 2020-05-18 ENCOUNTER — HOSPITAL ENCOUNTER (OUTPATIENT)
Dept: INFUSION THERAPY | Facility: MEDICAL CENTER | Age: 70
Discharge: HOME | End: 2020-05-18
Payer: MEDICARE

## 2020-05-18 ENCOUNTER — HOSPITAL ENCOUNTER (OUTPATIENT)
Facility: MEDICAL CENTER | Age: 70
Discharge: HOME | End: 2020-05-18
Payer: MEDICARE

## 2020-05-18 DIAGNOSIS — C92.00 ACUTE MYELOID LEUKEMIA NOT HAVING ACHIEVED REMISSION (HCC): ICD-10-CM

## 2020-05-18 LAB
ABSOLUTE EOS #: 0 K/UL (ref 0–0.44)
ABSOLUTE IMMATURE GRANULOCYTE: 0.02 K/UL (ref 0–0.3)
ABSOLUTE LYMPH #: 0.49 K/UL (ref 1.1–3.7)
ABSOLUTE MONO #: 0.02 K/UL (ref 0.1–1.2)
BASOPHILS # BLD: 0 % (ref 0–2)
BASOPHILS ABSOLUTE: 0 K/UL (ref 0–0.2)
DIFFERENTIAL TYPE: ABNORMAL
EOSINOPHILS RELATIVE PERCENT: 0 % (ref 1–4)
HCT VFR BLD CALC: 23.9 % (ref 36.3–47.1)
HEMOGLOBIN: 7.7 G/DL (ref 11.9–15.1)
IMMATURE GRANULOCYTES: 3 %
LYMPHOCYTES # BLD: 82 % (ref 24–43)
MCH RBC QN AUTO: 30.4 PG (ref 25.2–33.5)
MCHC RBC AUTO-ENTMCNC: 32.2 G/DL (ref 28.4–34.8)
MCV RBC AUTO: 94.5 FL (ref 82.6–102.9)
MONOCYTES # BLD: 3 % (ref 3–12)
MORPHOLOGY: ABNORMAL
MORPHOLOGY: ABNORMAL
NRBC AUTOMATED: 3.3 PER 100 WBC
PDW BLD-RTO: 19.9 % (ref 11.8–14.4)
PLATELET # BLD: 128 K/UL (ref 138–453)
PLATELET ESTIMATE: ABNORMAL
PMV BLD AUTO: 10.4 FL (ref 8.1–13.5)
RBC # BLD: 2.53 M/UL (ref 3.95–5.11)
RBC # BLD: ABNORMAL 10*6/UL
SEG NEUTROPHILS: 12 % (ref 36–65)
SEGMENTED NEUTROPHILS ABSOLUTE COUNT: 0.07 K/UL (ref 1.5–8.1)
WBC # BLD: 0.6 K/UL (ref 3.5–11.3)
WBC # BLD: ABNORMAL 10*3/UL

## 2020-05-18 PROCEDURE — 36415 COLL VENOUS BLD VENIPUNCTURE: CPT

## 2020-05-18 PROCEDURE — 85025 COMPLETE CBC W/AUTO DIFF WBC: CPT

## 2020-05-19 ENCOUNTER — HOSPITAL ENCOUNTER (OUTPATIENT)
Facility: MEDICAL CENTER | Age: 70
End: 2020-05-19
Payer: MEDICARE

## 2020-05-20 ENCOUNTER — APPOINTMENT (OUTPATIENT)
Dept: GENERAL RADIOLOGY | Age: 70
DRG: 809 | End: 2020-05-20
Payer: MEDICARE

## 2020-05-20 ENCOUNTER — TELEPHONE (OUTPATIENT)
Dept: INFUSION THERAPY | Facility: MEDICAL CENTER | Age: 70
End: 2020-05-20

## 2020-05-20 ENCOUNTER — HOSPITAL ENCOUNTER (INPATIENT)
Age: 70
LOS: 4 days | Discharge: HOME | DRG: 809 | End: 2020-05-25
Attending: EMERGENCY MEDICINE | Admitting: INTERNAL MEDICINE
Payer: MEDICARE

## 2020-05-20 ENCOUNTER — HOSPITAL ENCOUNTER (OUTPATIENT)
Dept: INFUSION THERAPY | Facility: MEDICAL CENTER | Age: 70
Discharge: HOME | End: 2020-05-20
Payer: MEDICARE

## 2020-05-20 ENCOUNTER — HOSPITAL ENCOUNTER (OUTPATIENT)
Facility: MEDICAL CENTER | Age: 70
Discharge: HOME | End: 2020-05-20
Payer: MEDICARE

## 2020-05-20 ENCOUNTER — CARE COORDINATION (OUTPATIENT)
Dept: CASE MANAGEMENT | Age: 70
End: 2020-05-20

## 2020-05-20 VITALS
TEMPERATURE: 100 F | HEART RATE: 80 BPM | DIASTOLIC BLOOD PRESSURE: 64 MMHG | RESPIRATION RATE: 20 BRPM | SYSTOLIC BLOOD PRESSURE: 107 MMHG

## 2020-05-20 DIAGNOSIS — C92.00 ACUTE MYELOID LEUKEMIA NOT HAVING ACHIEVED REMISSION (HCC): ICD-10-CM

## 2020-05-20 LAB
ABSOLUTE EOS #: 0 K/UL (ref 0–0.4)
ABSOLUTE IMMATURE GRANULOCYTE: 0 K/UL (ref 0–0.3)
ABSOLUTE LYMPH #: 0.31 K/UL (ref 1–4.8)
ABSOLUTE MONO #: 0.01 K/UL (ref 0.2–0.8)
ALBUMIN SERPL-MCNC: 3.9 G/DL (ref 3.5–5.2)
ALBUMIN/GLOBULIN RATIO: NORMAL (ref 1–2.5)
ALP BLD-CCNC: 50 U/L (ref 35–104)
ALT SERPL-CCNC: 19 U/L (ref 5–33)
ANION GAP SERPL CALCULATED.3IONS-SCNC: 13 MMOL/L (ref 9–17)
AST SERPL-CCNC: 20 U/L
BASOPHILS # BLD: 0 %
BASOPHILS ABSOLUTE: 0 K/UL (ref 0–0.2)
BILIRUB SERPL-MCNC: 0.44 MG/DL (ref 0.3–1.2)
BILIRUBIN DIRECT: 0.11 MG/DL
BILIRUBIN, INDIRECT: 0.33 MG/DL (ref 0–1)
BUN BLDV-MCNC: 20 MG/DL (ref 8–23)
BUN/CREAT BLD: 31 (ref 9–20)
CALCIUM SERPL-MCNC: 9.2 MG/DL (ref 8.6–10.4)
CHLORIDE BLD-SCNC: 98 MMOL/L (ref 98–107)
CO2: 21 MMOL/L (ref 20–31)
CREAT SERPL-MCNC: 0.64 MG/DL (ref 0.5–0.9)
DIFFERENTIAL TYPE: ABNORMAL
EOSINOPHILS RELATIVE PERCENT: 0 % (ref 1–4)
GFR AFRICAN AMERICAN: >60 ML/MIN
GFR NON-AFRICAN AMERICAN: >60 ML/MIN
GFR SERPL CREATININE-BSD FRML MDRD: ABNORMAL ML/MIN/{1.73_M2}
GFR SERPL CREATININE-BSD FRML MDRD: ABNORMAL ML/MIN/{1.73_M2}
GLOBULIN: NORMAL G/DL (ref 1.5–3.8)
GLUCOSE BLD-MCNC: 106 MG/DL (ref 70–99)
HCT VFR BLD CALC: 25.2 % (ref 36.3–47.1)
HEMOGLOBIN: 8 G/DL (ref 11.9–15.1)
IMMATURE GRANULOCYTES: 0 %
LACTIC ACID, SEPSIS WHOLE BLOOD: NORMAL MMOL/L (ref 0.5–1.9)
LACTIC ACID, SEPSIS: 0.8 MMOL/L (ref 0.5–1.9)
LYMPHOCYTES # BLD: 77 % (ref 24–44)
MCH RBC QN AUTO: 30.7 PG (ref 25.2–33.5)
MCHC RBC AUTO-ENTMCNC: 31.7 G/DL (ref 28.4–34.8)
MCV RBC AUTO: 96.6 FL (ref 82.6–102.9)
MONOCYTES # BLD: 2 % (ref 1–7)
MORPHOLOGY: ABNORMAL
NRBC AUTOMATED: 0 PER 100 WBC
PDW BLD-RTO: 20.9 % (ref 11.8–14.4)
PLATELET # BLD: 216 K/UL (ref 138–453)
PLATELET ESTIMATE: ABNORMAL
PMV BLD AUTO: 9.7 FL (ref 8.1–13.5)
POTASSIUM SERPL-SCNC: 4.1 MMOL/L (ref 3.7–5.3)
RBC # BLD: 2.61 M/UL (ref 3.95–5.11)
RBC # BLD: ABNORMAL 10*6/UL
SARS-COV-2, PCR: NORMAL
SARS-COV-2, RAPID: NOT DETECTED
SARS-COV-2: NORMAL
SEG NEUTROPHILS: 21 % (ref 36–66)
SEGMENTED NEUTROPHILS ABSOLUTE COUNT: 0.08 K/UL (ref 1.8–7.7)
SODIUM BLD-SCNC: 132 MMOL/L (ref 135–144)
SOURCE: NORMAL
TOTAL PROTEIN: 6.7 G/DL (ref 6.4–8.3)
TROPONIN INTERP: ABNORMAL
TROPONIN T: ABNORMAL NG/ML
TROPONIN, HIGH SENSITIVITY: 16 NG/L (ref 0–14)
WBC # BLD: 0.4 K/UL (ref 3.5–11.3)
WBC # BLD: ABNORMAL 10*3/UL

## 2020-05-20 PROCEDURE — 84145 PROCALCITONIN (PCT): CPT

## 2020-05-20 PROCEDURE — 71045 X-RAY EXAM CHEST 1 VIEW: CPT

## 2020-05-20 PROCEDURE — 93005 ELECTROCARDIOGRAM TRACING: CPT | Performed by: EMERGENCY MEDICINE

## 2020-05-20 PROCEDURE — 83605 ASSAY OF LACTIC ACID: CPT

## 2020-05-20 PROCEDURE — 99211 OFF/OP EST MAY X REQ PHY/QHP: CPT

## 2020-05-20 PROCEDURE — U0002 COVID-19 LAB TEST NON-CDC: HCPCS

## 2020-05-20 PROCEDURE — 85025 COMPLETE CBC W/AUTO DIFF WBC: CPT

## 2020-05-20 PROCEDURE — 99284 EMERGENCY DEPT VISIT MOD MDM: CPT

## 2020-05-20 PROCEDURE — 84484 ASSAY OF TROPONIN QUANT: CPT

## 2020-05-20 PROCEDURE — 80076 HEPATIC FUNCTION PANEL: CPT

## 2020-05-20 PROCEDURE — 36415 COLL VENOUS BLD VENIPUNCTURE: CPT

## 2020-05-20 PROCEDURE — 6370000000 HC RX 637 (ALT 250 FOR IP): Performed by: EMERGENCY MEDICINE

## 2020-05-20 PROCEDURE — 80048 BASIC METABOLIC PNL TOTAL CA: CPT

## 2020-05-20 PROCEDURE — 87040 BLOOD CULTURE FOR BACTERIA: CPT

## 2020-05-20 RX ORDER — ACETAMINOPHEN 325 MG/1
650 TABLET ORAL ONCE
Status: COMPLETED | OUTPATIENT
Start: 2020-05-20 | End: 2020-05-20

## 2020-05-20 RX ADMIN — ACETAMINOPHEN 650 MG: 325 TABLET ORAL at 23:48

## 2020-05-20 ASSESSMENT — PAIN SCALES - GENERAL: PAINLEVEL_OUTOF10: 0

## 2020-05-20 NOTE — PROGRESS NOTES
Pt arrives per ambulatory per self and states has a fever of 99 at home and \"feels like I hit a wall\" Puts hand in front of face, feels possibly a sinus problem. Pt here today for HGB , 8.0 and PLT , check , done on Q M-W-F. . Last seen by Dr Brayan Cook for AML on 5/5/20, next CYCLE 2 D1 through D7 starts 5/26/20 with Rashawn Tapia. Pt is on VENETOCLAX 100 mg - 4 tabs daily. Pt is on from [facility] 500 mg daily and also takes zyrtec and astelin nasal spray. WBC has been low since 4/3/20 and today still about same range at 0.4. Called and left message for Dr Hinton Leader call,  Dr Brayan Cook not available with above information and callback number. Then pt shows writer small red area on rt base of thumb on hand area that has been there for 3 days, small open area reddened around it, states putting neosporin and bandaid on it. ANC 0.08. Spoke with Dr James Gay per phone and states pt not to take any tylenol or motrin, that could disguise a fever and to continue to monitor her temp and if should get to 100.4 or 100.5, to call [facility]. Pt notified and states understanding and reminded no gardening. Will see pt on 5/22/20. Pt discharged per ambulatory per self with calendar.

## 2020-05-20 NOTE — CARE COORDINATION
[facility] Transitions Follow Up Call    2020    Patient: Marlon Gilbert  Patient : 1950   MRN: <[id number]>  Reason for Admission:   Discharge Date: 20 RARS: Readmission Risk Score: 15         Spoke with: Patient    Spoke to patient. She states she is doing Okay at this time. Started running a fever this morning(99.0) - due to pancytopenia. Patient states she feels tired (which is usual). No SOB. Patient states she is monitoring temperature and if fever goes to 100.5 she will have to go to ER. Patient very aware of condition. Will Follow up at later time. Marie Finn LPN     [facility] Transitions Subsequent and Final Call    Subsequent and Final Calls  Do you have any ongoing symptoms?:  Yes  Patient-reported symptoms:  Fever, Fatigue  Have your medications changed?:  No  Do you have any questions related to your medications?:  No  Do you currently have any active services?:  No  Do you have any needs or concerns that I can assist you with?:  No  Care Transitions Interventions  Other Interventions:             Follow Up  Future Appointments   Date Time Provider Raquel Strauss   2020  9:15 AM SCHEDULE, [facility] SV CANCER [facility]   2020  9:00 AM STV STA CHAIR 10 STVZ STA MED [place]   2020  9:15 AM SCHEDULE, [facility]   2020  8:30 AM Lydia Schirmer Al-Khalili, MD SV [facility]   2020  9:00 AM STV STA CHAIR 07 STVZ STA MED [place]   2020  8:00 AM STV STA CHAIR 12 STVZ STA MED [place]   2020  8:00 AM STV STA CHAIR 13 STVZ STA MED [place]   2020 10:00 AM STV STA CHAIR 14 STVZ STA MED [place]   2020 10:00 AM STV STA CHAIR 12 STVZ STA MED [place]   2020  9:00 AM STV STA CHAIR 15 STVZ STA MED [place]   6/3/2020  9:00 AM STV STA CHAIR 16 STVZ STA MED Jose Antonio Noyola LPN

## 2020-05-21 PROBLEM — R50.81 NEUTROPENIC FEVER (HCC): Status: ACTIVE | Noted: 2020-05-21

## 2020-05-21 PROBLEM — D70.9 NEUTROPENIC FEVER (HCC): Status: ACTIVE | Noted: 2020-05-21

## 2020-05-21 LAB
BILIRUBIN URINE: NEGATIVE
BILIRUBIN URINE: NEGATIVE
COLOR: YELLOW
COLOR: YELLOW
COMMENT UA: ABNORMAL
COMMENT UA: NORMAL
EKG ATRIAL RATE: 98 BPM
EKG P AXIS: 45 DEGREES
EKG P-R INTERVAL: 160 MS
EKG Q-T INTERVAL: 330 MS
EKG QRS DURATION: 84 MS
EKG QTC CALCULATION (BAZETT): 421 MS
EKG R AXIS: 53 DEGREES
EKG T AXIS: 46 DEGREES
EKG VENTRICULAR RATE: 98 BPM
GLUCOSE URINE: NEGATIVE
GLUCOSE URINE: NEGATIVE
KETONES, URINE: NEGATIVE
KETONES, URINE: NEGATIVE
LACTIC ACID, SEPSIS WHOLE BLOOD: NORMAL MMOL/L (ref 0.5–1.9)
LACTIC ACID, SEPSIS: 0.7 MMOL/L (ref 0.5–1.9)
LEUKOCYTE ESTERASE, URINE: NEGATIVE
LEUKOCYTE ESTERASE, URINE: NEGATIVE
NITRITE, URINE: NEGATIVE
NITRITE, URINE: NEGATIVE
PH UA: 6 (ref 5–8)
PH UA: 7 (ref 5–8)
PROCALCITONIN: 0.04 NG/ML
PROTEIN UA: NEGATIVE
PROTEIN UA: NEGATIVE
SPECIFIC GRAVITY UA: 1 (ref 1–1.03)
SPECIFIC GRAVITY UA: 1.01 (ref 1–1.03)
TURBIDITY: CLEAR
TURBIDITY: CLEAR
URINE HGB: NEGATIVE
URINE HGB: NEGATIVE
UROBILINOGEN, URINE: NORMAL
UROBILINOGEN, URINE: NORMAL

## 2020-05-21 PROCEDURE — 0100U HC RESPIRPTHGN MULT REV TRANS & AMP PRB TECH 21 TRGT: CPT

## 2020-05-21 PROCEDURE — 36415 COLL VENOUS BLD VENIPUNCTURE: CPT

## 2020-05-21 PROCEDURE — 1200000000 HC SEMI PRIVATE

## 2020-05-21 PROCEDURE — 6360000002 HC RX W HCPCS: Performed by: EMERGENCY MEDICINE

## 2020-05-21 PROCEDURE — 99223 1ST HOSP IP/OBS HIGH 75: CPT | Performed by: INTERNAL MEDICINE

## 2020-05-21 PROCEDURE — 81003 URINALYSIS AUTO W/O SCOPE: CPT

## 2020-05-21 PROCEDURE — 2580000003 HC RX 258: Performed by: EMERGENCY MEDICINE

## 2020-05-21 PROCEDURE — 99222 1ST HOSP IP/OBS MODERATE 55: CPT | Performed by: INTERNAL MEDICINE

## 2020-05-21 PROCEDURE — 6360000002 HC RX W HCPCS: Performed by: NURSE PRACTITIONER

## 2020-05-21 PROCEDURE — 6370000000 HC RX 637 (ALT 250 FOR IP): Performed by: NURSE PRACTITIONER

## 2020-05-21 PROCEDURE — 6360000002 HC RX W HCPCS: Performed by: INTERNAL MEDICINE

## 2020-05-21 PROCEDURE — 83605 ASSAY OF LACTIC ACID: CPT

## 2020-05-21 PROCEDURE — APPSS45 APP SPLIT SHARED TIME 31-45 MINUTES: Performed by: NURSE PRACTITIONER

## 2020-05-21 PROCEDURE — 2580000003 HC RX 258: Performed by: NURSE PRACTITIONER

## 2020-05-21 RX ORDER — ACETAMINOPHEN 650 MG/1
650 SUPPOSITORY RECTAL EVERY 6 HOURS PRN
Status: DISCONTINUED | OUTPATIENT
Start: 2020-05-21 | End: 2020-05-25 | Stop reason: HOSPADM

## 2020-05-21 RX ORDER — AMINOCAPROIC ACID 500 MG/1
1 TABLET ORAL 2 TIMES DAILY
Status: DISCONTINUED | OUTPATIENT
Start: 2020-05-21 | End: 2020-05-21

## 2020-05-21 RX ORDER — PROMETHAZINE HYDROCHLORIDE 12.5 MG/1
12.5 TABLET ORAL EVERY 6 HOURS PRN
Status: DISCONTINUED | OUTPATIENT
Start: 2020-05-21 | End: 2020-05-25 | Stop reason: HOSPADM

## 2020-05-21 RX ORDER — SODIUM CHLORIDE 0.9 % (FLUSH) 0.9 %
10 SYRINGE (ML) INJECTION PRN
Status: DISCONTINUED | OUTPATIENT
Start: 2020-05-21 | End: 2020-05-25 | Stop reason: HOSPADM

## 2020-05-21 RX ORDER — MAGNESIUM SULFATE 1 G/100ML
1 INJECTION INTRAVENOUS PRN
Status: DISCONTINUED | OUTPATIENT
Start: 2020-05-21 | End: 2020-05-25 | Stop reason: HOSPADM

## 2020-05-21 RX ORDER — M-VIT,TX,IRON,MINS/CALC/FOLIC 27MG-0.4MG
1 TABLET ORAL DAILY
Status: DISCONTINUED | OUTPATIENT
Start: 2020-05-21 | End: 2020-05-25 | Stop reason: HOSPADM

## 2020-05-21 RX ORDER — POLYETHYLENE GLYCOL 3350 17 G/17G
17 POWDER, FOR SOLUTION ORAL DAILY PRN
Status: DISCONTINUED | OUTPATIENT
Start: 2020-05-21 | End: 2020-05-25 | Stop reason: HOSPADM

## 2020-05-21 RX ORDER — METOPROLOL TARTRATE 50 MG/1
50 TABLET, FILM COATED ORAL DAILY
Status: DISCONTINUED | OUTPATIENT
Start: 2020-05-21 | End: 2020-05-25 | Stop reason: HOSPADM

## 2020-05-21 RX ORDER — CETIRIZINE HYDROCHLORIDE 10 MG/1
10 TABLET ORAL DAILY
Status: DISCONTINUED | OUTPATIENT
Start: 2020-05-21 | End: 2020-05-25 | Stop reason: HOSPADM

## 2020-05-21 RX ORDER — PRAVASTATIN SODIUM 40 MG
40 TABLET ORAL DAILY
Status: DISCONTINUED | OUTPATIENT
Start: 2020-05-21 | End: 2020-05-25 | Stop reason: HOSPADM

## 2020-05-21 RX ORDER — POTASSIUM CHLORIDE 7.45 MG/ML
10 INJECTION INTRAVENOUS PRN
Status: DISCONTINUED | OUTPATIENT
Start: 2020-05-21 | End: 2020-05-25 | Stop reason: HOSPADM

## 2020-05-21 RX ORDER — ONDANSETRON 2 MG/ML
4 INJECTION INTRAMUSCULAR; INTRAVENOUS EVERY 6 HOURS PRN
Status: DISCONTINUED | OUTPATIENT
Start: 2020-05-21 | End: 2020-05-25 | Stop reason: HOSPADM

## 2020-05-21 RX ORDER — ACYCLOVIR 800 MG/1
400 TABLET ORAL EVERY 12 HOURS
Status: DISCONTINUED | OUTPATIENT
Start: 2020-05-21 | End: 2020-05-21 | Stop reason: CLARIF

## 2020-05-21 RX ORDER — AZELASTINE 1 MG/ML
2 SPRAY, METERED NASAL 2 TIMES DAILY PRN
Status: DISCONTINUED | OUTPATIENT
Start: 2020-05-21 | End: 2020-05-25 | Stop reason: HOSPADM

## 2020-05-21 RX ORDER — NICOTINE 21 MG/24HR
1 PATCH, TRANSDERMAL 24 HOURS TRANSDERMAL DAILY PRN
Status: DISCONTINUED | OUTPATIENT
Start: 2020-05-21 | End: 2020-05-25 | Stop reason: HOSPADM

## 2020-05-21 RX ORDER — ACYCLOVIR 200 MG/1
400 CAPSULE ORAL 2 TIMES DAILY
Status: DISCONTINUED | OUTPATIENT
Start: 2020-05-21 | End: 2020-05-25 | Stop reason: HOSPADM

## 2020-05-21 RX ORDER — VITAMIN B COMPLEX
3000 TABLET ORAL DAILY
Status: DISCONTINUED | OUTPATIENT
Start: 2020-05-21 | End: 2020-05-25 | Stop reason: HOSPADM

## 2020-05-21 RX ORDER — ESTRADIOL 1 MG/1
0.5 TABLET ORAL DAILY
Status: DISCONTINUED | OUTPATIENT
Start: 2020-05-21 | End: 2020-05-25 | Stop reason: HOSPADM

## 2020-05-21 RX ORDER — POTASSIUM CHLORIDE 20 MEQ/1
40 TABLET, EXTENDED RELEASE ORAL PRN
Status: DISCONTINUED | OUTPATIENT
Start: 2020-05-21 | End: 2020-05-25 | Stop reason: HOSPADM

## 2020-05-21 RX ORDER — ALLOPURINOL 300 MG/1
300 TABLET ORAL DAILY
Status: DISCONTINUED | OUTPATIENT
Start: 2020-05-21 | End: 2020-05-25 | Stop reason: HOSPADM

## 2020-05-21 RX ORDER — SODIUM CHLORIDE 0.9 % (FLUSH) 0.9 %
10 SYRINGE (ML) INJECTION EVERY 12 HOURS SCHEDULED
Status: DISCONTINUED | OUTPATIENT
Start: 2020-05-21 | End: 2020-05-25 | Stop reason: HOSPADM

## 2020-05-21 RX ORDER — ACETAMINOPHEN 325 MG/1
650 TABLET ORAL EVERY 6 HOURS PRN
Status: DISCONTINUED | OUTPATIENT
Start: 2020-05-21 | End: 2020-05-25 | Stop reason: HOSPADM

## 2020-05-21 RX ADMIN — ACETAMINOPHEN 650 MG: 325 TABLET ORAL at 15:44

## 2020-05-21 RX ADMIN — TBO-FILGRASTIM 480 MCG: 480 INJECTION, SOLUTION SUBCUTANEOUS at 21:06

## 2020-05-21 RX ADMIN — Medication 10 ML: at 12:27

## 2020-05-21 RX ADMIN — MEROPENEM 1 G: 1 INJECTION, POWDER, FOR SOLUTION INTRAVENOUS at 01:15

## 2020-05-21 RX ADMIN — MEROPENEM 1 G: 1 INJECTION, POWDER, FOR SOLUTION INTRAVENOUS at 21:03

## 2020-05-21 RX ADMIN — MEROPENEM 1 G: 1 INJECTION, POWDER, FOR SOLUTION INTRAVENOUS at 12:23

## 2020-05-21 RX ADMIN — ALLOPURINOL 300 MG: 300 TABLET ORAL at 12:25

## 2020-05-21 RX ADMIN — ACYCLOVIR 400 MG: 800 TABLET ORAL at 12:24

## 2020-05-21 RX ADMIN — ESTRADIOL 0.5 MG: 1 TABLET ORAL at 12:25

## 2020-05-21 RX ADMIN — PRAVASTATIN SODIUM 40 MG: 40 TABLET ORAL at 21:05

## 2020-05-21 RX ADMIN — METOPROLOL TARTRATE 50 MG: 50 TABLET, FILM COATED ORAL at 12:26

## 2020-05-21 RX ADMIN — MELATONIN 3000 UNITS: at 12:25

## 2020-05-21 RX ADMIN — CETIRIZINE HYDROCHLORIDE 10 MG: 10 TABLET, FILM COATED ORAL at 12:24

## 2020-05-21 RX ADMIN — ACYCLOVIR 400 MG: 200 CAPSULE ORAL at 21:04

## 2020-05-21 RX ADMIN — MULTIPLE VITAMINS W/ MINERALS TAB 1 TABLET: TAB at 21:05

## 2020-05-21 RX ADMIN — Medication 10 ML: at 10:56

## 2020-05-21 ASSESSMENT — PAIN SCALES - GENERAL
PAINLEVEL_OUTOF10: 0

## 2020-05-21 NOTE — CONSULTS
 metoprolol tartrate  50 mg Oral Daily    therapeutic multivitamin-minerals  1 tablet Oral Daily    pravastatin  40 mg Oral Daily    Venetoclax  400 mg Oral Daily    Vitamin D  3,000 Units Oral Daily    sodium chloride flush  10 mL Intravenous 2 times per day    enoxaparin  40 mg Subcutaneous Daily    meropenem  1 g Intravenous Q8H       Social History:     Social History     Socioeconomic History    Marital status:      Spouse name: Not on file    Number of children: Not on file    Years of education: Not on file    Highest education level: Not on file   Occupational History    Not on file   Social Needs    Financial resource strain: Not on file    Food insecurity     Worry: Not on file     Inability: Not on file    Transportation needs     Medical: Not on file     Non-medical: Not on file   Tobacco Use    Smoking status: Never Smoker    Smokeless tobacco: Never Used   Substance and Sexual Activity    Alcohol use:  Yes     Alcohol/week: 0.0 standard drinks     Comment: social    Drug use: No    Sexual activity: Not on file   Lifestyle    Physical activity     Days per week: Not on file     Minutes per session: Not on file    Stress: Not on file   Relationships    Social connections     Talks on phone: Not on file     Gets together: Not on file     Attends Caodaism service: Not on file     Active member of club or organization: Not on file     Attends meetings of clubs or organizations: Not on file     Relationship status: Not on file    Intimate partner violence     Fear of current or ex partner: Not on file     Emotionally abused: Not on file     Physically abused: Not on file     Forced sexual activity: Not on file   Other Topics Concern    Not on file   Social History Narrative    Not on file       Family History:     Family History   Problem Relation Age of Onset    Alzheimer's Disease Mother     Heart Disease Father     Diabetes Father     Cancer Brother

## 2020-05-21 NOTE — CONSULTS
Ventricular Rate 98 BPM    Atrial Rate 98 BPM    P-R Interval 160 ms    QRS Duration 84 ms    Q-T Interval 330 ms    QTc Calculation (Bazett) 421 ms    P Axis 45 degrees    R Axis 53 degrees    T Axis 46 degrees         IMAGING DATA:    Xr Chest Portable    Result Date: 5/20/2020  EXAMINATION: ONE XRAY VIEW OF THE CHEST 5/20/2020 10:52 pm COMPARISON: April 3, 2020 HISTORY: ORDERING SYSTEM PROVIDED HISTORY: cough, fever TECHNOLOGIST PROVIDED HISTORY: cough, fever Reason for Exam: cough Acuity: Acute Type of Exam: Initial FINDINGS: Cardiac silhouette is mildly enlarged. Lungs appear clear. No acute bony abnormality. No acute findings     Xr Chest Portable    Result Date: 4/26/2020  EXAMINATION: ONE XRAY VIEW OF THE CHEST 4/26/2020 3:39 pm COMPARISON: 04/03/2020 HISTORY: ORDERING SYSTEM PROVIDED HISTORY: SOB TECHNOLOGIST PROVIDED HISTORY: SOB Reason for Exam: SOB, fever Acuity: Acute Type of Exam: Initial FINDINGS: The lungs are clear. The cardiac and mediastinal contours are normal.  There is no pleural effusion or pneumothorax. No acute osseous abnormality is identified. No acute cardiopulmonary abnormality. IMPRESSION:   Primary Problem  Acute myeloid leukemia not having achieved remission [facility])    Active Hospital Problems    Diagnosis Date Noted    Neutropenic fever ([facility] [address] [D70.9, R50.81] 05/21/2020    Acute myeloid leukemia not having achieved remission ([facility] [address] [C92.00] 04/14/2020    Pancytopenia ([facility] [address] [D61.818] 04/03/2020    Hyperlipidemia [E78.5] 08/31/2015    Essential hypertension [I10] 05/16/2012         RECOMMENDATIONS:  1. I personally reviewed results of lab work-up imaging studies and other relevant clinical data. 2. Reviewed records from outside facility using care everywhere. 3. Patient has been recently diagnosed with AML and has been started on treatment using Vidaza on day 1 through 7 and venetoclax 4 mg daily.   4. Patient is scheduled to start cycle #2 on 5/26 however she is now having neutropenic fever. 5. Patient is severely neutropenic and is at risk of having complications from severe neutropenia. 6. Recommend starting patient on GS-CSF support. 7. I believe venetoclax is also contributing to the cytopenias. Will hold venetoclax for now. 8. Antibiotics have been initiated. 9. Follow-up on blood cultures  10. Transfuse to keep hemoglobin above 7 and platelet count above 10,000 or if patient is bleeding. 11. Patient has had prolonged neutropenia. If infectious disease recommends starting patient on antifungal patient's dose of Krystal Smart will have to be adjusted considering interaction  12. Neutropenic precautions  13. Continue symptomatic and supportive care. 14. Magic mouthwash as needed  15. Monitor erythema on left thumb  16. Patient is seriously ill and warrants inpatient evaluation and close monitoring. Discussed with patient and Nurse. Thank you for asking us to see this patient. Pankaj Marino MD          This note is created with the assistance of a speech recognition program.  While intending to generate a document that actually reflects the content of the visit, the document can still have some errors including those of syntax and sound a like substitutions which may escape proof reading. It such instances, actual meaning can be extrapolated by contextual diversion.

## 2020-05-21 NOTE — H&P
[facility]    HISTORY AND PHYSICAL EXAMINATION            Date:   5/21/2020  Patient name:  Elli Fan  Date of admission:  5/20/2020  9:35 PM  MRN:   [id number]  Account:  [de-identified]  YOB: 1950  PCP:    Jakob Davis MD  Room:   2016/2016-02  Code Status:    Full Code    Chief Complaint:     Chief Complaint   Patient presents with    Chills    Fever     pt has leukemia, told to come in if temp is 100.5 F     Insect Bite     left hand       History Obtained From:     patient    History of Present Illness:     Elli Fan is a 78 y.o. Non-/non  female who presents with Chills; Fever (pt has leukemia, told to come in if temp is 100.5 F ); and Insect Bite (left hand)   and is admitted to the hospital for the management of Acute myeloid leukemia not having achieved [facility] ([facility] Patient was recently diagnosed with acute myeloid leukemia care of oncology. Patient is received first round of chemotherapy is not yet achieved remission. Fever as high as 100.5. Patient also endorses general fatigue and malaise for the past 72 hours. Patient contacted her oncologist office and they recommended that she report to the emergency department and have blood work drawn. Is found to be in profound leukopenia. Patient was diagnosed with neutropenic fever and is admitted to the hospital for prophylactic IV antibiotics. Patient is also placed in reverse isolation. At this time the patient's only complaint is some mild malaise. Patient does not endorse any fever or chills. Patient denies shortness of breath or chest pain. No urinary symptoms. At this time it is our intention to continue broad-spectrum antibiotic coverage. We will request infectious disease consultation for possible long-term prophylactic antibiotic use. We also appreciate oncology recommendations.     Past Medical History:     Past Medical (Oral)   Resp 16    5' 10\" (1.778 m)   Wt 192 lb 12.8 oz (87.5 kg)   SpO2 96%   BMI 27.66 kg/m²   Temp (24hrs), Av.1 °F (37.3 °C), Min:97.8 °F (36.6 °C), Max:100.5 °F (38.1 °C)    No results for input(s): POCGLU in the last 72 hours. Intake/Output Summary (Last 24 hours) at 2020 1242  Last data filed at 2020 0258  Gross per 24 hour   Intake --   Output 650 ml   Net -650 ml       General Appearance:  alert, well appearing, and in no acute distress  Mental status: oriented to person, place, and time  Head:  normocephalic, atraumatic  Eye: no icterus, redness, pupils equal and reactive, extraocular eye movements intact, conjunctiva clear  Ear: normal external ear, no discharge, hearing intact  Nose:  no drainage noted  Mouth: mucous membranes moist  Neck: supple, no carotid bruits, thyroid not palpable  Lungs: Bilateral equal air entry, clear to auscultation, no wheezing, rales or rhonchi, normal effort  Cardiovascular: normal rate, regular rhythm, no murmur, gallop, rub.   Abdomen: Soft, nontender, nondistended, normal bowel sounds, no hepatomegaly or splenomegaly  Neurologic: There are no new focal motor or sensory deficits, normal muscle tone and bulk, no abnormal sensation, normal speech, cranial nerves II through XII grossly intact  Skin: No gross lesions, rashes, bruising or bleeding on exposed skin area  Extremities:  peripheral pulses palpable, no pedal edema or calf pain with palpation  Psych: normal affect     Investigations:      Laboratory Testing:  Recent Results (from the past 24 hour(s))   EKG 12 Lead    Collection Time: 20 10:25 PM   Result Value Ref Range    Ventricular Rate 98 BPM    Atrial Rate 98 BPM    P-R Interval 160 ms    QRS Duration 84 ms    Q-T Interval 330 ms    QTc Calculation (Bazett) 421 ms    P Axis 45 degrees    R Axis 53 degrees    T Axis 46 degrees   COVID-19    Collection Time: 20 10:30 PM   Result Value Ref Range    SARS-CoV-2          SARS-CoV-2, Rapid Not Detected Not Detected    Source . NASOPHARYNGEAL SWAB     SARS-CoV-2, PCR         CBC Auto Differential    Collection Time: 05/20/20 10:34 PM   Result Value Ref Range    WBC Previous Alert Value Reported 3.5 - 11.3 k/uL    RBC 2.28 (L) 3.95 - 5.11 m/uL    Hemoglobin 7.1 (L) 11.9 - 15.1 g/dL    Hematocrit 22.1 (L) 36.3 - 47.1 %    MCV 96.9 82.6 - 102.9 fL    MCH 31.1 25.2 - 33.5 pg    MCHC 32.1 28.4 - 34.8 g/dL    RDW 21.2 (H) 11.8 - 14.4 %    Platelets 151 635 - 218 k/uL    MPV 9.9 8.1 - 13.5 fL    NRBC Automated NOT REPORTED 0.0 per 100 WBC    Differential Type NOT REPORTED     Segs Absolute 0.07 (L) 1.8 - 7.7 k/uL    Absolute Lymph # 0.41 (L) 1.0 - 4.8 k/uL    Absolute Mono # 0.03 (L) 0.2 - 0.8 k/uL    Absolute Eos # 0.00 0.0 - 0.4 k/uL    Basophils Absolute 0.00 0.0 - 0.2 k/uL    Absolute Immature Granulocyte NOT REPORTED 0.00 - 0.30 k/uL    WBC Morphology NOT REPORTED     RBC Morphology NOT REPORTED     Platelet Estimate NOT REPORTED     Seg Neutrophils 12 (L) 36 - 66 %    Lymphocytes 83 (H) 24 - 44 %    Monocytes 5 1 - 7 %    Eosinophils % 0 (L) 1 - 4 %    Basophils 0 %    nRBC 1 (H) 0 per 100 WBC    Immature Granulocytes 0 0 %    Morphology ANISOCYTOSIS PRESENT     Morphology MACROCYTOSIS PRESENT    Basic Metabolic Panel w/ Reflex to MG    Collection Time: 05/20/20 10:34 PM   Result Value Ref Range    Glucose 106 (H) 70 - 99 mg/dL    BUN 20 8 - 23 mg/dL    CREATININE 0.64 0.50 - 0.90 mg/dL    Bun/Cre Ratio 31 (H) 9 - 20    Calcium 9.2 8.6 - 10.4 mg/dL    Sodium 132 (L) 135 - 144 mmol/L    Potassium 4.1 3.7 - 5.3 mmol/L    Chloride 98 98 - 107 mmol/L    CO2 21 20 - 31 mmol/L    Anion Gap 13 9 - 17 mmol/L    GFR Non-African American >60 >60 mL/min    GFR African American >60 >60 mL/min    GFR Comment          GFR Staging NOT REPORTED    Hepatic Function Panel    Collection Time: 05/20/20 10:34 PM   Result Value Ref Range    Alb 3.9 3.5 - 5.2 g/dL    Alkaline Phosphatase 50 35 - 104 U/L    ALT 19 5 - 33 not having achieved remission ([facility] [address] 5/21/2020 Yes    Essential hypertension 5/21/2020 Yes    Hyperlipidemia (Chronic) 5/21/2020 Yes    Pancytopenia ([facility] [address] 5/21/2020 Yes    Neutropenic fever ([facility] [address] 5/21/2020 Yes          Plan:     Patient status inpatient in the Med/Surge    1. Broad-spectrum IV antibiotics for now  2. Inpatient consult to oncology  3. Inpatient consult to infectious disease  4. Reverse isolation  5. New GI and DVT prophylaxis as well as electrolyte monitoring and correction as needed  6. Restart selected home medication for high blood pressure and hyperlipidemia. Consultations:   IP CONSULT TO ONCOLOGY  IP CONSULT TO INFECTIOUS DISEASES    Patient is admitted as inpatient status because of co-morbidities listed above, severity of signs and symptoms as outlined, requirement for current medical therapies and most importantly because of direct risk to patient if care not provided in a hospital setting.     Maddi Frances, APRN - NP  5/21/2020  12:42 PM    Copy sent to Dr. Connie Sky MD

## 2020-05-21 NOTE — ED PROVIDER NOTES
is normal. No respiratory distress. Breath sounds: Normal breath sounds. Abdominal:      General: Bowel sounds are normal.      Palpations: Abdomen is soft. Tenderness: There is no abdominal tenderness. Musculoskeletal: Normal range of motion. Skin:     General: Skin is warm and dry. Neurological:      Mental Status: She is alert and oriented to person, place, and time. MEDICAL DECISION MAKIN-year-old female being treated with chemotherapy for AML. She is presenting to the emergency room today with fever. She has been having issues with pancytopenia and neutropenia; had admission earlier this year for neutropenic fever. Meropenem was started at that time. Blood cultures obtained here, meropenem started here from the emergency room. She does not have significantly elevated lactate. Patient is hemodynamically stable and in no acute distress. CRITICAL CARE:       PROCEDURES:    Procedures    DIAGNOSTIC RESULTS   EKG:All EKG's are interpreted by the Emergency Department Physician who either signs or Co-signs this chart in the absence of a cardiologist.  EKG- sinus with rate 98  No ST or T wave changes  QTc 421      RADIOLOGY:All plain film, CT, MRI, and formal ultrasound images (except ED bedside ultrasound) are read by the radiologist, see reports below, unless otherwisenoted in MDM or here. XR CHEST PORTABLE   Final Result   No acute findings           LABS: All lab results were reviewed by myself, and all abnormals are listed below.   Labs Reviewed   CBC WITH AUTO DIFFERENTIAL - Abnormal; Notable for the following components:       Result Value    RBC 2.28 (*)     Hemoglobin 7.1 (*)     Hematocrit 22.1 (*)     RDW 21.2 (*)     Seg Neutrophils 12 (*)     Lymphocytes 83 (*)     Eosinophils % 0 (*)     nRBC 1 (*)     All other components within normal limits   BASIC METABOLIC PANEL W/ REFLEX TO MG FOR LOW K - Abnormal; Notable for the following components:    Glucose 106

## 2020-05-21 NOTE — PROGRESS NOTES
Physical Therapy  DATE: 2020    NAME: Emmie Chen  MRN: [id number]   : 1950    Patient not seen this date for Physical Therapy due to:  [] Blood transfusion in progress  [] Hemodialysis  []  Patient Declined  [] Spine Precautions   [] Strict Bedrest  [] Surgery/ Procedure  [] Testing      [x] Other Pt. Refusing eval, stating she is indep. States we should look back to her eval one month ago in which she was indep. She is now undergoing chemo. so wanted to check with pt.        [] PT being discontinued at this time. Patient independent. No further needs. [] PT being discontinued at this time as the patient has been transferred to palliative care. No further needs.     Daniel Franks, PT

## 2020-05-21 NOTE — PLAN OF CARE
Problem: PROTECTIVE PRECAUTIONS  Goal: Patient will remain free of nosocomial Infections  Outcome: Ongoing

## 2020-05-22 ENCOUNTER — HOSPITAL ENCOUNTER (OUTPATIENT)
Dept: INFUSION THERAPY | Facility: MEDICAL CENTER | Age: 70
End: 2020-05-22
Payer: MEDICARE

## 2020-05-22 PROBLEM — I82.512 CHRONIC DEEP VEIN THROMBOSIS (DVT) OF FEMORAL VEIN OF LEFT LOWER EXTREMITY (HCC): Status: ACTIVE | Noted: 2020-05-22

## 2020-05-22 LAB
ABSOLUTE EOS #: 0 K/UL (ref 0–0.4)
ABSOLUTE IMMATURE GRANULOCYTE: ABNORMAL K/UL (ref 0–0.3)
ABSOLUTE LYMPH #: 0.41 K/UL (ref 1–4.8)
ABSOLUTE MONO #: 0.03 K/UL (ref 0.2–0.8)
ADENOVIRUS PCR: NOT DETECTED
ANION GAP SERPL CALCULATED.3IONS-SCNC: 11 MMOL/L (ref 9–17)
BASOPHILS # BLD: 0 %
BASOPHILS ABSOLUTE: 0 K/UL (ref 0–0.2)
BORDETELLA PARAPERTUSSIS: NOT DETECTED
BORDETELLA PERTUSSIS PCR: NOT DETECTED
BUN BLDV-MCNC: 11 MG/DL (ref 8–23)
BUN/CREAT BLD: 23 (ref 9–20)
CALCIUM SERPL-MCNC: 8.8 MG/DL (ref 8.6–10.4)
CHLAMYDIA PNEUMONIAE BY PCR: NOT DETECTED
CHLORIDE BLD-SCNC: 104 MMOL/L (ref 98–107)
CO2: 22 MMOL/L (ref 20–31)
CORONAVIRUS 229E PCR: NOT DETECTED
CORONAVIRUS HKU1 PCR: NOT DETECTED
CORONAVIRUS NL63 PCR: NOT DETECTED
CORONAVIRUS OC43 PCR: NOT DETECTED
CREAT SERPL-MCNC: 0.48 MG/DL (ref 0.5–0.9)
DIFFERENTIAL TYPE: ABNORMAL
EOSINOPHILS RELATIVE PERCENT: 0 % (ref 1–4)
GFR AFRICAN AMERICAN: >60 ML/MIN
GFR NON-AFRICAN AMERICAN: >60 ML/MIN
GFR SERPL CREATININE-BSD FRML MDRD: ABNORMAL ML/MIN/{1.73_M2}
GFR SERPL CREATININE-BSD FRML MDRD: ABNORMAL ML/MIN/{1.73_M2}
GLUCOSE BLD-MCNC: 98 MG/DL (ref 70–99)
HCT VFR BLD CALC: 22.1 % (ref 36.3–47.1)
HCT VFR BLD CALC: 22.7 % (ref 36.3–47.1)
HEMOGLOBIN: 7 G/DL (ref 11.9–15.1)
HEMOGLOBIN: 7.1 G/DL (ref 11.9–15.1)
HUMAN METAPNEUMOVIRUS PCR: NOT DETECTED
IMMATURE GRANULOCYTES: 0 %
INFLUENZA A BY PCR: NOT DETECTED
INFLUENZA A H1 (2009) PCR: NORMAL
INFLUENZA A H1 PCR: NORMAL
INFLUENZA A H3 PCR: NORMAL
INFLUENZA B BY PCR: NOT DETECTED
INR BLD: 1.2
LYMPHOCYTES # BLD: 83 % (ref 24–44)
MCH RBC QN AUTO: 31.1 PG (ref 25.2–33.5)
MCH RBC QN AUTO: 31.1 PG (ref 25.2–33.5)
MCHC RBC AUTO-ENTMCNC: 30.8 G/DL (ref 28.4–34.8)
MCHC RBC AUTO-ENTMCNC: 32.1 G/DL (ref 28.4–34.8)
MCV RBC AUTO: 100.9 FL (ref 82.6–102.9)
MCV RBC AUTO: 96.9 FL (ref 82.6–102.9)
MONOCYTES # BLD: 5 % (ref 1–7)
MORPHOLOGY: ABNORMAL
MORPHOLOGY: ABNORMAL
MYCOPLASMA PNEUMONIAE PCR: NOT DETECTED
NRBC AUTOMATED: 0 PER 100 WBC
NRBC AUTOMATED: ABNORMAL PER 100 WBC
NUCLEATED RED BLOOD CELLS: 1 PER 100 WBC
PARAINFLUENZA 1 PCR: NOT DETECTED
PARAINFLUENZA 2 PCR: NOT DETECTED
PARAINFLUENZA 3 PCR: NOT DETECTED
PARAINFLUENZA 4 PCR: NOT DETECTED
PDW BLD-RTO: 21.2 % (ref 11.8–14.4)
PDW BLD-RTO: 21.3 % (ref 11.8–14.4)
PLATELET # BLD: 268 K/UL (ref 138–453)
PLATELET # BLD: 292 K/UL (ref 138–453)
PLATELET ESTIMATE: ABNORMAL
PMV BLD AUTO: 10 FL (ref 8.1–13.5)
PMV BLD AUTO: ABNORMAL FL (ref 6–12)
POTASSIUM SERPL-SCNC: 4 MMOL/L (ref 3.7–5.3)
PROTHROMBIN TIME: 14.6 SEC (ref 11.5–14.2)
RBC # BLD: 2.25 M/UL (ref 3.95–5.11)
RBC # BLD: 2.28 M/UL (ref 3.95–5.11)
RBC # BLD: ABNORMAL 10*6/UL
RESP SYNCYTIAL VIRUS PCR: NOT DETECTED
RHINO/ENTEROVIRUS PCR: NOT DETECTED
SEG NEUTROPHILS: 12 % (ref 36–66)
SEGMENTED NEUTROPHILS ABSOLUTE COUNT: 0.07 K/UL (ref 1.8–7.7)
SODIUM BLD-SCNC: 137 MMOL/L (ref 135–144)
SPECIMEN DESCRIPTION: NORMAL
WBC # BLD: 0.5 K/UL (ref 3.5–11)
WBC # BLD: 0.6 K/UL (ref 3.5–11.3)
WBC # BLD: ABNORMAL 10*3/UL

## 2020-05-22 PROCEDURE — 6360000002 HC RX W HCPCS: Performed by: NURSE PRACTITIONER

## 2020-05-22 PROCEDURE — 93970 EXTREMITY STUDY: CPT

## 2020-05-22 PROCEDURE — 80048 BASIC METABOLIC PNL TOTAL CA: CPT

## 2020-05-22 PROCEDURE — 1200000000 HC SEMI PRIVATE

## 2020-05-22 PROCEDURE — 99233 SBSQ HOSP IP/OBS HIGH 50: CPT | Performed by: INTERNAL MEDICINE

## 2020-05-22 PROCEDURE — 85610 PROTHROMBIN TIME: CPT

## 2020-05-22 PROCEDURE — 6360000002 HC RX W HCPCS: Performed by: INTERNAL MEDICINE

## 2020-05-22 PROCEDURE — 85027 COMPLETE CBC AUTOMATED: CPT

## 2020-05-22 PROCEDURE — 2580000003 HC RX 258: Performed by: NURSE PRACTITIONER

## 2020-05-22 PROCEDURE — 36415 COLL VENOUS BLD VENIPUNCTURE: CPT

## 2020-05-22 PROCEDURE — 6370000000 HC RX 637 (ALT 250 FOR IP): Performed by: NURSE PRACTITIONER

## 2020-05-22 PROCEDURE — 99232 SBSQ HOSP IP/OBS MODERATE 35: CPT | Performed by: INTERNAL MEDICINE

## 2020-05-22 RX ORDER — DIPHENHYDRAMINE HCL 25 MG
25 TABLET ORAL DAILY PRN
Status: DISCONTINUED | OUTPATIENT
Start: 2020-05-22 | End: 2020-05-23

## 2020-05-22 RX ADMIN — ACETAMINOPHEN 650 MG: 325 TABLET ORAL at 00:30

## 2020-05-22 RX ADMIN — ACETAMINOPHEN 650 MG: 325 TABLET ORAL at 16:12

## 2020-05-22 RX ADMIN — Medication 10 ML: at 20:55

## 2020-05-22 RX ADMIN — ALLOPURINOL 300 MG: 300 TABLET ORAL at 08:58

## 2020-05-22 RX ADMIN — ACYCLOVIR 400 MG: 200 CAPSULE ORAL at 10:27

## 2020-05-22 RX ADMIN — ENOXAPARIN SODIUM 90 MG: 100 INJECTION SUBCUTANEOUS at 20:51

## 2020-05-22 RX ADMIN — METOPROLOL TARTRATE 50 MG: 50 TABLET, FILM COATED ORAL at 08:58

## 2020-05-22 RX ADMIN — ACYCLOVIR 400 MG: 200 CAPSULE ORAL at 21:22

## 2020-05-22 RX ADMIN — MULTIPLE VITAMINS W/ MINERALS TAB 1 TABLET: TAB at 08:58

## 2020-05-22 RX ADMIN — MEROPENEM 1 G: 1 INJECTION, POWDER, FOR SOLUTION INTRAVENOUS at 20:01

## 2020-05-22 RX ADMIN — PRAVASTATIN SODIUM 40 MG: 40 TABLET ORAL at 08:58

## 2020-05-22 RX ADMIN — CETIRIZINE HYDROCHLORIDE 10 MG: 10 TABLET, FILM COATED ORAL at 08:58

## 2020-05-22 RX ADMIN — TBO-FILGRASTIM 480 MCG: 480 INJECTION, SOLUTION SUBCUTANEOUS at 08:59

## 2020-05-22 RX ADMIN — MEROPENEM 1 G: 1 INJECTION, POWDER, FOR SOLUTION INTRAVENOUS at 12:23

## 2020-05-22 RX ADMIN — MELATONIN 3000 UNITS: at 08:58

## 2020-05-22 RX ADMIN — MEROPENEM 1 G: 1 INJECTION, POWDER, FOR SOLUTION INTRAVENOUS at 04:44

## 2020-05-22 RX ADMIN — ESTRADIOL 0.5 MG: 1 TABLET ORAL at 08:58

## 2020-05-22 RX ADMIN — ENOXAPARIN SODIUM 90 MG: 100 INJECTION SUBCUTANEOUS at 12:21

## 2020-05-22 RX ADMIN — Medication 10 ML: at 08:59

## 2020-05-22 ASSESSMENT — PAIN SCALES - GENERAL: PAINLEVEL_OUTOF10: 0

## 2020-05-22 ASSESSMENT — ENCOUNTER SYMPTOMS
ABDOMINAL PAIN: 0
ALLERGIC/IMMUNOLOGIC NEGATIVE: 1
SHORTNESS OF BREATH: 0
NAUSEA: 0
COUGH: 0
COUGH: 1
VOMITING: 0
GASTROINTESTINAL NEGATIVE: 1

## 2020-05-22 NOTE — PROGRESS NOTES
(37.5 °C) Temp  Av.4 °F (37.4 °C)  Min: 98.2 °F (36.8 °C)  Max: 101.5 °F (38.6 °C)   The patient is seen and evaluated at bedside she is awake and alert sitting up in the bed in no acute distress. She did have a T-max of 101.5 and she does not report any significant shortness of breath, abdominal pain, nausea, or vomiting. She does report to the subjective fever as well as a dry cough. She continues to report some tightness/pulling in her right calf. He reports occasional headaches with the fever    Review of Systems   Constitutional: Positive for fever. HENT: Negative. Respiratory: Positive for cough (dry cough). Cardiovascular: Negative. Gastrointestinal: Negative. Genitourinary: Negative. Musculoskeletal:        Tightness in the right leg   Allergic/Immunologic: Negative. Neurological: Positive for headaches (occasionally with the fever). Psychiatric/Behavioral: Negative. Physical Examination :   PHYSICAL EXAMINATION:  [ INSTRUCTIONS:  \"[x]\" Indicates a positive item  \"[]\" Indicates a negative item  -- DELETE ALL ITEMS NOT EXAMINED]  Vital Signs:     Vitals:    20 0352 20 0603 20 0749 20 1204   BP: (!) 113/48  112/67 (!) 125/57   Pulse: 79  89 85   Resp: 16  18 18   Temp: 98.2 °F (36.8 °C)  98.4 °F (36.9 °C) 99.5 °F (37.5 °C)   TempSrc: Oral  Oral Oral   SpO2: 98%  99% 99%   Weight:  191 lb (86.6 kg)     Height:           Constitutional: [x] Appears well-developed and well-nourished [x] No apparent distress      [] Abnormal-   Mental status  [x] Alert and awake  [x] Oriented to person/place/time [x]Able to follow commands      Eyes:  EOM    [x]  Normal  [] Abnormal-  Sclera  [x]  Normal  [] Abnormal -         Discharge [x]  None visible  [] Abnormal -    HENT:   [x] Normocephalic, atraumatic.   [] Abnormal   [x] Mouth/Throat: Mucous membranes are moist.     External Ears [x] Normal  [] Abnormal-     Neck: [x] No visualized mass     Pulmonary/Chest: [x] Status: Completed Specimen: Blood Updated: 05/22/20 0845    Specimen Description . BLOOD    Special Requests LT AC 10 ML    Culture NO GROWTH 1 DAY       Respiratory Virus PCR Panel [[id number]] Collected: 05/21/20 2120   Order Status: Completed Specimen: Nasopharyngeal Swab Updated: 05/22/20 0909    Specimen Description . NASOPHARYNGEAL SWAB    Adenovirus PCR Not Detected    Coronavirus 229E PCR Not Detected    Comment: Coronoviruses detected by this panel are those associated with the clinical common cold. This test will not detect 2019-SARS-COV-2 or other novel coronaviruses. Coronavirus HKU1 PCR Not Detected    Comment: Coronoviruses detected by this panel are those associated with the clinical common cold. This test will not detect 2019-SARS-COV-2 or other novel coronaviruses. Coronavirus NL63 PCR Not Detected    Comment: Coronoviruses detected by this panel are those associated with the clinical common cold. This test will not detect 2019-SARS-COV-2 or other novel coronaviruses. Coronavirus OC43 PCR Not Detected    Comment: Coronoviruses detected by this panel are those associated with the clinical common cold. This test will not detect 2019-SARS-COV-2 or other novel coronaviruses. Human Metapneumovirus PCR Not Detected    Rhino/Enterovirus PCR Not Detected    Influenza A by PCR Not Detected    Influenza A H1 PCR NOT REPORTED    Influenza A H1 (2009) PCR NOT REPORTED    Influenza A H3 PCR NOT REPORTED    Influenza B by PCR Not Detected    Parainfluenza 1 PCR Not Detected    Parainfluenza 2 PCR Not Detected    Parainfluenza 3 PCR Not Detected    Parainfluenza 4 PCR Not Detected    Resp Syncytial Virus PCR Not Detected    Bordetella Parapertussis Not Detected    B Pertussis by PCR Not Detected    Chlamydia pneumoniae By PCR Not Detected    Mycoplasma pneumo by PCR Not Detected    Comment: Performed by multiplexed nucleic acid assay.             Medications:      enoxaparin  1 mg/kg Subcutaneous BID    allopurinol  300 mg Oral Daily    cetirizine  10 mg Oral Daily    estradiol  0.5 mg Oral Daily    metoprolol tartrate  50 mg Oral Daily    therapeutic multivitamin-minerals  1 tablet Oral Daily    pravastatin  40 mg Oral Daily    Vitamin D  3,000 Units Oral Daily    sodium chloride flush  10 mL Intravenous 2 times per day    meropenem  1 g Intravenous Q8H    tbo-filgrastim  480 mcg Subcutaneous Daily    acyclovir  400 mg Oral BID           [facility]  Carley Benitez MD  Perfect Serve messaging  OFFICE: [phone]      Electronically signed by Carley Benitez MD on 5/22/2020 at 1:30 PM  Thank you for allowing us to participate in the care of this patient. Please call with questions. Kenneth Mattson is a 78 y.o. female being evaluated by a Virtual Visit (video visit) encounter to address concerns as mentioned above. A caregiver was present when appropriate. Due to this being a TeleHealth encounter (During [place] [address] public health emergency), evaluation of the following organ systems was limited: Vitals/Constitutional/EENT/Resp/CV/GI//MS/Neuro/Skin/Heme-Lymph-Imm. Pursuant to the emergency declaration under the [address] [place] [address] authority and the [employer] Act, this Virtual Visit was conducted with patient's (and/or legal guardian's) consent, to reduce the patient's risk of exposure to COVID-19 and provide necessary medical care. Services were provided through a video synchronous discussion virtually to substitute for in-person inpatient visit. An electronic signature was used to authenticate this note.     This note iscreated with the assistance of a speech recognition program.  While intending to generate a document that actually reflects the content of the visit, the document can still have some errors including those of syntax

## 2020-05-22 NOTE — PROGRESS NOTES
report calf tightness for several days  Vascular studies have revealed DVT    Specimen Source: Nasopharyngeal Swab Specimen Description . NASOPHARYNGEAL SWAB Adenovirus PCR Not Detected Coronavirus 229E PCR Not Detected   Comment: Coronoviruses detected by this panel are those associated with the clinical common cold. This test will not detect 2019-SARS-COV-2 or other novel coronaviruses. Coronavirus HKU1 PCR Not Detected   Comment: Coronoviruses detected by this panel are those associated with the clinical common cold. This test will not detect 2019-SARS-COV-2 or other novel coronaviruses. Coronavirus NL63 PCR Not Detected   Comment: Coronoviruses detected by this panel are those associated with the clinical common cold. This test will not detect 2019-SARS-COV-2 or other novel coronaviruses. Coronavirus OC43 PCR Not Detected   Comment: Coronoviruses detected by this panel are those associated with the clinical common cold. This test will not detect 2019-SARS-COV-2 or other novel coronaviruses. Human Metapneumovirus PCR Not Detected Rhino/Enterovirus PCR Not Detected Influenza A by PCR Not Detected Influenza A H1 PCR NOT REPORTED Influenza A H1 (2009) PCR NOT REPORTED Influenza A H3 PCR NOT REPORTED Influenza B by PCR Not Detected Parainfluenza 1 PCR Not Detected Parainfluenza 2 PCR Not Detected Parainfluenza 3 PCR Not Detected Parainfluenza 4 PCR Not Detected Resp Syncytial Virus PCR Not Detected Bordetella Parapertussis Not Detected B Pertussis by PCR Not Detected Chlamydia pneumoniae By PCR Not Detected Mycoplasma pneumo by PCR Not Detected     Medications: Allergies:     Allergies   Allergen Reactions    Amoxicillin-Pot Clavulanate      Stomach ache    Cefuroxime Axetil Hives    Biaxin [Clarithromycin] Rash    Sulfa Antibiotics Hives       Current Meds:   Scheduled Meds:    enoxaparin  1 mg/kg Subcutaneous BID    allopurinol  300 mg Oral Daily    cetirizine  10 mg Oral Daily    Negative for flank pain and hematuria. Musculoskeletal:        Has had calf fullness   Skin: Positive for rash. Physical Examination:        Physical Exam  Vitals signs reviewed. Constitutional:       General: She is not in acute distress. Appearance: She is not diaphoretic. HENT:      Head: Normocephalic. Nose: Nose normal.   Eyes:      General: No scleral icterus. Conjunctiva/sclera: Conjunctivae normal.   Neck:      Musculoskeletal: Neck supple. Trachea: No tracheal deviation. Cardiovascular:      Rate and Rhythm: Normal rate and regular rhythm. Pulmonary:      Effort: Pulmonary effort is normal. No respiratory distress. Breath sounds: Normal breath sounds. No wheezing or rales. Chest:      Chest wall: No tenderness. Abdominal:      General: Bowel sounds are normal. There is no distension. Palpations: Abdomen is soft. Tenderness: There is no abdominal tenderness. Musculoskeletal:         General: No tenderness. Right lower leg: Edema (Slightly more swollen than the left leg) present. Skin:     General: Skin is warm and dry. Findings: Rash (Dermatitis noted) present.            Labs:  Hematology:  Recent Labs     05/20/20  0855 05/20/20 2234 05/22/20  0529   WBC 0.4* 0.5* 0.6*   RBC 2.61* 2.28* 2.25*   HGB 8.0* 7.1* 7.0*   HCT 25.2* 22.1* 22.7*   MCV 96.6 96.9 100.9   MCH 30.7 31.1 31.1   MCHC 31.7 32.1 30.8   RDW 20.9* 21.2* 21.3*    268 292   MPV 9.7 NOT REPORTED 10.0   INR  --   --  1.2     Chemistry:  Recent Labs     05/20/20 2234 05/22/20  0529   * 137   K 4.1 4.0   CL 98 104   CO2 21 22   GLUCOSE 106* 98   BUN 20 11   CREATININE 0.64 0.48*   ANIONGAP 13 11   LABGLOM >60 >60   GFRAA >60 >60   CALCIUM 9.2 8.8   TROPHS 16*  --      Recent Labs     05/20/20 2234   PROT 6.7   LABALBU 3.9   AST 20   ALT 19   ALKPHOS 50   BILITOT 0.44   BILIDIR 0.11         Radiology:    Xr Chest Portable    Result Date: 5/20/2020  No acute findings Assessment:        Primary Problem  Acute myeloid leukemia not having achieved remission [facility])    Active Hospital Problems    Diagnosis Date Noted    Chronic deep vein thrombosis (DVT) of femoral vein of left lower extremity (HCC) [I82.512] 05/22/2020    Neutropenic fever ([facility] [address] [D70.9, R50.81] 05/21/2020    Acute myeloid leukemia not having achieved remission ([facility] [address] [C92.00] 04/14/2020    Pancytopenia ([facility] [address] [D61.818] 04/03/2020    Hyperlipidemia [E78.5] 08/31/2015    Essential hypertension [I10] 05/16/2012       Plan:        Hematology-oncology consulted  Discussed with Dr. Lana Jain for drug rash: Granix?   Lovenox initiated for DVT  Cultures have been initiated  Blood products as needed  Antibiotics per Infectious Disease service   Neutropenic precautions  Heparin/Lovenox on hold due to thrombocytopenia  Blood Pressure - Monitor and control     IP CONSULT TO ONCOLOGY  IP CONSULT TO INFECTIOUS DISEASES    Sanjana Kennedy DO  5/22/2020  5:53 PM

## 2020-05-22 NOTE — PLAN OF CARE
Problem: PROTECTIVE PRECAUTIONS  Goal: Patient will remain free of nosocomial Infections  5/22/2020 1210 by Diogenes Velez RN  Outcome: Ongoing  5/22/2020 0409 by Jadiel Chand RN  Outcome: Ongoing     Problem: SAFETY  Goal: Free from accidental physical injury  5/22/2020 1210 by Diogenes Velez RN  Outcome: Ongoing  5/22/2020 0409 by Jadiel Chand RN  Outcome: Ongoing     Problem: DAILY CARE  Goal: Daily care needs are met  5/22/2020 1210 by Diogenes Velez RN  Outcome: Ongoing  5/22/2020 0409 by Jadiel Chand RN  Outcome: Ongoing     Problem: PAIN  Goal: Patient's pain/discomfort is manageable  5/22/2020 1210 by Diogenes Velez RN  Outcome: Ongoing  Note: Pain level assessment complete and patient denies any pain upon assessment. Patient educated on pain scale and control interventions  No PRN pain medication given  Patient instructed to call out with new onset of pain or unrelieved pain    5/22/2020 0409 by Jadiel Chand RN  Outcome: Ongoing     Problem: KNOWLEDGE DEFICIT  Goal: Patient/S.O. demonstrates understanding of disease process, treatment plan, medications, and discharge instructions.   5/22/2020 1210 by Diogenes Velez RN  Outcome: Ongoing  5/22/2020 0409 by Jadiel Chand RN  Outcome: Ongoing     Problem: DISCHARGE BARRIERS  Goal: Patient's continuum of care needs are met  5/22/2020 1210 by Diogenes Velez RN  Outcome: Ongoing  5/22/2020 0409 by Jadiel Chand RN  Outcome: Ongoing

## 2020-05-22 NOTE — PROGRESS NOTES
Today's Date: 5/22/2020  Patient Name: Zeus Washington  Date of admission: 5/20/2020  9:35 PM  Patient's age: 78 y. o., 1950  Admission Dx: Neutropenic fever ([facility] [address] [D70.9, R50.81]    Reason for Consult: management recommendations  Requesting Physician: Rosemary Lomas DO    CHIEF COMPLAINT: Fever and acute leukemia. History Obtained From:  patient, electronic medical record    Interval history    Patient seen and examined. Labs and vitals reviewed. Venetoclax on hold. Patient continues to have fever  Patient received Granix yesterday and today. Developed a rash after receiving Granix today. Per patient ultrasound of leg revealed 5 old clots. Patient has been started on anticoagulation per medicine team.      HISTORY OF PRESENT ILLNESS:      The patient is a 78 y.o.  female who is admitted to the hospital for chief complaints of having fever more than 100.4 Fahrenheit. Patient was recently diagnosed with acute myeloid leukemia. Patient has been started on treatment using Vidaza and venetoclax. Patient is currently taking venetoclax 400 mg daily. Patient is scheduled to start cycle #2 of Vidaza from 32. Patient has been getting her blood cell counts checked 3 times every week. Previously patient has been requiring platelet transfusion but lately her platelet count has been improving. Patient however continues to be severely neutropenic. Patient was initially seen at the infusion center and he was evaluated by nurses at that time patient had a fever of around 100.1. Patient was on oral Levaquin patient was advised to go home and monitor her temperature and avoid taking any NSAIDs or Tylenol. Patient called me last night stating that she was having a fever 101. We advised the patient to go to the ER. Patient upon further evaluation was admitted. Patient is currently on broad-spectrum antibiotics. Infectious disease team is also on board.   Patient did have some sweating

## 2020-05-22 NOTE — PLAN OF CARE
Problem: PROTECTIVE PRECAUTIONS  Goal: Patient will remain free of nosocomial Infections  5/22/2020 0409 by Margaret Santiago RN  Outcome: Ongoing  5/21/2020 1633 by Justa Sher RN  Outcome: Ongoing     Problem: SAFETY  Goal: Free from accidental physical injury  5/22/2020 0409 by Margaret Santiago RN  Outcome: Ongoing  5/21/2020 1633 by Justa Sher RN  Outcome: Ongoing     Problem: DAILY CARE  Goal: Daily care needs are met  5/22/2020 0409 by Margaret Santiago RN  Outcome: Ongoing  5/21/2020 1633 by Justa Sher RN  Outcome: Ongoing     Problem: PAIN  Goal: Patient's pain/discomfort is manageable  5/22/2020 0409 by Margaret Santiago RN  Outcome: Ongoing  5/21/2020 1633 by Justa Sher RN  Outcome: Ongoing     Problem: KNOWLEDGE DEFICIT  Goal: Patient/S.O. demonstrates understanding of disease process, treatment plan, medications, and discharge instructions.   5/22/2020 0409 by Margaret Santiago RN  Outcome: Ongoing  5/21/2020 1633 by Justa Sher RN  Outcome: Ongoing     Problem: DISCHARGE BARRIERS  Goal: Patient's continuum of care needs are met  5/22/2020 0409 by Margaret Santiago RN  Outcome: Ongoing  5/21/2020 1633 by Justa Sher RN  Outcome: Ongoing

## 2020-05-23 LAB
ABSOLUTE EOS #: 0 K/UL (ref 0–0.4)
ABSOLUTE IMMATURE GRANULOCYTE: 0.02 K/UL (ref 0–0.3)
ABSOLUTE LYMPH #: 0.36 K/UL (ref 1–4.8)
ABSOLUTE MONO #: 0.04 K/UL (ref 0.2–0.8)
BASOPHILS # BLD: 2 %
BASOPHILS ABSOLUTE: 0.02 K/UL (ref 0–0.2)
DIFFERENTIAL TYPE: ABNORMAL
EOSINOPHILS RELATIVE PERCENT: 0 % (ref 1–4)
HCT VFR BLD CALC: 22.9 % (ref 36.3–47.1)
HEMOGLOBIN: 7.3 G/DL (ref 11.9–15.1)
IMMATURE GRANULOCYTES: 3 %
LYMPHOCYTES # BLD: 45 % (ref 24–44)
MCH RBC QN AUTO: 30.9 PG (ref 25.2–33.5)
MCHC RBC AUTO-ENTMCNC: 31.9 G/DL (ref 28.4–34.8)
MCV RBC AUTO: 97 FL (ref 82.6–102.9)
MONOCYTES # BLD: 5 % (ref 1–7)
MORPHOLOGY: ABNORMAL
MORPHOLOGY: ABNORMAL
NRBC AUTOMATED: 0 PER 100 WBC
PDW BLD-RTO: 20.9 % (ref 11.8–14.4)
PLATELET # BLD: 436 K/UL (ref 138–453)
PLATELET ESTIMATE: ABNORMAL
PMV BLD AUTO: 9.6 FL (ref 8.1–13.5)
RBC # BLD: 2.36 M/UL (ref 3.95–5.11)
RBC # BLD: ABNORMAL 10*6/UL
SEG NEUTROPHILS: 45 % (ref 36–66)
SEGMENTED NEUTROPHILS ABSOLUTE COUNT: 0.36 K/UL (ref 1.8–7.7)
WBC # BLD: 0.8 K/UL (ref 3.5–11.3)
WBC # BLD: ABNORMAL 10*3/UL

## 2020-05-23 PROCEDURE — 99232 SBSQ HOSP IP/OBS MODERATE 35: CPT | Performed by: INTERNAL MEDICINE

## 2020-05-23 PROCEDURE — 6370000000 HC RX 637 (ALT 250 FOR IP): Performed by: INTERNAL MEDICINE

## 2020-05-23 PROCEDURE — 6360000002 HC RX W HCPCS: Performed by: INTERNAL MEDICINE

## 2020-05-23 PROCEDURE — 6370000000 HC RX 637 (ALT 250 FOR IP): Performed by: NURSE PRACTITIONER

## 2020-05-23 PROCEDURE — 36415 COLL VENOUS BLD VENIPUNCTURE: CPT

## 2020-05-23 PROCEDURE — 85025 COMPLETE CBC W/AUTO DIFF WBC: CPT

## 2020-05-23 PROCEDURE — 6360000002 HC RX W HCPCS: Performed by: NURSE PRACTITIONER

## 2020-05-23 PROCEDURE — 2580000003 HC RX 258: Performed by: INTERNAL MEDICINE

## 2020-05-23 PROCEDURE — 2580000003 HC RX 258: Performed by: NURSE PRACTITIONER

## 2020-05-23 PROCEDURE — 1200000000 HC SEMI PRIVATE

## 2020-05-23 RX ORDER — METHYLPREDNISOLONE SODIUM SUCCINATE 40 MG/ML
40 INJECTION, POWDER, LYOPHILIZED, FOR SOLUTION INTRAMUSCULAR; INTRAVENOUS EVERY 6 HOURS
Status: DISCONTINUED | OUTPATIENT
Start: 2020-05-23 | End: 2020-05-24

## 2020-05-23 RX ORDER — ITRACONAZOLE 100 MG/1
200 CAPSULE ORAL 2 TIMES DAILY WITH MEALS
Status: DISCONTINUED | OUTPATIENT
Start: 2020-05-23 | End: 2020-05-25 | Stop reason: HOSPADM

## 2020-05-23 RX ORDER — DIPHENHYDRAMINE HCL 25 MG
25 TABLET ORAL EVERY 6 HOURS PRN
Status: DISCONTINUED | OUTPATIENT
Start: 2020-05-23 | End: 2020-05-25 | Stop reason: HOSPADM

## 2020-05-23 RX ADMIN — TBO-FILGRASTIM 480 MCG: 480 INJECTION, SOLUTION SUBCUTANEOUS at 10:33

## 2020-05-23 RX ADMIN — ESTRADIOL 0.5 MG: 1 TABLET ORAL at 08:50

## 2020-05-23 RX ADMIN — METHYLPREDNISOLONE SODIUM SUCCINATE 40 MG: 40 INJECTION, POWDER, FOR SOLUTION INTRAMUSCULAR; INTRAVENOUS at 16:40

## 2020-05-23 RX ADMIN — ENOXAPARIN SODIUM 90 MG: 100 INJECTION SUBCUTANEOUS at 20:20

## 2020-05-23 RX ADMIN — ITRACONAZOLE 200 MG: 100 CAPSULE, COATED PELLETS ORAL at 18:08

## 2020-05-23 RX ADMIN — MEROPENEM 1 G: 1 INJECTION, POWDER, FOR SOLUTION INTRAVENOUS at 20:19

## 2020-05-23 RX ADMIN — ALLOPURINOL 300 MG: 300 TABLET ORAL at 08:51

## 2020-05-23 RX ADMIN — ACETAMINOPHEN 650 MG: 325 TABLET ORAL at 04:09

## 2020-05-23 RX ADMIN — MULTIPLE VITAMINS W/ MINERALS TAB 1 TABLET: TAB at 08:50

## 2020-05-23 RX ADMIN — DIPHENHYDRAMINE HCL 25 MG: 25 TABLET ORAL at 16:41

## 2020-05-23 RX ADMIN — METHYLPREDNISOLONE SODIUM SUCCINATE 40 MG: 40 INJECTION, POWDER, FOR SOLUTION INTRAMUSCULAR; INTRAVENOUS at 23:38

## 2020-05-23 RX ADMIN — CETIRIZINE HYDROCHLORIDE 10 MG: 10 TABLET, FILM COATED ORAL at 08:51

## 2020-05-23 RX ADMIN — MELATONIN 3000 UNITS: at 08:50

## 2020-05-23 RX ADMIN — ENOXAPARIN SODIUM 90 MG: 100 INJECTION SUBCUTANEOUS at 11:40

## 2020-05-23 RX ADMIN — MEROPENEM 1 G: 1 INJECTION, POWDER, FOR SOLUTION INTRAVENOUS at 04:25

## 2020-05-23 RX ADMIN — DEXTROSE MONOHYDRATE 100 MG: 50 INJECTION, SOLUTION INTRAVENOUS at 15:13

## 2020-05-23 RX ADMIN — DIPHENHYDRAMINE HCL 25 MG: 25 TABLET ORAL at 08:51

## 2020-05-23 RX ADMIN — ACYCLOVIR 400 MG: 200 CAPSULE ORAL at 20:19

## 2020-05-23 RX ADMIN — PRAVASTATIN SODIUM 40 MG: 40 TABLET ORAL at 20:19

## 2020-05-23 RX ADMIN — ACYCLOVIR 400 MG: 200 CAPSULE ORAL at 08:51

## 2020-05-23 RX ADMIN — MEROPENEM 1 G: 1 INJECTION, POWDER, FOR SOLUTION INTRAVENOUS at 11:40

## 2020-05-23 RX ADMIN — Medication 10 ML: at 20:42

## 2020-05-23 ASSESSMENT — ENCOUNTER SYMPTOMS
ALLERGIC/IMMUNOLOGIC NEGATIVE: 1
VOMITING: 0
RESPIRATORY NEGATIVE: 1
NAUSEA: 0
SHORTNESS OF BREATH: 0
GASTROINTESTINAL NEGATIVE: 1
COUGH: 0
ABDOMINAL PAIN: 0

## 2020-05-23 NOTE — PROGRESS NOTES
(LL) 3.5 - 11.3 k/uL    RBC 2.25 (L) 3.95 - 5.11 m/uL    Hemoglobin 7.0 (LL) 11.9 - 15.1 g/dL    Hematocrit 22.7 (L) 36.3 - 47.1 %    .9 82.6 - 102.9 fL    MCH 31.1 25.2 - 33.5 pg    MCHC 30.8 28.4 - 34.8 g/dL    RDW 21.3 (H) 11.8 - 14.4 %    Platelets 291 370 - 383 k/uL    MPV 10.0 8.1 - 13.5 fL    NRBC Automated 0.0 0.0 per 100 WBC   Protime-INR   Result Value Ref Range    Protime 14.6 (H) 11.5 - 14.2 sec    INR 1.2    CBC Auto Differential   Result Value Ref Range    WBC 0.8 (LL) 3.5 - 11.3 k/uL    RBC 2.36 (L) 3.95 - 5.11 m/uL    Hemoglobin 7.3 (L) 11.9 - 15.1 g/dL    Hematocrit 22.9 (L) 36.3 - 47.1 %    MCV 97.0 82.6 - 102.9 fL    MCH 30.9 25.2 - 33.5 pg    MCHC 31.9 28.4 - 34.8 g/dL    RDW 20.9 (H) 11.8 - 14.4 %    Platelets 196 799 - 039 k/uL    MPV 9.6 8.1 - 13.5 fL    NRBC Automated 0.0 0.0 per 100 WBC    Differential Type NOT REPORTED     WBC Morphology NOT REPORTED     RBC Morphology NOT REPORTED     Platelet Estimate NOT REPORTED     Seg Neutrophils 45 36 - 66 %    Lymphocytes 45 (H) 24 - 44 %    Monocytes 5 1 - 7 %    Eosinophils % 0 (L) 1 - 4 %    Basophils 2 %    Immature Granulocytes 3 (H) 0 %    Segs Absolute 0.36 (L) 1.8 - 7.7 k/uL    Absolute Lymph # 0.36 (L) 1.0 - 4.8 k/uL    Absolute Mono # 0.04 (L) 0.2 - 0.8 k/uL    Absolute Eos # 0.00 0.0 - 0.4 k/uL    Basophils Absolute 0.02 0.0 - 0.2 k/uL    Absolute Immature Granulocyte 0.02 0.00 - 0.30 k/uL    Morphology ANISOCYTOSIS PRESENT     Morphology TOXIC GRANULATION PRESENT    EKG 12 Lead   Result Value Ref Range    Ventricular Rate 98 BPM    Atrial Rate 98 BPM    P-R Interval 160 ms    QRS Duration 84 ms    Q-T Interval 330 ms    QTc Calculation (Bazett) 421 ms    P Axis 45 degrees    R Axis 53 degrees    T Axis 46 degrees         IMAGING DATA:    Xr Chest Portable    Result Date: 5/20/2020  EXAMINATION: ONE XRAY VIEW OF THE CHEST 5/20/2020 10:52 pm COMPARISON: April 3, 2020 HISTORY: ORDERING SYSTEM PROVIDED HISTORY: cough, fever TECHNOLOGIST PROVIDED HISTORY: cough, fever Reason for Exam: cough Acuity: Acute Type of Exam: Initial FINDINGS: Cardiac silhouette is mildly enlarged. Lungs appear clear. No acute bony abnormality. No acute findings     Xr Chest Portable    Result Date: 4/26/2020  EXAMINATION: ONE XRAY VIEW OF THE CHEST 4/26/2020 3:39 pm COMPARISON: 04/03/2020 HISTORY: ORDERING SYSTEM PROVIDED HISTORY: SOB TECHNOLOGIST PROVIDED HISTORY: SOB Reason for Exam: SOB, fever Acuity: Acute Type of Exam: Initial FINDINGS: The lungs are clear. The cardiac and mediastinal contours are normal.  There is no pleural effusion or pneumothorax. No acute osseous abnormality is identified. No acute cardiopulmonary abnormality. IMPRESSION:   Primary Problem  Acute myeloid leukemia not having achieved remission [facility])    Active Hospital Problems    Diagnosis Date Noted    Chronic deep vein thrombosis (DVT) of femoral vein of left lower extremity (HCC) [I82.512] 05/22/2020    Neutropenic fever ([facility] [address] [D70.9, R50.81] 05/21/2020    Acute myeloid leukemia not having achieved remission ([address] [C92.00] 04/14/2020    Pancytopenia ([address] [D61.818] 04/03/2020    Hyperlipidemia [E78.5] 08/31/2015    Essential hypertension [I10] 05/16/2012         RECOMMENDATIONS:  Pt has high grade MDS and AML, on vidaza plus venetoclax. Pancytopenia  secondary to MDS  Had a reaction to the current antifungal, the plan is posaconazole.   There is a interaction between venetoclax and posaconazole, she is off venetoclax right now and we plan to resume it with dose reduction as an outpatient  We will follow with you, transfusion support as needed to keep hemoglobin above 7 and platelets above 10       Bela Todd MD  Hematologist/Medical Oncologist  Cell: [phone]            This note is created with the assistance of a speech recognition program.  While intending to generate a document that actually reflects the content of the visit, the document

## 2020-05-23 NOTE — PROGRESS NOTES
[facility]  Progress Note    Shantal Howell  MRN: [id number]  Date: 5/23/2020    Reason for F/U :   Neutropenic fever    Impression :   1. Neutropenic fever -fevers persist  2. AML on the Vidaza and venetoclax  3. Pancytopenia related to chemotherapy  4. Right lower extremity multiple subacute DVTs  5. New onset erythematous rash-etiology is not entirely clear    Recommendations:   · The patient continues on meropenem and acyclovir  · The patient does have persistent fevers and has chronic neutropenia and is definitely at high risk for fungal infections. · At this point in time I will add anidulafungin to the regimen but the hope would be to switch her to posaconazole at discharge  · The patient is on granulocyte colony-stimulating factors per the oncology service -it is unclear if the rash is related to this medication  · We will continue to follow the temperature curve and the evolution of the rash    Infection Control Recommendations:   Neutropenic precautions    Discharge Planning:   Estimated Length of IV antimicrobials: To be determined  Patient will need Midline Catheter Insertion/ PICC line Insertion: No  Patient will need: Home IV , [place],  SNF,  LTAC: Undetermined  Patient willneed outpatient wound care: No    MedicalDecision making / Summary of Stay:   Shantal Howell is a 78y.o.-year-old female presented to the hospital with a fever for 2 days associated with nonproductive cough and right calf pain. Patient was initially seen at the infusion center had temperature of 100.1 was started on oral Levaquin , then she developed a fever at home with temp of 101  She denied any headache, no sore throat, no shortness of breath,no N/V/D,no other complaints.   COVID-19 negative    Current evaluation:5/23/2020    BP (!) 118/52   Pulse 86   Temp 98.4 °F (36.9 °C) (Oral)   Resp 16   Ht 5' 10\" (1.778 m)   Wt 187 lb 9.6 oz (85.1 kg)   SpO2 100%   BMI 26.92 kg/m² thrombosis in the right lower extremity.    No evidence of superficial or deep venous thrombosis in the left lower    extremity.             ONE XRAY VIEW OF THE CHEST 5/20/2020 10:52 pm  Impression   No acute findings       Cultures:     Culture, Blood 1 [[id number]] Collected: 05/20/20 2234   Order Status: Completed Specimen: Blood Updated: 05/23/20 0607    Specimen Description . BLOOD    Special Requests LT AC 10 ML    Culture NO GROWTH 2 DAYS   Culture, Blood 1 [[id number]] Collected: 05/20/20 2234   Order Status: Completed Specimen: Blood Updated: 05/23/20 4186    Specimen Description . BLOOD    Special Requests RT AC 10 ML    Culture NO GROWTH 2 DAYS       Respiratory Virus PCR Panel [[id number]] Collected: 05/21/20 2120   Order Status: Completed Specimen: Nasopharyngeal Swab Updated: 05/22/20 0909    Specimen Description . NASOPHARYNGEAL SWAB    Adenovirus PCR Not Detected    Coronavirus 229E PCR Not Detected    Comment: Coronoviruses detected by this panel are those associated with the clinical common cold. This test will not detect 2019-SARS-COV-2 or other novel coronaviruses. Coronavirus HKU1 PCR Not Detected    Comment: Coronoviruses detected by this panel are those associated with the clinical common cold. This test will not detect 2019-SARS-COV-2 or other novel coronaviruses. Coronavirus NL63 PCR Not Detected    Comment: Coronoviruses detected by this panel are those associated with the clinical common cold. This test will not detect 2019-SARS-COV-2 or other novel coronaviruses. Coronavirus OC43 PCR Not Detected    Comment: Coronoviruses detected by this panel are those associated with the clinical common cold. This test will not detect 2019-SARS-COV-2 or other novel coronaviruses.         Human Metapneumovirus PCR Not Detected    Rhino/Enterovirus PCR Not Detected    Influenza A by PCR Not Detected    Influenza A H1 PCR NOT REPORTED    Influenza A H1 (2009) PCR NOT REPORTED Influenza A H3 PCR NOT REPORTED    Influenza B by PCR Not Detected    Parainfluenza 1 PCR Not Detected    Parainfluenza 2 PCR Not Detected    Parainfluenza 3 PCR Not Detected    Parainfluenza 4 PCR Not Detected    Resp Syncytial Virus PCR Not Detected    Bordetella Parapertussis Not Detected    B Pertussis by PCR Not Detected    Chlamydia pneumoniae By PCR Not Detected    Mycoplasma pneumo by PCR Not Detected    Comment: Performed by multiplexed nucleic acid assay. Medications:      enoxaparin  1 mg/kg Subcutaneous BID    allopurinol  300 mg Oral Daily    cetirizine  10 mg Oral Daily    estradiol  0.5 mg Oral Daily    metoprolol tartrate  50 mg Oral Daily    therapeutic multivitamin-minerals  1 tablet Oral Daily    pravastatin  40 mg Oral Daily    Vitamin D  3,000 Units Oral Daily    sodium chloride flush  10 mL Intravenous 2 times per day    meropenem  1 g Intravenous Q8H    acyclovir  400 mg Oral BID           [facility]  Vanessa Hills MD  Perfect Serve messaging  OFFICE: [phone]      Electronically signed by Vanessa Hills MD on 5/23/2020 at 1:16 PM  Thank you for allowing us to participate in the care of this patient. Please call with questions. Yessenia Restrepo is a 78 y.o. female being evaluated by a Virtual Visit (video visit) encounter to address concerns as mentioned above. A caregiver was present when appropriate. Due to this being a TeleHealth encounter (During [facility]- public health emergency), evaluation of the following organ systems was limited: Vitals/Constitutional/EENT/Resp/CV/GI//MS/Neuro/Skin/Heme-Lymph-Imm.     Pursuant to the emergency declaration under the [address] [place] [address], [address] [place] [address] authority and the [employer] Act, this Virtual Visit was conducted with patient's (and/or legal guardian's) consent, to reduce the patient's risk of exposure

## 2020-05-23 NOTE — PROGRESS NOTES
Pt c/o chest tightness, lower back ache and. Writer observed shakiness and flushed throughout body. almost 20 mins into new infusion Eraxis. infusion stopped Vitals obtained, pt placed in bed.  Dr. Grupo Guillory notified

## 2020-05-23 NOTE — PROGRESS NOTES
Stomach ache    Cefuroxime Axetil Hives    Biaxin [Clarithromycin] Rash    Sulfa Antibiotics Hives       Current Meds:   Scheduled Meds:    enoxaparin  1 mg/kg Subcutaneous BID    allopurinol  300 mg Oral Daily    cetirizine  10 mg Oral Daily    estradiol  0.5 mg Oral Daily    metoprolol tartrate  50 mg Oral Daily    therapeutic multivitamin-minerals  1 tablet Oral Daily    pravastatin  40 mg Oral Daily    Vitamin D  3,000 Units Oral Daily    sodium chloride flush  10 mL Intravenous 2 times per day    meropenem  1 g Intravenous Q8H    acyclovir  400 mg Oral BID     Continuous Infusions:   PRN Meds: diphenhydrAMINE, azelastine, sodium chloride flush, potassium chloride **OR** potassium alternative oral replacement **OR** potassium chloride, magnesium sulfate, acetaminophen **OR** acetaminophen, polyethylene glycol, promethazine **OR** ondansetron, nicotine    Data:     Past Medical History:   has a past medical history of Arthritis, Cancer ([facility] [address] Hyperlipidemia, Hypertension, Leukemia ([facility] [address] Pneumonia, and Seasonal allergies. Social History:   reports that she has never smoked. She has never used smokeless tobacco. She reports current alcohol use. She reports that she does not use drugs. Family History:   Family History   Problem Relation Age of Onset    Alzheimer's Disease Mother     Heart Disease Father     Diabetes Father     Cancer Brother        Vitals:  BP (!) 101/50   Pulse 73   Temp 98.2 °F (36.8 °C) (Oral)   Resp 16   Ht 5' 10\" (1.778 m)   Wt 187 lb 9.6 oz (85.1 kg)   SpO2 96%   BMI 26.92 kg/m²   Temp (24hrs), Av.9 °F (37.7 °C), Min:98.2 °F (36.8 °C), Max:102.8 °F (39.3 °C)    No results for input(s): POCGLU in the last 72 hours. I/O (24Hr):     Intake/Output Summary (Last 24 hours) at 2020 1114  Last data filed at 2020 0831  Gross per 24 hour   Intake 240 ml   Output --   Net 240 ml         Review of Systems:     Review of Systems   Constitutional:

## 2020-05-23 NOTE — PLAN OF CARE
Problem: PROTECTIVE PRECAUTIONS  Goal: Patient will remain free of nosocomial Infections  5/23/2020 1257 by Campos Barba RN  Outcome: Ongoing  5/23/2020 0217 by Liam Shah RN  Outcome: Ongoing     Problem: SAFETY  Goal: Free from accidental physical injury  5/23/2020 1257 by Campos Barba RN  Outcome: Ongoing  5/23/2020 0217 by Liam Shah RN  Outcome: Ongoing     Problem: DAILY CARE  Goal: Daily care needs are met  5/23/2020 1257 by Campos Barba RN  Outcome: Ongoing  5/23/2020 0217 by Liam Shah RN  Outcome: Ongoing     Problem: PAIN  Goal: Patient's pain/discomfort is manageable  5/23/2020 1257 by Campos Barba RN  Outcome: Ongoing  5/23/2020 0217 by Liam Shah RN  Outcome: Ongoing     Problem: KNOWLEDGE DEFICIT  Goal: Patient/S.O. demonstrates understanding of disease process, treatment plan, medications, and discharge instructions.   5/23/2020 1257 by Campos Barba RN  Outcome: Ongoing  5/23/2020 0217 by Liam Shah RN  Outcome: Ongoing     Problem: DISCHARGE BARRIERS  Goal: Patient's continuum of care needs are met  5/23/2020 1257 by Campos Barba RN  Outcome: Ongoing  5/23/2020 0217 by Liam Shah RN  Outcome: Ongoing     Problem: Falls - Risk of:  Goal: Will remain free from falls  Description: Will remain free from falls  Outcome: Ongoing  Goal: Absence of physical injury  Description: Absence of physical injury  Outcome: Ongoing

## 2020-05-23 NOTE — PLAN OF CARE
Problem: PROTECTIVE PRECAUTIONS  Goal: Patient will remain free of nosocomial Infections  Outcome: Ongoing     Problem: SAFETY  Goal: Free from accidental physical injury  Outcome: Ongoing     Problem: DAILY CARE  Goal: Daily care needs are met  Outcome: Ongoing     Problem: PAIN  Goal: Patient's pain/discomfort is manageable  Outcome: Ongoing     Problem: KNOWLEDGE DEFICIT  Goal: Patient/S.O. demonstrates understanding of disease process, treatment plan, medications, and discharge instructions.   Outcome: Ongoing     Problem: DISCHARGE BARRIERS  Goal: Patient's continuum of care needs are met  Outcome: Ongoing

## 2020-05-24 LAB
ABSOLUTE EOS #: 0 K/UL (ref 0–0.4)
ABSOLUTE IMMATURE GRANULOCYTE: 0.02 K/UL (ref 0–0.3)
ABSOLUTE LYMPH #: 0.23 K/UL (ref 1–4.8)
ABSOLUTE MONO #: 0.22 K/UL (ref 0.2–0.8)
BASOPHILS # BLD: 2 %
BASOPHILS ABSOLUTE: 0.04 K/UL (ref 0–0.2)
DIFFERENTIAL TYPE: ABNORMAL
EOSINOPHILS RELATIVE PERCENT: 0 % (ref 1–4)
HCT VFR BLD CALC: 24.6 % (ref 36.3–47.1)
HEMOGLOBIN: 7.8 G/DL (ref 11.9–15.1)
IMMATURE GRANULOCYTES: 1 %
LYMPHOCYTES # BLD: 13 % (ref 24–44)
MCH RBC QN AUTO: 30.6 PG (ref 25.2–33.5)
MCHC RBC AUTO-ENTMCNC: 31.7 G/DL (ref 28.4–34.8)
MCV RBC AUTO: 96.5 FL (ref 82.6–102.9)
MONOCYTES # BLD: 12 % (ref 1–7)
MORPHOLOGY: ABNORMAL
MORPHOLOGY: ABNORMAL
NRBC AUTOMATED: 0 PER 100 WBC
PDW BLD-RTO: 21 % (ref 11.8–14.4)
PLATELET # BLD: 607 K/UL (ref 138–453)
PLATELET ESTIMATE: ABNORMAL
PMV BLD AUTO: 9.7 FL (ref 8.1–13.5)
RBC # BLD: 2.55 M/UL (ref 3.95–5.11)
RBC # BLD: ABNORMAL 10*6/UL
SEG NEUTROPHILS: 72 % (ref 36–66)
SEGMENTED NEUTROPHILS ABSOLUTE COUNT: 1.29 K/UL (ref 1.8–7.7)
WBC # BLD: 1.8 K/UL (ref 3.5–11.3)
WBC # BLD: ABNORMAL 10*3/UL

## 2020-05-24 PROCEDURE — 6360000002 HC RX W HCPCS: Performed by: INTERNAL MEDICINE

## 2020-05-24 PROCEDURE — 99232 SBSQ HOSP IP/OBS MODERATE 35: CPT | Performed by: INTERNAL MEDICINE

## 2020-05-24 PROCEDURE — 6370000000 HC RX 637 (ALT 250 FOR IP): Performed by: INTERNAL MEDICINE

## 2020-05-24 PROCEDURE — 6370000000 HC RX 637 (ALT 250 FOR IP): Performed by: NURSE PRACTITIONER

## 2020-05-24 PROCEDURE — 1200000000 HC SEMI PRIVATE

## 2020-05-24 PROCEDURE — 36415 COLL VENOUS BLD VENIPUNCTURE: CPT

## 2020-05-24 PROCEDURE — 6360000002 HC RX W HCPCS: Performed by: NURSE PRACTITIONER

## 2020-05-24 PROCEDURE — 2580000003 HC RX 258: Performed by: NURSE PRACTITIONER

## 2020-05-24 PROCEDURE — 85025 COMPLETE CBC W/AUTO DIFF WBC: CPT

## 2020-05-24 RX ADMIN — ENOXAPARIN SODIUM 90 MG: 100 INJECTION SUBCUTANEOUS at 20:31

## 2020-05-24 RX ADMIN — ITRACONAZOLE 200 MG: 100 CAPSULE, COATED PELLETS ORAL at 08:49

## 2020-05-24 RX ADMIN — ITRACONAZOLE 200 MG: 100 CAPSULE, COATED PELLETS ORAL at 17:10

## 2020-05-24 RX ADMIN — MEROPENEM 1 G: 1 INJECTION, POWDER, FOR SOLUTION INTRAVENOUS at 20:18

## 2020-05-24 RX ADMIN — Medication 10 ML: at 12:08

## 2020-05-24 RX ADMIN — MELATONIN 3000 UNITS: at 08:50

## 2020-05-24 RX ADMIN — MEROPENEM 1 G: 1 INJECTION, POWDER, FOR SOLUTION INTRAVENOUS at 12:08

## 2020-05-24 RX ADMIN — PRAVASTATIN SODIUM 40 MG: 40 TABLET ORAL at 20:18

## 2020-05-24 RX ADMIN — ESTRADIOL 0.5 MG: 1 TABLET ORAL at 08:50

## 2020-05-24 RX ADMIN — Medication 10 ML: at 20:12

## 2020-05-24 RX ADMIN — ACYCLOVIR 400 MG: 200 CAPSULE ORAL at 08:49

## 2020-05-24 RX ADMIN — METHYLPREDNISOLONE SODIUM SUCCINATE 40 MG: 40 INJECTION, POWDER, FOR SOLUTION INTRAMUSCULAR; INTRAVENOUS at 04:13

## 2020-05-24 RX ADMIN — MULTIPLE VITAMINS W/ MINERALS TAB 1 TABLET: TAB at 08:50

## 2020-05-24 RX ADMIN — ENOXAPARIN SODIUM 90 MG: 100 INJECTION SUBCUTANEOUS at 08:51

## 2020-05-24 RX ADMIN — METOPROLOL TARTRATE 50 MG: 50 TABLET, FILM COATED ORAL at 08:50

## 2020-05-24 RX ADMIN — CETIRIZINE HYDROCHLORIDE 10 MG: 10 TABLET, FILM COATED ORAL at 08:50

## 2020-05-24 RX ADMIN — ACYCLOVIR 400 MG: 200 CAPSULE ORAL at 20:30

## 2020-05-24 RX ADMIN — MEROPENEM 1 G: 1 INJECTION, POWDER, FOR SOLUTION INTRAVENOUS at 04:13

## 2020-05-24 RX ADMIN — ALLOPURINOL 300 MG: 300 TABLET ORAL at 08:50

## 2020-05-24 ASSESSMENT — PAIN SCALES - GENERAL: PAINLEVEL_OUTOF10: 0

## 2020-05-24 ASSESSMENT — ENCOUNTER SYMPTOMS
NAUSEA: 0
GASTROINTESTINAL NEGATIVE: 1
COUGH: 0
VOMITING: 0
RESPIRATORY NEGATIVE: 1
ALLERGIC/IMMUNOLOGIC NEGATIVE: 1
SHORTNESS OF BREATH: 0
ABDOMINAL PAIN: 0

## 2020-05-24 NOTE — PROGRESS NOTES
[facility]  Progress Note    Clau Aguillon  MRN: [id number]  Date: 5/24/2020    Reason for F/U :   Neutropenic fever    Impression :   1. Neutropenic fever   2. AML on the Vidaza and venetoclax  3. Pancytopenia related to chemotherapy  4. Right lower extremity multiple subacute DVTs  5. New onset erythematous rash-etiology is not entirely clear    Recommendations:   · The patient continues on meropenem, itraconazole and acyclovir  · The patient has been afebrile for over 24 hours now. · The rash is starting to resolve and may well have been related to the Granix  · The white blood cell count is at 1.8 which is the highest that have seen it in the time that she has been hospitalized  · Clinically she is doing well and from an infectious disease standpoint given she has negative blood cultures and her fevers remain resolved she could potentially be discharged on levofloxacin and I itraconazole if all other services agree    Infection Control Recommendations:   Neutropenic precautions    Discharge Planning:   Estimated Length of IV antimicrobials: To be determined  Patient will need Midline Catheter Insertion/ PICC line Insertion: No  Patient will need: Home IV , [place],  SNF,  LTAC: Undetermined  Patient willneed outpatient wound care: No    MedicalDecision making / Summary of Stay:   Clau Aguillon is a 78y.o.-year-old female presented to the hospital with a fever for 2 days associated with nonproductive cough and right calf pain. Patient was initially seen at the infusion center had temperature of 100.1 was started on oral Levaquin , then she developed a fever at home with temp of 101  She denied any headache, no sore throat, no shortness of breath,no N/V/D,no other complaints.   COVID-19 negative    The patient did develop an erythematous rash on the extremities as well as the back [5/22/2020] and initially felt related to the Granix, though there was concern this could be

## 2020-05-24 NOTE — PROGRESS NOTES
hold due to thrombocytopenia  Blood Pressure - Monitor and control     Patient did report calf tightness for several days  Vascular studies have revealed DVT    Dopplers:   Sub-acute deep vein thrombosis of the right leg involving the femoral   popliteal and peroneal veins. No evidence of superficial venous thrombosis in the right lower extremity. No evidence of superficial or deep venous thrombosis in the left lower   extremity. Lovenox therapeutic dose initiated    Specimen Source: Nasopharyngeal Swab Specimen Description . NASOPHARYNGEAL SWAB Adenovirus PCR Not Detected Coronavirus 229E PCR Not Detected   Comment: Coronoviruses detected by this panel are those associated with the clinical common cold. This test will not detect 2019-SARS-COV-2 or other novel coronaviruses. Coronavirus HKU1 PCR Not Detected   Comment: Coronoviruses detected by this panel are those associated with the clinical common cold. This test will not detect 2019-SARS-COV-2 or other novel coronaviruses. Coronavirus NL63 PCR Not Detected   Comment: Coronoviruses detected by this panel are those associated with the clinical common cold. This test will not detect 2019-SARS-COV-2 or other novel coronaviruses. Coronavirus OC43 PCR Not Detected   Comment: Coronoviruses detected by this panel are those associated with the clinical common cold. This test will not detect 2019-SARS-COV-2 or other novel coronaviruses.     Human Metapneumovirus PCR Not Detected Rhino/Enterovirus PCR Not Detected Influenza A by PCR Not Detected Influenza A H1 PCR NOT REPORTED Influenza A H1 (2009) PCR NOT REPORTED Influenza A H3 PCR NOT REPORTED Influenza B by PCR Not Detected Parainfluenza 1 PCR Not Detected Parainfluenza 2 PCR Not Detected Parainfluenza 3 PCR Not Detected Parainfluenza 4 PCR Not Detected Resp Syncytial Virus PCR Not Detected Bordetella Parapertussis Not Detected B Pertussis by PCR Not Detected Chlamydia pneumoniae By PCR Not

## 2020-05-24 NOTE — PROGRESS NOTES
Learning About the Safe Use of Antibiotics  Introduction  Antibiotics are drugs used to kill bacteria. Bacteria can cause infections. These include strep throat, ear infections, and pneumonia. These medicines can't cure everything. They don't kill viruses or help with allergies. They don't help illnesses such as the common cold, the flu, or a runny nose. And they can cause side effects. There are many types of antibiotics. Your doctor will decide which one will work best for your infection. Examples include:  · Amoxicillin. · Cephalexin (Keflex). · Ciprofloxacin (Cipro). What are the possible side effects? Side effects can include:  · Nausea. · Diarrhea. · Skin rash. · Yeast infection. · A severe allergic reaction. It may cause itching, swelling, and breathing problems. This is rare. You may have other side effects or reactions not listed here. Check the information that comes with your medicine. Should you take antibiotics just in case? Don't take antibiotics when you don't need them. If you do that, they may not work when you do need them. Each time you take antibiotics, you are more likely to have some bacteria that survive and aren't killed by the medicine. Bacteria that don't die can change and become even harder to kill. These are called antibiotic-resistant bacteria. They can cause longer and more serious infections. To treat them, you may need different, stronger antibiotics that have more side effects and may cost more. So always ask your doctor if antibiotics are the best treatment. Explain that you do not want antibiotics unless you need them. Help protect the community  Using antibiotics when they're not needed leads to the development of antibiotic-resistant bacteria. These tougher bacteria can spread to family members, children, and coworkers. People in your community will have a risk of getting an infection that is harder to cure and that costs more to treat.   How can you take antibiotics safely? Be safe with medicine. Take your antibiotics as directed. Do not stop taking them just because you feel better. You need to take the full course of medicine. This will help make sure your infection is cured. It will also help prevent the growth of antibiotic-resistant bacteria. Always take the exact amount that the label says to take. If the label says to take the medicine at a certain time, follow those directions. You might feel better after you take an antibiotic for a few days. But it is important to keep taking it for as long as prescribed. That will help you get rid of those bacteria that are a bit stronger and that survive the first few days of treatment. Where can you learn more? Go to [email or web address] and sign in to your [employer] account. Enter L188 in the [employer] box to learn more about \"Learning About the Safe Use of Antibiotics. \"     If you do not have an account, please click on the \"Sign Up Now\" link. Current as of: March 3, 2017  Content Version: 11.3  © 1275-5339 [employer]. Care instructions adapted under license by [facility] ([facility]). If you have questions about a medical condition or this instruction, always ask your healthcare professional. [place] [address] any warranty or liability for your use of this information. Antibiotics are powerful drugs that are generally safe and very helpful in fighting disease, but there are times when antibiotics can actually be harmful. Antibiotics can have side effects, including allergic reactions and a potentially deadly diarrhea caused by the bacteria Clostridium difficile (C. diff). Antibiotics can also interfere with the action of other drugs a patient may be taking for another condition. These unintended reactions to antibiotics are called adverse drug events.    When someone takes an antibiotic that they do not need, they are needlessly exposed to the side

## 2020-05-25 VITALS
SYSTOLIC BLOOD PRESSURE: 118 MMHG | DIASTOLIC BLOOD PRESSURE: 58 MMHG | OXYGEN SATURATION: 96 % | WEIGHT: 195.2 LBS | TEMPERATURE: 97.9 F | HEART RATE: 65 BPM | HEIGHT: 70 IN | RESPIRATION RATE: 16 BRPM | BODY MASS INDEX: 27.94 KG/M2

## 2020-05-25 LAB
ABSOLUTE EOS #: 0 K/UL (ref 0–0.4)
ABSOLUTE IMMATURE GRANULOCYTE: 0.98 K/UL (ref 0–0.3)
ABSOLUTE LYMPH #: 1.04 K/UL (ref 1–4.8)
ABSOLUTE MONO #: 1.43 K/UL (ref 0.2–0.8)
BASOPHILS # BLD: 1 %
BASOPHILS ABSOLUTE: 0.07 K/UL (ref 0–0.2)
DIFFERENTIAL TYPE: ABNORMAL
EOSINOPHILS RELATIVE PERCENT: 0 % (ref 1–4)
HCT VFR BLD CALC: 24.7 % (ref 36.3–47.1)
HEMOGLOBIN: 7.7 G/DL (ref 11.9–15.1)
IMMATURE GRANULOCYTES: 15 %
LYMPHOCYTES # BLD: 16 % (ref 24–44)
MCH RBC QN AUTO: 30.7 PG (ref 25.2–33.5)
MCHC RBC AUTO-ENTMCNC: 31.2 G/DL (ref 28.4–34.8)
MCV RBC AUTO: 98.4 FL (ref 82.6–102.9)
MONOCYTES # BLD: 22 % (ref 1–7)
MORPHOLOGY: ABNORMAL
MORPHOLOGY: ABNORMAL
NRBC AUTOMATED: 2 PER 100 WBC
PDW BLD-RTO: 21.9 % (ref 11.8–14.4)
PLATELET # BLD: 802 K/UL (ref 138–453)
PLATELET ESTIMATE: ABNORMAL
PMV BLD AUTO: 9.8 FL (ref 8.1–13.5)
RBC # BLD: 2.51 M/UL (ref 3.95–5.11)
RBC # BLD: ABNORMAL 10*6/UL
SEG NEUTROPHILS: 46 % (ref 36–66)
SEGMENTED NEUTROPHILS ABSOLUTE COUNT: 2.98 K/UL (ref 1.8–7.7)
WBC # BLD: 6.5 K/UL (ref 3.5–11.3)
WBC # BLD: ABNORMAL 10*3/UL

## 2020-05-25 PROCEDURE — 6370000000 HC RX 637 (ALT 250 FOR IP): Performed by: INTERNAL MEDICINE

## 2020-05-25 PROCEDURE — 99231 SBSQ HOSP IP/OBS SF/LOW 25: CPT | Performed by: INTERNAL MEDICINE

## 2020-05-25 PROCEDURE — 36415 COLL VENOUS BLD VENIPUNCTURE: CPT

## 2020-05-25 PROCEDURE — 2580000003 HC RX 258: Performed by: NURSE PRACTITIONER

## 2020-05-25 PROCEDURE — 6370000000 HC RX 637 (ALT 250 FOR IP): Performed by: NURSE PRACTITIONER

## 2020-05-25 PROCEDURE — 6360000002 HC RX W HCPCS: Performed by: INTERNAL MEDICINE

## 2020-05-25 PROCEDURE — 6360000002 HC RX W HCPCS: Performed by: NURSE PRACTITIONER

## 2020-05-25 PROCEDURE — 85025 COMPLETE CBC W/AUTO DIFF WBC: CPT

## 2020-05-25 PROCEDURE — 99232 SBSQ HOSP IP/OBS MODERATE 35: CPT | Performed by: INTERNAL MEDICINE

## 2020-05-25 RX ORDER — ITRACONAZOLE 100 MG/1
200 CAPSULE ORAL 2 TIMES DAILY WITH MEALS
Qty: 56 CAPSULE | Refills: 1 | Status: SHIPPED | OUTPATIENT
Start: 2020-05-25 | End: 2020-06-08

## 2020-05-25 RX ORDER — ACYCLOVIR 400 MG/1
400 TABLET ORAL EVERY 12 HOURS
Qty: 60 TABLET | Refills: 0 | Status: SHIPPED | OUTPATIENT
Start: 2020-05-25 | End: 2020-06-24

## 2020-05-25 RX ORDER — LEVOFLOXACIN 500 MG/1
500 TABLET, FILM COATED ORAL DAILY
Qty: 10 TABLET | Refills: 0 | Status: SHIPPED | OUTPATIENT
Start: 2020-05-25 | End: 2020-06-04

## 2020-05-25 RX ORDER — LEVOFLOXACIN 500 MG/1
500 TABLET, FILM COATED ORAL DAILY
Status: DISCONTINUED | OUTPATIENT
Start: 2020-05-25 | End: 2020-05-25 | Stop reason: HOSPADM

## 2020-05-25 RX ADMIN — ITRACONAZOLE 200 MG: 100 CAPSULE, COATED PELLETS ORAL at 09:00

## 2020-05-25 RX ADMIN — LEVOFLOXACIN 500 MG: 500 TABLET, FILM COATED ORAL at 11:40

## 2020-05-25 RX ADMIN — ACYCLOVIR 400 MG: 200 CAPSULE ORAL at 09:00

## 2020-05-25 RX ADMIN — MELATONIN 3000 UNITS: at 08:59

## 2020-05-25 RX ADMIN — CETIRIZINE HYDROCHLORIDE 10 MG: 10 TABLET, FILM COATED ORAL at 09:01

## 2020-05-25 RX ADMIN — MULTIPLE VITAMINS W/ MINERALS TAB 1 TABLET: TAB at 09:01

## 2020-05-25 RX ADMIN — ALLOPURINOL 300 MG: 300 TABLET ORAL at 09:00

## 2020-05-25 RX ADMIN — ESTRADIOL 0.5 MG: 1 TABLET ORAL at 09:00

## 2020-05-25 RX ADMIN — METOPROLOL TARTRATE 50 MG: 50 TABLET, FILM COATED ORAL at 09:01

## 2020-05-25 RX ADMIN — ENOXAPARIN SODIUM 90 MG: 100 INJECTION SUBCUTANEOUS at 09:01

## 2020-05-25 RX ADMIN — MEROPENEM 1 G: 1 INJECTION, POWDER, FOR SOLUTION INTRAVENOUS at 04:09

## 2020-05-25 ASSESSMENT — ENCOUNTER SYMPTOMS
ABDOMINAL PAIN: 0
COUGH: 0
NAUSEA: 0
ABDOMINAL DISTENTION: 0
SHORTNESS OF BREATH: 0
RESPIRATORY NEGATIVE: 1
VOMITING: 0
WHEEZING: 0
GASTROINTESTINAL NEGATIVE: 1
PHOTOPHOBIA: 0
ALLERGIC/IMMUNOLOGIC NEGATIVE: 1
BLOOD IN STOOL: 0

## 2020-05-25 ASSESSMENT — PAIN SCALES - GENERAL: PAINLEVEL_OUTOF10: 0

## 2020-05-25 NOTE — PROGRESS NOTES
[address]    Progress Note    5/25/2020    10:55 AM    Name:   Marlon Gilbert  MRN:     [id number]     [place]:      [de-identified]   Room:   2016/2016-02  IP Day:  4  Admit Date:  5/20/2020  9:35 PM    PCP:   Rama Cassidy MD  Code Status:  Full Code    Subjective:     C/C:   Chief Complaint   Patient presents with    Chills    Fever     pt has leukemia, told to come in if temp is 100.5 F     Insect Bite     left hand     Interval History Status:   Comfortable  No chills or sweats  Has been afebrile  Hopes to go home. .. Data Base Updates:  WBC 1.8Low   Hemoglobin 7.8Low   Platelets [id number]     Cultures remain negative  Specimen Source: Blood Specimen Description . BLOOD Special Requests RT AC 10 ML Culture NO GROWTH 3 DAYS     Brief History:     Additional Comments:   76-year-old female admitted through the emergency room with fever  Known history of AML  Has just started her first course of chemo  The patient was having fevers of 101.1  At this time no infectious symptoms  She has had a history of chronic congestion  There has been a nonproductive cough     It appears that she was discharged on 4/29 with similar symptomatology  She had pancytopenia, fevers   Cultures at that time were negative     Initial exam revealed:  Patient awake and orientated  Heart is regular in rate and rhythm  Lungs are clear, no rales or rhonchi /wheeze  Abdomen is soft, nontender, bowel sounds present  Extremities no edema, no tenderness  No gross motor or sensory deficits  No cyanosis  There is an area of erythema overlying her left thumb MCP joint, she has been applying Neosporin ointment     Patient has been admitted  Hematology-oncology consulted  Cultures have been initiated  Blood products as needed  Antibiotics per Infectious Disease service   Neutropenic precautions  Heparin/Lovenox on hold due to thrombocytopenia  Blood Pressure - Monitor and control Eraxis infusion the patient became flushed and developed back pain  The Eraxis has been discontinued  She was transitioned to Sporanox  Steroids were ordered  Benadryl ordered    The patient responded well to therapy  Cultures have remained negative  DC planning was initiated  The patient was instructed to follow up with their PCP, Crystal James MD in one week      Medications: Allergies: Allergies   Allergen Reactions    Amoxicillin-Pot Clavulanate      Stomach ache    Cefuroxime Axetil Hives    Eraxis [Anidulafungin] Other (See Comments)     Flushing, lower back pain, chest tightness    Biaxin [Clarithromycin] Rash    Sulfa Antibiotics Hives       Current Meds:   Scheduled Meds:    itraconazole  200 mg Oral BID WC    enoxaparin  1 mg/kg Subcutaneous BID    allopurinol  300 mg Oral Daily    cetirizine  10 mg Oral Daily    estradiol  0.5 mg Oral Daily    metoprolol tartrate  50 mg Oral Daily    therapeutic multivitamin-minerals  1 tablet Oral Daily    pravastatin  40 mg Oral Daily    Vitamin D  3,000 Units Oral Daily    sodium chloride flush  10 mL Intravenous 2 times per day    meropenem  1 g Intravenous Q8H    acyclovir  400 mg Oral BID     Continuous Infusions:   PRN Meds: diphenhydrAMINE, azelastine, sodium chloride flush, potassium chloride **OR** potassium alternative oral replacement **OR** potassium chloride, magnesium sulfate, acetaminophen **OR** acetaminophen, polyethylene glycol, promethazine **OR** ondansetron, nicotine    Data:     Past Medical History:   has a past medical history of Arthritis, Cancer ([facility] [address] Hyperlipidemia, Hypertension, Leukemia ([facility] [address] Pneumonia, and Seasonal allergies. Social History:   reports that she has never smoked. She has never used smokeless tobacco. She reports current alcohol use. She reports that she does not use drugs.      Family History:   Family History   Problem Relation Age of Onset    Alzheimer's Disease Mother     Heart Disease Plan:        Hematology-oncology consulted  Venetoclax on hold   Observe for drug rash: Granix? Question of drug reaction to Eraxis, no further symptoms  Steroids have been DC'd  Benadryl as needed  Antibiotics per ID  Meropenem, itraconazole, acyclovir  Lovenox initiated for DVT, transition to NOAC if ok with hematology   Estradiol on hold  Amicar on hold  Cultures have been initiated  Blood products as needed  Neutropenic precautions  Blood Pressure - Monitor and control   Discharge planning  Will discharge when arrangements complete and ok with other services.   Follow-up with PCP in one week, Bonnie Hunt MD  Notify PCP of discharge     IP CONSULT TO ONCOLOGY  IP CONSULT TO INFECTIOUS DISEASES    Gerson Hicks DO  5/25/2020  10:55 AM

## 2020-05-25 NOTE — PROGRESS NOTES
Today's Date: 5/25/2020  Patient Name: Catina Roman  Date of admission: 5/20/2020  9:35 PM  Patient's age: 78 y. o., 1950  Admission Dx: Neutropenic fever ([address] [D70.9, R50.81]    Reason for Consult: management recommendations  Requesting Physician: Yue Saha DO    CHIEF COMPLAINT: Fever and acute leukemia. History Obtained From:  patient, electronic medical record    Interval history    Patient seen and examined. Labs and vitals reviewed. Venetoclax on hold. Afebrile  Counts are better   HISTORY OF PRESENT ILLNESS:      The patient is a 78 y.o.  female who is admitted to the hospital for chief complaints of having fever more than 100.4 Fahrenheit. Patient was recently diagnosed with acute myeloid leukemia. Patient has been started on treatment using Vidaza and venetoclax. Patient is currently taking venetoclax 400 mg daily. Patient is scheduled to start cycle #2 of Vidaza from 32. Patient has been getting her blood cell counts checked 3 times every week. Previously patient has been requiring platelet transfusion but lately her platelet count has been improving. Patient however continues to be severely neutropenic. Patient was initially seen at the infusion center and he was evaluated by nurses at that time patient had a fever of around 100.1. Patient was on oral Levaquin patient was advised to go home and monitor her temperature and avoid taking any NSAIDs or Tylenol. Patient called me last night stating that she was having a fever 101. We advised the patient to go to the ER. Patient upon further evaluation was admitted. Patient is currently on broad-spectrum antibiotics. Infectious disease team is also on board. Patient did have some sweating earlier associated with chills. Denies any bleeding. Complains of being weak.     Past Medical History:   has a past medical history of Arthritis, Cancer ([address] Hyperlipidemia, Hypertension, Leukemia ([address] Pneumonia, and Seasonal allergies. Past Surgical History:   has a past surgical history that includes Colonoscopy; joint replacement; Ovary removal; and Colonoscopy (03/10/2017). Medications:    Prior to Admission medications    Medication Sig Start Date End Date Taking? Authorizing Provider   acyclovir (ZOVIRAX) 400 MG tablet Take 1 tablet by mouth every 12 hours 5/25/20 6/24/20 Yes Vita Mohs, DO   apixaban [facility]) 5 MG TABS tablet Take 10 mg (2 tablets) orally twice daily for 7 days, then take 5 mg (1 tablet) orally twice daily thereafter.  5/25/20  Yes Vita Mohs, DO   levoFLOXacin (LEVAQUIN) 500 MG tablet Take 1 tablet by mouth daily for 10 days 5/25/20 6/4/20 Yes Zia Prasad MD   itraconazole (SPORANOX) 100 MG capsule Take 2 capsules by mouth 2 times daily (with meals) for 14 days 5/25/20 6/8/20 Yes Zia Prasad MD   ondansetron (ZOFRAN-ODT) 4 MG disintegrating tablet Take 1 tablet by mouth 3 times daily as needed for Nausea or Vomiting 4/24/20  Yes Kinza Dallas MD   vitamin D (CHOLECALCIFEROL) 25 MCG (1000 UT) TABS tablet Take 3,000 Units by mouth daily   Yes Historical Provider, MD   Multiple Vitamins-Minerals (THERAPEUTIC MULTIVITAMIN-MINERALS) tablet Take 1 tablet by mouth daily   Yes Historical Provider, MD   ammonium lactate (AMLACTIN) 12 % cream APPLY AFTER SHOWERING TO SKIN FOR DRYNESS 11/11/19  Yes Historical Provider, MD   cetirizine (ZYRTEC ALLERGY) 10 MG tablet Take 10 mg by mouth daily   Yes Historical Provider, MD   metoprolol tartrate (LOPRESSOR) 50 MG tablet 50mg once a day 10/9/19  Yes Leah Forrester MD   pravastatin (PRAVACHOL) 40 MG tablet TAKE 1 TABLET DAILY 5/23/19  Yes Leah Forrester MD   Multiple Vitamins-Minerals (ICAPS AREDS 2 PO) Take by mouth daily   Yes Historical Provider, MD   azelastine (ASTELIN) 137 MCG/SPRAY nasal spray 2 sprays by Nasal route 2 times daily as needed Use in each nostril as directed    Yes Historical Ailyn Piña, APRN - CNP        Or    potassium bicarb-citric acid (EFFER-K) effervescent tablet 40 mEq  40 mEq Oral PRN Kamila Ogren, APRN - CNP        Or    potassium chloride 10 mEq/100 mL IVPB (Peripheral Line)  10 mEq Intravenous PRN Kamila Ogren, APRN - CNP        magnesium sulfate 1 g in dextrose 5% 100 mL IVPB  1 g Intravenous PRN Kamila Ogren, APRN - CNP        acetaminophen (TYLENOL) tablet 650 mg  650 mg Oral Q6H PRN Kamila Ogren, APRN - CNP   650 mg at 05/23/20 0409    Or    acetaminophen (TYLENOL) suppository 650 mg  650 mg Rectal Q6H PRN Kamila Ogren, APRN - CNP        polyethylene glycol (GLYCOLAX) packet 17 g  17 g Oral Daily PRN Kamila Ogren, APRN - CNP        promethazine (PHENERGAN) tablet 12.5 mg  12.5 mg Oral Q6H PRN Kamila Ogren, APRN - CNP        Or    ondansetron [facility]) injection 4 mg  4 mg Intravenous Q6H PRN Kamila Ogren, APRN - CNP        nicotine (NICODERM CQ) 21 MG/24HR 1 patch  1 patch Transdermal Daily PRN Kamila Ogren, APRN - CNP        acyclovir (ZOVIRAX) capsule 400 mg  400 mg Oral BID Kamila Ogren, APRN - CNP   400 mg at 05/25/20 0900     Facility-Administered Medications Ordered in Other Encounters   Medication Dose Route Frequency Provider Last Rate Last Dose    0.9 % sodium chloride bolus  20 mL Intravenous Once Romeo Miranda MD           Allergies:  Amoxicillin-pot clavulanate; Cefuroxime axetil; Eraxis [anidulafungin]; Biaxin [clarithromycin]; and Sulfa antibiotics    Social History:   reports that she has never smoked. She has never used smokeless tobacco. She reports current alcohol use. She reports that she does not use drugs. Family History: family history includes Alzheimer's Disease in her mother; Cancer in her brother; Diabetes in her father; Heart Disease in her father. REVIEW OF SYSTEMS:      Constitutional:  No night sweats, no weight loss. Positive fever.   Eyes: No eye discharge, double vision, or eye pain   HEENT: negativesore throat, SARS-CoV-2, Rapid Not Detected Not Detected    Source . NASOPHARYNGEAL SWAB     SARS-CoV-2, PCR         Lactate, Sepsis   Result Value Ref Range    Lactic Acid, Sepsis 0.8 0.5 - 1.9 mmol/L    Lactic Acid, Sepsis, Whole Blood NOT REPORTED 0.5 - 1.9 mmol/L   Lactate, Sepsis   Result Value Ref Range    Lactic Acid, Sepsis 0.7 0.5 - 1.9 mmol/L    Lactic Acid, Sepsis, Whole Blood NOT REPORTED 0.5 - 1.9 mmol/L   Urinalysis Reflex to Culture   Result Value Ref Range    Color, UA YELLOW YELLOW    Turbidity UA CLEAR CLEAR    Glucose, Ur NEGATIVE NEGATIVE    Bilirubin Urine NEGATIVE NEGATIVE    Ketones, Urine NEGATIVE NEGATIVE    Specific Gravity, UA 1.004 (L) 1.005 - 1.030    Urine Hgb NEGATIVE NEGATIVE    pH, UA 6.0 5.0 - 8.0    Protein, UA NEGATIVE NEGATIVE    Urobilinogen, Urine Normal Normal    Nitrite, Urine NEGATIVE NEGATIVE    Leukocyte Esterase, Urine NEGATIVE NEGATIVE    Urinalysis Comments NOT REPORTED    Urinalysis Reflex to Culture   Result Value Ref Range    Color, UA YELLOW YELLOW    Turbidity UA CLEAR CLEAR    Glucose, Ur NEGATIVE NEGATIVE    Bilirubin Urine NEGATIVE NEGATIVE    Ketones, Urine NEGATIVE NEGATIVE    Specific Gravity, UA 1.010 1.005 - 1.030    Urine Hgb NEGATIVE NEGATIVE    pH, UA 7.0 5.0 - 8.0    Protein, UA NEGATIVE NEGATIVE    Urobilinogen, Urine Normal Normal    Nitrite, Urine NEGATIVE NEGATIVE    Leukocyte Esterase, Urine NEGATIVE NEGATIVE    Urinalysis Comments NOT REPORTED    Procalcitonin   Result Value Ref Range    Procalcitonin 0.04 <0.09 ng/mL   Basic Metabolic Panel w/ Reflex to MG   Result Value Ref Range    Glucose 98 70 - 99 mg/dL    BUN 11 8 - 23 mg/dL    CREATININE 0.48 (L) 0.50 - 0.90 mg/dL    Bun/Cre Ratio 23 (H) 9 - 20    Calcium 8.8 8.6 - 10.4 mg/dL    Sodium 137 135 - 144 mmol/L    Potassium 4.0 3.7 - 5.3 mmol/L    Chloride 104 98 - 107 mmol/L    CO2 22 20 - 31 mmol/L    Anion Gap 11 9 - 17 mmol/L    GFR Non-African American >60 >60 mL/min    GFR African American >60 >60 mL/min    GFR Comment          GFR Staging NOT REPORTED    CBC   Result Value Ref Range    WBC 0.6 (LL) 3.5 - 11.3 k/uL    RBC 2.25 (L) 3.95 - 5.11 m/uL    Hemoglobin 7.0 (LL) 11.9 - 15.1 g/dL    Hematocrit 22.7 (L) 36.3 - 47.1 %    .9 82.6 - 102.9 fL    MCH 31.1 25.2 - 33.5 pg    MCHC 30.8 28.4 - 34.8 g/dL    RDW 21.3 (H) 11.8 - 14.4 %    Platelets 441 860 - 372 k/uL    MPV 10.0 8.1 - 13.5 fL    NRBC Automated 0.0 0.0 per 100 WBC   Protime-INR   Result Value Ref Range    Protime 14.6 (H) 11.5 - 14.2 sec    INR 1.2    CBC Auto Differential   Result Value Ref Range    WBC 0.8 (LL) 3.5 - 11.3 k/uL    RBC 2.36 (L) 3.95 - 5.11 m/uL    Hemoglobin 7.3 (L) 11.9 - 15.1 g/dL    Hematocrit 22.9 (L) 36.3 - 47.1 %    MCV 97.0 82.6 - 102.9 fL    MCH 30.9 25.2 - 33.5 pg    MCHC 31.9 28.4 - 34.8 g/dL    RDW 20.9 (H) 11.8 - 14.4 %    Platelets 634 475 - 244 k/uL    MPV 9.6 8.1 - 13.5 fL    NRBC Automated 0.0 0.0 per 100 WBC    Differential Type NOT REPORTED     WBC Morphology NOT REPORTED     RBC Morphology NOT REPORTED     Platelet Estimate NOT REPORTED     Seg Neutrophils 45 36 - 66 %    Lymphocytes 45 (H) 24 - 44 %    Monocytes 5 1 - 7 %    Eosinophils % 0 (L) 1 - 4 %    Basophils 2 %    Immature Granulocytes 3 (H) 0 %    Segs Absolute 0.36 (L) 1.8 - 7.7 k/uL    Absolute Lymph # 0.36 (L) 1.0 - 4.8 k/uL    Absolute Mono # 0.04 (L) 0.2 - 0.8 k/uL    Absolute Eos # 0.00 0.0 - 0.4 k/uL    Basophils Absolute 0.02 0.0 - 0.2 k/uL    Absolute Immature Granulocyte 0.02 0.00 - 0.30 k/uL    Morphology ANISOCYTOSIS PRESENT     Morphology TOXIC GRANULATION PRESENT    CBC Auto Differential   Result Value Ref Range    WBC 1.8 (L) 3.5 - 11.3 k/uL    RBC 2.55 (L) 3.95 - 5.11 m/uL    Hemoglobin 7.8 (L) 11.9 - 15.1 g/dL    Hematocrit 24.6 (L) 36.3 - 47.1 %    MCV 96.5 82.6 - 102.9 fL    MCH 30.6 25.2 - 33.5 pg    MCHC 31.7 28.4 - 34.8 g/dL    RDW 21.0 (H) 11.8 - 14.4 %    Platelets 274 (H) 231 - 453 k/uL    MPV 9.7 8.1 - 13.5 fL    NRBC 53 degrees    T Axis 46 degrees         IMAGING DATA:    Xr Chest Portable    Result Date: 5/20/2020  EXAMINATION: ONE XRAY VIEW OF THE CHEST 5/20/2020 10:52 pm COMPARISON: April 3, 2020 HISTORY: ORDERING SYSTEM PROVIDED HISTORY: cough, fever TECHNOLOGIST PROVIDED HISTORY: cough, fever Reason for Exam: cough Acuity: Acute Type of Exam: Initial FINDINGS: Cardiac silhouette is mildly enlarged. Lungs appear clear. No acute bony abnormality. No acute findings     Xr Chest Portable    Result Date: 4/26/2020  EXAMINATION: ONE XRAY VIEW OF THE CHEST 4/26/2020 3:39 pm COMPARISON: 04/03/2020 HISTORY: ORDERING SYSTEM PROVIDED HISTORY: SOB TECHNOLOGIST PROVIDED HISTORY: SOB Reason for Exam: SOB, fever Acuity: Acute Type of Exam: Initial FINDINGS: The lungs are clear. The cardiac and mediastinal contours are normal.  There is no pleural effusion or pneumothorax. No acute osseous abnormality is identified. No acute cardiopulmonary abnormality. IMPRESSION:   Primary Problem  Acute myeloid leukemia not having achieved remission [facility])    Active Hospital Problems    Diagnosis Date Noted    Drug reaction [T50.905A]     Chronic deep vein thrombosis (DVT) of femoral vein of left lower extremity (HCC) [I82.512] 05/22/2020    Neutropenic fever [address] [address] [D70.9, R50.81] 05/21/2020    Acute myeloid leukemia not having achieved remission ([address] [C92.00] 04/14/2020    Hyperlipidemia [E78.5] 08/31/2015    Essential hypertension [I10] 05/16/2012         RECOMMENDATIONS:  Pt has high grade MDS and AML, on vidaza plus venetoclax. Pancytopenia  secondary to MDS  Currently on itraconazole. Ok to discharge   Hold venetoclax until evaluated in the office.      Betty Todd MD  Hematologist/Medical Oncologist  Cell: [phone]            This note is created with the assistance of a speech recognition program.  While intending to generate a document that actually reflects the content of the visit, the document can

## 2020-05-25 NOTE — PROGRESS NOTES
[facility]  Progress Note    Edward Cobb  MRN: [id number]  Date: 5/25/2020    Reason for F/U :   Neutropenic fever    Impression :   1. Neutropenic fever   2. Myelodysplastic syndrome and AML on the Vidaza and venetoclax  3. Pancytopenia related to chemotherapy  · Recovered with Granix therapy  4. Right lower extremity multiple subacute DVTs  5. Allergic drug reaction-secondary to Granix    Recommendations:   · The patient has continued to do well clinically with no further fevers. · The blood culture data remains negative  · She has also lost IV access today and therefore I will switch her to oral levofloxacin, continue oral acyclovir, and I will also plan on discharging her on the itraconazole. · The patient's neutropenia has resolved and the question is whether this is all related to the Granix therapy and is not clear whether this will be sustained  · The patient does have a follow-up with the [facility] clinic team early next week and the duration of the antibiotic and antifungal coverage will be determined at that time  · Infectious disease wise the patient is okay for discharge    Infection Control Recommendations:   Neutropenic precautions    Discharge Planning:   Patient will need Midline Catheter Insertion/ PICC line Insertion: No  Patient willneed outpatient wound care: No    MedicalDecision making / Summary of Stay:   Edward Cobb is a 78y.o.-year-old female presented to the hospital with a fever for 2 days associated with nonproductive cough and right calf pain. Patient was initially seen at the infusion center had temperature of 100.1 was started on oral Levaquin , then she developed a fever at home with temp of 101  She denied any headache, no sore throat, no shortness of breath,no N/V/D,no other complaints.   COVID-19 negative    The patient did develop an erythematous rash on the extremities as well as the back [5/22/2020] and initially felt related to the Granix, thrombosis of the right leg involving the femoral    popliteal and peroneal veins.    No evidence of superficial venous thrombosis in the right lower extremity.    No evidence of superficial or deep venous thrombosis in the left lower    extremity.             ONE XRAY VIEW OF THE CHEST 5/20/2020 10:52 pm  Impression   No acute findings       Cultures:     Culture, Blood 1 [[id number]] Collected: 05/20/20 2234   Order Status: Completed Specimen: Blood Updated: 05/25/20 0057    Specimen Description . BLOOD    Special Requests RT AC 10 ML    Culture NO GROWTH 4 DAYS   Culture, Blood 1 [[id number]] Collected: 05/20/20 2234   Order Status: Completed Specimen: Blood Updated: 05/25/20 0057    Specimen Description . BLOOD    Special Requests LT AC 10 ML    Culture NO GROWTH 4 DAYS     Respiratory Virus PCR Panel [[id number]] Collected: 05/21/20 2120   Order Status: Completed Specimen: Nasopharyngeal Swab Updated: 05/22/20 0909    Specimen Description . NASOPHARYNGEAL SWAB    Adenovirus PCR Not Detected    Coronavirus 229E PCR Not Detected    Comment: Coronoviruses detected by this panel are those associated with the clinical common cold. This test will not detect 2019-SARS-COV-2 or other novel coronaviruses. Coronavirus HKU1 PCR Not Detected    Comment: Coronoviruses detected by this panel are those associated with the clinical common cold. This test will not detect 2019-SARS-COV-2 or other novel coronaviruses. Coronavirus NL63 PCR Not Detected    Comment: Coronoviruses detected by this panel are those associated with the clinical common cold. This test will not detect 2019-SARS-COV-2 or other novel coronaviruses. Coronavirus OC43 PCR Not Detected    Comment: Coronoviruses detected by this panel are those associated with the clinical common cold. This test will not detect 2019-SARS-COV-2 or other novel coronaviruses.         Human Metapneumovirus PCR Not Detected    Rhino/Enterovirus PCR Not Detected Influenza A by PCR Not Detected    Influenza A H1 PCR NOT REPORTED    Influenza A H1 (2009) PCR NOT REPORTED    Influenza A H3 PCR NOT REPORTED    Influenza B by PCR Not Detected    Parainfluenza 1 PCR Not Detected    Parainfluenza 2 PCR Not Detected    Parainfluenza 3 PCR Not Detected    Parainfluenza 4 PCR Not Detected    Resp Syncytial Virus PCR Not Detected    Bordetella Parapertussis Not Detected    B Pertussis by PCR Not Detected    Chlamydia pneumoniae By PCR Not Detected    Mycoplasma pneumo by PCR Not Detected    Comment: Performed by multiplexed nucleic acid assay. Medications:      itraconazole  200 mg Oral BID WC    enoxaparin  1 mg/kg Subcutaneous BID    allopurinol  300 mg Oral Daily    cetirizine  10 mg Oral Daily    estradiol  0.5 mg Oral Daily    metoprolol tartrate  50 mg Oral Daily    therapeutic multivitamin-minerals  1 tablet Oral Daily    pravastatin  40 mg Oral Daily    Vitamin D  3,000 Units Oral Daily    sodium chloride flush  10 mL Intravenous 2 times per day    meropenem  1 g Intravenous Q8H    acyclovir  400 mg Oral BID           [facility]  Armida Donis MD  [employer] messaging  OFFICE: [phone]      Electronically signed by Armida Donis MD on 5/25/2020 at 8:20 AM  Thank you for allowing us to participate in the care of this patient. Please call with questions. Angelina Garcia is a 78 y.o. female being evaluated by a Virtual Visit (video visit) encounter to address concerns as mentioned above. A caregiver was present when appropriate. Due to this being a TeleHealth encounter (During [place] [address] public health emergency), evaluation of the following organ systems was limited: Vitals/Constitutional/EENT/Resp/CV/GI//MS/Neuro/Skin/Heme-Lymph-Imm.     Pursuant to the emergency declaration under the [address] [facility] [place] waiver authority and the [facility] [employer] and Response

## 2020-05-25 NOTE — DISCHARGE SUMMARY
admitted  Hematology-oncology consulted  Cultures have been initiated  Blood products as needed  Antibiotics per Infectious Disease service   Neutropenic precautions  Heparin/Lovenox on hold due to thrombocytopenia  Blood Pressure - Monitor and control      Patient did report calf tightness for several days  Vascular studies have revealed DVT     Dopplers:   Sub-acute deep vein thrombosis of the right leg involving the femoral   popliteal and peroneal veins.   No evidence of superficial venous thrombosis in the right lower extremity.   No evidence of superficial or deep venous thrombosis in the left lower   extremity. Lovenox therapeutic dose initiated     Specimen Source: Nasopharyngeal Swab Specimen Description . NASOPHARYNGEAL SWAB Adenovirus PCR Not Detected Coronavirus 229E PCR Not Detected   Comment: Coronoviruses detected by this panel are those associated with the clinical common cold. This test will not detect 2019-SARS-COV-2 or other novel coronaviruses. Coronavirus HKU1 PCR Not Detected   Comment: Coronoviruses detected by this panel are those associated with the clinical common cold. This test will not detect 2019-SARS-COV-2 or other novel coronaviruses. Coronavirus NL63 PCR Not Detected   Comment: Coronoviruses detected by this panel are those associated with the clinical common cold. This test will not detect 2019-SARS-COV-2 or other novel coronaviruses. Coronavirus OC43 PCR Not Detected   Comment: Coronoviruses detected by this panel are those associated with the clinical common cold. This test will not detect 2019-SARS-COV-2 or other novel coronaviruses.     Human Metapneumovirus PCR Not Detected Rhino/Enterovirus PCR Not Detected Influenza A by PCR Not Detected Influenza A H1 PCR NOT REPORTED Influenza A H1 (2009) PCR NOT REPORTED Influenza A H3 PCR NOT REPORTED Influenza B by PCR Not Detected Parainfluenza 1 PCR Not Detected Parainfluenza 2 PCR Not Detected Parainfluenza 3 PCR Not Detected Parainfluenza 4 PCR Not Detected Resp Syncytial Virus PCR Not Detected Bordetella Parapertussis Not Detected B Pertussis by PCR Not Detected Chlamydia pneumoniae By PCR Not Detected Mycoplasma pneumo by PCR Not Detected      On 5/23 during her Eraxis infusion the patient became flushed and developed back pain  The Eraxis has been discontinued  She was transitioned to Sporanox  Steroids were ordered  Benadryl ordered     The patient responded well to therapy  Cultures have remained negative  DC planning was initiated  The patient was instructed to follow up with their PCP, Sriram Teran MD in one week        Discharge plan:     Hematology-oncology evaluation of follow-up   Venetoclax on hold   Observe for drug rash: Granix? Question of drug reaction to Eraxis, no further symptoms  Steroids have been DC'd  Benadryl as needed  Antibiotics per ID  Meropenem, itraconazole, acyclovir  Lovenox initiated for DVT, transition to NOAC if ok with hematology   Estradiol on hold  Amicar on hold  Cultures have been initiated  Blood products as needed  Neutropenic precautions  Blood Pressure - Monitor and control   Discharge planning  Will discharge when arrangements complete and ok with other services. Follow-up with PCP in one week, Sriram Teran MD  Notify PCP of discharge     No discharge procedures on file. Significant Diagnostic Studies:     Labs / Micro:  Labs:  Hematology:  Recent Labs     05/23/20  0534 05/24/20  0531 05/25/20  0559   WBC 0.8* 1.8* 6.5   RBC 2.36* 2.55* 2.51*   HGB 7.3* 7.8* 7.7*   HCT 22.9* 24.6* 24.7*   MCV 97.0 96.5 98.4   MCH 30.9 30.6 30.7   MCHC 31.9 31.7 31.2   RDW 20.9* 21.0* 21.9*    607* 802*   MPV 9.6 9.7 9.8     Chemistry:No results for input(s): NA, K, CL, CO2, GLUCOSE, BUN, CREATININE, MG, ANIONGAP, LABGLOM, GFRAA, CALCIUM, CAION, PHOS, PSA, PROBNP, TROPHS, CKTOTAL, CKMB, CKMBINDEX, MYOGLOBIN, DIGOXIN, LACTACIDWB in the last 72 hours. No results for input(s): PROT, +----------------------------------+----------+---------------+------------+ ! Mid Femoral                       !Yes       ! Yes            ! None        ! +----------------------------------+----------+---------------+------------+ ! Dist Femoral                      !Yes       ! No             !Heterogenous! +----------------------------------+----------+---------------+------------+ ! Popliteal                         !Yes       ! No             !Heterogenous! +----------------------------------+----------+---------------+------------+ ! Sapheno Femoral Junction          ! Yes       ! Yes            ! None        ! +----------------------------------+----------+---------------+------------+ ! PTV                               ! Yes       ! Yes            ! None        ! +----------------------------------+----------+---------------+------------+ ! Peroneal                          !Yes       ! No             !Heterogenous! +----------------------------------+----------+---------------+------------+ ! Gastroc                           ! Yes       ! Yes            ! None        ! +----------------------------------+----------+---------------+------------+ ! GSV Thigh                         ! Yes       ! Yes            ! None        ! +----------------------------------+----------+---------------+------------+ ! GSV Knee                          ! Yes       ! Yes            ! None        ! +----------------------------------+----------+---------------+------------+ ! GSV Ankle                         ! Yes       ! Yes            ! None        ! +----------------------------------+----------+---------------+------------+ ! SSV                               ! Yes       ! Yes            ! None        ! +----------------------------------+----------+---------------+------------+ Right Doppler Measurements +---------------------------+------+------+--------------------------------+ ! Location                   ! Signal!Reflux! Reflux (msec) ! +---------------------------+------+------+--------------------------------+ ! Common Femoral             !Phasic!      !                                ! +---------------------------+------+------+--------------------------------+ ! Prox Femoral               !Phasic!      !                                ! +---------------------------+------+------+--------------------------------+ ! Popliteal                  !Absent!      !                                ! +---------------------------+------+------+--------------------------------+ Left Lower Extremities DVT Study Measurements Left 2D Measurements +------------------------------------+----------+---------------+----------+ ! Location                            ! Visualized! Compressibility! Thrombosis! +------------------------------------+----------+---------------+----------+ ! Common Femoral                      !Yes       ! Yes            ! None      ! +------------------------------------+----------+---------------+----------+ ! Prox Femoral                        !Yes       ! Yes            ! None      ! +------------------------------------+----------+---------------+----------+ ! Mid Femoral                         !Yes       ! Yes            ! None      ! +------------------------------------+----------+---------------+----------+ ! Dist Femoral                        !Yes       ! Yes            ! None      ! +------------------------------------+----------+---------------+----------+ ! Popliteal                           !Yes       ! Yes            ! None      ! +------------------------------------+----------+---------------+----------+ ! Sapheno Femoral Junction            ! Yes       ! Yes            ! None      ! +------------------------------------+----------+---------------+----------+ ! PTV                                 ! Yes       ! Yes            ! None      ! +------------------------------------+----------+---------------+----------+ ! Danielle

## 2020-05-25 NOTE — PROGRESS NOTES
Today's Date: 5/24/2020  Patient Name: Chely Trevino  Date of admission: 5/20/2020  9:35 PM  Patient's age: 78 y. o., 1950  Admission Dx: Neutropenic fever ([facility] [address] [D70.9, R50.81]    Reason for Consult: management recommendations  Requesting Physician: Shola Lugo DO    CHIEF COMPLAINT: Fever and acute leukemia. History Obtained From:  patient, electronic medical record    Interval history    Patient seen and examined. Labs and vitals reviewed. Venetoclax on hold. Patient is afebrile  ID input appreciated     HISTORY OF PRESENT ILLNESS:      The patient is a 78 y.o.  female who is admitted to the hospital for chief complaints of having fever more than 100.4 Fahrenheit. Patient was recently diagnosed with acute myeloid leukemia. Patient has been started on treatment using Vidaza and venetoclax. Patient is currently taking venetoclax 400 mg daily. Patient is scheduled to start cycle #2 of Vidaza from 32. Patient has been getting her blood cell counts checked 3 times every week. Previously patient has been requiring platelet transfusion but lately her platelet count has been improving. Patient however continues to be severely neutropenic. Patient was initially seen at the infusion center and he was evaluated by nurses at that time patient had a fever of around 100.1. Patient was on oral Levaquin patient was advised to go home and monitor her temperature and avoid taking any NSAIDs or Tylenol. Patient called me last night stating that she was having a fever 101. We advised the patient to go to the ER. Patient upon further evaluation was admitted. Patient is currently on broad-spectrum antibiotics. Infectious disease team is also on board. Patient did have some sweating earlier associated with chills. Denies any bleeding. Complains of being weak.     Past Medical History:   has a past medical history of Arthritis, Cancer ([address] Hyperlipidemia, Hypertension, Leukemia 25 mg Oral Q6H PRN Everet Cost, DO   25 mg at 05/23/20 1641    itraconazole (SPORANOX) capsule 200 mg  200 mg Oral BID  Cedric Castro MD   200 mg at 05/24/20 1710    enoxaparin (LOVENOX) injection 90 mg  1 mg/kg Subcutaneous BID Everet Cost, DO   90 mg at 05/24/20 2031    allopurinol (ZYLOPRIM) tablet 300 mg  300 mg Oral Daily Valaria Sessions, APRN - CNP   300 mg at 05/24/20 0850    azelastine (ASTELIN) 0.1 % nasal spray 2 spray  2 spray Each Nostril BID PRN Valaria Sessions, APRN - CNP        cetirizine (ZYRTEC) tablet 10 mg  10 mg Oral Daily Valaria Sessions, APRN - CNP   10 mg at 05/24/20 1577    estradiol (ESTRACE) tablet 0.5 mg  0.5 mg Oral Daily Valaria Sessions, APRN - CNP   0.5 mg at 05/24/20 0850    metoprolol tartrate (LOPRESSOR) tablet 50 mg  50 mg Oral Daily Everet Cost, DO   50 mg at 05/24/20 2779    therapeutic multivitamin-minerals 1 tablet  1 tablet Oral Daily Valaria Sessions, APRN - CNP   1 tablet at 05/24/20 0850    pravastatin (PRAVACHOL) tablet 40 mg  40 mg Oral Daily Valaria Sessions, APRN - CNP   40 mg at 05/24/20 2018    vitamin D (CHOLECALCIFEROL) tablet 3,000 Units  3,000 Units Oral Daily Valaria Sessions, APRN - CNP   3,000 Units at 05/24/20 0850    sodium chloride flush 0.9 % injection 10 mL  10 mL Intravenous 2 times per day Valaria Sessions, APRN - CNP   10 mL at 05/24/20 2012    sodium chloride flush 0.9 % injection 10 mL  10 mL Intravenous PRN Valaria Sessions, APRN - CNP   10 mL at 05/24/20 1208    potassium chloride (KLOR-CON M) extended release tablet 40 mEq  40 mEq Oral PRN Valaria Sessions, APRN - CNP        Or    potassium bicarb-citric acid (EFFER-K) effervescent tablet 40 mEq  40 mEq Oral PRN Valaria Sessions, APRN - CNP        Or    potassium chloride 10 mEq/100 mL IVPB (Peripheral Line)  10 mEq Intravenous PRN Valaria Sessions, APRN - CNP        magnesium sulfate 1 g in dextrose 5% 100 mL IVPB  1 g Intravenous PRN Valaria Sessions, APRN - CNP        acetaminophen PND   Gastrointestinal: negative for nausea, vomiting, diarrhea, constipation, abdominal pain, Dysphagia, hematemesis and hematochezia   Genitourinary: negative for frequency, dysuria, nocturia, urinary incontinence, and hematuria   Integument: negative for rash, skin lesions, bruises.    Hematologic/Lymphatic: negative for easy bruising, bleeding, lymphadenopathy, or petechiae   Endocrine: negative for heat or cold intolerance,weight changes, change in bowel habits and hair loss   Musculoskeletal: negative for myalgias, arthralgias, pain, joint swelling,and bone pain   Neurological: negative for headaches, dizziness, seizures, weakness, numbness    PHYSICAL EXAM:        BP (!) 121/51   Pulse 70   Temp 97.5 °F (36.4 °C) (Oral)   Resp 18   Ht 5' 10\" (1.778 m)   Wt 195 lb (88.5 kg)   SpO2 97%   BMI 27.98 kg/m²    Temp (24hrs), Av °F (36.7 °C), Min:97.5 °F (36.4 °C), Max:98.6 °F (37 °C)      General appearance - well appearing, no in pain or distress   Mental status - alert and cooperative   Eyes - pupils equal and reactive, extraocular eye movements intact   Ears - bilateral TM's and external ear canals normal   Mouth - mucous membranes moist, pharynx normal without lesions   Neck - supple, no significant adenopathy   Lymphatics - no palpable lymphadenopathy, no hepatosplenomegaly   Chest - clear to auscultation, no wheezes, rales or rhonchi, symmetric air entry   Heart - normal rate, regular rhythm, normal S1, S2, no murmurs  Abdomen - soft, nontender, nondistended, no masses or organomegaly   Neurological - alert, oriented, normal speech, no focal findings or movement disorder noted   Musculoskeletal - no joint tenderness, deformity or swelling   Extremities - peripheral pulses normal, no pedal edema, no clubbing or cyanosis   Skin - normal coloration and turgor, no rashes, no suspicious skin lesions noted ,      DATA:      Labs:     Results for orders placed or performed during the hospital encounter of REPORTED 0.5 - 1.9 mmol/L   Lactate, Sepsis   Result Value Ref Range    Lactic Acid, Sepsis 0.7 0.5 - 1.9 mmol/L    Lactic Acid, Sepsis, Whole Blood NOT REPORTED 0.5 - 1.9 mmol/L   Urinalysis Reflex to Culture   Result Value Ref Range    Color, UA YELLOW YELLOW    Turbidity UA CLEAR CLEAR    Glucose, Ur NEGATIVE NEGATIVE    Bilirubin Urine NEGATIVE NEGATIVE    Ketones, Urine NEGATIVE NEGATIVE    Specific Gravity, UA 1.004 (L) 1.005 - 1.030    Urine Hgb NEGATIVE NEGATIVE    pH, UA 6.0 5.0 - 8.0    Protein, UA NEGATIVE NEGATIVE    Urobilinogen, Urine Normal Normal    Nitrite, Urine NEGATIVE NEGATIVE    Leukocyte Esterase, Urine NEGATIVE NEGATIVE    Urinalysis Comments NOT REPORTED    Urinalysis Reflex to Culture   Result Value Ref Range    Color, UA YELLOW YELLOW    Turbidity UA CLEAR CLEAR    Glucose, Ur NEGATIVE NEGATIVE    Bilirubin Urine NEGATIVE NEGATIVE    Ketones, Urine NEGATIVE NEGATIVE    Specific Gravity, UA 1.010 1.005 - 1.030    Urine Hgb NEGATIVE NEGATIVE    pH, UA 7.0 5.0 - 8.0    Protein, UA NEGATIVE NEGATIVE    Urobilinogen, Urine Normal Normal    Nitrite, Urine NEGATIVE NEGATIVE    Leukocyte Esterase, Urine NEGATIVE NEGATIVE    Urinalysis Comments NOT REPORTED    Procalcitonin   Result Value Ref Range    Procalcitonin 0.04 <0.09 ng/mL   Basic Metabolic Panel w/ Reflex to MG   Result Value Ref Range    Glucose 98 70 - 99 mg/dL    BUN 11 8 - 23 mg/dL    CREATININE 0.48 (L) 0.50 - 0.90 mg/dL    Bun/Cre Ratio 23 (H) 9 - 20    Calcium 8.8 8.6 - 10.4 mg/dL    Sodium 137 135 - 144 mmol/L    Potassium 4.0 3.7 - 5.3 mmol/L    Chloride 104 98 - 107 mmol/L    CO2 22 20 - 31 mmol/L    Anion Gap 11 9 - 17 mmol/L    GFR Non-African American >60 >60 mL/min    GFR African American >60 >60 mL/min    GFR Comment          GFR Staging NOT REPORTED    CBC   Result Value Ref Range    WBC 0.6 (LL) 3.5 - 11.3 k/uL    RBC 2.25 (L) 3.95 - 5.11 m/uL    Hemoglobin 7.0 (LL) 11.9 - 15.1 g/dL    Hematocrit 22.7 (L) 36.3 - 47.1 % Monocytes 12 (H) 1 - 7 %    Eosinophils % 0 (L) 1 - 4 %    Basophils 2 %    Immature Granulocytes 1 (H) 0 %    Segs Absolute 1.29 (L) 1.8 - 7.7 k/uL    Absolute Lymph # 0.23 (L) 1.0 - 4.8 k/uL    Absolute Mono # 0.22 0.2 - 0.8 k/uL    Absolute Eos # 0.00 0.0 - 0.4 k/uL    Basophils Absolute 0.04 0.0 - 0.2 k/uL    Absolute Immature Granulocyte 0.02 0.00 - 0.30 k/uL    Morphology TOXIC GRANULATION PRESENT     Morphology ANISOCYTOSIS PRESENT    EKG 12 Lead   Result Value Ref Range    Ventricular Rate 98 BPM    Atrial Rate 98 BPM    P-R Interval 160 ms    QRS Duration 84 ms    Q-T Interval 330 ms    QTc Calculation (Bazett) 421 ms    P Axis 45 degrees    R Axis 53 degrees    T Axis 46 degrees         IMAGING DATA:    Xr Chest Portable    Result Date: 5/20/2020  EXAMINATION: ONE XRAY VIEW OF THE CHEST 5/20/2020 10:52 pm COMPARISON: April 3, 2020 HISTORY: ORDERING SYSTEM PROVIDED HISTORY: cough, fever TECHNOLOGIST PROVIDED HISTORY: cough, fever Reason for Exam: cough Acuity: Acute Type of Exam: Initial FINDINGS: Cardiac silhouette is mildly enlarged. Lungs appear clear. No acute bony abnormality. No acute findings     Xr Chest Portable    Result Date: 4/26/2020  EXAMINATION: ONE XRAY VIEW OF THE CHEST 4/26/2020 3:39 pm COMPARISON: 04/03/2020 HISTORY: ORDERING SYSTEM PROVIDED HISTORY: SOB TECHNOLOGIST PROVIDED HISTORY: SOB Reason for Exam: SOB, fever Acuity: Acute Type of Exam: Initial FINDINGS: The lungs are clear. The cardiac and mediastinal contours are normal.  There is no pleural effusion or pneumothorax. No acute osseous abnormality is identified. No acute cardiopulmonary abnormality.          IMPRESSION:   Primary Problem  Acute myeloid leukemia not having achieved remission [facility])    Active Hospital Problems    Diagnosis Date Noted    Drug reaction [T50.905A]     Chronic deep vein thrombosis (DVT) of femoral vein of left lower extremity (HCC) [I82.512] 05/22/2020    Neutropenic fever ([facility] [address] [D70.9, R50.81] 05/21/2020    Acute myeloid leukemia not having achieved remission ([facility] [C92.00] 04/14/2020    Pancytopenia ([facility] [D61.818] 04/03/2020    Hyperlipidemia [E78.5] 08/31/2015    Essential hypertension [I10] 05/16/2012         RECOMMENDATIONS:  Pt has high grade MDS and AML, on vidaza plus venetoclax. Pancytopenia  secondary to MDS  Currently on itraconazole. I will discuss with ID about outpatient management, we will likely adjust dose of venetoclax depending on the medication  Rash is likely from GCSF, counts improving. I am ok with discharge if afebrile and cultures are negative     Calixto Todd MD  Hematologist/Medical Oncologist  Cell: [phone]            This note is created with the assistance of a speech recognition program.  While intending to generate a document that actually reflects the content of the visit, the document can still have some errors including those of syntax and sound a like substitutions which may escape proof reading. It such instances, actual meaning can be extrapolated by contextual diversion.

## 2020-05-26 ENCOUNTER — HOSPITAL ENCOUNTER (OUTPATIENT)
Facility: MEDICAL CENTER | Age: 70
Discharge: HOME | End: 2020-05-26
Payer: MEDICARE

## 2020-05-26 ENCOUNTER — CARE COORDINATION (OUTPATIENT)
Dept: CASE MANAGEMENT | Age: 70
End: 2020-05-26

## 2020-05-26 ENCOUNTER — OFFICE VISIT (OUTPATIENT)
Dept: ONCOLOGY | Age: 70
End: 2020-05-26
Payer: MEDICARE

## 2020-05-26 ENCOUNTER — TELEPHONE (OUTPATIENT)
Dept: ONCOLOGY | Age: 70
End: 2020-05-26

## 2020-05-26 ENCOUNTER — HOSPITAL ENCOUNTER (OUTPATIENT)
Dept: INFUSION THERAPY | Facility: MEDICAL CENTER | Age: 70
Discharge: HOME | End: 2020-05-26
Payer: MEDICARE

## 2020-05-26 VITALS
TEMPERATURE: 98.3 F | DIASTOLIC BLOOD PRESSURE: 82 MMHG | HEART RATE: 89 BPM | BODY MASS INDEX: 28.35 KG/M2 | RESPIRATION RATE: 16 BRPM | WEIGHT: 197.6 LBS | SYSTOLIC BLOOD PRESSURE: 137 MMHG

## 2020-05-26 DIAGNOSIS — C92.00 ACUTE MYELOID LEUKEMIA NOT HAVING ACHIEVED REMISSION (HCC): ICD-10-CM

## 2020-05-26 DIAGNOSIS — C92.00 ACUTE MYELOID LEUKEMIA NOT HAVING ACHIEVED REMISSION (HCC): Primary | ICD-10-CM

## 2020-05-26 LAB
ABO/RH: NORMAL
ABSOLUTE EOS #: 0 K/UL (ref 0–0.44)
ABSOLUTE IMMATURE GRANULOCYTE: 4.32 K/UL (ref 0–0.3)
ABSOLUTE LYMPH #: 0.96 K/UL (ref 1.1–3.7)
ABSOLUTE MONO #: 2.02 K/UL (ref 0.1–1.2)
ANTIBODY SCREEN: NEGATIVE
ARM BAND NUMBER: NORMAL
BASOPHILS # BLD: 0 % (ref 0–2)
BASOPHILS ABSOLUTE: 0 K/UL (ref 0–0.2)
DIFFERENTIAL TYPE: ABNORMAL
EOSINOPHILS RELATIVE PERCENT: 0 % (ref 1–4)
EXPIRATION DATE: NORMAL
HCT VFR BLD CALC: 28.4 % (ref 36.3–47.1)
HEMOGLOBIN: 8.7 G/DL (ref 11.9–15.1)
IMMATURE GRANULOCYTES: 45 %
LYMPHOCYTES # BLD: 10 % (ref 24–43)
MCH RBC QN AUTO: 30.5 PG (ref 25.2–33.5)
MCHC RBC AUTO-ENTMCNC: 30.6 G/DL (ref 28.4–34.8)
MCV RBC AUTO: 99.6 FL (ref 82.6–102.9)
MONOCYTES # BLD: 21 % (ref 3–12)
MORPHOLOGY: ABNORMAL
NRBC AUTOMATED: 5.9 PER 100 WBC
PDW BLD-RTO: 22.4 % (ref 11.8–14.4)
PLATELET # BLD: 906 K/UL (ref 138–453)
PLATELET ESTIMATE: ABNORMAL
PMV BLD AUTO: 9 FL (ref 8.1–13.5)
RBC # BLD: 2.85 M/UL (ref 3.95–5.11)
RBC # BLD: ABNORMAL 10*6/UL
SEG NEUTROPHILS: 24 % (ref 36–65)
SEGMENTED NEUTROPHILS ABSOLUTE COUNT: 2.3 K/UL (ref 1.5–8.1)
WBC # BLD: 9.6 K/UL (ref 3.5–11.3)
WBC # BLD: ABNORMAL 10*3/UL

## 2020-05-26 PROCEDURE — 99214 OFFICE O/P EST MOD 30 MIN: CPT | Performed by: INTERNAL MEDICINE

## 2020-05-26 PROCEDURE — 6360000002 HC RX W HCPCS: Performed by: INTERNAL MEDICINE

## 2020-05-26 PROCEDURE — 99211 OFF/OP EST MAY X REQ PHY/QHP: CPT | Performed by: INTERNAL MEDICINE

## 2020-05-26 PROCEDURE — 85025 COMPLETE CBC W/AUTO DIFF WBC: CPT

## 2020-05-26 PROCEDURE — 2580000003 HC RX 258: Performed by: INTERNAL MEDICINE

## 2020-05-26 PROCEDURE — 86901 BLOOD TYPING SEROLOGIC RH(D): CPT

## 2020-05-26 PROCEDURE — 96401 CHEMO ANTI-NEOPL SQ/IM: CPT

## 2020-05-26 PROCEDURE — 86850 RBC ANTIBODY SCREEN: CPT

## 2020-05-26 PROCEDURE — 86900 BLOOD TYPING SEROLOGIC ABO: CPT

## 2020-05-26 PROCEDURE — 36415 COLL VENOUS BLD VENIPUNCTURE: CPT

## 2020-05-26 PROCEDURE — 1111F DSCHRG MED/CURRENT MED MERGE: CPT | Performed by: FAMILY MEDICINE

## 2020-05-26 RX ADMIN — WATER 160 MG: 1 INJECTION INTRAMUSCULAR; INTRAVENOUS; SUBCUTANEOUS at 10:02

## 2020-05-26 NOTE — CARE COORDINATION
following risk factors of: immunocompromised. Education provided regarding infection prevention, and signs and symptoms of COVID-19 and when to seek medical attention with patient who verbalized understanding. Discussed exposure protocols and quarantine From CDC: Are you at higher risk for severe illness?   and given an opportunity for questions and concerns. The patient agrees to contact the COVID-19 hotline [phone] or PCP office for questions related to COVID-19. For more information on steps you can take to protect yourself, see CDC's How to Protect Yourself     Patient/family/caregiver given information for [employer] and agrees to enroll no  Patient's preferred e-mail: declines  Patient's preferred phone number: declines    Non-[facility] follow up appointment(s): n/a   Plan for follow-up call in 5-7 days based on severity of symptoms and risk factors. CTN provided contact information for future needs.       Facility: [facility]     Non-face-to-face services provided:  Scheduled appointment with PCP-message scheduling pool to call for TCM  Scheduled appointment with Specialist-saw onc this morning with chemo   Obtained and reviewed discharge summary and/or continuity of care documents  Assessment and support for treatment adherence and medication management-[id number] complete    Care Transitions 24 Hour Call    Do you have any ongoing symptoms?:  No  Do you have a copy of your discharge instructions?:  Yes  Do you have all of your prescriptions and are they filled?:  Yes  Have you been contacted by a [address]?:  No  Have you scheduled your follow up appointment?:  Yes  How are you going to get to your appointment?:  Car - drive self  Were you discharged with any Home Care or Post Acute Services:  No  Do you feel like you have everything you need to keep you well at home?:  Yes  Care Transitions Interventions         Follow Up  Future Appointments   Date Time Provider Raquel Strauss   5/27/2020  8:00 AM STV STA CHAIR 12 [facility] STA [facility] [place]   5/28/2020  8:00 AM STV STA CHAIR 13 [facility] STA MED [place]   5/29/2020 10:00 AM STV STA CHAIR 14 [facility] STA MED [place]   6/1/2020 10:00 AM STV STA CHAIR 12 [facility] STA MED [place]   6/3/2020  9:00 AM STV STA CHAIR 16 [facility] STA MED [place]   6/4/2020  8:00 AM STV STA CHAIR 12 [facility] STA MED [place]   6/5/2020 10:00 AM STV STA CHAIR 08 ST[facility] STA MED [place]   6/8/2020  9:00 AM STV STA CHAIR 11 [facility] STA MED [place]   6/10/2020  9:00 AM STV STA CHAIR 10 [facility] STA MED [place]   6/12/2020  9:00 AM STV STA CHAIR 10 [facility] STA MED [place]   6/15/2020  9:00 AM STV STA CHAIR 14 [facility] STA MED [place]   6/16/2020  9:00 AM Vernell Rasheed MD [facility] Sohail Muir RN

## 2020-05-26 NOTE — PROGRESS NOTES
Patient arrive ambulatory from front office for cycle 2 day 1 treatment having met with physician. Denies complaint or concern to writer. Vitals reviewed as is physician note. Patient takes her Zofran prior to injections. Patient tolerate injections well; band-aids applied. Patient ambulate off unit per self; AVS to be provided by front office staff.

## 2020-05-26 NOTE — PATIENT INSTRUCTIONS
Proceed with treatment as scheduled  Cbc and platelets transfusion of HLA matched platelets every other day  RV 2-3 weeks

## 2020-05-27 ENCOUNTER — HOSPITAL ENCOUNTER (OUTPATIENT)
Facility: MEDICAL CENTER | Age: 70
Discharge: HOME | End: 2020-05-27
Payer: MEDICARE

## 2020-05-27 ENCOUNTER — HOSPITAL ENCOUNTER (OUTPATIENT)
Dept: INFUSION THERAPY | Facility: MEDICAL CENTER | Age: 70
Discharge: HOME | End: 2020-05-27
Payer: MEDICARE

## 2020-05-27 VITALS
TEMPERATURE: 98.6 F | RESPIRATION RATE: 18 BRPM | HEART RATE: 63 BPM | SYSTOLIC BLOOD PRESSURE: 115 MMHG | DIASTOLIC BLOOD PRESSURE: 86 MMHG

## 2020-05-27 DIAGNOSIS — C92.00 ACUTE MYELOID LEUKEMIA NOT HAVING ACHIEVED REMISSION (HCC): Primary | ICD-10-CM

## 2020-05-27 PROCEDURE — 2580000003 HC RX 258: Performed by: INTERNAL MEDICINE

## 2020-05-27 PROCEDURE — 96401 CHEMO ANTI-NEOPL SQ/IM: CPT

## 2020-05-27 PROCEDURE — 6360000002 HC RX W HCPCS: Performed by: INTERNAL MEDICINE

## 2020-05-27 RX ADMIN — AZACITIDINE 160 MG: 100 INJECTION, POWDER, LYOPHILIZED, FOR SOLUTION INTRAVENOUS; SUBCUTANEOUS at 08:55

## 2020-05-27 NOTE — PROGRESS NOTES
_    Chief Complaint   Patient presents with    Follow-up     review status of disease    Follow-Up from Hospital     follow up from hospital.        DIAGNOSIS:       AML  Pancytopenia      CURRENT THERAPY:         Transfusion as needed, HLA matched platelets  Started Vidaza/ Venetoclax 4/20/2020    BRIEF CASE HISTORY:      The patient is a 78 y.o.  female who is admitted to the hospital for further management of severe pancytopenia. The patient was clinically fine. Actually she is busy doing boxes with house movement. She noticed increasing weakness and fatigue. This is getting worse over the last few weeks. She gets shortness of breath on exertion. No dizziness. She denies any active bleeding. She has increasing bruising. No fever or infections. No enlarged lymph nodes. No weight loss or decreased appetite. The patient had blood work which showed severe pancytopenia. She is receiving platelet transfusion. No history of smoking or alcohol drinking. BM test done. Results showed AML. INTERIM HISTORY:   The patient is seen for follow up AML. She had pancytopenia. She received blood and platelets. Patient was started on Vidaza and venetoclax. She was hospitalized last week with febrile neutropenia. Clinically she is doing fine now. No fever or chills. No N/V. No active bleeding. no more bruises. Patient was seen in second opinion at [facility]. Discussed with Dr. Elisabeth Robert. They agree with our plans. Recommend for continued monitoring and transfusion as needed. Possible future transplant. Past Medical History:   has a past medical history of Arthritis, Cancer ([address] Hyperlipidemia, Hypertension, Leukemia ([address] Pneumonia, and Seasonal allergies. Past Surgical History:   has a past surgical history that includes Colonoscopy; joint replacement; Ovary removal; and Colonoscopy (03/10/2017).    Family History: family history includes Alzheimer's Disease in her mother; Cancer in her brother; Diabetes in her father; Heart Disease in her father. Social History:   reports that she has never smoked. She has never used smokeless tobacco. She reports current alcohol use. She reports that she does not use drugs. Medications:    Prior to Admission medications    Medication Sig Start Date End Date Taking? Authorizing Provider   acyclovir (ZOVIRAX) 400 MG tablet Take 1 tablet by mouth every 12 hours 5/25/20 6/24/20 Yes Francia Massey, DO   apixaban Hollie Kameron DVT/PE STARTER PACK) 5 MG TABS tablet Take 10 mg (2 tablets) orally twice daily for 7 days, then take 5 mg (1 tablet) orally twice daily thereafter.  5/25/20  Yes Francia Massey,    levoFLOXacin (LEVAQUIN) 500 MG tablet Take 1 tablet by mouth daily for 10 days 5/25/20 6/4/20 Yes Mark Espinoza MD   ondansetron (ZOFRAN-ODT) 4 MG disintegrating tablet Take 1 tablet by mouth 3 times daily as needed for Nausea or Vomiting 4/24/20  Yes Leti Hidalgo MD   allopurinol (ZYLOPRIM) 300 MG tablet Take 300 mg by mouth daily 4/21/20 5/26/20 Yes Historical Provider, MD   vitamin D (CHOLECALCIFEROL) 25 MCG (1000 UT) TABS tablet Take 3,000 Units by mouth daily   Yes Historical Provider, MD   Multiple Vitamins-Minerals (THERAPEUTIC MULTIVITAMIN-MINERALS) tablet Take 1 tablet by mouth daily   Yes Historical Provider, MD   ammonium lactate (AMLACTIN) 12 % cream APPLY AFTER SHOWERING TO SKIN FOR DRYNESS 11/11/19  Yes Historical Provider, MD   cetirizine (ZYRTEC ALLERGY) 10 MG tablet Take 10 mg by mouth daily   Yes Historical Provider, MD   metoprolol tartrate (LOPRESSOR) 50 MG tablet 50mg once a day 10/9/19  Yes Jill Everett MD   pravastatin (PRAVACHOL) 40 MG tablet TAKE 1 TABLET DAILY 5/23/19  Yes Jill Everett MD   Multiple Vitamins-Minerals (ICAPS AREDS 2 PO) Take by mouth daily   Yes Historical Provider, MD   azelastine (ASTELIN) 137 MCG/SPRAY nasal spray 2 sprays by (37.3 °C)      General appearance - well appearing, not in pain or distress  Mental status - good mood, alert and oriented  Eyes - pupils equal and reactive, extraocular eye movements intact  Ears - bilateral TM's and external ear canals normal  Nose - normal and patent, no erythema, discharge or polyps  Mouth - mucous membranes moist, pharynx normal without lesions  Neck - supple, no significant adenopathy  Lymphatics - no palpable lymphadenopathy, no hepatosplenomegaly  Chest - clear to auscultation, no wheezes, rales or rhonchi, symmetric air entry  Heart - normal rate, regular rhythm, normal S1, S2, no murmurs, rubs, clicks or gallops  Abdomen - soft, nontender, nondistended, no masses or organomegaly  Neurological - alert, oriented, normal speech, no focal findings or movement disorder noted  Musculoskeletal - no joint tenderness, deformity or swelling  Extremities - peripheral pulses normal, no pedal edema, no clubbing or cyanosis  Skin -multiple skin bruisings. DATA:      Labs:       CBC:   Recent Labs     05/25/20  0559 05/26/20  0824   WBC 6.5 9.6   HGB 7.7* 8.7*   HCT 24.7* 28.4*   * 906*       Chemistry        Component Value Date/Time     05/22/2020 0529    K 4.0 05/22/2020 0529     05/22/2020 0529    CO2 22 05/22/2020 0529    BUN 11 05/22/2020 0529    CREATININE 0.48 (L) 05/22/2020 0529        Component Value Date/Time    CALCIUM 8.8 05/22/2020 0529    ALKPHOS 50 05/20/2020 2234    AST 20 05/20/2020 2234    ALT 19 05/20/2020 2234    BILITOT 0.44 05/20/2020 2234                IMPRESSION:    AML  Pancytopenia ([facility] [address]    RECOMMENDATIONS:  1. Records and labs and images were reviewed and discussed with the patient. 2. 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    3. Patient was evaluated at [address] [place] [address]  Agreed with starting treatment. Receiving supportive transfusion as needed.   Due to previous reaction to platelet

## 2020-05-28 ENCOUNTER — HOSPITAL ENCOUNTER (OUTPATIENT)
Dept: INFUSION THERAPY | Facility: MEDICAL CENTER | Age: 70
Discharge: HOME | End: 2020-05-28
Payer: MEDICARE

## 2020-05-28 VITALS
DIASTOLIC BLOOD PRESSURE: 67 MMHG | TEMPERATURE: 98.3 F | RESPIRATION RATE: 18 BRPM | SYSTOLIC BLOOD PRESSURE: 108 MMHG | HEART RATE: 69 BPM

## 2020-05-28 DIAGNOSIS — C92.00 ACUTE MYELOID LEUKEMIA NOT HAVING ACHIEVED REMISSION (HCC): Primary | ICD-10-CM

## 2020-05-28 LAB
ABSOLUTE EOS #: 0 K/UL (ref 0–0.44)
ABSOLUTE IMMATURE GRANULOCYTE: 1.74 K/UL (ref 0–0.3)
ABSOLUTE LYMPH #: 0.66 K/UL (ref 1.1–3.7)
ABSOLUTE MONO #: 1.07 K/UL (ref 0.1–1.2)
BASOPHILS # BLD: 1 % (ref 0–2)
BASOPHILS ABSOLUTE: 0.05 K/UL (ref 0–0.2)
DIFFERENTIAL TYPE: ABNORMAL
EOSINOPHILS RELATIVE PERCENT: 0 % (ref 1–4)
HCT VFR BLD CALC: 29.6 % (ref 36.3–47.1)
HEMOGLOBIN: 9 G/DL (ref 11.9–15.1)
IMMATURE GRANULOCYTES: 34 %
LYMPHOCYTES # BLD: 13 % (ref 24–43)
MCH RBC QN AUTO: 31 PG (ref 25.2–33.5)
MCHC RBC AUTO-ENTMCNC: 30.4 G/DL (ref 28.4–34.8)
MCV RBC AUTO: 102.1 FL (ref 82.6–102.9)
MONOCYTES # BLD: 21 % (ref 3–12)
MORPHOLOGY: ABNORMAL
NRBC AUTOMATED: 2.4 PER 100 WBC
PDW BLD-RTO: 23.1 % (ref 11.8–14.4)
PLATELET # BLD: 857 K/UL (ref 138–453)
PLATELET ESTIMATE: ABNORMAL
PMV BLD AUTO: 9.2 FL (ref 8.1–13.5)
RBC # BLD: 2.9 M/UL (ref 3.95–5.11)
RBC # BLD: ABNORMAL 10*6/UL
SEG NEUTROPHILS: 31 % (ref 36–65)
SEGMENTED NEUTROPHILS ABSOLUTE COUNT: 1.58 K/UL (ref 1.5–8.1)
WBC # BLD: 5.1 K/UL (ref 3.5–11.3)
WBC # BLD: ABNORMAL 10*3/UL

## 2020-05-28 PROCEDURE — 36415 COLL VENOUS BLD VENIPUNCTURE: CPT

## 2020-05-28 PROCEDURE — 96401 CHEMO ANTI-NEOPL SQ/IM: CPT

## 2020-05-28 PROCEDURE — 85025 COMPLETE CBC W/AUTO DIFF WBC: CPT

## 2020-05-28 PROCEDURE — 2580000003 HC RX 258: Performed by: INTERNAL MEDICINE

## 2020-05-28 PROCEDURE — 6360000002 HC RX W HCPCS: Performed by: INTERNAL MEDICINE

## 2020-05-28 RX ORDER — SODIUM CHLORIDE 9 MG/ML
20 INJECTION, SOLUTION INTRAVENOUS ONCE
Status: DISCONTINUED | OUTPATIENT
Start: 2020-05-28 | End: 2020-05-29 | Stop reason: HOSPADM

## 2020-05-28 RX ORDER — SODIUM CHLORIDE 0.9 % (FLUSH) 0.9 %
10 SYRINGE (ML) INJECTION PRN
Status: DISCONTINUED | OUTPATIENT
Start: 2020-05-28 | End: 2020-05-29 | Stop reason: HOSPADM

## 2020-05-28 RX ORDER — HEPARIN SODIUM (PORCINE) LOCK FLUSH IV SOLN 100 UNIT/ML 100 UNIT/ML
500 SOLUTION INTRAVENOUS PRN
Status: DISCONTINUED | OUTPATIENT
Start: 2020-05-28 | End: 2020-05-29 | Stop reason: HOSPADM

## 2020-05-28 RX ADMIN — AZACITIDINE 160 MG: 100 INJECTION, POWDER, LYOPHILIZED, FOR SOLUTION INTRAVENOUS; SUBCUTANEOUS at 09:09

## 2020-05-28 NOTE — PROGRESS NOTES
Patient arrive ambulatory for cycle 2 day 3 treatment and possible transfusion. Labs reviewed; no transfusion required. Patient feeling well; denies complaints. Vidaza injections tolerated well; band-aids applied. Patient ambulate off unit per self at discharge; RTC tomorrow.

## 2020-05-29 ENCOUNTER — HOSPITAL ENCOUNTER (OUTPATIENT)
Dept: INFUSION THERAPY | Facility: MEDICAL CENTER | Age: 70
Discharge: HOME | End: 2020-05-29
Payer: MEDICARE

## 2020-05-29 ENCOUNTER — HOSPITAL ENCOUNTER (OUTPATIENT)
Facility: MEDICAL CENTER | Age: 70
Discharge: HOME | End: 2020-05-29
Payer: MEDICARE

## 2020-05-29 VITALS
SYSTOLIC BLOOD PRESSURE: 119 MMHG | DIASTOLIC BLOOD PRESSURE: 62 MMHG | HEART RATE: 67 BPM | TEMPERATURE: 98.3 F | RESPIRATION RATE: 18 BRPM

## 2020-05-29 DIAGNOSIS — C92.00 ACUTE MYELOID LEUKEMIA NOT HAVING ACHIEVED REMISSION (HCC): Primary | ICD-10-CM

## 2020-05-29 PROCEDURE — 2580000003 HC RX 258: Performed by: INTERNAL MEDICINE

## 2020-05-29 PROCEDURE — 96401 CHEMO ANTI-NEOPL SQ/IM: CPT

## 2020-05-29 PROCEDURE — 6360000002 HC RX W HCPCS: Performed by: INTERNAL MEDICINE

## 2020-05-29 RX ADMIN — AZACITIDINE 160 MG: 100 INJECTION, POWDER, LYOPHILIZED, FOR SOLUTION INTRAVENOUS; SUBCUTANEOUS at 10:40

## 2020-05-29 NOTE — PROGRESS NOTES
Patient here Vidaza injections. Complaints of redness and warmth to previous sites. Vitals obtained. Vidaza injections given per [facility], band aids to sites. Has a return visit scheduled. Discharged ambulatory with family.

## 2020-06-01 ENCOUNTER — HOSPITAL ENCOUNTER (OUTPATIENT)
Dept: INFUSION THERAPY | Facility: MEDICAL CENTER | Age: 70
Discharge: HOME | End: 2020-06-01
Payer: MEDICARE

## 2020-06-01 ENCOUNTER — HOSPITAL ENCOUNTER (OUTPATIENT)
Facility: MEDICAL CENTER | Age: 70
Discharge: HOME | End: 2020-06-01
Payer: MEDICARE

## 2020-06-01 ENCOUNTER — CARE COORDINATION (OUTPATIENT)
Dept: CASE MANAGEMENT | Age: 70
End: 2020-06-01

## 2020-06-01 VITALS
SYSTOLIC BLOOD PRESSURE: 134 MMHG | HEART RATE: 67 BPM | TEMPERATURE: 98.6 F | DIASTOLIC BLOOD PRESSURE: 79 MMHG | RESPIRATION RATE: 20 BRPM

## 2020-06-01 DIAGNOSIS — C92.00 ACUTE MYELOID LEUKEMIA NOT HAVING ACHIEVED REMISSION (HCC): Primary | ICD-10-CM

## 2020-06-01 DIAGNOSIS — C92.00 ACUTE MYELOID LEUKEMIA NOT HAVING ACHIEVED REMISSION (HCC): ICD-10-CM

## 2020-06-01 LAB
ABSOLUTE EOS #: 0 K/UL (ref 0–0.4)
ABSOLUTE IMMATURE GRANULOCYTE: 0 K/UL (ref 0–0.3)
ABSOLUTE LYMPH #: 0.7 K/UL (ref 1–4.8)
ABSOLUTE MONO #: 0.56 K/UL (ref 0.2–0.8)
ALBUMIN SERPL-MCNC: 4.1 G/DL (ref 3.5–5.2)
ALBUMIN/GLOBULIN RATIO: ABNORMAL (ref 1–2.5)
ALP BLD-CCNC: 64 U/L (ref 35–104)
ALT SERPL-CCNC: 35 U/L (ref 5–33)
ANION GAP SERPL CALCULATED.3IONS-SCNC: 11 MMOL/L (ref 9–17)
AST SERPL-CCNC: 23 U/L
BASOPHILS # BLD: 1 %
BASOPHILS ABSOLUTE: 0.04 K/UL (ref 0–0.2)
BILIRUB SERPL-MCNC: 0.38 MG/DL (ref 0.3–1.2)
BUN BLDV-MCNC: 12 MG/DL (ref 8–23)
BUN/CREAT BLD: 24 (ref 9–20)
CALCIUM SERPL-MCNC: 9.4 MG/DL (ref 8.6–10.4)
CHLORIDE BLD-SCNC: 105 MMOL/L (ref 98–107)
CO2: 23 MMOL/L (ref 20–31)
CREAT SERPL-MCNC: 0.51 MG/DL (ref 0.5–0.9)
DIFFERENTIAL TYPE: ABNORMAL
EOSINOPHILS RELATIVE PERCENT: 0 % (ref 1–4)
GFR AFRICAN AMERICAN: >60 ML/MIN
GFR NON-AFRICAN AMERICAN: >60 ML/MIN
GFR SERPL CREATININE-BSD FRML MDRD: ABNORMAL ML/MIN/{1.73_M2}
GFR SERPL CREATININE-BSD FRML MDRD: ABNORMAL ML/MIN/{1.73_M2}
GLUCOSE BLD-MCNC: 100 MG/DL (ref 70–99)
HCT VFR BLD CALC: 31.3 % (ref 36.3–47.1)
HEMOGLOBIN: 9.7 G/DL (ref 11.9–15.1)
IMMATURE GRANULOCYTES: 0 %
LACTATE DEHYDROGENASE: 316 U/L (ref 135–214)
LYMPHOCYTES # BLD: 20 % (ref 24–44)
MCH RBC QN AUTO: 31.6 PG (ref 25.2–33.5)
MCHC RBC AUTO-ENTMCNC: 31 G/DL (ref 28.4–34.8)
MCV RBC AUTO: 102 FL (ref 82.6–102.9)
MONOCYTES # BLD: 16 % (ref 1–7)
MORPHOLOGY: ABNORMAL
NRBC AUTOMATED: 0.9 PER 100 WBC
PDW BLD-RTO: 23.4 % (ref 11.8–14.4)
PHOSPHORUS: 2.5 MG/DL (ref 2.6–4.5)
PLATELET # BLD: 526 K/UL (ref 138–453)
PLATELET ESTIMATE: ABNORMAL
PMV BLD AUTO: 9.2 FL (ref 8.1–13.5)
POTASSIUM SERPL-SCNC: 4.1 MMOL/L (ref 3.7–5.3)
RBC # BLD: 3.07 M/UL (ref 3.95–5.11)
RBC # BLD: ABNORMAL 10*6/UL
SEG NEUTROPHILS: 63 % (ref 36–66)
SEGMENTED NEUTROPHILS ABSOLUTE COUNT: 2.2 K/UL (ref 1.8–7.7)
SODIUM BLD-SCNC: 139 MMOL/L (ref 135–144)
TOTAL PROTEIN: 6.7 G/DL (ref 6.4–8.3)
URIC ACID: 2.8 MG/DL (ref 2.4–5.7)
WBC # BLD: 3.5 K/UL (ref 3.5–11.3)
WBC # BLD: ABNORMAL 10*3/UL

## 2020-06-01 PROCEDURE — 85025 COMPLETE CBC W/AUTO DIFF WBC: CPT

## 2020-06-01 PROCEDURE — 84100 ASSAY OF PHOSPHORUS: CPT

## 2020-06-01 PROCEDURE — 6360000002 HC RX W HCPCS: Performed by: INTERNAL MEDICINE

## 2020-06-01 PROCEDURE — 96401 CHEMO ANTI-NEOPL SQ/IM: CPT

## 2020-06-01 PROCEDURE — 2580000003 HC RX 258: Performed by: INTERNAL MEDICINE

## 2020-06-01 PROCEDURE — 84550 ASSAY OF BLOOD/URIC ACID: CPT

## 2020-06-01 PROCEDURE — 80053 COMPREHEN METABOLIC PANEL: CPT

## 2020-06-01 PROCEDURE — 83615 LACTATE (LD) (LDH) ENZYME: CPT

## 2020-06-01 PROCEDURE — 36415 COLL VENOUS BLD VENIPUNCTURE: CPT

## 2020-06-01 RX ADMIN — AZACITIDINE 160 MG: 100 INJECTION, POWDER, LYOPHILIZED, FOR SOLUTION INTRAVENOUS; SUBCUTANEOUS at 11:12

## 2020-06-01 NOTE — PROGRESS NOTES
Patient here for labs and [address]. No complaints today. Vitals obtained. Labs reviewed. No transfusion needed. Vidaza given per [facility], band aids to sites. Has a return visit scheduled. Discharged ambulatory per self.

## 2020-06-01 NOTE — CARE COORDINATION
[facility] Transitions Follow Up Call    2020    Patient: Shantal Howell  Patient : 1950   MRN: [id number]  Reason for Admission: neutropenic fever, blood clots- RLE   Discharge Date: 20 RARS: Readmission Risk Score: 22         Spoke with: Angely Castellano     Call to pt who states she is doing \"good\"  Denies fever/ chills, cough or shortness of breath   States had chemo earlier today- states she forgot to take a nausea pill prior to appt and was nauseated but better after getting home and taking nausea med  States going to [facility] tomorrow for 2nd opinion  Denies needs  Encouraged to call writer/ CTC or providers if questions or concerns- v/u     Care Transitions Subsequent and Final Call    Subsequent and Final Calls  Do you have any ongoing symptoms?:  No  Have your medications changed?:  No  Do you have any questions related to your medications?:  No  Do you currently have any active services?:  No  Do you have any needs or concerns that I can assist you with?:  No  Identified Barriers:  Lack of Education  Care Transitions Interventions  Other Interventions:             Follow Up  Future Appointments   Date Time Provider Raquel Strauss   6/3/2020  9:00 AM STV STA CHAIR 16 [facility] STA [facility] [place]   2020  8:00 AM STV STA CHAIR 12 [facility] ST[facility] [place]   2020 10:00 AM STV STA CHAIR 08 ST[facility]Z STA MED [place]   2020  9:00 AM STV STA CHAIR 11 STVZ STA MED [place]   6/10/2020  9:00 AM STV STA CHAIR 10 STVZ STA MED [place]   2020  9:00 AM STV STA CHAIR 10 ST[facility] STA MED [place]   6/15/2020  9:00 AM STV STA CHAIR 14 STVZ STA MED [place]   2020  9:00 AM Kim Langford MD [facility] Mary Bacon RN

## 2020-06-03 ENCOUNTER — HOSPITAL ENCOUNTER (OUTPATIENT)
Dept: INFUSION THERAPY | Facility: MEDICAL CENTER | Age: 70
Discharge: HOME | End: 2020-06-03
Payer: MEDICARE

## 2020-06-03 ENCOUNTER — HOSPITAL ENCOUNTER (OUTPATIENT)
Facility: MEDICAL CENTER | Age: 70
Discharge: HOME | End: 2020-06-03
Payer: MEDICARE

## 2020-06-03 VITALS
HEART RATE: 65 BPM | TEMPERATURE: 98.4 F | RESPIRATION RATE: 20 BRPM | SYSTOLIC BLOOD PRESSURE: 142 MMHG | DIASTOLIC BLOOD PRESSURE: 77 MMHG

## 2020-06-03 DIAGNOSIS — C92.00 ACUTE MYELOID LEUKEMIA NOT HAVING ACHIEVED REMISSION (HCC): Primary | ICD-10-CM

## 2020-06-03 DIAGNOSIS — C92.00 ACUTE MYELOID LEUKEMIA NOT HAVING ACHIEVED REMISSION (HCC): ICD-10-CM

## 2020-06-03 LAB
ABSOLUTE EOS #: 0 K/UL (ref 0–0.44)
ABSOLUTE IMMATURE GRANULOCYTE: 0 K/UL (ref 0–0.3)
ABSOLUTE LYMPH #: 0.65 K/UL (ref 1.1–3.7)
ABSOLUTE MONO #: 0.5 K/UL (ref 0.1–1.2)
BASOPHILS # BLD: 1 % (ref 0–2)
BASOPHILS ABSOLUTE: 0.02 K/UL (ref 0–0.2)
DIFFERENTIAL TYPE: ABNORMAL
EOSINOPHILS RELATIVE PERCENT: 0 % (ref 1–4)
HCT VFR BLD CALC: 31.7 % (ref 36.3–47.1)
HEMOGLOBIN: 10 G/DL (ref 11.9–15.1)
IMMATURE GRANULOCYTES: 0 %
LYMPHOCYTES # BLD: 27 % (ref 24–43)
MCH RBC QN AUTO: 32.1 PG (ref 25.2–33.5)
MCHC RBC AUTO-ENTMCNC: 31.5 G/DL (ref 28.4–34.8)
MCV RBC AUTO: 101.6 FL (ref 82.6–102.9)
MONOCYTES # BLD: 21 % (ref 3–12)
MORPHOLOGY: ABNORMAL
NRBC AUTOMATED: 1.3 PER 100 WBC
PDW BLD-RTO: 24 % (ref 11.8–14.4)
PLATELET # BLD: 418 K/UL (ref 138–453)
PLATELET ESTIMATE: ABNORMAL
PMV BLD AUTO: 9.4 FL (ref 8.1–13.5)
RBC # BLD: 3.12 M/UL (ref 3.95–5.11)
RBC # BLD: ABNORMAL 10*6/UL
SEG NEUTROPHILS: 51 % (ref 36–65)
SEGMENTED NEUTROPHILS ABSOLUTE COUNT: 1.23 K/UL (ref 1.5–8.1)
WBC # BLD: 2.4 K/UL (ref 3.5–11.3)
WBC # BLD: ABNORMAL 10*3/UL

## 2020-06-03 PROCEDURE — 2580000003 HC RX 258: Performed by: INTERNAL MEDICINE

## 2020-06-03 PROCEDURE — 85025 COMPLETE CBC W/AUTO DIFF WBC: CPT

## 2020-06-03 PROCEDURE — 6360000002 HC RX W HCPCS: Performed by: INTERNAL MEDICINE

## 2020-06-03 PROCEDURE — 96401 CHEMO ANTI-NEOPL SQ/IM: CPT

## 2020-06-03 PROCEDURE — 36415 COLL VENOUS BLD VENIPUNCTURE: CPT

## 2020-06-03 RX ORDER — SODIUM CHLORIDE 9 MG/ML
20 INJECTION, SOLUTION INTRAVENOUS ONCE
Status: DISCONTINUED | OUTPATIENT
Start: 2020-06-03 | End: 2020-06-04 | Stop reason: HOSPADM

## 2020-06-03 RX ADMIN — AZACITIDINE 160 MG: 100 INJECTION, POWDER, LYOPHILIZED, FOR SOLUTION INTRAVENOUS; SUBCUTANEOUS at 09:57

## 2020-06-04 ENCOUNTER — HOSPITAL ENCOUNTER (OUTPATIENT)
Dept: INFUSION THERAPY | Facility: MEDICAL CENTER | Age: 70
Discharge: HOME | End: 2020-06-04
Payer: MEDICARE

## 2020-06-04 VITALS
RESPIRATION RATE: 18 BRPM | TEMPERATURE: 98.4 F | SYSTOLIC BLOOD PRESSURE: 124 MMHG | HEART RATE: 75 BPM | DIASTOLIC BLOOD PRESSURE: 73 MMHG

## 2020-06-04 DIAGNOSIS — C92.00 ACUTE MYELOID LEUKEMIA NOT HAVING ACHIEVED REMISSION (HCC): Primary | ICD-10-CM

## 2020-06-04 PROCEDURE — 6360000002 HC RX W HCPCS: Performed by: INTERNAL MEDICINE

## 2020-06-04 PROCEDURE — 2580000003 HC RX 258: Performed by: INTERNAL MEDICINE

## 2020-06-04 PROCEDURE — 96401 CHEMO ANTI-NEOPL SQ/IM: CPT

## 2020-06-04 RX ADMIN — AZACITIDINE 160 MG: 100 INJECTION, POWDER, LYOPHILIZED, FOR SOLUTION INTRAVENOUS; SUBCUTANEOUS at 08:50

## 2020-06-04 NOTE — PROGRESS NOTES
Pt arrives per ambulatory per self and labs from 6/1/2020 and 6/3/2020 and orders reviewed. Pt has reddened areas and warmth to posterior upper arms bilat and pt state is using ice to sites. Writer avoided reddened areas and injections given slowly to bilat upper arms and no reactions or complaints, no bleeding at sites and bandaids applied.  Pt discharged per ambulatory to return tomorrow for lab draw with eval.

## 2020-06-05 ENCOUNTER — HOSPITAL ENCOUNTER (OUTPATIENT)
Facility: MEDICAL CENTER | Age: 70
Discharge: HOME | End: 2020-06-05
Payer: MEDICARE

## 2020-06-05 ENCOUNTER — HOSPITAL ENCOUNTER (OUTPATIENT)
Dept: INFUSION THERAPY | Facility: MEDICAL CENTER | Age: 70
Discharge: HOME | End: 2020-06-05
Payer: MEDICARE

## 2020-06-05 DIAGNOSIS — C92.00 ACUTE MYELOID LEUKEMIA NOT HAVING ACHIEVED REMISSION (HCC): ICD-10-CM

## 2020-06-05 LAB
ABSOLUTE EOS #: 0.02 K/UL (ref 0–0.4)
ABSOLUTE IMMATURE GRANULOCYTE: 0 K/UL (ref 0–0.3)
ABSOLUTE LYMPH #: 0.42 K/UL (ref 1–4.8)
ABSOLUTE MONO #: 0.66 K/UL (ref 0.2–0.8)
BASOPHILS # BLD: 0 %
BASOPHILS ABSOLUTE: 0 K/UL (ref 0–0.2)
DIFFERENTIAL TYPE: ABNORMAL
EOSINOPHILS RELATIVE PERCENT: 1 % (ref 1–4)
HCT VFR BLD CALC: 31.4 % (ref 36.3–47.1)
HEMOGLOBIN: 9.9 G/DL (ref 11.9–15.1)
IMMATURE GRANULOCYTES: 0 %
LYMPHOCYTES # BLD: 21 % (ref 24–44)
MCH RBC QN AUTO: 32.2 PG (ref 25.2–33.5)
MCHC RBC AUTO-ENTMCNC: 31.5 G/DL (ref 28.4–34.8)
MCV RBC AUTO: 102.3 FL (ref 82.6–102.9)
MONOCYTES # BLD: 33 % (ref 1–7)
MORPHOLOGY: ABNORMAL
MORPHOLOGY: ABNORMAL
NRBC AUTOMATED: 1.5 PER 100 WBC
PDW BLD-RTO: 24.2 % (ref 11.8–14.4)
PLATELET # BLD: 390 K/UL (ref 138–453)
PLATELET ESTIMATE: ABNORMAL
PMV BLD AUTO: 9.6 FL (ref 8.1–13.5)
RBC # BLD: 3.07 M/UL (ref 3.95–5.11)
RBC # BLD: ABNORMAL 10*6/UL
SEG NEUTROPHILS: 45 % (ref 36–66)
SEGMENTED NEUTROPHILS ABSOLUTE COUNT: 0.9 K/UL (ref 1.8–7.7)
WBC # BLD: 2 K/UL (ref 3.5–11.3)
WBC # BLD: ABNORMAL 10*3/UL

## 2020-06-05 PROCEDURE — 36415 COLL VENOUS BLD VENIPUNCTURE: CPT

## 2020-06-05 PROCEDURE — 85025 COMPLETE CBC W/AUTO DIFF WBC: CPT

## 2020-06-05 NOTE — PROGRESS NOTES
Patient here for blood draw and possible platelet transfusion. Lab results returned with Hgb 9.9 and platelets 689. Patient informed she will not need transfusion. WBC 2 with absolute segs 9. Patient instructed about need for good handwashing and extra precautions around others. Patient voices understanding  aware of next appointment Monday.

## 2020-06-08 ENCOUNTER — HOSPITAL ENCOUNTER (OUTPATIENT)
Dept: INFUSION THERAPY | Facility: MEDICAL CENTER | Age: 70
Discharge: HOME | End: 2020-06-08
Payer: MEDICARE

## 2020-06-08 ENCOUNTER — HOSPITAL ENCOUNTER (OUTPATIENT)
Facility: MEDICAL CENTER | Age: 70
Discharge: HOME | End: 2020-06-08
Payer: MEDICARE

## 2020-06-08 VITALS
RESPIRATION RATE: 20 BRPM | DIASTOLIC BLOOD PRESSURE: 89 MMHG | TEMPERATURE: 98.4 F | SYSTOLIC BLOOD PRESSURE: 146 MMHG | HEART RATE: 65 BPM

## 2020-06-08 DIAGNOSIS — C92.00 ACUTE MYELOID LEUKEMIA NOT HAVING ACHIEVED REMISSION (HCC): ICD-10-CM

## 2020-06-08 LAB
ABSOLUTE EOS #: 0 K/UL (ref 0–0.44)
ABSOLUTE IMMATURE GRANULOCYTE: 0 K/UL (ref 0–0.3)
ABSOLUTE LYMPH #: 0.53 K/UL (ref 1.1–3.7)
ABSOLUTE MONO #: 0.75 K/UL (ref 0.1–1.2)
BASOPHILS # BLD: 1 % (ref 0–2)
BASOPHILS ABSOLUTE: 0.01 K/UL (ref 0–0.2)
DIFFERENTIAL TYPE: ABNORMAL
EOSINOPHILS RELATIVE PERCENT: 0 % (ref 1–4)
HCT VFR BLD CALC: 33.4 % (ref 36.3–47.1)
HEMOGLOBIN: 10.5 G/DL (ref 11.9–15.1)
IMMATURE GRANULOCYTES: 0 %
LYMPHOCYTES # BLD: 38 % (ref 24–43)
MCH RBC QN AUTO: 32.9 PG (ref 25.2–33.5)
MCHC RBC AUTO-ENTMCNC: 31.4 G/DL (ref 28.4–34.8)
MCV RBC AUTO: 104.7 FL (ref 82.6–102.9)
MONOCYTES # BLD: 53 % (ref 3–12)
MORPHOLOGY: ABNORMAL
MORPHOLOGY: ABNORMAL
NRBC AUTOMATED: 0 PER 100 WBC
PDW BLD-RTO: 23.9 % (ref 11.8–14.4)
PLATELET # BLD: 460 K/UL (ref 138–453)
PLATELET ESTIMATE: ABNORMAL
PMV BLD AUTO: 9.4 FL (ref 8.1–13.5)
RBC # BLD: 3.19 M/UL (ref 3.95–5.11)
RBC # BLD: ABNORMAL 10*6/UL
SEG NEUTROPHILS: 8 % (ref 36–65)
SEGMENTED NEUTROPHILS ABSOLUTE COUNT: 0.11 K/UL (ref 1.5–8.1)
WBC # BLD: 1.4 K/UL (ref 3.5–11.3)
WBC # BLD: ABNORMAL 10*3/UL

## 2020-06-08 PROCEDURE — 85025 COMPLETE CBC W/AUTO DIFF WBC: CPT

## 2020-06-08 PROCEDURE — 36415 COLL VENOUS BLD VENIPUNCTURE: CPT

## 2020-06-08 PROCEDURE — 99201 HC NEW PT, E/M LEVEL 1: CPT

## 2020-06-08 NOTE — PROGRESS NOTES
Patient here for labs and possible transfusion. Feels tired but otherwise no complains. Vitals obtained. Labs reviewed. WBC=1.4 with ANC=0.11/  Hemoglobin and platelets within normal limits. Call placed to Dr. Violet Martines and informed him of the above. Venetoclax changed to 3 tabs per day instead of 4. No other orders received,  Reminded patient of neutropenic precautions due to [address] of 0.11. Voices understanding. Going to [place] tomorrow to discuss transplant. Daughter going with her. Has a return visit scheduled here. Discharged ambulatory per self.

## 2020-06-10 ENCOUNTER — HOSPITAL ENCOUNTER (OUTPATIENT)
Facility: MEDICAL CENTER | Age: 70
Discharge: HOME | End: 2020-06-10
Payer: MEDICARE

## 2020-06-10 ENCOUNTER — HOSPITAL ENCOUNTER (OUTPATIENT)
Facility: MEDICAL CENTER | Age: 70
End: 2020-06-10
Payer: MEDICARE

## 2020-06-10 ENCOUNTER — HOSPITAL ENCOUNTER (OUTPATIENT)
Dept: INFUSION THERAPY | Facility: MEDICAL CENTER | Age: 70
Discharge: HOME | End: 2020-06-10
Payer: MEDICARE

## 2020-06-10 DIAGNOSIS — C92.00 ACUTE MYELOID LEUKEMIA NOT HAVING ACHIEVED REMISSION (HCC): ICD-10-CM

## 2020-06-10 LAB
ABSOLUTE EOS #: 0 K/UL (ref 0–0.44)
ABSOLUTE IMMATURE GRANULOCYTE: 0 K/UL (ref 0–0.3)
ABSOLUTE LYMPH #: 0.46 K/UL (ref 1.1–3.7)
ABSOLUTE MONO #: 0.43 K/UL (ref 0.1–1.2)
BASOPHILS # BLD: 1 % (ref 0–2)
BASOPHILS ABSOLUTE: 0.01 K/UL (ref 0–0.2)
DIFFERENTIAL TYPE: ABNORMAL
EOSINOPHILS RELATIVE PERCENT: 0 % (ref 1–4)
HCT VFR BLD CALC: 34.8 % (ref 36.3–47.1)
HEMOGLOBIN: 10.8 G/DL (ref 11.9–15.1)
IMMATURE GRANULOCYTES: 0 %
LYMPHOCYTES # BLD: 46 % (ref 24–43)
MCH RBC QN AUTO: 32.8 PG (ref 25.2–33.5)
MCHC RBC AUTO-ENTMCNC: 31 G/DL (ref 28.4–34.8)
MCV RBC AUTO: 105.8 FL (ref 82.6–102.9)
MONOCYTES # BLD: 43 % (ref 3–12)
MORPHOLOGY: ABNORMAL
MORPHOLOGY: ABNORMAL
NRBC AUTOMATED: 0 PER 100 WBC
PDW BLD-RTO: 23.8 % (ref 11.8–14.4)
PLATELET # BLD: 494 K/UL (ref 138–453)
PLATELET ESTIMATE: ABNORMAL
PMV BLD AUTO: 9.6 FL (ref 8.1–13.5)
RBC # BLD: 3.29 M/UL (ref 3.95–5.11)
RBC # BLD: ABNORMAL 10*6/UL
SEG NEUTROPHILS: 10 % (ref 36–65)
SEGMENTED NEUTROPHILS ABSOLUTE COUNT: 0.1 K/UL (ref 1.5–8.1)
WBC # BLD: 1 K/UL (ref 3.5–11.3)
WBC # BLD: ABNORMAL 10*3/UL

## 2020-06-10 PROCEDURE — 36415 COLL VENOUS BLD VENIPUNCTURE: CPT

## 2020-06-10 PROCEDURE — 85025 COMPLETE CBC W/AUTO DIFF WBC: CPT

## 2020-06-10 NOTE — PROGRESS NOTES
Patient arrived for labs and possible transfusion. Labs reviewed. Dr. Swapna Guerrero called and informed of results. Hgb 10.8, platelets 008 WBC 1.0 absolute seg . 01. No need for transfusion and okay to continue present medication regimine. Spoke with patient and informed her of plan. Patient voices understanding. Patient discharged home per self.

## 2020-06-12 ENCOUNTER — HOSPITAL ENCOUNTER (OUTPATIENT)
Dept: INFUSION THERAPY | Facility: MEDICAL CENTER | Age: 70
Discharge: HOME | End: 2020-06-12
Payer: MEDICARE

## 2020-06-12 ENCOUNTER — HOSPITAL ENCOUNTER (OUTPATIENT)
Facility: MEDICAL CENTER | Age: 70
Discharge: HOME | End: 2020-06-12
Payer: MEDICARE

## 2020-06-12 DIAGNOSIS — C92.00 ACUTE MYELOID LEUKEMIA NOT HAVING ACHIEVED REMISSION (HCC): ICD-10-CM

## 2020-06-12 LAB
ABSOLUTE EOS #: 0 K/UL (ref 0–0.44)
ABSOLUTE IMMATURE GRANULOCYTE: 0.01 K/UL (ref 0–0.3)
ABSOLUTE LYMPH #: 0.46 K/UL (ref 1.1–3.7)
ABSOLUTE MONO #: 0.39 K/UL (ref 0.1–1.2)
BASOPHILS # BLD: 0 % (ref 0–2)
BASOPHILS ABSOLUTE: 0 K/UL (ref 0–0.2)
DIFFERENTIAL TYPE: ABNORMAL
EOSINOPHILS RELATIVE PERCENT: 0 % (ref 1–4)
HCT VFR BLD CALC: 33.3 % (ref 36.3–47.1)
HEMOGLOBIN: 10.4 G/DL (ref 11.9–15.1)
IMMATURE GRANULOCYTES: 1 %
LYMPHOCYTES # BLD: 42 % (ref 24–43)
MCH RBC QN AUTO: 33.3 PG (ref 25.2–33.5)
MCHC RBC AUTO-ENTMCNC: 31.2 G/DL (ref 28.4–34.8)
MCV RBC AUTO: 106.7 FL (ref 82.6–102.9)
MONOCYTES # BLD: 35 % (ref 3–12)
MORPHOLOGY: ABNORMAL
NRBC AUTOMATED: 0 PER 100 WBC
PDW BLD-RTO: 23.2 % (ref 11.8–14.4)
PLATELET # BLD: 404 K/UL (ref 138–453)
PLATELET ESTIMATE: ABNORMAL
PMV BLD AUTO: 9.2 FL (ref 8.1–13.5)
RBC # BLD: 3.12 M/UL (ref 3.95–5.11)
RBC # BLD: ABNORMAL 10*6/UL
SEG NEUTROPHILS: 22 % (ref 36–65)
SEGMENTED NEUTROPHILS ABSOLUTE COUNT: 0.24 K/UL (ref 1.5–8.1)
WBC # BLD: 1.1 K/UL (ref 3.5–11.3)
WBC # BLD: ABNORMAL 10*3/UL

## 2020-06-12 PROCEDURE — 85025 COMPLETE CBC W/AUTO DIFF WBC: CPT

## 2020-06-12 PROCEDURE — 36415 COLL VENOUS BLD VENIPUNCTURE: CPT

## 2020-06-12 NOTE — PROGRESS NOTES
Patient here for labs and possible transfusion. Continues to complain of fatigue. Labs reviewed,  Hemoglobin 10.2. Platelet RPVXO=637. ANC=0.24. WBC=1.1. Labs stable this week. Has a return visit scheduled. Discharged ambulatory per self.

## 2020-06-15 ENCOUNTER — CARE COORDINATION (OUTPATIENT)
Dept: CASE MANAGEMENT | Age: 70
End: 2020-06-15

## 2020-06-15 NOTE — CARE COORDINATION
[facility] Transitions Follow Up Call- 1st attempt at final call (also COVID risk follow up)     6/15/2020    Patient: Angelina Garcia  Patient : 1950   MRN: [id number]  Reason for Admission: neutropenic fever, blood clots- RLE    Discharge Date: 20 RARS: Readmission Risk Score: 22         Attempted to reach patient for final call. VM left to return call to [phone] (pt on COVID- 19 risk discharge list).    Will attempt later         Follow Up  Future Appointments   Date Time Provider Raquel Strauss   2020 10:00 AM STV STA CHAIR 18 STVZ STA MED [place]   2020 10:00 AM Manolo Siddiqi MD  [facility]   2020  8:00 AM STV STA CHAIR 09 STVZ STA MED [place]   2020  8:00 AM STV STA CHAIR 14 STVZ STA MED [place]   2020  8:00 AM STV STA CHAIR 18 STVZ STA MED [place]   2020 10:00 AM STV STA CHAIR 08 STVZ STA MED [place]   2020  8:00 AM STV STA CHAIR 11 STVZ STA MED [place]   2020 10:00 AM STV STA CHAIR 07 STVZ STA MED [place]   2020 10:00 AM STV STA CHAIR 07 STVZ STA MED [place]   2020 10:00 AM STV STA CHAIR 09 STVZ STA MED [place]   2020 10:00 AM STV STA CHAIR 12 STVZ STA MED Deena Vilchis RN

## 2020-06-15 NOTE — CARE COORDINATION
[facility] Transitions Follow Up Call    6/15/2020    Patient: Angelina Garcia  Patient : 1950   MRN: [id number]  Reason for Admission: neutropenic fever, blood clots/ DVT RLE   Discharge Date: 20 RARS: Readmission Risk Score: 22         Spoke with: Mini     Patient returned call  States she is tired but doing okay  States went to see onc at [facility]) who states pt may be in remission but cannot confirm until bone marrow biopsy  States needing less and less lab work for now  Denies SOB, fever/ chills  States understanding about staying home and prevention measures with regards to COVID-19   Denies needs  Encouraged to call writer/ CTC or providers if questions or concerns- v/u     Care Transitions Subsequent and Final Call    Subsequent and Final Calls  Do you have any ongoing symptoms?:  No  Have your medications changed?:  No  Do you have any questions related to your medications?:  No  Do you currently have any active services?:  No  Do you have any needs or concerns that I can assist you with?:  No  Identified Barriers:  Lack of Education  Care Transitions Interventions  Other Interventions:             Follow Up  Future Appointments   Date Time Provider Raquel Strauss   2020 10:00 AM STV STA CHAIR 18 STVZ STA MED [place]   2020 10:00 AM Manolo Siddiqi MD [facility]   2020  8:00 AM STV STA CHAIR 09 STVZ STA MED [place]   2020  8:00 AM STV STA CHAIR 14 STVZ STA MED [place]   2020  8:00 AM STV STA CHAIR 18 STVZ STA MED [place]   2020 10:00 AM STV STA CHAIR 08 STVZ STA MED [place]   2020  8:00 AM STV STA CHAIR 11 STVZ STA MED [place]   2020 10:00 AM STV STA CHAIR 07 STVZ STA MED [place]   2020 10:00 AM STV STA CHAIR 07 STVZ STA MED [place]   2020 10:00 AM STV STA CHAIR 09 STVZ STA MED [place]   2020 10:00 AM STV STA CHAIR 12 STVZ STA MED Deena Prophet, RN

## 2020-06-16 ENCOUNTER — TELEPHONE (OUTPATIENT)
Dept: ONCOLOGY | Age: 70
End: 2020-06-16

## 2020-06-16 ENCOUNTER — OFFICE VISIT (OUTPATIENT)
Dept: ONCOLOGY | Age: 70
End: 2020-06-16
Payer: MEDICARE

## 2020-06-16 ENCOUNTER — HOSPITAL ENCOUNTER (OUTPATIENT)
Dept: INFUSION THERAPY | Facility: MEDICAL CENTER | Age: 70
Discharge: HOME | End: 2020-06-16
Payer: MEDICARE

## 2020-06-16 ENCOUNTER — HOSPITAL ENCOUNTER (OUTPATIENT)
Facility: MEDICAL CENTER | Age: 70
End: 2020-06-16
Payer: MEDICARE

## 2020-06-16 ENCOUNTER — HOSPITAL ENCOUNTER (OUTPATIENT)
Facility: MEDICAL CENTER | Age: 70
Discharge: HOME | End: 2020-06-16
Payer: MEDICARE

## 2020-06-16 VITALS
RESPIRATION RATE: 18 BRPM | TEMPERATURE: 98.6 F | DIASTOLIC BLOOD PRESSURE: 79 MMHG | SYSTOLIC BLOOD PRESSURE: 138 MMHG | BODY MASS INDEX: 27.75 KG/M2 | WEIGHT: 193.4 LBS | HEART RATE: 70 BPM

## 2020-06-16 DIAGNOSIS — C92.00 ACUTE MYELOID LEUKEMIA NOT HAVING ACHIEVED REMISSION (HCC): ICD-10-CM

## 2020-06-16 LAB
ABSOLUTE EOS #: 0 K/UL (ref 0–0.4)
ABSOLUTE IMMATURE GRANULOCYTE: 0 K/UL (ref 0–0.3)
ABSOLUTE LYMPH #: 0.36 K/UL (ref 1–4.8)
ABSOLUTE MONO #: 0.14 K/UL (ref 0.2–0.8)
ALBUMIN SERPL-MCNC: 4.2 G/DL (ref 3.5–5.2)
ALBUMIN/GLOBULIN RATIO: ABNORMAL (ref 1–2.5)
ALP BLD-CCNC: 54 U/L (ref 35–104)
ALT SERPL-CCNC: 20 U/L (ref 5–33)
ANION GAP SERPL CALCULATED.3IONS-SCNC: 9 MMOL/L (ref 9–17)
AST SERPL-CCNC: 19 U/L
BASOPHILS # BLD: 0 %
BASOPHILS ABSOLUTE: 0 K/UL (ref 0–0.2)
BILIRUB SERPL-MCNC: 0.29 MG/DL (ref 0.3–1.2)
BUN BLDV-MCNC: 15 MG/DL (ref 8–23)
BUN/CREAT BLD: 32 (ref 9–20)
CALCIUM SERPL-MCNC: 9.5 MG/DL (ref 8.6–10.4)
CHLORIDE BLD-SCNC: 104 MMOL/L (ref 98–107)
CO2: 26 MMOL/L (ref 20–31)
CREAT SERPL-MCNC: 0.47 MG/DL (ref 0.5–0.9)
DIFFERENTIAL TYPE: ABNORMAL
EOSINOPHILS RELATIVE PERCENT: 0 % (ref 1–4)
GFR AFRICAN AMERICAN: >60 ML/MIN
GFR NON-AFRICAN AMERICAN: >60 ML/MIN
GFR SERPL CREATININE-BSD FRML MDRD: ABNORMAL ML/MIN/{1.73_M2}
GFR SERPL CREATININE-BSD FRML MDRD: ABNORMAL ML/MIN/{1.73_M2}
GLUCOSE BLD-MCNC: 91 MG/DL (ref 70–99)
HCT VFR BLD CALC: 33.3 % (ref 36.3–47.1)
HEMOGLOBIN: 10.4 G/DL (ref 11.9–15.1)
IMMATURE GRANULOCYTES: 0 %
LYMPHOCYTES # BLD: 30 % (ref 24–44)
MCH RBC QN AUTO: 33.7 PG (ref 25.2–33.5)
MCHC RBC AUTO-ENTMCNC: 31.2 G/DL (ref 28.4–34.8)
MCV RBC AUTO: 107.8 FL (ref 82.6–102.9)
MONOCYTES # BLD: 12 % (ref 1–7)
MORPHOLOGY: ABNORMAL
NRBC AUTOMATED: ABNORMAL PER 100 WBC
PDW BLD-RTO: 22.8 % (ref 11.8–14.4)
PLATELET # BLD: 242 K/UL (ref 138–453)
PLATELET ESTIMATE: ABNORMAL
PMV BLD AUTO: 9.9 FL (ref 8.1–13.5)
POTASSIUM SERPL-SCNC: 4.1 MMOL/L (ref 3.7–5.3)
RBC # BLD: 3.09 M/UL (ref 3.95–5.11)
RBC # BLD: ABNORMAL 10*6/UL
SEG NEUTROPHILS: 58 % (ref 36–66)
SEGMENTED NEUTROPHILS ABSOLUTE COUNT: 0.7 K/UL (ref 1.8–7.7)
SODIUM BLD-SCNC: 139 MMOL/L (ref 135–144)
TOTAL PROTEIN: 6.4 G/DL (ref 6.4–8.3)
WBC # BLD: 1.2 K/UL (ref 3.5–11.3)
WBC # BLD: ABNORMAL 10*3/UL

## 2020-06-16 PROCEDURE — 80053 COMPREHEN METABOLIC PANEL: CPT

## 2020-06-16 PROCEDURE — 85025 COMPLETE CBC W/AUTO DIFF WBC: CPT

## 2020-06-16 PROCEDURE — 36415 COLL VENOUS BLD VENIPUNCTURE: CPT

## 2020-06-16 PROCEDURE — 99214 OFFICE O/P EST MOD 30 MIN: CPT | Performed by: INTERNAL MEDICINE

## 2020-06-17 ENCOUNTER — HOSPITAL ENCOUNTER (OUTPATIENT)
Dept: INFUSION THERAPY | Facility: MEDICAL CENTER | Age: 70
End: 2020-06-17
Payer: MEDICARE

## 2020-06-17 NOTE — PROGRESS NOTES
 apixaban (ELIQUIS) 5 MG TABS tablet Take 1 tablet by mouth 2 times daily 60 tablet 3    acyclovir (ZOVIRAX) 400 MG tablet Take 1 tablet by mouth every 12 hours 60 tablet 0    apixaban (ELIQUIS DVT/PE STARTER PACK) 5 MG TABS tablet Take 10 mg (2 tablets) orally twice daily for 7 days, then take 5 mg (1 tablet) orally twice daily thereafter. 74 tablet 0    ondansetron (ZOFRAN-ODT) 4 MG disintegrating tablet Take 1 tablet by mouth 3 times daily as needed for Nausea or Vomiting 21 tablet 0    vitamin D (CHOLECALCIFEROL) 25 MCG (1000 UT) TABS tablet Take 3,000 Units by mouth daily      Multiple Vitamins-Minerals (THERAPEUTIC MULTIVITAMIN-MINERALS) tablet Take 1 tablet by mouth daily      ammonium lactate (AMLACTIN) 12 % cream APPLY AFTER SHOWERING TO SKIN FOR DRYNESS      cetirizine (ZYRTEC ALLERGY) 10 MG tablet Take 10 mg by mouth daily      metoprolol tartrate (LOPRESSOR) 50 MG tablet 50mg once a day 90 tablet 3    pravastatin (PRAVACHOL) 40 MG tablet TAKE 1 TABLET DAILY 90 tablet 3    Multiple Vitamins-Minerals (ICAPS AREDS 2 PO) Take by mouth daily      azelastine (ASTELIN) 137 MCG/SPRAY nasal spray 2 sprays by Nasal route 2 times daily as needed Use in each nostril as directed        No current facility-administered medications for this visit. Facility-Administered Medications Ordered in Other Visits   Medication Dose Route Frequency Provider Last Rate Last Dose    0.9 % sodium chloride bolus  20 mL Intravenous Once Camden Resendez MD           Allergies:  Amoxicillin-pot clavulanate; Cefuroxime axetil; Eraxis [anidulafungin]; Biaxin [clarithromycin]; and Sulfa antibiotics    REVIEW OF SYSTEMS:      · General: Positive for weakness and fatigue. . No unanticipated weight loss or decreased appetite. No fever or chills. · Eyes: No blurred vision, eye pain or double vision. · Ears: No hearing problems or drainage. No tinnitus. · Throat: No sore throat, problems with swallowing or dysphagia. · Respiratory: No cough, sputum or hemoptysis. No shortness of breath. No pleuritic chest pain. · Cardiovascular: No chest pain, orthopnea or PND. No lower extremity edema. No palpitation. · Gastrointestinal: No problems with swallowing. No abdominal pain or bloating. No nausea or vomiting. No diarrhea or constipation. No GI bleeding. · Genitourinary: No dysuria, hematuria, frequency or urgency. · Musculoskeletal: No muscle aches or pains. No limitation of movement. No back pain. No gait disturbance, No joint complaints. · Dermatologic: No skin rashes or pruritus. No skin lesions or discolorations. · Psychiatric: No depression, anxiety, or stress or signs of schizophrenia. No change in mood or affect. · Hematologic: As above. · Infectious disease: No fever, chills or frequent infections. · Endocrine: No problems with obesity. No polydipsia or polyuria. No temperature intolerance. · Neurologic: No headaches or dizziness. No weakness or numbness of the extremities. No changes in balance, coordination,  memory, mentation, behavior. · Allergic/Immunologic: No nasal congestion or hives. No repeated infections.        PHYSICAL EXAM:      /79   Pulse 70   Temp 98.6 °F (37 °C) (Oral)   Resp 18   Wt 193 lb 6.4 oz (87.7 kg)   BMI 27.75 kg/m²    Temp (24hrs), Av.3 °F (36.8 °C), Min:97.8 °F (36.6 °C), Max:99.1 °F (37.3 °C)      General appearance - well appearing, not in pain or distress  Mental status - good mood, alert and oriented  Eyes - pupils equal and reactive, extraocular eye movements intact  Ears - bilateral TM's and external ear canals normal  Nose - normal and patent, no erythema, discharge or polyps  Mouth - mucous membranes moist, pharynx normal without lesions  Neck - supple, no significant adenopathy  Lymphatics - no palpable lymphadenopathy, no hepatosplenomegaly  Chest - clear to auscultation, no wheezes, rales or rhonchi, symmetric air entry  Heart - normal rate, 9. We will make labs twice weekly. 10. Patient's questions were answered to the best of her satisfaction and she verbalized full understanding and agreement. MD Kayla Argueta MD  Cell: [phone]    This note is created with the assistance of a speech recognition program.  While intending to generate a document that actually reflects the content of the visit, the document can still have some errors including those of syntax and sound a like substitutions which may escape proof reading. It such instances, actual meaning can be extrapolated by contextual diversion.

## 2020-06-19 ENCOUNTER — HOSPITAL ENCOUNTER (OUTPATIENT)
Dept: INFUSION THERAPY | Facility: MEDICAL CENTER | Age: 70
Discharge: HOME | End: 2020-06-19
Payer: MEDICARE

## 2020-06-19 ENCOUNTER — HOSPITAL ENCOUNTER (OUTPATIENT)
Facility: MEDICAL CENTER | Age: 70
Discharge: HOME | End: 2020-06-19
Payer: MEDICARE

## 2020-06-19 DIAGNOSIS — C92.00 ACUTE MYELOID LEUKEMIA NOT HAVING ACHIEVED REMISSION (HCC): ICD-10-CM

## 2020-06-19 LAB
ABSOLUTE EOS #: 0 K/UL (ref 0–0.44)
ABSOLUTE IMMATURE GRANULOCYTE: 0 K/UL (ref 0–0.3)
ABSOLUTE LYMPH #: 0.58 K/UL (ref 1.1–3.7)
ABSOLUTE MONO #: 0.01 K/UL (ref 0.1–1.2)
BASOPHILS # BLD: 0 % (ref 0–2)
BASOPHILS ABSOLUTE: 0 K/UL (ref 0–0.2)
DIFFERENTIAL TYPE: ABNORMAL
EOSINOPHILS RELATIVE PERCENT: 0 % (ref 1–4)
HCT VFR BLD CALC: 31.5 % (ref 36.3–47.1)
HEMOGLOBIN: 10.1 G/DL (ref 11.9–15.1)
IMMATURE GRANULOCYTES: 0 %
LYMPHOCYTES # BLD: 58 % (ref 24–43)
MCH RBC QN AUTO: 33.8 PG (ref 25.2–33.5)
MCHC RBC AUTO-ENTMCNC: 32.1 G/DL (ref 28.4–34.8)
MCV RBC AUTO: 105.4 FL (ref 82.6–102.9)
MONOCYTES # BLD: 1 % (ref 3–12)
MORPHOLOGY: ABNORMAL
NRBC AUTOMATED: 0 PER 100 WBC
PDW BLD-RTO: 22 % (ref 11.8–14.4)
PLATELET # BLD: 93 K/UL (ref 138–453)
PLATELET ESTIMATE: ABNORMAL
PMV BLD AUTO: 9.2 FL (ref 8.1–13.5)
RBC # BLD: 2.99 M/UL (ref 3.95–5.11)
RBC # BLD: ABNORMAL 10*6/UL
SEG NEUTROPHILS: 41 % (ref 36–65)
SEGMENTED NEUTROPHILS ABSOLUTE COUNT: 0.41 K/UL (ref 1.5–8.1)
WBC # BLD: 1 K/UL (ref 3.5–11.3)
WBC # BLD: ABNORMAL 10*3/UL

## 2020-06-19 PROCEDURE — 36415 COLL VENOUS BLD VENIPUNCTURE: CPT

## 2020-06-19 PROCEDURE — 85025 COMPLETE CBC W/AUTO DIFF WBC: CPT

## 2020-06-19 NOTE — PROGRESS NOTES
Patient arrived ambulatory per self for possible transfusion. Labs reviewed. Dr. Rowan Nguyen called with results. Plts 93. Remainder of results given also. Decision made not to treat today. Patient notified and voices understanding. Patient to return Monday. Patient ambulated off unit per self at discharge.

## 2020-06-22 ENCOUNTER — HOSPITAL ENCOUNTER (OUTPATIENT)
Dept: INFUSION THERAPY | Facility: MEDICAL CENTER | Age: 70
Discharge: HOME | End: 2020-06-22
Payer: MEDICARE

## 2020-06-22 ENCOUNTER — HOSPITAL ENCOUNTER (OUTPATIENT)
Facility: MEDICAL CENTER | Age: 70
Discharge: HOME | End: 2020-06-22
Payer: MEDICARE

## 2020-06-22 VITALS
SYSTOLIC BLOOD PRESSURE: 150 MMHG | RESPIRATION RATE: 20 BRPM | WEIGHT: 194 LBS | BODY MASS INDEX: 27.84 KG/M2 | TEMPERATURE: 98.6 F | DIASTOLIC BLOOD PRESSURE: 89 MMHG | HEART RATE: 67 BPM

## 2020-06-22 DIAGNOSIS — C92.00 ACUTE MYELOID LEUKEMIA NOT HAVING ACHIEVED REMISSION (HCC): ICD-10-CM

## 2020-06-22 LAB
ABSOLUTE EOS #: 0 K/UL (ref 0–0.4)
ABSOLUTE IMMATURE GRANULOCYTE: 0 K/UL (ref 0–0.3)
ABSOLUTE LYMPH #: 0.57 K/UL (ref 1–4.8)
ABSOLUTE MONO #: 0.01 K/UL (ref 0.2–0.8)
ALBUMIN SERPL-MCNC: 4.3 G/DL (ref 3.5–5.2)
ALBUMIN/GLOBULIN RATIO: ABNORMAL (ref 1–2.5)
ALP BLD-CCNC: 57 U/L (ref 35–104)
ALT SERPL-CCNC: 25 U/L (ref 5–33)
ANION GAP SERPL CALCULATED.3IONS-SCNC: 10 MMOL/L (ref 9–17)
AST SERPL-CCNC: 23 U/L
BASOPHILS # BLD: 0 %
BASOPHILS ABSOLUTE: 0 K/UL (ref 0–0.2)
BILIRUB SERPL-MCNC: 0.22 MG/DL (ref 0.3–1.2)
BUN BLDV-MCNC: 18 MG/DL (ref 8–23)
BUN/CREAT BLD: 37 (ref 9–20)
CALCIUM SERPL-MCNC: 9.8 MG/DL (ref 8.6–10.4)
CHLORIDE BLD-SCNC: 103 MMOL/L (ref 98–107)
CO2: 27 MMOL/L (ref 20–31)
CREAT SERPL-MCNC: 0.49 MG/DL (ref 0.5–0.9)
DIFFERENTIAL TYPE: ABNORMAL
EOSINOPHILS RELATIVE PERCENT: 0 % (ref 1–4)
GFR AFRICAN AMERICAN: >60 ML/MIN
GFR NON-AFRICAN AMERICAN: >60 ML/MIN
GFR SERPL CREATININE-BSD FRML MDRD: ABNORMAL ML/MIN/{1.73_M2}
GFR SERPL CREATININE-BSD FRML MDRD: ABNORMAL ML/MIN/{1.73_M2}
GLUCOSE BLD-MCNC: 89 MG/DL (ref 70–99)
HCT VFR BLD CALC: 31.1 % (ref 36.3–47.1)
HEMOGLOBIN: 10.1 G/DL (ref 11.9–15.1)
IMMATURE GRANULOCYTES: 0 %
LYMPHOCYTES # BLD: 94 % (ref 24–44)
MCH RBC QN AUTO: 33.6 PG (ref 25.2–33.5)
MCHC RBC AUTO-ENTMCNC: 32.5 G/DL (ref 28.4–34.8)
MCV RBC AUTO: 103.3 FL (ref 82.6–102.9)
MONOCYTES # BLD: 2 % (ref 1–7)
MORPHOLOGY: ABNORMAL
NRBC AUTOMATED: 0 PER 100 WBC
PDW BLD-RTO: 21.1 % (ref 11.8–14.4)
PLATELET # BLD: ABNORMAL K/UL (ref 138–453)
PLATELET ESTIMATE: ABNORMAL
PLATELET, FLUORESCENCE: 32 K/UL (ref 138–453)
PLATELET, IMMATURE FRACTION: 3.2 % (ref 1.1–10.3)
PMV BLD AUTO: ABNORMAL FL (ref 8.1–13.5)
POTASSIUM SERPL-SCNC: 4.4 MMOL/L (ref 3.7–5.3)
RBC # BLD: 3.01 M/UL (ref 3.95–5.11)
RBC # BLD: ABNORMAL 10*6/UL
SEG NEUTROPHILS: 4 % (ref 36–66)
SEGMENTED NEUTROPHILS ABSOLUTE COUNT: 0.02 K/UL (ref 1.8–7.7)
SODIUM BLD-SCNC: 140 MMOL/L (ref 135–144)
TOTAL PROTEIN: 6.6 G/DL (ref 6.4–8.3)
WBC # BLD: 0.6 K/UL (ref 3.5–11.3)
WBC # BLD: ABNORMAL 10*3/UL

## 2020-06-22 PROCEDURE — 80053 COMPREHEN METABOLIC PANEL: CPT

## 2020-06-22 PROCEDURE — 85055 RETICULATED PLATELET ASSAY: CPT

## 2020-06-22 PROCEDURE — 36415 COLL VENOUS BLD VENIPUNCTURE: CPT

## 2020-06-22 PROCEDURE — 85025 COMPLETE CBC W/AUTO DIFF WBC: CPT

## 2020-06-22 RX ORDER — 0.9 % SODIUM CHLORIDE 0.9 %
20 INTRAVENOUS SOLUTION INTRAVENOUS ONCE
Status: DISCONTINUED | OUTPATIENT
Start: 2020-06-22 | End: 2020-06-23 | Stop reason: HOSPADM

## 2020-06-22 NOTE — PLAN OF CARE
Problem: Safety:  Goal: Free from accidental physical injury  Description: Free from accidental physical injury  Outcome: Met This Shift  Goal: Free from intentional harm  Description: Free from intentional harm  Outcome: Met This Shift     Problem: Safety:  Goal: Free from accidental physical injury  Description: Free from accidental physical injury  Outcome: Met This Shift  Goal: Free from intentional harm  Description: Free from intentional harm  Outcome: Met This Shift

## 2020-06-22 NOTE — PROGRESS NOTES
Patient here for labs and chemo. No complaints except for small purple area on left wrist and ankle. No active bleeding or probelsm. Vitals obtained. Labs reviewed. WBC=0.6. ANC=0.05. Platelets =47. Called Dr. Ellen Noble. Order received to hold Abigail Rower this week and obtain labs on Wednesday and Friday. Hold Venetoclax oral as well. Patient informed of the above and voices understanding of the same. Appointments changed to reflect changes. Discharged ambulatory per self.

## 2020-06-23 ENCOUNTER — APPOINTMENT (OUTPATIENT)
Dept: INFUSION THERAPY | Facility: MEDICAL CENTER | Age: 70
End: 2020-06-23
Payer: MEDICARE

## 2020-06-23 ENCOUNTER — TELEPHONE (OUTPATIENT)
Dept: ONCOLOGY | Age: 70
End: 2020-06-23

## 2020-06-24 ENCOUNTER — HOSPITAL ENCOUNTER (OUTPATIENT)
Facility: MEDICAL CENTER | Age: 70
Discharge: HOME | End: 2020-06-24
Payer: MEDICARE

## 2020-06-24 ENCOUNTER — HOSPITAL ENCOUNTER (OUTPATIENT)
Dept: INFUSION THERAPY | Facility: MEDICAL CENTER | Age: 70
Discharge: HOME | End: 2020-06-24
Payer: MEDICARE

## 2020-06-24 ENCOUNTER — CARE COORDINATION (OUTPATIENT)
Dept: CASE MANAGEMENT | Age: 70
End: 2020-06-24

## 2020-06-24 VITALS
HEART RATE: 77 BPM | DIASTOLIC BLOOD PRESSURE: 63 MMHG | SYSTOLIC BLOOD PRESSURE: 127 MMHG | TEMPERATURE: 98.7 F | RESPIRATION RATE: 18 BRPM

## 2020-06-24 DIAGNOSIS — C92.00 ACUTE MYELOID LEUKEMIA NOT HAVING ACHIEVED REMISSION (HCC): Primary | ICD-10-CM

## 2020-06-24 DIAGNOSIS — C92.00 ACUTE MYELOID LEUKEMIA NOT HAVING ACHIEVED REMISSION (HCC): ICD-10-CM

## 2020-06-24 LAB
ABSOLUTE EOS #: 0 K/UL (ref 0–0.44)
ABSOLUTE IMMATURE GRANULOCYTE: 0 K/UL (ref 0–0.3)
ABSOLUTE LYMPH #: 0.6 K/UL (ref 1.1–3.7)
ABSOLUTE MONO #: 0 K/UL (ref 0.1–1.2)
BASOPHILS # BLD: 0 % (ref 0–2)
BASOPHILS ABSOLUTE: 0 K/UL (ref 0–0.2)
DIFFERENTIAL TYPE: ABNORMAL
EOSINOPHILS RELATIVE PERCENT: 0 % (ref 1–4)
HCT VFR BLD CALC: 30.5 % (ref 36.3–47.1)
HEMOGLOBIN: 9.8 G/DL (ref 11.9–15.1)
IMMATURE GRANULOCYTES: 0 %
LYMPHOCYTES # BLD: 100 % (ref 24–43)
MCH RBC QN AUTO: 33.1 PG (ref 25.2–33.5)
MCHC RBC AUTO-ENTMCNC: 32.1 G/DL (ref 28.4–34.8)
MCV RBC AUTO: 103 FL (ref 82.6–102.9)
MONOCYTES # BLD: 0 % (ref 3–12)
MORPHOLOGY: ABNORMAL
NRBC AUTOMATED: 0 PER 100 WBC
PDW BLD-RTO: 20.5 % (ref 11.8–14.4)
PLATELET # BLD: ABNORMAL K/UL (ref 138–453)
PLATELET ESTIMATE: ABNORMAL
PLATELET, FLUORESCENCE: 10 K/UL (ref 138–453)
PLATELET, IMMATURE FRACTION: 2.3 % (ref 1.1–10.3)
PMV BLD AUTO: ABNORMAL FL (ref 8.1–13.5)
RBC # BLD: 2.96 M/UL (ref 3.95–5.11)
RBC # BLD: ABNORMAL 10*6/UL
SEG NEUTROPHILS: 0 % (ref 36–65)
SEGMENTED NEUTROPHILS ABSOLUTE COUNT: 0 K/UL (ref 1.5–8.1)
WBC # BLD: 0.6 K/UL (ref 3.5–11.3)
WBC # BLD: ABNORMAL 10*3/UL

## 2020-06-24 PROCEDURE — 96375 TX/PRO/DX INJ NEW DRUG ADDON: CPT

## 2020-06-24 PROCEDURE — P9037 PLATE PHERES LEUKOREDU IRRAD: HCPCS

## 2020-06-24 PROCEDURE — 36430 TRANSFUSION BLD/BLD COMPNT: CPT

## 2020-06-24 PROCEDURE — 6360000002 HC RX W HCPCS: Performed by: INTERNAL MEDICINE

## 2020-06-24 PROCEDURE — 2580000003 HC RX 258: Performed by: INTERNAL MEDICINE

## 2020-06-24 PROCEDURE — 96374 THER/PROPH/DIAG INJ IV PUSH: CPT

## 2020-06-24 PROCEDURE — 85055 RETICULATED PLATELET ASSAY: CPT

## 2020-06-24 PROCEDURE — 36415 COLL VENOUS BLD VENIPUNCTURE: CPT

## 2020-06-24 PROCEDURE — 85025 COMPLETE CBC W/AUTO DIFF WBC: CPT

## 2020-06-24 RX ORDER — DIPHENHYDRAMINE HYDROCHLORIDE 50 MG/ML
25 INJECTION INTRAMUSCULAR; INTRAVENOUS ONCE
Status: CANCELLED
Start: 2020-06-24

## 2020-06-24 RX ORDER — DIPHENHYDRAMINE HYDROCHLORIDE 50 MG/ML
25 INJECTION INTRAMUSCULAR; INTRAVENOUS ONCE
Status: COMPLETED | OUTPATIENT
Start: 2020-06-24 | End: 2020-06-24

## 2020-06-24 RX ORDER — SODIUM CHLORIDE 0.9 % (FLUSH) 0.9 %
10 SYRINGE (ML) INJECTION PRN
Status: DISCONTINUED | OUTPATIENT
Start: 2020-06-24 | End: 2020-06-25 | Stop reason: HOSPADM

## 2020-06-24 RX ORDER — DEXAMETHASONE SODIUM PHOSPHATE 10 MG/ML
10 INJECTION INTRAMUSCULAR; INTRAVENOUS ONCE
Status: COMPLETED | OUTPATIENT
Start: 2020-06-24 | End: 2020-06-24

## 2020-06-24 RX ORDER — 0.9 % SODIUM CHLORIDE 0.9 %
20 INTRAVENOUS SOLUTION INTRAVENOUS ONCE
Status: COMPLETED | OUTPATIENT
Start: 2020-06-24 | End: 2020-06-24

## 2020-06-24 RX ORDER — DEXAMETHASONE SODIUM PHOSPHATE 10 MG/ML
10 INJECTION INTRAMUSCULAR; INTRAVENOUS ONCE
Status: CANCELLED
Start: 2020-06-24

## 2020-06-24 RX ORDER — 0.9 % SODIUM CHLORIDE 0.9 %
20 INTRAVENOUS SOLUTION INTRAVENOUS ONCE
Status: DISCONTINUED | OUTPATIENT
Start: 2020-06-24 | End: 2020-06-25 | Stop reason: HOSPADM

## 2020-06-24 RX ORDER — LEVOFLOXACIN 500 MG/1
500 TABLET, FILM COATED ORAL DAILY
COMMUNITY
End: 2020-07-08

## 2020-06-24 RX ADMIN — DIPHENHYDRAMINE HYDROCHLORIDE 25 MG: 50 INJECTION, SOLUTION INTRAMUSCULAR; INTRAVENOUS at 11:32

## 2020-06-24 RX ADMIN — SODIUM CHLORIDE 20 ML: 9 INJECTION, SOLUTION INTRAVENOUS at 11:32

## 2020-06-24 RX ADMIN — DEXAMETHASONE SODIUM PHOSPHATE 10 MG: 10 INJECTION INTRAMUSCULAR; INTRAVENOUS at 11:32

## 2020-06-24 NOTE — PROGRESS NOTES
Patient arrive ambulatory for review of lab results and platelets, PRN. Labs reviewed; call to Dr. Joy Christianson and received order to administer HLA Matched Platelets. Further, Sabino Lopez is to fax lab results to Dr. Rachel Monsalve in [facility] Peripheral IV placed per policy. Anticipated receipt of platelets and received call from blood bank stating that the bag was leaking and therefore we would not be able to administer. Call to Dr. Joy Christianson to inform him of HLA matched platelet issue; order for 1 unit irradiated platelets today due to platelets of 10 and request HLA matched platelets to be ready for administration on Friday. Patient informed of plan; also patient informed that labs are being faxed to Dr. Rachel Monsalve and she should expect a call from her [facility] office. If she does not receive call patient verbalizes she will call her. Patient premedicated. Patient tolerated platelets with no adverse reaction; line flushed. Intact IV catheter removed with pressure dressing applied. Patient ambulate off unit per self at discharge; RTC Friday. Copy of fax confirmation/labs faxed to front office for scan to chart.

## 2020-06-26 ENCOUNTER — HOSPITAL ENCOUNTER (OUTPATIENT)
Dept: INFUSION THERAPY | Facility: MEDICAL CENTER | Age: 70
Discharge: HOME | End: 2020-06-26
Payer: MEDICARE

## 2020-06-26 ENCOUNTER — HOSPITAL ENCOUNTER (OUTPATIENT)
Facility: MEDICAL CENTER | Age: 70
Discharge: HOME | End: 2020-06-26
Payer: MEDICARE

## 2020-06-26 ENCOUNTER — HOSPITAL ENCOUNTER (OUTPATIENT)
Facility: MEDICAL CENTER | Age: 70
End: 2020-06-26
Payer: MEDICARE

## 2020-06-26 VITALS
RESPIRATION RATE: 16 BRPM | HEART RATE: 75 BPM | DIASTOLIC BLOOD PRESSURE: 64 MMHG | SYSTOLIC BLOOD PRESSURE: 126 MMHG | TEMPERATURE: 99.5 F

## 2020-06-26 DIAGNOSIS — C92.00 ACUTE MYELOID LEUKEMIA NOT HAVING ACHIEVED REMISSION (HCC): Primary | ICD-10-CM

## 2020-06-26 DIAGNOSIS — C92.00 ACUTE MYELOID LEUKEMIA NOT HAVING ACHIEVED REMISSION (HCC): ICD-10-CM

## 2020-06-26 LAB
ABSOLUTE EOS #: 0 K/UL (ref 0–0.4)
ABSOLUTE IMMATURE GRANULOCYTE: 0 K/UL (ref 0–0.3)
ABSOLUTE LYMPH #: 0.78 K/UL (ref 1–4.8)
ABSOLUTE MONO #: 0.01 K/UL (ref 0.2–0.8)
BASOPHILS # BLD: 0 %
BASOPHILS ABSOLUTE: 0 K/UL (ref 0–0.2)
DIFFERENTIAL TYPE: ABNORMAL
EOSINOPHILS RELATIVE PERCENT: 0 % (ref 1–4)
HCT VFR BLD CALC: 28.6 % (ref 36.3–47.1)
HEMOGLOBIN: 9.5 G/DL (ref 11.9–15.1)
IMMATURE GRANULOCYTES: 0 %
LYMPHOCYTES # BLD: 98 % (ref 24–44)
MCH RBC QN AUTO: 33.8 PG (ref 25.2–33.5)
MCHC RBC AUTO-ENTMCNC: 33.2 G/DL (ref 28.4–34.8)
MCV RBC AUTO: 101.8 FL (ref 82.6–102.9)
MONOCYTES # BLD: 1 % (ref 1–7)
MORPHOLOGY: ABNORMAL
NRBC AUTOMATED: 0 PER 100 WBC
PDW BLD-RTO: 20.2 % (ref 11.8–14.4)
PLATELET # BLD: ABNORMAL K/UL (ref 138–453)
PLATELET ESTIMATE: ABNORMAL
PLATELET, FLUORESCENCE: 4 K/UL (ref 138–453)
PLATELET, IMMATURE FRACTION: 2.5 % (ref 1.1–10.3)
PMV BLD AUTO: ABNORMAL FL (ref 8.1–13.5)
RBC # BLD: 2.81 M/UL (ref 3.95–5.11)
RBC # BLD: ABNORMAL 10*6/UL
SEG NEUTROPHILS: 1 % (ref 36–66)
SEGMENTED NEUTROPHILS ABSOLUTE COUNT: 0.01 K/UL (ref 1.8–7.7)
WBC # BLD: 0.8 K/UL (ref 3.5–11.3)
WBC # BLD: ABNORMAL 10*3/UL

## 2020-06-26 PROCEDURE — 96375 TX/PRO/DX INJ NEW DRUG ADDON: CPT

## 2020-06-26 PROCEDURE — 36430 TRANSFUSION BLD/BLD COMPNT: CPT

## 2020-06-26 PROCEDURE — P9037 PLATE PHERES LEUKOREDU IRRAD: HCPCS

## 2020-06-26 PROCEDURE — 96374 THER/PROPH/DIAG INJ IV PUSH: CPT

## 2020-06-26 PROCEDURE — 85025 COMPLETE CBC W/AUTO DIFF WBC: CPT

## 2020-06-26 PROCEDURE — 85055 RETICULATED PLATELET ASSAY: CPT

## 2020-06-26 PROCEDURE — 36415 COLL VENOUS BLD VENIPUNCTURE: CPT

## 2020-06-26 PROCEDURE — 6360000002 HC RX W HCPCS: Performed by: INTERNAL MEDICINE

## 2020-06-26 PROCEDURE — 96361 HYDRATE IV INFUSION ADD-ON: CPT

## 2020-06-26 PROCEDURE — 2580000003 HC RX 258: Performed by: INTERNAL MEDICINE

## 2020-06-26 PROCEDURE — 96376 TX/PRO/DX INJ SAME DRUG ADON: CPT

## 2020-06-26 RX ORDER — DIPHENHYDRAMINE HYDROCHLORIDE 50 MG/ML
25 INJECTION INTRAMUSCULAR; INTRAVENOUS ONCE
Status: CANCELLED
Start: 2020-06-26

## 2020-06-26 RX ORDER — DEXAMETHASONE SODIUM PHOSPHATE 10 MG/ML
10 INJECTION INTRAMUSCULAR; INTRAVENOUS ONCE
Status: CANCELLED
Start: 2020-06-26

## 2020-06-26 RX ORDER — 0.9 % SODIUM CHLORIDE 0.9 %
20 INTRAVENOUS SOLUTION INTRAVENOUS ONCE
Status: COMPLETED | OUTPATIENT
Start: 2020-06-26 | End: 2020-06-26

## 2020-06-26 RX ORDER — DEXAMETHASONE SODIUM PHOSPHATE 10 MG/ML
10 INJECTION INTRAMUSCULAR; INTRAVENOUS ONCE
Status: COMPLETED | OUTPATIENT
Start: 2020-06-26 | End: 2020-06-26

## 2020-06-26 RX ORDER — DIPHENHYDRAMINE HYDROCHLORIDE 50 MG/ML
25 INJECTION INTRAMUSCULAR; INTRAVENOUS ONCE
Status: COMPLETED | OUTPATIENT
Start: 2020-06-26 | End: 2020-06-26

## 2020-06-26 RX ADMIN — DEXAMETHASONE SODIUM PHOSPHATE 10 MG: 10 INJECTION INTRAMUSCULAR; INTRAVENOUS at 12:12

## 2020-06-26 RX ADMIN — DIPHENHYDRAMINE HYDROCHLORIDE 25 MG: 50 INJECTION, SOLUTION INTRAMUSCULAR; INTRAVENOUS at 12:12

## 2020-06-26 RX ADMIN — SODIUM CHLORIDE 500 ML: 9 INJECTION, SOLUTION INTRAVENOUS at 11:50

## 2020-06-26 NOTE — PROGRESS NOTES
Patient arrived ambulatory per self for possible transfusion. Patient denies concerns or complaints. Labs reviewed. Dr. Kaden Davis notified. Patient to receive 1 unit platelets. IV inserted without complication. Patient premedicated. 1 units platelets infused with no sign adverse reaction;line flushed. Patient monitored for 15 minutes post infusion with running fluids. No sign adverse reaction. IV removed without complication. Pressure dressing applied to site. Patient ambulated off unit per self at discharge.

## 2020-06-29 ENCOUNTER — HOSPITAL ENCOUNTER (OUTPATIENT)
Facility: MEDICAL CENTER | Age: 70
Discharge: HOME | End: 2020-06-29
Payer: MEDICARE

## 2020-06-29 ENCOUNTER — HOSPITAL ENCOUNTER (OUTPATIENT)
Dept: INFUSION THERAPY | Facility: MEDICAL CENTER | Age: 70
Discharge: HOME | End: 2020-06-29
Payer: MEDICARE

## 2020-06-29 ENCOUNTER — HOSPITAL ENCOUNTER (OUTPATIENT)
Facility: MEDICAL CENTER | Age: 70
End: 2020-06-29
Payer: MEDICARE

## 2020-06-29 VITALS
SYSTOLIC BLOOD PRESSURE: 119 MMHG | DIASTOLIC BLOOD PRESSURE: 78 MMHG | RESPIRATION RATE: 16 BRPM | TEMPERATURE: 99 F | HEART RATE: 76 BPM

## 2020-06-29 DIAGNOSIS — C92.00 ACUTE MYELOID LEUKEMIA NOT HAVING ACHIEVED REMISSION (HCC): ICD-10-CM

## 2020-06-29 DIAGNOSIS — C92.00 ACUTE MYELOID LEUKEMIA NOT HAVING ACHIEVED REMISSION (HCC): Primary | ICD-10-CM

## 2020-06-29 LAB
ABSOLUTE EOS #: 0 K/UL (ref 0–0.4)
ABSOLUTE IMMATURE GRANULOCYTE: 0 K/UL (ref 0–0.3)
ABSOLUTE LYMPH #: 0.69 K/UL (ref 1–4.8)
ABSOLUTE MONO #: 0 K/UL (ref 0.2–0.8)
BASOPHILS # BLD: 0 %
BASOPHILS ABSOLUTE: 0 K/UL (ref 0–0.2)
DIFFERENTIAL TYPE: ABNORMAL
EOSINOPHILS RELATIVE PERCENT: 0 % (ref 1–4)
HCT VFR BLD CALC: 28 % (ref 36.3–47.1)
HEMOGLOBIN: 9.3 G/DL (ref 11.9–15.1)
IMMATURE GRANULOCYTES: 0 %
LYMPHOCYTES # BLD: 98 % (ref 24–44)
MCH RBC QN AUTO: 33.7 PG (ref 25.2–33.5)
MCHC RBC AUTO-ENTMCNC: 33.2 G/DL (ref 28.4–34.8)
MCV RBC AUTO: 101.4 FL (ref 82.6–102.9)
MONOCYTES # BLD: 0 % (ref 1–7)
MORPHOLOGY: ABNORMAL
NRBC AUTOMATED: 0 PER 100 WBC
PDW BLD-RTO: 19.9 % (ref 11.8–14.4)
PLATELET # BLD: ABNORMAL K/UL (ref 138–453)
PLATELET ESTIMATE: ABNORMAL
PLATELET, FLUORESCENCE: 15 K/UL (ref 138–453)
PLATELET, IMMATURE FRACTION: 0.8 % (ref 1.1–10.3)
PMV BLD AUTO: ABNORMAL FL (ref 8.1–13.5)
RBC # BLD: 2.76 M/UL (ref 3.95–5.11)
RBC # BLD: ABNORMAL 10*6/UL
SEG NEUTROPHILS: 2 % (ref 36–66)
SEGMENTED NEUTROPHILS ABSOLUTE COUNT: 0.01 K/UL (ref 1.8–7.7)
WBC # BLD: 0.7 K/UL (ref 3.5–11.3)
WBC # BLD: ABNORMAL 10*3/UL

## 2020-06-29 PROCEDURE — 2580000003 HC RX 258: Performed by: INTERNAL MEDICINE

## 2020-06-29 PROCEDURE — 96375 TX/PRO/DX INJ NEW DRUG ADDON: CPT

## 2020-06-29 PROCEDURE — 36415 COLL VENOUS BLD VENIPUNCTURE: CPT

## 2020-06-29 PROCEDURE — 85025 COMPLETE CBC W/AUTO DIFF WBC: CPT

## 2020-06-29 PROCEDURE — P9037 PLATE PHERES LEUKOREDU IRRAD: HCPCS

## 2020-06-29 PROCEDURE — 96374 THER/PROPH/DIAG INJ IV PUSH: CPT

## 2020-06-29 PROCEDURE — 36430 TRANSFUSION BLD/BLD COMPNT: CPT

## 2020-06-29 PROCEDURE — 85055 RETICULATED PLATELET ASSAY: CPT

## 2020-06-29 PROCEDURE — 6360000002 HC RX W HCPCS: Performed by: INTERNAL MEDICINE

## 2020-06-29 RX ORDER — 0.9 % SODIUM CHLORIDE 0.9 %
20 INTRAVENOUS SOLUTION INTRAVENOUS ONCE
Status: DISCONTINUED | OUTPATIENT
Start: 2020-06-29 | End: 2020-07-06 | Stop reason: HOSPADM

## 2020-06-29 RX ORDER — DIPHENHYDRAMINE HYDROCHLORIDE 50 MG/ML
25 INJECTION INTRAMUSCULAR; INTRAVENOUS ONCE
Status: CANCELLED
Start: 2020-06-29

## 2020-06-29 RX ORDER — SODIUM CHLORIDE 9 MG/ML
INJECTION, SOLUTION INTRAVENOUS CONTINUOUS
Status: DISCONTINUED | OUTPATIENT
Start: 2020-06-29 | End: 2020-06-30 | Stop reason: HOSPADM

## 2020-06-29 RX ORDER — 0.9 % SODIUM CHLORIDE 0.9 %
20 INTRAVENOUS SOLUTION INTRAVENOUS ONCE
Status: DISCONTINUED | OUTPATIENT
Start: 2020-06-29 | End: 2020-06-30 | Stop reason: HOSPADM

## 2020-06-29 RX ORDER — DEXAMETHASONE SODIUM PHOSPHATE 10 MG/ML
10 INJECTION INTRAMUSCULAR; INTRAVENOUS ONCE
Status: CANCELLED
Start: 2020-06-29

## 2020-06-29 RX ORDER — DIPHENHYDRAMINE HYDROCHLORIDE 50 MG/ML
25 INJECTION INTRAMUSCULAR; INTRAVENOUS ONCE
Status: COMPLETED | OUTPATIENT
Start: 2020-06-29 | End: 2020-06-29

## 2020-06-29 RX ORDER — DEXAMETHASONE SODIUM PHOSPHATE 10 MG/ML
10 INJECTION INTRAMUSCULAR; INTRAVENOUS ONCE
Status: COMPLETED | OUTPATIENT
Start: 2020-06-29 | End: 2020-06-29

## 2020-06-29 RX ADMIN — DEXAMETHASONE SODIUM PHOSPHATE 10 MG: 10 INJECTION INTRAMUSCULAR; INTRAVENOUS at 12:33

## 2020-06-29 RX ADMIN — SODIUM CHLORIDE: 9 INJECTION, SOLUTION INTRAVENOUS at 12:32

## 2020-06-29 RX ADMIN — DIPHENHYDRAMINE HYDROCHLORIDE 25 MG: 50 INJECTION, SOLUTION INTRAMUSCULAR; INTRAVENOUS at 12:33

## 2020-06-29 NOTE — PROGRESS NOTES
Misa Stratton arrives ambulatory alone  Order for lab draw and possible transfusion and vidaza injection  Labs reviewed and called to Dr Mayda Dick to hold vidaza injection  Order to give one unit platelet  HLA platelets not available today  Order to obtain HLA platelets for transfusion when available  Order to fax labs to [facility] [place] [facility] physician  Labs faxed to Dr Mary Mejia at  with confirmation  Lab notified to obtain HLA platelets  Iv started after 4 attempts with #24 cathlon to rt wrist with good blood return   See [facility] for pre medication for platelets  Platelets initiated and completed    No reaction noted  Iv line flushed and iv line discontinued with needle intact  Pressure dressing applied  Discharged per ambulatory

## 2020-06-30 ENCOUNTER — HOSPITAL ENCOUNTER (OUTPATIENT)
Dept: INFUSION THERAPY | Facility: MEDICAL CENTER | Age: 70
Discharge: HOME | End: 2020-06-30
Payer: MEDICARE

## 2020-06-30 ENCOUNTER — TELEPHONE (OUTPATIENT)
Dept: ONCOLOGY | Age: 70
End: 2020-06-30

## 2020-06-30 ENCOUNTER — OFFICE VISIT (OUTPATIENT)
Dept: ONCOLOGY | Age: 70
End: 2020-06-30
Payer: MEDICARE

## 2020-06-30 VITALS
TEMPERATURE: 98.5 F | HEART RATE: 90 BPM | WEIGHT: 191 LBS | BODY MASS INDEX: 27.41 KG/M2 | SYSTOLIC BLOOD PRESSURE: 139 MMHG | RESPIRATION RATE: 16 BRPM | DIASTOLIC BLOOD PRESSURE: 83 MMHG

## 2020-06-30 PROCEDURE — 99211 OFF/OP EST MAY X REQ PHY/QHP: CPT | Performed by: INTERNAL MEDICINE

## 2020-06-30 PROCEDURE — 96376 TX/PRO/DX INJ SAME DRUG ADON: CPT

## 2020-06-30 PROCEDURE — 2580000003 HC RX 258: Performed by: INTERNAL MEDICINE

## 2020-06-30 PROCEDURE — 6360000002 HC RX W HCPCS: Performed by: INTERNAL MEDICINE

## 2020-06-30 PROCEDURE — P9037 PLATE PHERES LEUKOREDU IRRAD: HCPCS

## 2020-06-30 PROCEDURE — 96375 TX/PRO/DX INJ NEW DRUG ADDON: CPT

## 2020-06-30 PROCEDURE — 36430 TRANSFUSION BLD/BLD COMPNT: CPT

## 2020-06-30 PROCEDURE — 96374 THER/PROPH/DIAG INJ IV PUSH: CPT

## 2020-06-30 PROCEDURE — 99215 OFFICE O/P EST HI 40 MIN: CPT | Performed by: INTERNAL MEDICINE

## 2020-06-30 RX ORDER — 0.9 % SODIUM CHLORIDE 0.9 %
20 INTRAVENOUS SOLUTION INTRAVENOUS ONCE
Status: COMPLETED | OUTPATIENT
Start: 2020-06-30 | End: 2020-06-30

## 2020-06-30 RX ORDER — DEXAMETHASONE SODIUM PHOSPHATE 10 MG/ML
10 INJECTION INTRAMUSCULAR; INTRAVENOUS ONCE
Status: COMPLETED | OUTPATIENT
Start: 2020-06-30 | End: 2020-06-30

## 2020-06-30 RX ORDER — ACYCLOVIR 400 MG/1
1 TABLET ORAL 2 TIMES DAILY
COMMUNITY
Start: 2020-06-15 | End: 2022-04-27

## 2020-06-30 RX ORDER — LEVOFLOXACIN 500 MG/1
500 TABLET, FILM COATED ORAL DAILY
Qty: 30 TABLET | Refills: 3 | Status: SHIPPED | OUTPATIENT
Start: 2020-06-30 | End: 2020-07-30

## 2020-06-30 RX ORDER — DIPHENHYDRAMINE HYDROCHLORIDE 50 MG/ML
25 INJECTION INTRAMUSCULAR; INTRAVENOUS EVERY 6 HOURS PRN
Status: DISCONTINUED | OUTPATIENT
Start: 2020-06-30 | End: 2020-07-01 | Stop reason: HOSPADM

## 2020-06-30 RX ADMIN — DIPHENHYDRAMINE HYDROCHLORIDE 25 MG: 50 INJECTION, SOLUTION INTRAMUSCULAR; INTRAVENOUS at 11:38

## 2020-06-30 RX ADMIN — DEXAMETHASONE SODIUM PHOSPHATE 10 MG: 10 INJECTION INTRAMUSCULAR; INTRAVENOUS at 11:40

## 2020-06-30 RX ADMIN — SODIUM CHLORIDE 500 ML: 9 INJECTION, SOLUTION INTRAVENOUS at 11:37

## 2020-06-30 NOTE — PROGRESS NOTES
Patient here for platelets. Feels well today. Vitals obtained. Platelets ordered. IV started. Premeds given per [facility]. Platelets hung per MAR. Infusing No complaints. Completed. Tolerated well. IV discontinued intact, dressing to site. Has a return visit scheduled. Discharged ambulatory per self. Yes

## 2020-06-30 NOTE — PATIENT INSTRUCTIONS
Platelets transfusion today  levaquin   Hold Eliquis  Will see Dr Alpesh Smith at [facility] 7/7/2020  Call Dr Alpesh Smith  RV 2 weeks

## 2020-07-01 ENCOUNTER — HOSPITAL ENCOUNTER (OUTPATIENT)
Dept: INFUSION THERAPY | Facility: MEDICAL CENTER | Age: 70
End: 2020-07-01
Payer: MEDICARE

## 2020-07-01 ENCOUNTER — HOSPITAL ENCOUNTER (OUTPATIENT)
Facility: MEDICAL CENTER | Age: 70
Discharge: HOME | End: 2020-07-01
Payer: MEDICARE

## 2020-07-01 ENCOUNTER — HOSPITAL ENCOUNTER (OUTPATIENT)
Dept: INFUSION THERAPY | Facility: MEDICAL CENTER | Age: 70
Discharge: HOME | End: 2020-07-01
Payer: MEDICARE

## 2020-07-01 VITALS
DIASTOLIC BLOOD PRESSURE: 74 MMHG | SYSTOLIC BLOOD PRESSURE: 138 MMHG | TEMPERATURE: 99.2 F | RESPIRATION RATE: 20 BRPM | HEART RATE: 67 BPM

## 2020-07-01 DIAGNOSIS — C92.00 ACUTE MYELOID LEUKEMIA NOT HAVING ACHIEVED REMISSION (HCC): ICD-10-CM

## 2020-07-01 LAB
ABSOLUTE EOS #: 0 K/UL (ref 0–0.4)
ABSOLUTE IMMATURE GRANULOCYTE: 0 K/UL (ref 0–0.3)
ABSOLUTE LYMPH #: 0.88 K/UL (ref 1–4.8)
ABSOLUTE MONO #: 0 K/UL (ref 0.2–0.8)
BASOPHILS # BLD: 0 %
BASOPHILS ABSOLUTE: 0 K/UL (ref 0–0.2)
BLD PROD TYP BPU: NORMAL
DIFFERENTIAL TYPE: ABNORMAL
DISPENSE STATUS BLOOD BANK: NORMAL
EOSINOPHILS RELATIVE PERCENT: 0 % (ref 1–4)
HCT VFR BLD CALC: 25.4 % (ref 36.3–47.1)
HEMOGLOBIN: 8.4 G/DL (ref 11.9–15.1)
IMMATURE GRANULOCYTES: 0 %
LYMPHOCYTES # BLD: 98 % (ref 24–44)
MCH RBC QN AUTO: 33.3 PG (ref 25.2–33.5)
MCHC RBC AUTO-ENTMCNC: 33.1 G/DL (ref 28.4–34.8)
MCV RBC AUTO: 100.8 FL (ref 82.6–102.9)
MONOCYTES # BLD: 0 % (ref 1–7)
NRBC AUTOMATED: 0 PER 100 WBC
PDW BLD-RTO: 19.4 % (ref 11.8–14.4)
PLATELET # BLD: 55 K/UL (ref 138–453)
PLATELET ESTIMATE: ABNORMAL
PMV BLD AUTO: 9 FL (ref 8.1–13.5)
RBC # BLD: 2.52 M/UL (ref 3.95–5.11)
RBC # BLD: ABNORMAL 10*6/UL
SEG NEUTROPHILS: 2 % (ref 36–66)
SEGMENTED NEUTROPHILS ABSOLUTE COUNT: 0.02 K/UL (ref 1.8–7.7)
TRANSFUSION STATUS: NORMAL
UNIT DIVISION: 0
UNIT NUMBER: NORMAL
WBC # BLD: 0.9 K/UL (ref 3.5–11.3)
WBC # BLD: ABNORMAL 10*3/UL

## 2020-07-01 PROCEDURE — 99211 OFF/OP EST MAY X REQ PHY/QHP: CPT

## 2020-07-01 PROCEDURE — 85025 COMPLETE CBC W/AUTO DIFF WBC: CPT

## 2020-07-01 PROCEDURE — 36415 COLL VENOUS BLD VENIPUNCTURE: CPT

## 2020-07-01 NOTE — PROGRESS NOTES
Patient here for labs and possible transfusion. Feels well. Off Eliquis until platelets greater than 50 per Dr. Mary Catherine. Vitals obtained. Labs reviewed. Platelets 55. Called Dr. Mary Catherine and told him blood bank has no HLA matched platelets today but will have tomorrow. No transfusion today. CBC tomorrow and call MD for instructions. Dr. Sharon Fuller [facility] planning bone marrow biopsy Tuesday 7/7/20 and will go from there with a treatment plan. Patient informed that she will receive a call from [facility] for biopsy details and time. Will return tomorrow. Discharged ambulatory per self.

## 2020-07-02 ENCOUNTER — HOSPITAL ENCOUNTER (OUTPATIENT)
Facility: MEDICAL CENTER | Age: 70
Discharge: HOME | End: 2020-07-02
Payer: MEDICARE

## 2020-07-02 ENCOUNTER — APPOINTMENT (OUTPATIENT)
Dept: INFUSION THERAPY | Facility: MEDICAL CENTER | Age: 70
End: 2020-07-02
Payer: MEDICARE

## 2020-07-02 ENCOUNTER — HOSPITAL ENCOUNTER (OUTPATIENT)
Dept: INFUSION THERAPY | Facility: MEDICAL CENTER | Age: 70
Discharge: HOME | End: 2020-07-02
Payer: MEDICARE

## 2020-07-02 VITALS
DIASTOLIC BLOOD PRESSURE: 68 MMHG | SYSTOLIC BLOOD PRESSURE: 132 MMHG | TEMPERATURE: 99.6 F | RESPIRATION RATE: 18 BRPM | HEART RATE: 72 BPM

## 2020-07-02 DIAGNOSIS — C92.00 ACUTE MYELOID LEUKEMIA NOT HAVING ACHIEVED REMISSION (HCC): ICD-10-CM

## 2020-07-02 LAB
ABSOLUTE EOS #: 0.01 K/UL (ref 0–0.44)
ABSOLUTE IMMATURE GRANULOCYTE: 0 K/UL (ref 0–0.3)
ABSOLUTE LYMPH #: 0.88 K/UL (ref 1.1–3.7)
ABSOLUTE MONO #: 0 K/UL (ref 0.1–1.2)
BASOPHILS # BLD: 0 % (ref 0–2)
BASOPHILS ABSOLUTE: 0 K/UL (ref 0–0.2)
DIFFERENTIAL TYPE: ABNORMAL
EOSINOPHILS RELATIVE PERCENT: 1 % (ref 1–4)
HCT VFR BLD CALC: 26.4 % (ref 36.3–47.1)
HEMOGLOBIN: 8.7 G/DL (ref 11.9–15.1)
IMMATURE GRANULOCYTES: 0 %
LYMPHOCYTES # BLD: 98 % (ref 24–43)
MCH RBC QN AUTO: 33.2 PG (ref 25.2–33.5)
MCHC RBC AUTO-ENTMCNC: 33 G/DL (ref 28.4–34.8)
MCV RBC AUTO: 100.8 FL (ref 82.6–102.9)
MONOCYTES # BLD: 0 % (ref 3–12)
MORPHOLOGY: ABNORMAL
NRBC AUTOMATED: 0 PER 100 WBC
PDW BLD-RTO: 19.4 % (ref 11.8–14.4)
PLATELET # BLD: ABNORMAL K/UL (ref 138–453)
PLATELET ESTIMATE: ABNORMAL
PLATELET, FLUORESCENCE: 43 K/UL (ref 138–453)
PLATELET, IMMATURE FRACTION: 0.4 % (ref 1.1–10.3)
PMV BLD AUTO: ABNORMAL FL (ref 8.1–13.5)
RBC # BLD: 2.62 M/UL (ref 3.95–5.11)
RBC # BLD: ABNORMAL 10*6/UL
SEG NEUTROPHILS: 1 % (ref 36–65)
SEGMENTED NEUTROPHILS ABSOLUTE COUNT: 0.01 K/UL (ref 1.5–8.1)
WBC # BLD: 0.9 K/UL (ref 3.5–11.3)
WBC # BLD: ABNORMAL 10*3/UL

## 2020-07-02 PROCEDURE — 99211 OFF/OP EST MAY X REQ PHY/QHP: CPT

## 2020-07-02 PROCEDURE — 85055 RETICULATED PLATELET ASSAY: CPT

## 2020-07-02 PROCEDURE — 85025 COMPLETE CBC W/AUTO DIFF WBC: CPT

## 2020-07-02 PROCEDURE — 36415 COLL VENOUS BLD VENIPUNCTURE: CPT

## 2020-07-02 NOTE — PROGRESS NOTES
Pt arrives and states that she is supposed to receive HLA IRRADIATED PLATELETS today. Note from 7/1/2020 from Radha Willams RN states was told by blood bank that HLA PLTS coming from [place] and will be here late yesterday or today. Writer called blood bank , Ekta Hernandez , after speaking with pt. Ekta Hernandez not here yesterday and states no PLTS arrived. Spoke with Dr Sukhdeep Mae and nursing staff working on 7/1/2020. Dr Sukhdeep Mae notified of PLT 43  Today and did not request pt sent to [facility] obs unit, due to if ordered now would take at least 24 hours to obtain HLA PLTS from [employer], per Ekta Hernandez. Writer then trying to find out what happened to HLA IRRADIATED PLTS. Asked Ekta Hernandez to call [employer] and when spoke to Ekta Hernandez again she states [employer] did not have an order. Only order request was sent for PLTS given on Tuesday 6/30/2020. Writer entered order into [facility] for HLA IRRADIATED PLTS to be given on Monday 7/6/2020. Pt has a procedure scheduled on Tuesday 7/7/2020 at [facility] for bone marrow biopsy, informed per Dr Sukhdeep Mae. Notified Ekta Hernandez in blood bank, absolutely essential that pt has the above ordered PLTS on Monday. Writer verified that order received to Ekta Hernandez in blood bank and writer asked Ekta Hernandez to check with [employer] later in day to verify order in process. Then explained to pt that would take 24 hours to obtain HLA IRRD PLTS and md did not direct pt to [facility] Obs. States pt must receive them on Monday 7/6/2020. Also reminded that she will need to be careful due to low white count also and if any problems with bruising or fever, temp 99.6 today , but no open areas, no cough, and pt states understanding. That pt should notify md.  Pt discharged per ambulatory per self. Also given phone numbers for Triston Mcmahan, in case can help pt with financial concerns due to extremely large amount of bills received.

## 2020-07-05 NOTE — PROGRESS NOTES
_    Chief Complaint   Patient presents with    Follow-up     review status of disease    Fatigue     off and on       DIAGNOSIS:       AML  Pancytopenia      CURRENT THERAPY:         Transfusion as needed, HLA matched platelets  Started Vidaza/ Venetoclax 4/20/2020, received total of 2 cycles. BRIEF CASE HISTORY:      The patient is a 78 y.o.  female who is admitted to the hospital for further management of severe pancytopenia. The patient was clinically fine. Actually she is busy doing boxes with house movement. She noticed increasing weakness and fatigue. This is getting worse over the last few weeks. She gets shortness of breath on exertion. No dizziness. She denies any active bleeding. She has increasing bruising. No fever or infections. No enlarged lymph nodes. No weight loss or decreased appetite. The patient had blood work which showed severe pancytopenia. She is receiving platelet transfusion. No history of smoking or alcohol drinking. BM test done. Results showed AML. INTERIM HISTORY:   The patient is seen for follow up AML. She had pancytopenia. She received blood and platelets. Patient was started on Vidaza and venetoclax. She received total of 2 cycles. Patient had good initial response. She started having severe cytopenias again so treatment dose was decreased and lately treatment was held. Clinically she is doing fine now. No fever or chills. No N/V. No active bleeding. She has multiple bruises. Repeated labs showed persistent leukopenia with severe thrombocytopenia. Patient was seen in second opinion at [facility] clinic. Discussed with Dr. Wynell Hodgkins. She is scheduled for follow up next week. Past Medical History:   has a past medical history of Arthritis, Cancer ([facility] [address] Hyperlipidemia, Hypertension, Leukemia ([facility] [address] Pneumonia, and Seasonal allergies.     Past Surgical History:   has a past surgical history that includes Colonoscopy; joint replacement; Ovary removal; and Colonoscopy (03/10/2017). Family History: family history includes Alzheimer's Disease in her mother; Cancer in her brother; Diabetes in her father; Heart Disease in her father. Social History:   reports that she has never smoked. She has never used smokeless tobacco. She reports current alcohol use. She reports that she does not use drugs. Medications:    Prior to Admission medications    Medication Sig Start Date End Date Taking? Authorizing Provider   acyclovir (ZOVIRAX) 400 MG tablet Take 1 tablet by mouth 2 times daily 6/15/20  Yes Historical Provider, MD   levoFLOXacin (LEVAQUIN) 500 MG tablet Take 1 tablet by mouth daily 6/30/20 7/30/20 Yes Matteo Perkins MD   levoFLOXacin (LEVAQUIN) 500 MG tablet Take 500 mg by mouth daily   Yes Historical Provider, MD   apixaban (ELIQUIS) 5 MG TABS tablet Take 1 tablet by mouth 2 times daily 6/16/20  Yes Husam Mcpherson MD   apixaban (ELIQUIS DVT/PE STARTER PACK) 5 MG TABS tablet Take 10 mg (2 tablets) orally twice daily for 7 days, then take 5 mg (1 tablet) orally twice daily thereafter.  5/25/20  Yes Si Diss, DO   ondansetron (ZOFRAN-ODT) 4 MG disintegrating tablet Take 1 tablet by mouth 3 times daily as needed for Nausea or Vomiting 4/24/20  Yes Matteo Perkins MD   vitamin D (CHOLECALCIFEROL) 25 MCG (1000 UT) TABS tablet Take 3,000 Units by mouth daily   Yes Historical Provider, MD   Multiple Vitamins-Minerals (THERAPEUTIC MULTIVITAMIN-MINERALS) tablet Take 1 tablet by mouth daily   Yes Historical Provider, MD   ammonium lactate (AMLACTIN) 12 % cream APPLY AFTER SHOWERING TO SKIN FOR DRYNESS 11/11/19  Yes Historical Provider, MD   cetirizine (ZYRTEC ALLERGY) 10 MG tablet Take 10 mg by mouth daily   Yes Historical Provider, MD   metoprolol tartrate (LOPRESSOR) 50 MG tablet 50mg once a day 10/9/19  Yes Alyssa Tripathi MD   pravastatin (PRAVACHOL) 40 MG tablet TAKE 1 TABLET DAILY 5/23/19  Yes Yandel Hussein MD   Multiple Vitamins-Minerals (ICAPS AREDS 2 PO) Take by mouth daily   Yes Historical Provider, MD   azelastine (ASTELIN) 137 MCG/SPRAY nasal spray 2 sprays by Nasal route 2 times daily as needed Use in each nostril as directed    Yes Historical Provider, MD     Current Outpatient Medications   Medication Sig Dispense Refill    acyclovir (ZOVIRAX) 400 MG tablet Take 1 tablet by mouth 2 times daily      levoFLOXacin (LEVAQUIN) 500 MG tablet Take 1 tablet by mouth daily 30 tablet 3    levoFLOXacin (LEVAQUIN) 500 MG tablet Take 500 mg by mouth daily      apixaban (ELIQUIS) 5 MG TABS tablet Take 1 tablet by mouth 2 times daily 60 tablet 3    apixaban (ELIQUIS DVT/PE STARTER PACK) 5 MG TABS tablet Take 10 mg (2 tablets) orally twice daily for 7 days, then take 5 mg (1 tablet) orally twice daily thereafter. 74 tablet 0    ondansetron (ZOFRAN-ODT) 4 MG disintegrating tablet Take 1 tablet by mouth 3 times daily as needed for Nausea or Vomiting 21 tablet 0    vitamin D (CHOLECALCIFEROL) 25 MCG (1000 UT) TABS tablet Take 3,000 Units by mouth daily      Multiple Vitamins-Minerals (THERAPEUTIC MULTIVITAMIN-MINERALS) tablet Take 1 tablet by mouth daily      ammonium lactate (AMLACTIN) 12 % cream APPLY AFTER SHOWERING TO SKIN FOR DRYNESS      cetirizine (ZYRTEC ALLERGY) 10 MG tablet Take 10 mg by mouth daily      metoprolol tartrate (LOPRESSOR) 50 MG tablet 50mg once a day 90 tablet 3    pravastatin (PRAVACHOL) 40 MG tablet TAKE 1 TABLET DAILY 90 tablet 3    Multiple Vitamins-Minerals (ICAPS AREDS 2 PO) Take by mouth daily      azelastine (ASTELIN) 137 MCG/SPRAY nasal spray 2 sprays by Nasal route 2 times daily as needed Use in each nostril as directed        No current facility-administered medications for this visit.       Facility-Administered Medications Ordered in Other Visits   Medication Dose Route Frequency Provider Last Rate Last Dose    0.9 % sodium chloride bolus  20 mL Intravenous Once Corrinne Mulders, MD        0.9 % sodium chloride bolus  20 mL Intravenous Once Kayla Mcpherson MD        0.9 % sodium chloride bolus  20 mL Intravenous Once Kayla Mcpherson MD        0.9 % sodium chloride bolus  20 mL Intravenous Once Corrinne Mulders, MD           Allergies:  Amoxicillin-pot clavulanate; Cefuroxime axetil; Eraxis [anidulafungin]; Biaxin [clarithromycin]; and Sulfa antibiotics    REVIEW OF SYSTEMS:      · General: Positive for weakness and fatigue. . No unanticipated weight loss or decreased appetite. No fever or chills. · Eyes: No blurred vision, eye pain or double vision. · Ears: No hearing problems or drainage. No tinnitus. · Throat: No sore throat, problems with swallowing or dysphagia. · Respiratory: No cough, sputum or hemoptysis. No shortness of breath. No pleuritic chest pain. · Cardiovascular: No chest pain, orthopnea or PND. No lower extremity edema. No palpitation. · Gastrointestinal: No problems with swallowing. No abdominal pain or bloating. No nausea or vomiting. No diarrhea or constipation. No GI bleeding. · Genitourinary: No dysuria, hematuria, frequency or urgency. · Musculoskeletal: No muscle aches or pains. No limitation of movement. No back pain. No gait disturbance, No joint complaints. · Dermatologic: No skin rashes or pruritus. No skin lesions or discolorations. · Psychiatric: No depression, anxiety, or stress or signs of schizophrenia. No change in mood or affect. · Hematologic: As above. · Infectious disease: No fever, chills or frequent infections. · Endocrine: No problems with obesity. No polydipsia or polyuria. No temperature intolerance. · Neurologic: No headaches or dizziness. No weakness or numbness of the extremities. No changes in balance, coordination,  memory, mentation, behavior. · Allergic/Immunologic: No nasal congestion or hives. No repeated infections.        PHYSICAL EXAM:      /83   Pulse 90   Temp 98.5 °F (36.9 °C) (Oral)   Resp 16   Wt 191 lb (86.6 kg)   BMI 27.41 kg/m²    Temp (24hrs), Av.3 °F (36.8 °C), Min:97.8 °F (36.6 °C), Max:99.1 °F (37.3 °C)      General appearance - well appearing, not in pain or distress  Mental status - good mood, alert and oriented  Eyes - pupils equal and reactive, extraocular eye movements intact  Ears - bilateral TM's and external ear canals normal  Nose - normal and patent, no erythema, discharge or polyps  Mouth - mucous membranes moist, pharynx normal without lesions  Neck - supple, no significant adenopathy  Lymphatics - no palpable lymphadenopathy, no hepatosplenomegaly  Chest - clear to auscultation, no wheezes, rales or rhonchi, symmetric air entry  Heart - normal rate, regular rhythm, normal S1, S2, no murmurs, rubs, clicks or gallops  Abdomen - soft, nontender, nondistended, no masses or organomegaly  Neurological - alert, oriented, normal speech, no focal findings or movement disorder noted  Musculoskeletal - no joint tenderness, deformity or swelling  Extremities - peripheral pulses normal, no pedal edema, no clubbing or cyanosis  Skin -multiple skin bruisings. DATA:      Labs:       CBC:   Recent Labs     20  0852   WBC 0.9*   HGB 8.7*   HCT 26.4*   PLT See Reflexed IPF Result       Chemistry        Component Value Date/Time     2020 0828    K 4.4 2020 0828     2020 0828    CO2 27 2020 0828    BUN 18 2020 0828    CREATININE 0.49 (L) 2020 0828        Component Value Date/Time    CALCIUM 9.8 2020 0828    ALKPHOS 57 2020 0828    AST 23 2020 0828    ALT 25 2020 0828    BILITOT 0.22 (L) 2020 0828                IMPRESSION:    AML  Pancytopenia ([address]    RECOMMENDATIONS:  1. Patient was evaluated at [facility]. Discussed with Dr Lyle Temple. 2. Receiving supportive transfusion as needed. Continue using HLA matched platelets.     3. Labs from today reviewed and discussed. Platelets transfusion. 4. Educated about neutropenic precautions. Continue prophylactic antibiotics. 5. We will continue to hold Vidaza and Venetoclax due to cytopenia. 6. Patient is scheduled for follow up at [facility] next week. She will have BM test on Tuesday for evaluation of leukemia status. 7. Continue home meds. 8. Hold eliquis due to severe thrombocytopenia. 9. Patient's questions were answered to the best of her satisfaction and she verbalized full understanding and agreement. 10. Time spent during this visit included 40 minutes of face to face discussion. MD Kayla Dupont MD  Cell: [phone]    This note is created with the assistance of a speech recognition program.  While intending to generate a document that actually reflects the content of the visit, the document can still have some errors including those of syntax and sound a like substitutions which may escape proof reading. It such instances, actual meaning can be extrapolated by contextual diversion.

## 2020-07-06 ENCOUNTER — HOSPITAL ENCOUNTER (OUTPATIENT)
Facility: MEDICAL CENTER | Age: 70
Discharge: HOME | End: 2020-07-06
Payer: MEDICARE

## 2020-07-06 ENCOUNTER — HOSPITAL ENCOUNTER (OUTPATIENT)
Dept: INFUSION THERAPY | Facility: MEDICAL CENTER | Age: 70
Discharge: HOME | End: 2020-07-06
Payer: MEDICARE

## 2020-07-06 VITALS
DIASTOLIC BLOOD PRESSURE: 64 MMHG | TEMPERATURE: 99 F | SYSTOLIC BLOOD PRESSURE: 113 MMHG | HEART RATE: 70 BPM | RESPIRATION RATE: 16 BRPM

## 2020-07-06 DIAGNOSIS — C92.00 ACUTE MYELOID LEUKEMIA NOT HAVING ACHIEVED REMISSION (HCC): ICD-10-CM

## 2020-07-06 DIAGNOSIS — C92.00 ACUTE MYELOID LEUKEMIA NOT HAVING ACHIEVED REMISSION (HCC): Primary | ICD-10-CM

## 2020-07-06 LAB
ABSOLUTE EOS #: 0.01 K/UL (ref 0–0.4)
ABSOLUTE IMMATURE GRANULOCYTE: 0 K/UL (ref 0–0.3)
ABSOLUTE LYMPH #: 0.68 K/UL (ref 1–4.8)
ABSOLUTE MONO #: 0.01 K/UL (ref 0.2–0.8)
BASOPHILS # BLD: 0 %
BASOPHILS ABSOLUTE: 0 K/UL (ref 0–0.2)
DIFFERENTIAL TYPE: ABNORMAL
EOSINOPHILS RELATIVE PERCENT: 1 % (ref 1–4)
HCT VFR BLD CALC: 25.6 % (ref 36.3–47.1)
HEMOGLOBIN: 8.6 G/DL (ref 11.9–15.1)
IMMATURE GRANULOCYTES: 0 %
LYMPHOCYTES # BLD: 98 % (ref 24–44)
MCH RBC QN AUTO: 33.7 PG (ref 25.2–33.5)
MCHC RBC AUTO-ENTMCNC: 33.6 G/DL (ref 28.4–34.8)
MCV RBC AUTO: 100.4 FL (ref 82.6–102.9)
MONOCYTES # BLD: 1 % (ref 1–7)
MORPHOLOGY: ABNORMAL
NRBC AUTOMATED: 0 PER 100 WBC
PDW BLD-RTO: 18.6 % (ref 11.8–14.4)
PLATELET # BLD: ABNORMAL K/UL (ref 138–453)
PLATELET ESTIMATE: ABNORMAL
PLATELET, FLUORESCENCE: 12 K/UL (ref 138–453)
PLATELET, IMMATURE FRACTION: 0.5 % (ref 1.1–10.3)
PMV BLD AUTO: ABNORMAL FL (ref 8.1–13.5)
RBC # BLD: 2.55 M/UL (ref 3.95–5.11)
RBC # BLD: ABNORMAL 10*6/UL
SEG NEUTROPHILS: 0 % (ref 36–66)
SEGMENTED NEUTROPHILS ABSOLUTE COUNT: 0 K/UL (ref 1.8–7.7)
WBC # BLD: 0.7 K/UL (ref 3.5–11.3)
WBC # BLD: ABNORMAL 10*3/UL

## 2020-07-06 PROCEDURE — P9037 PLATE PHERES LEUKOREDU IRRAD: HCPCS

## 2020-07-06 PROCEDURE — 6360000002 HC RX W HCPCS: Performed by: INTERNAL MEDICINE

## 2020-07-06 PROCEDURE — 36415 COLL VENOUS BLD VENIPUNCTURE: CPT

## 2020-07-06 PROCEDURE — 85025 COMPLETE CBC W/AUTO DIFF WBC: CPT

## 2020-07-06 PROCEDURE — 96375 TX/PRO/DX INJ NEW DRUG ADDON: CPT

## 2020-07-06 PROCEDURE — 85055 RETICULATED PLATELET ASSAY: CPT

## 2020-07-06 PROCEDURE — 96376 TX/PRO/DX INJ SAME DRUG ADON: CPT

## 2020-07-06 PROCEDURE — 96374 THER/PROPH/DIAG INJ IV PUSH: CPT

## 2020-07-06 PROCEDURE — 36430 TRANSFUSION BLD/BLD COMPNT: CPT

## 2020-07-06 PROCEDURE — 2580000003 HC RX 258: Performed by: INTERNAL MEDICINE

## 2020-07-06 RX ORDER — DEXAMETHASONE SODIUM PHOSPHATE 10 MG/ML
10 INJECTION INTRAMUSCULAR; INTRAVENOUS ONCE
Status: CANCELLED
Start: 2020-07-06

## 2020-07-06 RX ORDER — DIPHENHYDRAMINE HYDROCHLORIDE 50 MG/ML
25 INJECTION INTRAMUSCULAR; INTRAVENOUS ONCE
Status: CANCELLED
Start: 2020-07-06

## 2020-07-06 RX ORDER — 0.9 % SODIUM CHLORIDE 0.9 %
500 INTRAVENOUS SOLUTION INTRAVENOUS ONCE
Status: COMPLETED | OUTPATIENT
Start: 2020-07-06 | End: 2020-07-06

## 2020-07-06 RX ORDER — DEXAMETHASONE SODIUM PHOSPHATE 10 MG/ML
10 INJECTION INTRAMUSCULAR; INTRAVENOUS ONCE
Status: COMPLETED | OUTPATIENT
Start: 2020-07-06 | End: 2020-07-06

## 2020-07-06 RX ORDER — DIPHENHYDRAMINE HYDROCHLORIDE 50 MG/ML
25 INJECTION INTRAMUSCULAR; INTRAVENOUS ONCE
Status: COMPLETED | OUTPATIENT
Start: 2020-07-06 | End: 2020-07-06

## 2020-07-06 RX ADMIN — DIPHENHYDRAMINE HYDROCHLORIDE 25 MG: 50 INJECTION, SOLUTION INTRAMUSCULAR; INTRAVENOUS at 10:43

## 2020-07-06 RX ADMIN — SODIUM CHLORIDE 500 ML: 9 INJECTION, SOLUTION INTRAVENOUS at 09:45

## 2020-07-06 RX ADMIN — DEXAMETHASONE SODIUM PHOSPHATE 10 MG: 10 INJECTION INTRAMUSCULAR; INTRAVENOUS at 10:43

## 2020-07-06 NOTE — PROGRESS NOTES
Patient arrived ambulatory per self for possible transfusion. Patient denies concerns or complaints. Labs reviewed. Dr. Gregor Barnes notified. Patient to receive 1 unit platelets. IV inserted without complication. Patient premedicated. 1 units platelets infused with no sign adverse reaction;line flushed. Patient monitored for 15 minutes post infusion with running fluids. No sign adverse reaction. IV removed without complication. Pressure dressing applied to site. Patient ambulated off unit per self at discharge.

## 2020-07-08 ENCOUNTER — HOSPITAL ENCOUNTER (OUTPATIENT)
Dept: INFUSION THERAPY | Facility: MEDICAL CENTER | Age: 70
Discharge: HOME | End: 2020-07-08
Payer: MEDICARE

## 2020-07-08 ENCOUNTER — HOSPITAL ENCOUNTER (OUTPATIENT)
Facility: MEDICAL CENTER | Age: 70
Discharge: HOME | End: 2020-07-08
Payer: MEDICARE

## 2020-07-08 VITALS
RESPIRATION RATE: 18 BRPM | TEMPERATURE: 98.4 F | SYSTOLIC BLOOD PRESSURE: 105 MMHG | HEART RATE: 87 BPM | DIASTOLIC BLOOD PRESSURE: 57 MMHG

## 2020-07-08 DIAGNOSIS — C92.00 ACUTE MYELOID LEUKEMIA NOT HAVING ACHIEVED REMISSION (HCC): ICD-10-CM

## 2020-07-08 LAB
ABSOLUTE EOS #: 0 K/UL (ref 0–0.4)
ABSOLUTE IMMATURE GRANULOCYTE: 0 K/UL (ref 0–0.3)
ABSOLUTE LYMPH #: 0.95 K/UL (ref 1–4.8)
ABSOLUTE MONO #: 0.01 K/UL (ref 0.2–0.8)
BASOPHILS # BLD: 0 %
BASOPHILS ABSOLUTE: 0 K/UL (ref 0–0.2)
DIFFERENTIAL TYPE: ABNORMAL
EOSINOPHILS RELATIVE PERCENT: 0 % (ref 1–4)
HCT VFR BLD CALC: 23.5 % (ref 36.3–47.1)
HEMOGLOBIN: 7.6 G/DL (ref 11.9–15.1)
IMMATURE GRANULOCYTES: 0 %
LYMPHOCYTES # BLD: 95 % (ref 24–44)
MCH RBC QN AUTO: 32.8 PG (ref 25.2–33.5)
MCHC RBC AUTO-ENTMCNC: 32.3 G/DL (ref 28.4–34.8)
MCV RBC AUTO: 101.3 FL (ref 82.6–102.9)
MONOCYTES # BLD: 1 % (ref 1–7)
MORPHOLOGY: ABNORMAL
NRBC AUTOMATED: 0 PER 100 WBC
PDW BLD-RTO: 18.7 % (ref 11.8–14.4)
PLATELET # BLD: ABNORMAL K/UL (ref 138–453)
PLATELET ESTIMATE: ABNORMAL
PLATELET, FLUORESCENCE: 20 K/UL (ref 138–453)
PLATELET, IMMATURE FRACTION: 1 % (ref 1.1–10.3)
PMV BLD AUTO: ABNORMAL FL (ref 8.1–13.5)
RBC # BLD: 2.32 M/UL (ref 3.95–5.11)
RBC # BLD: ABNORMAL 10*6/UL
SEG NEUTROPHILS: 4 % (ref 36–66)
SEGMENTED NEUTROPHILS ABSOLUTE COUNT: 0.04 K/UL (ref 1.8–7.7)
WBC # BLD: 1 K/UL (ref 3.5–11.3)
WBC # BLD: ABNORMAL 10*3/UL

## 2020-07-08 PROCEDURE — 99211 OFF/OP EST MAY X REQ PHY/QHP: CPT

## 2020-07-08 PROCEDURE — 85025 COMPLETE CBC W/AUTO DIFF WBC: CPT

## 2020-07-08 PROCEDURE — 85055 RETICULATED PLATELET ASSAY: CPT

## 2020-07-08 PROCEDURE — 36415 COLL VENOUS BLD VENIPUNCTURE: CPT

## 2020-07-08 RX ORDER — MIRTAZAPINE 15 MG/1
15 TABLET, FILM COATED ORAL NIGHTLY
COMMUNITY
Start: 2020-07-07 | End: 2020-08-11 | Stop reason: SINTOL

## 2020-07-08 NOTE — PROGRESS NOTES
Pt arrives per ambulatory per self and labs and pt assessed and orders reviewed. No current orders to transfuse IRRADIATED / HLA PLATELETS, today PLATELET CT is 20, HGB is 7.6, pt was 8.6 on 7/6/2020. WBC 1.0. Writer called and spoke with Dr Brayan Cook md looking at [address] results from [facility] and notified of above lab values and states OK to given IRRAD, HLA platelets on Friday 8/40/1273, due to pt already has appt scheduled and also discussed with Emmett Sánchez to proceed with transfusion on Friday. No orders for blood at this time. Pt does complain of \"tired\", but denies shortness of breath or dizziness. Pt states received 2 stitches to right forearm site, that was where the bone marrow biopsy was done. The area is moderately ecchymotic , denies any bleeding from site and has dry intact dressing in place. Writer received order for above platelets to be given Friday and pt notified and order placed and Carey Goldberg verified in Epic that order is in place. Writer spoke to Kory Miller , daniel in blood bank and she verified order received and needed on FRiday , 7/10/20 and [facility] states will send order in to [facility]. Pt aware of plan for platelet transfusion only at this time for Friday and pt scheduled at 1100. Pt discharged per ambulatory per self.

## 2020-07-10 ENCOUNTER — HOSPITAL ENCOUNTER (OUTPATIENT)
Facility: MEDICAL CENTER | Age: 70
Discharge: HOME | End: 2020-07-10
Payer: MEDICARE

## 2020-07-10 ENCOUNTER — HOSPITAL ENCOUNTER (OUTPATIENT)
Dept: INFUSION THERAPY | Facility: MEDICAL CENTER | Age: 70
Discharge: HOME | End: 2020-07-10
Payer: MEDICARE

## 2020-07-10 VITALS
HEART RATE: 70 BPM | TEMPERATURE: 99 F | DIASTOLIC BLOOD PRESSURE: 59 MMHG | RESPIRATION RATE: 18 BRPM | SYSTOLIC BLOOD PRESSURE: 103 MMHG

## 2020-07-10 DIAGNOSIS — C92.00 ACUTE MYELOID LEUKEMIA NOT HAVING ACHIEVED REMISSION (HCC): ICD-10-CM

## 2020-07-10 LAB
ABSOLUTE EOS #: 0 K/UL (ref 0–0.44)
ABSOLUTE IMMATURE GRANULOCYTE: 0 K/UL (ref 0–0.3)
ABSOLUTE LYMPH #: 0.95 K/UL (ref 1.1–3.7)
ABSOLUTE MONO #: 0.01 K/UL (ref 0.1–1.2)
BASOPHILS # BLD: 0 % (ref 0–2)
BASOPHILS ABSOLUTE: 0 K/UL (ref 0–0.2)
DIFFERENTIAL TYPE: ABNORMAL
EOSINOPHILS RELATIVE PERCENT: 0 % (ref 1–4)
HCT VFR BLD CALC: 23.9 % (ref 36.3–47.1)
HEMOGLOBIN: 8 G/DL (ref 11.9–15.1)
IMMATURE GRANULOCYTES: 0 %
LYMPHOCYTES # BLD: 95 % (ref 24–43)
MCH RBC QN AUTO: 33.5 PG (ref 25.2–33.5)
MCHC RBC AUTO-ENTMCNC: 33.5 G/DL (ref 28.4–34.8)
MCV RBC AUTO: 100 FL (ref 82.6–102.9)
MONOCYTES # BLD: 1 % (ref 3–12)
MORPHOLOGY: ABNORMAL
NRBC AUTOMATED: 0 PER 100 WBC
PDW BLD-RTO: 18.6 % (ref 11.8–14.4)
PLATELET # BLD: ABNORMAL K/UL (ref 138–453)
PLATELET ESTIMATE: ABNORMAL
PLATELET, FLUORESCENCE: 11 K/UL (ref 138–453)
PLATELET, IMMATURE FRACTION: 2 % (ref 1.1–10.3)
PMV BLD AUTO: ABNORMAL FL (ref 8.1–13.5)
RBC # BLD: 2.39 M/UL (ref 3.95–5.11)
RBC # BLD: ABNORMAL 10*6/UL
SEG NEUTROPHILS: 4 % (ref 36–65)
SEGMENTED NEUTROPHILS ABSOLUTE COUNT: 0.04 K/UL (ref 1.5–8.1)
WBC # BLD: 1 K/UL (ref 3.5–11.3)
WBC # BLD: ABNORMAL 10*3/UL

## 2020-07-10 PROCEDURE — 36430 TRANSFUSION BLD/BLD COMPNT: CPT

## 2020-07-10 PROCEDURE — 96374 THER/PROPH/DIAG INJ IV PUSH: CPT

## 2020-07-10 PROCEDURE — 36415 COLL VENOUS BLD VENIPUNCTURE: CPT

## 2020-07-10 PROCEDURE — 85055 RETICULATED PLATELET ASSAY: CPT

## 2020-07-10 PROCEDURE — 2580000003 HC RX 258: Performed by: INTERNAL MEDICINE

## 2020-07-10 PROCEDURE — P9037 PLATE PHERES LEUKOREDU IRRAD: HCPCS

## 2020-07-10 PROCEDURE — 96375 TX/PRO/DX INJ NEW DRUG ADDON: CPT

## 2020-07-10 PROCEDURE — 85025 COMPLETE CBC W/AUTO DIFF WBC: CPT

## 2020-07-10 PROCEDURE — 6360000002 HC RX W HCPCS: Performed by: INTERNAL MEDICINE

## 2020-07-10 RX ORDER — DEXAMETHASONE SODIUM PHOSPHATE 10 MG/ML
10 INJECTION INTRAMUSCULAR; INTRAVENOUS ONCE
Status: COMPLETED | OUTPATIENT
Start: 2020-07-10 | End: 2020-07-10

## 2020-07-10 RX ORDER — 0.9 % SODIUM CHLORIDE 0.9 %
500 INTRAVENOUS SOLUTION INTRAVENOUS ONCE
Status: COMPLETED | OUTPATIENT
Start: 2020-07-10 | End: 2020-07-10

## 2020-07-10 RX ORDER — DIPHENHYDRAMINE HYDROCHLORIDE 50 MG/ML
25 INJECTION INTRAMUSCULAR; INTRAVENOUS ONCE
Status: COMPLETED | OUTPATIENT
Start: 2020-07-10 | End: 2020-07-10

## 2020-07-10 RX ADMIN — SODIUM CHLORIDE 500 ML: 9 INJECTION, SOLUTION INTRAVENOUS at 09:30

## 2020-07-10 RX ADMIN — DIPHENHYDRAMINE HYDROCHLORIDE 25 MG: 50 INJECTION, SOLUTION INTRAMUSCULAR; INTRAVENOUS at 09:47

## 2020-07-10 RX ADMIN — DEXAMETHASONE SODIUM PHOSPHATE 10 MG: 10 INJECTION INTRAMUSCULAR; INTRAVENOUS at 09:44

## 2020-07-10 NOTE — PROGRESS NOTES
Pt arrives per ambulatory per self and labs reviewed and PLT CT 11, HGB 8. Dr Jessy Phipps notified and PLATELETS irrad, HL-matched ordered on Wednesday for transfusion today given today after premeds given and after 30 minute wait per NS primed standard blood tubing. No reactions or complaints and blood return present throughout infusion. Then per md order PLATELETS, irrad and HLA-matched ordered for Tuesday , 7/14/2020. Writer then called Boo Soto in blood bank and confirms that [employer] to be notified to have platelets here for transfusion on Tuesday, 7/14/2020. Pt notified of above and pt has appt for Tuesday for md and transfusion. Pt discharged per self.

## 2020-07-13 ENCOUNTER — HOSPITAL ENCOUNTER (OUTPATIENT)
Facility: MEDICAL CENTER | Age: 70
End: 2020-07-13
Payer: MEDICARE

## 2020-07-14 ENCOUNTER — OFFICE VISIT (OUTPATIENT)
Dept: ONCOLOGY | Age: 70
End: 2020-07-14
Payer: MEDICARE

## 2020-07-14 ENCOUNTER — HOSPITAL ENCOUNTER (OUTPATIENT)
Facility: MEDICAL CENTER | Age: 70
Discharge: HOME | End: 2020-07-14
Payer: MEDICARE

## 2020-07-14 ENCOUNTER — TELEPHONE (OUTPATIENT)
Dept: ONCOLOGY | Age: 70
End: 2020-07-14

## 2020-07-14 ENCOUNTER — HOSPITAL ENCOUNTER (OUTPATIENT)
Dept: INFUSION THERAPY | Facility: MEDICAL CENTER | Age: 70
Discharge: HOME | End: 2020-07-14
Payer: MEDICARE

## 2020-07-14 VITALS — TEMPERATURE: 98.6 F | DIASTOLIC BLOOD PRESSURE: 55 MMHG | HEART RATE: 78 BPM | SYSTOLIC BLOOD PRESSURE: 105 MMHG

## 2020-07-14 VITALS
SYSTOLIC BLOOD PRESSURE: 121 MMHG | WEIGHT: 179.8 LBS | HEART RATE: 87 BPM | TEMPERATURE: 98.7 F | BODY MASS INDEX: 25.8 KG/M2 | DIASTOLIC BLOOD PRESSURE: 65 MMHG

## 2020-07-14 DIAGNOSIS — C92.00 ACUTE MYELOID LEUKEMIA NOT HAVING ACHIEVED REMISSION (HCC): ICD-10-CM

## 2020-07-14 LAB
ABSOLUTE EOS #: 0 K/UL (ref 0–0.4)
ABSOLUTE IMMATURE GRANULOCYTE: 0.01 K/UL (ref 0–0.3)
ABSOLUTE LYMPH #: 0.77 K/UL (ref 1–4.8)
ABSOLUTE MONO #: 0.05 K/UL (ref 0.2–0.8)
ALBUMIN SERPL-MCNC: 3.9 G/DL (ref 3.5–5.2)
ALBUMIN/GLOBULIN RATIO: ABNORMAL (ref 1–2.5)
ALP BLD-CCNC: 55 U/L (ref 35–104)
ALT SERPL-CCNC: 16 U/L (ref 5–33)
ANION GAP SERPL CALCULATED.3IONS-SCNC: 12 MMOL/L (ref 9–17)
AST SERPL-CCNC: 14 U/L
BASOPHILS # BLD: 0 %
BASOPHILS ABSOLUTE: 0 K/UL (ref 0–0.2)
BILIRUB SERPL-MCNC: 0.1 MG/DL (ref 0.3–1.2)
BUN BLDV-MCNC: 26 MG/DL (ref 8–23)
BUN/CREAT BLD: 43 (ref 9–20)
CALCIUM SERPL-MCNC: 9.4 MG/DL (ref 8.6–10.4)
CHLORIDE BLD-SCNC: 103 MMOL/L (ref 98–107)
CO2: 24 MMOL/L (ref 20–31)
CREAT SERPL-MCNC: 0.61 MG/DL (ref 0.5–0.9)
DIFFERENTIAL TYPE: ABNORMAL
EOSINOPHILS RELATIVE PERCENT: 0 % (ref 1–4)
GFR AFRICAN AMERICAN: >60 ML/MIN
GFR NON-AFRICAN AMERICAN: >60 ML/MIN
GFR SERPL CREATININE-BSD FRML MDRD: ABNORMAL ML/MIN/{1.73_M2}
GFR SERPL CREATININE-BSD FRML MDRD: ABNORMAL ML/MIN/{1.73_M2}
GLUCOSE BLD-MCNC: 104 MG/DL (ref 70–99)
HCT VFR BLD CALC: 23.2 % (ref 36.3–47.1)
HEMOGLOBIN: 7.6 G/DL (ref 11.9–15.1)
IMMATURE GRANULOCYTES: 1 %
LYMPHOCYTES # BLD: 77 % (ref 24–44)
MCH RBC QN AUTO: 33.2 PG (ref 25.2–33.5)
MCHC RBC AUTO-ENTMCNC: 32.8 G/DL (ref 28.4–34.8)
MCV RBC AUTO: 101.3 FL (ref 82.6–102.9)
MONOCYTES # BLD: 5 % (ref 1–7)
NRBC AUTOMATED: 0 PER 100 WBC
PDW BLD-RTO: 18.6 % (ref 11.8–14.4)
PLATELET # BLD: ABNORMAL K/UL (ref 138–453)
PLATELET ESTIMATE: ABNORMAL
PLATELET, FLUORESCENCE: 20 K/UL (ref 138–453)
PLATELET, IMMATURE FRACTION: 2.3 % (ref 1.1–10.3)
PMV BLD AUTO: ABNORMAL FL (ref 8.1–13.5)
POTASSIUM SERPL-SCNC: 4.1 MMOL/L (ref 3.7–5.3)
RBC # BLD: 2.29 M/UL (ref 3.95–5.11)
RBC # BLD: ABNORMAL 10*6/UL
SEG NEUTROPHILS: 17 % (ref 36–66)
SEGMENTED NEUTROPHILS ABSOLUTE COUNT: 0.17 K/UL (ref 1.8–7.7)
SODIUM BLD-SCNC: 139 MMOL/L (ref 135–144)
TOTAL PROTEIN: 6.6 G/DL (ref 6.4–8.3)
WBC # BLD: 1 K/UL (ref 3.5–11.3)
WBC # BLD: ABNORMAL 10*3/UL

## 2020-07-14 PROCEDURE — P9037 PLATE PHERES LEUKOREDU IRRAD: HCPCS

## 2020-07-14 PROCEDURE — 85025 COMPLETE CBC W/AUTO DIFF WBC: CPT

## 2020-07-14 PROCEDURE — 99214 OFFICE O/P EST MOD 30 MIN: CPT | Performed by: INTERNAL MEDICINE

## 2020-07-14 PROCEDURE — 80053 COMPREHEN METABOLIC PANEL: CPT

## 2020-07-14 PROCEDURE — 36430 TRANSFUSION BLD/BLD COMPNT: CPT

## 2020-07-14 PROCEDURE — 2580000003 HC RX 258: Performed by: INTERNAL MEDICINE

## 2020-07-14 PROCEDURE — 99211 OFF/OP EST MAY X REQ PHY/QHP: CPT | Performed by: INTERNAL MEDICINE

## 2020-07-14 PROCEDURE — 36415 COLL VENOUS BLD VENIPUNCTURE: CPT

## 2020-07-14 PROCEDURE — 96374 THER/PROPH/DIAG INJ IV PUSH: CPT

## 2020-07-14 PROCEDURE — 6360000002 HC RX W HCPCS: Performed by: INTERNAL MEDICINE

## 2020-07-14 PROCEDURE — 96375 TX/PRO/DX INJ NEW DRUG ADDON: CPT

## 2020-07-14 PROCEDURE — 85055 RETICULATED PLATELET ASSAY: CPT

## 2020-07-14 RX ORDER — DIPHENHYDRAMINE HYDROCHLORIDE 50 MG/ML
25 INJECTION INTRAMUSCULAR; INTRAVENOUS ONCE
Status: COMPLETED | OUTPATIENT
Start: 2020-07-14 | End: 2020-07-14

## 2020-07-14 RX ORDER — 0.9 % SODIUM CHLORIDE 0.9 %
20 INTRAVENOUS SOLUTION INTRAVENOUS ONCE
Status: COMPLETED | OUTPATIENT
Start: 2020-07-14 | End: 2020-07-14

## 2020-07-14 RX ORDER — DEXAMETHASONE SODIUM PHOSPHATE 10 MG/ML
10 INJECTION INTRAMUSCULAR; INTRAVENOUS ONCE
Status: COMPLETED | OUTPATIENT
Start: 2020-07-14 | End: 2020-07-14

## 2020-07-14 RX ADMIN — DEXAMETHASONE SODIUM PHOSPHATE 10 MG: 10 INJECTION INTRAMUSCULAR; INTRAVENOUS at 12:04

## 2020-07-14 RX ADMIN — DIPHENHYDRAMINE HYDROCHLORIDE 25 MG: 50 INJECTION, SOLUTION INTRAMUSCULAR; INTRAVENOUS at 12:07

## 2020-07-14 RX ADMIN — SODIUM CHLORIDE 500 ML: 9 INJECTION, SOLUTION INTRAVENOUS at 11:52

## 2020-07-14 NOTE — PROGRESS NOTES
Patient here following MD visit for platelet transfusion. No complaints today. Vitals obtained. Platelets ordered and are ready. IV started. Platelets hung and infusing. No complaints. Completed. Tolerated well. Vitals placed on flow sheet. IV discontinued intact, dressing to site. Has a return visit scheduled. Discharged ambulatory per self.

## 2020-07-15 RX ORDER — 0.9 % SODIUM CHLORIDE 0.9 %
20 INTRAVENOUS SOLUTION INTRAVENOUS ONCE
Status: DISCONTINUED | OUTPATIENT
Start: 2020-07-15 | End: 2020-07-15 | Stop reason: HOSPADM

## 2020-07-15 NOTE — PROGRESS NOTES
_    Chief Complaint   Patient presents with    Follow-up     review status of disease    Discuss Labs       DIAGNOSIS:       AML  Pancytopenia      CURRENT THERAPY:         Transfusion as needed, HLA matched platelets  Started Vidaza/ Venetoclax 4/20/2020, received total of 2 cycles. BRIEF CASE HISTORY:      The patient is a 78 y.o.  female who is admitted to the hospital for further management of severe pancytopenia. The patient was clinically fine. Actually she is busy doing boxes with house movement. She noticed increasing weakness and fatigue. This is getting worse over the last few weeks. She gets shortness of breath on exertion. No dizziness. She denies any active bleeding. She has increasing bruising. No fever or infections. No enlarged lymph nodes. No weight loss or decreased appetite. The patient had blood work which showed severe pancytopenia. She is receiving platelet transfusion. No history of smoking or alcohol drinking. BM test done. Results showed AML. INTERIM HISTORY:   The patient is seen for follow up AML. She had pancytopenia. She received blood and platelets. Patient was started on Vidaza and venetoclax. She received total of 2 cycles. Patient had good initial response. She started having severe cytopenias again so treatment dose was decreased and lately treatment was held. Clinically she is doing fine now. No fever or chills. No N/V. No active bleeding. She has multiple bruises. Repeated labs showed persistent leukopenia with severe thrombocytopenia. Patient continues follow[facility]up at [facility]. Discussed with Dr. Wynell Hodgkins. 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 Recommendations to continue to observe and support her with blood products as needed.     Past Medical History:   has a past medical history of Arthritis, Cancer ([address] Hyperlipidemia, Hypertension, Leukemia ([facility] [address] Pneumonia, and Seasonal allergies. Past Surgical History:   has a past surgical history that includes Colonoscopy; joint replacement; Ovary removal; and Colonoscopy (03/10/2017). Family History: family history includes Alzheimer's Disease in her mother; Cancer in her brother; Diabetes in her father; Heart Disease in her father. Social History:   reports that she has never smoked. She has never used smokeless tobacco. She reports current alcohol use. She reports that she does not use drugs. Medications:    Prior to Admission medications    Medication Sig Start Date End Date Taking?  Authorizing Provider   mirtazapine (REMERON) 15 MG tablet Take 15 mg by mouth nightly 7/7/20 8/6/20 Yes Historical Provider, MD   acyclovir (ZOVIRAX) 400 MG tablet Take 1 tablet by mouth 2 times daily 6/15/20  Yes Historical Provider, MD   levoFLOXacin (LEVAQUIN) 500 MG tablet Take 1 tablet by mouth daily 6/30/20 7/30/20 Yes Chelsea Mcpherson MD   apixaban (ELIQUIS) 5 MG TABS tablet Take 1 tablet by mouth 2 times daily 6/16/20  Yes Chelsea Mcpherson MD   ondansetron (ZOFRAN-ODT) 4 MG disintegrating tablet Take 1 tablet by mouth 3 times daily as needed for Nausea or Vomiting 4/24/20  Yes Chelsea Mcpherson MD   vitamin D (CHOLECALCIFEROL) 25 MCG (1000 UT) TABS tablet Take 3,000 Units by mouth daily   Yes Historical Provider, MD   Multiple Vitamins-Minerals (THERAPEUTIC MULTIVITAMIN-MINERALS) tablet Take 1 tablet by mouth daily   Yes Historical Provider, MD   ammonium lactate (AMLACTIN) 12 % cream APPLY AFTER SHOWERING TO SKIN FOR DRYNESS 11/11/19  Yes Historical Provider, MD   cetirizine (ZYRTEC ALLERGY) 10 MG tablet Take 10 mg by mouth daily   Yes Historical Provider, MD   metoprolol tartrate (LOPRESSOR) 50 MG tablet 50mg once a day 10/9/19  Yes Austyn Murry MD   pravastatin (PRAVACHOL) 40 MG tablet TAKE 1 TABLET DAILY 5/23/19  Yes Austyn Murry MD   Multiple Vitamins-Minerals (ICAPS AREDS 2 PO) Take by mouth daily   Yes Historical Provider, MD   azelastine (ASTELIN) 137 MCG/SPRAY nasal spray 2 sprays by Nasal route 2 times daily as needed Use in each nostril as directed    Yes Historical Provider, MD     Current Outpatient Medications   Medication Sig Dispense Refill    mirtazapine (REMERON) 15 MG tablet Take 15 mg by mouth nightly      acyclovir (ZOVIRAX) 400 MG tablet Take 1 tablet by mouth 2 times daily      levoFLOXacin (LEVAQUIN) 500 MG tablet Take 1 tablet by mouth daily 30 tablet 3    apixaban (ELIQUIS) 5 MG TABS tablet Take 1 tablet by mouth 2 times daily 60 tablet 3    ondansetron (ZOFRAN-ODT) 4 MG disintegrating tablet Take 1 tablet by mouth 3 times daily as needed for Nausea or Vomiting 21 tablet 0    vitamin D (CHOLECALCIFEROL) 25 MCG (1000 UT) TABS tablet Take 3,000 Units by mouth daily      Multiple Vitamins-Minerals (THERAPEUTIC MULTIVITAMIN-MINERALS) tablet Take 1 tablet by mouth daily      ammonium lactate (AMLACTIN) 12 % cream APPLY AFTER SHOWERING TO SKIN FOR DRYNESS      cetirizine (ZYRTEC ALLERGY) 10 MG tablet Take 10 mg by mouth daily      metoprolol tartrate (LOPRESSOR) 50 MG tablet 50mg once a day 90 tablet 3    pravastatin (PRAVACHOL) 40 MG tablet TAKE 1 TABLET DAILY 90 tablet 3    Multiple Vitamins-Minerals (ICAPS AREDS 2 PO) Take by mouth daily      azelastine (ASTELIN) 137 MCG/SPRAY nasal spray 2 sprays by Nasal route 2 times daily as needed Use in each nostril as directed        No current facility-administered medications for this visit. Facility-Administered Medications Ordered in Other Visits   Medication Dose Route Frequency Provider Last Rate Last Dose    0.9 % sodium chloride bolus  20 mL Intravenous Once Renita Barrett MD           Allergies:  Amoxicillin-pot clavulanate; Cefuroxime axetil; Eraxis [anidulafungin];  Biaxin [clarithromycin]; and Sulfa antibiotics    REVIEW OF SYSTEMS:      · General: Positive for membranes moist, pharynx normal without lesions  Neck - supple, no significant adenopathy  Lymphatics - no palpable lymphadenopathy, no hepatosplenomegaly  Chest - clear to auscultation, no wheezes, rales or rhonchi, symmetric air entry  Heart - normal rate, regular rhythm, normal S1, S2, no murmurs, rubs, clicks or gallops  Abdomen - soft, nontender, nondistended, no masses or organomegaly  Neurological - alert, oriented, normal speech, no focal findings or movement disorder noted  Musculoskeletal - no joint tenderness, deformity or swelling  Extremities - peripheral pulses normal, no pedal edema, no clubbing or cyanosis  Skin -multiple skin bruisings. DATA:      Labs:       CBC:   Recent Labs     07/14/20  0957   WBC 1.0*   HGB 7.6*   HCT 23.2*   PLT See Reflexed IPF Result       Chemistry        Component Value Date/Time     07/14/2020 0957    K 4.1 07/14/2020 0957     07/14/2020 0957    CO2 24 07/14/2020 0957    BUN 26 (H) 07/14/2020 0957    CREATININE 0.61 07/14/2020 0957        Component Value Date/Time    CALCIUM 9.4 07/14/2020 0957    ALKPHOS 55 07/14/2020 0957    AST 14 07/14/2020 0957    ALT 16 07/14/2020 0957    BILITOT 0.10 (L) 07/14/2020 0957                IMPRESSION:    AML  Pancytopenia ([facility] [address]    RECOMMENDATIONS:  1. Patient was evaluated at [address] [place] [address] Dr. Discussed with Dr Kimmy Marcelino. 2. Receiving supportive transfusion as needed. Continue using HLA matched platelets. 3. Labs from today reviewed and discussed. Platelets transfusion. 4. Educated about neutropenic precautions. Continue prophylactic antibiotics. 5. We will continue to hold Vidaza and Venetoclax due to cytopenia. 6. Patient had a repeated bone marrow at [facility]. Bone marrow is acellular possibly due to treatment. Recommendations from [facility] is to continue to hold treatment and to support her with blood products as needed. 7. Continue home meds. 8. Hold eliquis due to severe thrombocytopenia. 9. Patient's questions were answered to the best of her satisfaction and she verbalized full understanding and agreement. MD Kayla Smith MD  Cell: [phone]    This note is created with the assistance of a speech recognition program.  While intending to generate a document that actually reflects the content of the visit, the document can still have some errors including those of syntax and sound a like substitutions which may escape proof reading. It such instances, actual meaning can be extrapolated by contextual diversion.

## 2020-07-16 ENCOUNTER — TELEPHONE (OUTPATIENT)
Dept: ONCOLOGY | Age: 70
End: 2020-07-16

## 2020-07-17 ENCOUNTER — HOSPITAL ENCOUNTER (OUTPATIENT)
Dept: INFUSION THERAPY | Facility: MEDICAL CENTER | Age: 70
Discharge: HOME | End: 2020-07-17
Payer: MEDICARE

## 2020-07-17 ENCOUNTER — HOSPITAL ENCOUNTER (OUTPATIENT)
Facility: MEDICAL CENTER | Age: 70
Discharge: HOME | End: 2020-07-17
Payer: MEDICARE

## 2020-07-17 VITALS
SYSTOLIC BLOOD PRESSURE: 119 MMHG | TEMPERATURE: 98.5 F | RESPIRATION RATE: 18 BRPM | DIASTOLIC BLOOD PRESSURE: 60 MMHG | HEART RATE: 72 BPM

## 2020-07-17 DIAGNOSIS — C92.00 ACUTE MYELOID LEUKEMIA NOT HAVING ACHIEVED REMISSION (HCC): ICD-10-CM

## 2020-07-17 LAB
ABSOLUTE EOS #: 0 K/UL (ref 0–0.4)
ABSOLUTE IMMATURE GRANULOCYTE: 0.02 K/UL (ref 0–0.3)
ABSOLUTE LYMPH #: 1.01 K/UL (ref 1–4.8)
ABSOLUTE MONO #: 0.24 K/UL (ref 0.2–0.8)
BASOPHILS # BLD: 0 %
BASOPHILS ABSOLUTE: 0 K/UL (ref 0–0.2)
DIFFERENTIAL TYPE: ABNORMAL
EOSINOPHILS RELATIVE PERCENT: 0 % (ref 1–4)
HCT VFR BLD CALC: 22.7 % (ref 36.3–47.1)
HEMOGLOBIN: 7.4 G/DL (ref 11.9–15.1)
IMMATURE GRANULOCYTES: 1 %
LYMPHOCYTES # BLD: 60 % (ref 24–44)
MCH RBC QN AUTO: 33.2 PG (ref 25.2–33.5)
MCHC RBC AUTO-ENTMCNC: 32.6 G/DL (ref 28.4–34.8)
MCV RBC AUTO: 101.8 FL (ref 82.6–102.9)
MONOCYTES # BLD: 14 % (ref 1–7)
NRBC AUTOMATED: 1.8 PER 100 WBC
PDW BLD-RTO: 18.9 % (ref 11.8–14.4)
PLATELET # BLD: ABNORMAL K/UL (ref 138–453)
PLATELET ESTIMATE: ABNORMAL
PLATELET, FLUORESCENCE: 34 K/UL (ref 138–453)
PLATELET, IMMATURE FRACTION: 1.8 % (ref 1.1–10.3)
PMV BLD AUTO: ABNORMAL FL (ref 8.1–13.5)
RBC # BLD: 2.23 M/UL (ref 3.95–5.11)
RBC # BLD: ABNORMAL 10*6/UL
SEG NEUTROPHILS: 25 % (ref 36–66)
SEGMENTED NEUTROPHILS ABSOLUTE COUNT: 0.43 K/UL (ref 1.8–7.7)
WBC # BLD: 1.7 K/UL (ref 3.5–11.3)
WBC # BLD: ABNORMAL 10*3/UL

## 2020-07-17 PROCEDURE — 96374 THER/PROPH/DIAG INJ IV PUSH: CPT

## 2020-07-17 PROCEDURE — 96375 TX/PRO/DX INJ NEW DRUG ADDON: CPT

## 2020-07-17 PROCEDURE — 36430 TRANSFUSION BLD/BLD COMPNT: CPT

## 2020-07-17 PROCEDURE — 2580000003 HC RX 258: Performed by: INTERNAL MEDICINE

## 2020-07-17 PROCEDURE — 85055 RETICULATED PLATELET ASSAY: CPT

## 2020-07-17 PROCEDURE — 6360000002 HC RX W HCPCS: Performed by: INTERNAL MEDICINE

## 2020-07-17 PROCEDURE — P9037 PLATE PHERES LEUKOREDU IRRAD: HCPCS

## 2020-07-17 PROCEDURE — 85025 COMPLETE CBC W/AUTO DIFF WBC: CPT

## 2020-07-17 PROCEDURE — 36415 COLL VENOUS BLD VENIPUNCTURE: CPT

## 2020-07-17 RX ORDER — DEXAMETHASONE SODIUM PHOSPHATE 10 MG/ML
10 INJECTION INTRAMUSCULAR; INTRAVENOUS ONCE
Status: COMPLETED | OUTPATIENT
Start: 2020-07-17 | End: 2020-07-17

## 2020-07-17 RX ORDER — DIPHENHYDRAMINE HYDROCHLORIDE 50 MG/ML
25 INJECTION INTRAMUSCULAR; INTRAVENOUS ONCE
Status: COMPLETED | OUTPATIENT
Start: 2020-07-17 | End: 2020-07-17

## 2020-07-17 RX ORDER — 0.9 % SODIUM CHLORIDE 0.9 %
500 INTRAVENOUS SOLUTION INTRAVENOUS ONCE
Status: COMPLETED | OUTPATIENT
Start: 2020-07-17 | End: 2020-07-17

## 2020-07-17 RX ADMIN — SODIUM CHLORIDE 500 ML: 9 INJECTION, SOLUTION INTRAVENOUS at 11:31

## 2020-07-17 RX ADMIN — DIPHENHYDRAMINE HYDROCHLORIDE 25 MG: 50 INJECTION, SOLUTION INTRAMUSCULAR; INTRAVENOUS at 12:00

## 2020-07-17 RX ADMIN — DEXAMETHASONE SODIUM PHOSPHATE 10 MG: 10 INJECTION INTRAMUSCULAR; INTRAVENOUS at 11:56

## 2020-07-17 NOTE — PROGRESS NOTES
Pt arrives per ambulatory per self and PLT CT 34, HGB 7.4. OK for infusion of platelets, irradiated, HLA-matched today and per Dr Ya Rucker order to obtain another unit for Tuesday , 7/21/2020 and order entered and spoke to Verito Iglesias in blood bank, and she states [employer] notified to obtain for pt. Pt notified should have them for here appt on Tuesday. ANC continues low at 0.43 and Dr Ya Rucker states and pt states today is day for stitches to be removed today. Site cleansed with chloraprep and 2 stitches removed with sterile suture removal kit and reminded pt to care for site carefully and pt states understanding. Gauze and coflex to site. No signs of redness or infection and no drainage. Site healing well. Pt tolerated blood well and NS flushing line before and after. No reactions or complaints and blood return present throughout infusion. Pt discharged per ambulatory per self with next appts.

## 2020-07-20 ENCOUNTER — TELEPHONE (OUTPATIENT)
Dept: ONCOLOGY | Age: 70
End: 2020-07-20

## 2020-07-20 NOTE — TELEPHONE ENCOUNTER
Spoke with America Moya via phone. She describes feeling,\" hung over, light headed when getting up and down. She has begun taking the Remron that was prescribed from [facility]. She took the Remron at 2000 last night and slept for 10 hours but it's like I just can't get woke up\"    She is going to follow up with [facility] and her daughter who is a Pharmacist to inquire if half a tablet could be taken. When I spoke with her she said symptoms were improving and she was feeling better.

## 2020-07-21 ENCOUNTER — HOSPITAL ENCOUNTER (OUTPATIENT)
Facility: MEDICAL CENTER | Age: 70
Discharge: HOME | End: 2020-07-21
Payer: MEDICARE

## 2020-07-21 ENCOUNTER — HOSPITAL ENCOUNTER (OUTPATIENT)
Dept: INFUSION THERAPY | Facility: MEDICAL CENTER | Age: 70
Discharge: HOME | End: 2020-07-21
Payer: MEDICARE

## 2020-07-21 VITALS
RESPIRATION RATE: 18 BRPM | TEMPERATURE: 98.5 F | SYSTOLIC BLOOD PRESSURE: 114 MMHG | DIASTOLIC BLOOD PRESSURE: 60 MMHG | HEART RATE: 80 BPM

## 2020-07-21 DIAGNOSIS — C92.00 ACUTE MYELOID LEUKEMIA NOT HAVING ACHIEVED REMISSION (HCC): Primary | ICD-10-CM

## 2020-07-21 DIAGNOSIS — C92.00 ACUTE MYELOID LEUKEMIA NOT HAVING ACHIEVED REMISSION (HCC): ICD-10-CM

## 2020-07-21 LAB
ABSOLUTE EOS #: 0 K/UL (ref 0–0.44)
ABSOLUTE IMMATURE GRANULOCYTE: 0.02 K/UL (ref 0–0.3)
ABSOLUTE LYMPH #: 1.1 K/UL (ref 1.1–3.7)
ABSOLUTE MONO #: 0.4 K/UL (ref 0.1–1.2)
BASOPHILS # BLD: 0 % (ref 0–2)
BASOPHILS ABSOLUTE: 0 K/UL (ref 0–0.2)
DIFFERENTIAL TYPE: ABNORMAL
EOSINOPHILS RELATIVE PERCENT: 0 % (ref 1–4)
HCT VFR BLD CALC: 23.8 % (ref 36.3–47.1)
HEMOGLOBIN: 7.7 G/DL (ref 11.9–15.1)
IMMATURE GRANULOCYTES: 1 %
LYMPHOCYTES # BLD: 58 % (ref 24–43)
MCH RBC QN AUTO: 33.5 PG (ref 25.2–33.5)
MCHC RBC AUTO-ENTMCNC: 32.4 G/DL (ref 28.4–34.8)
MCV RBC AUTO: 103.5 FL (ref 82.6–102.9)
MONOCYTES # BLD: 21 % (ref 3–12)
NRBC AUTOMATED: 3.2 PER 100 WBC
PDW BLD-RTO: 19.3 % (ref 11.8–14.4)
PLATELET # BLD: ABNORMAL K/UL (ref 138–453)
PLATELET ESTIMATE: ABNORMAL
PLATELET, FLUORESCENCE: 29 K/UL (ref 138–453)
PLATELET, IMMATURE FRACTION: 2.6 % (ref 1.1–10.3)
PMV BLD AUTO: ABNORMAL FL (ref 8.1–13.5)
RBC # BLD: 2.3 M/UL (ref 3.95–5.11)
RBC # BLD: ABNORMAL 10*6/UL
SEG NEUTROPHILS: 20 % (ref 36–65)
SEGMENTED NEUTROPHILS ABSOLUTE COUNT: 0.38 K/UL (ref 1.5–8.1)
WBC # BLD: 1.9 K/UL (ref 3.5–11.3)
WBC # BLD: ABNORMAL 10*3/UL

## 2020-07-21 PROCEDURE — 2580000003 HC RX 258: Performed by: INTERNAL MEDICINE

## 2020-07-21 PROCEDURE — P9073 PLATELETS PHERESIS PATH REDU: HCPCS

## 2020-07-21 PROCEDURE — 96374 THER/PROPH/DIAG INJ IV PUSH: CPT

## 2020-07-21 PROCEDURE — 96375 TX/PRO/DX INJ NEW DRUG ADDON: CPT

## 2020-07-21 PROCEDURE — 36415 COLL VENOUS BLD VENIPUNCTURE: CPT

## 2020-07-21 PROCEDURE — 85055 RETICULATED PLATELET ASSAY: CPT

## 2020-07-21 PROCEDURE — 85025 COMPLETE CBC W/AUTO DIFF WBC: CPT

## 2020-07-21 PROCEDURE — 36430 TRANSFUSION BLD/BLD COMPNT: CPT

## 2020-07-21 PROCEDURE — 6360000002 HC RX W HCPCS: Performed by: INTERNAL MEDICINE

## 2020-07-21 RX ORDER — DEXAMETHASONE SODIUM PHOSPHATE 10 MG/ML
10 INJECTION INTRAMUSCULAR; INTRAVENOUS ONCE
Status: CANCELLED
Start: 2020-07-21

## 2020-07-21 RX ORDER — DIPHENHYDRAMINE HYDROCHLORIDE 50 MG/ML
25 INJECTION INTRAMUSCULAR; INTRAVENOUS ONCE
Status: CANCELLED
Start: 2020-07-21

## 2020-07-21 RX ORDER — DEXAMETHASONE SODIUM PHOSPHATE 10 MG/ML
10 INJECTION INTRAMUSCULAR; INTRAVENOUS ONCE
Status: COMPLETED | OUTPATIENT
Start: 2020-07-21 | End: 2020-07-21

## 2020-07-21 RX ORDER — 0.9 % SODIUM CHLORIDE 0.9 %
20 INTRAVENOUS SOLUTION INTRAVENOUS ONCE
Status: COMPLETED | OUTPATIENT
Start: 2020-07-21 | End: 2020-07-21

## 2020-07-21 RX ORDER — DIPHENHYDRAMINE HYDROCHLORIDE 50 MG/ML
25 INJECTION INTRAMUSCULAR; INTRAVENOUS ONCE
Status: COMPLETED | OUTPATIENT
Start: 2020-07-21 | End: 2020-07-21

## 2020-07-21 RX ADMIN — DIPHENHYDRAMINE HYDROCHLORIDE 25 MG: 50 INJECTION, SOLUTION INTRAMUSCULAR; INTRAVENOUS at 13:01

## 2020-07-21 RX ADMIN — SODIUM CHLORIDE 20 ML: 9 INJECTION, SOLUTION INTRAVENOUS at 13:01

## 2020-07-21 RX ADMIN — DEXAMETHASONE SODIUM PHOSPHATE 10 MG: 10 INJECTION INTRAMUSCULAR; INTRAVENOUS at 13:01

## 2020-07-21 NOTE — PROGRESS NOTES
Patient arrive ambulatory for possible platelet transfusion. Denies complaint or concern; yesterday she was dizzy but [facility] staff reduced her Remeron dose and she has no complaints today. Vitals as charted. Labs reviewed; discussed with Dr. Anatoly Simpson. Because the unit of HLA platelets that are on site  today we will transfuse. If expiration date had allowed we would have held for 2 - 3 additional days. At this time, plan is no HLA platelets need to be ordered for 2020. Order placed so blood bank can prepare platelets. Received call from blood bank stating there is a problem with platelets as they are not irradiated. Discussed again with Dr. Anatoly Simpson and we will have a unit ordered and ready for 20 as we are unable to administer this unit due to irradiated status. Patient discharged and will return Friday for labs and possible transfusion. Again received call from blood bank stating the HLA matched platelets that are on site ARE irradiated. [place] indicates that the platelets on site are Psoralen Treated and therefore are irradiated. Spoke with Dr. Anatoly Simpson and he instructs to call patient and if she able to come back for platelets. Spoke with patient; she will return today for transfusion. Patient arrive; IV established per policy. HLA matched, Leukocyte reduced, Psoralen Treated platelets administered after pre medicating patient. No transfusion reactions. Line flushed. Intact IV catheter removed with pressure dressing applied. Patient ambulate off unit per self at discharge. She will return Friday for blood work. Per Dr. Anatoly Simpson no need to have platelets ordered for Friday.

## 2020-07-23 ENCOUNTER — HOSPITAL ENCOUNTER (OUTPATIENT)
Facility: MEDICAL CENTER | Age: 70
End: 2020-07-23

## 2020-07-24 ENCOUNTER — HOSPITAL ENCOUNTER (OUTPATIENT)
Facility: MEDICAL CENTER | Age: 70
Discharge: HOME | End: 2020-07-24
Payer: MEDICARE

## 2020-07-24 ENCOUNTER — HOSPITAL ENCOUNTER (OUTPATIENT)
Dept: INFUSION THERAPY | Facility: MEDICAL CENTER | Age: 70
Discharge: HOME | End: 2020-07-24
Payer: MEDICARE

## 2020-07-24 DIAGNOSIS — C92.00 ACUTE MYELOID LEUKEMIA NOT HAVING ACHIEVED REMISSION (HCC): ICD-10-CM

## 2020-07-24 DIAGNOSIS — C92.00 ACUTE MYELOID LEUKEMIA NOT HAVING ACHIEVED REMISSION (HCC): Primary | ICD-10-CM

## 2020-07-24 LAB
ABSOLUTE EOS #: 0 K/UL (ref 0–0.4)
ABSOLUTE IMMATURE GRANULOCYTE: 0.02 K/UL (ref 0–0.3)
ABSOLUTE LYMPH #: 0.97 K/UL (ref 1–4.8)
ABSOLUTE MONO #: 0.35 K/UL (ref 0.2–0.8)
BASOPHILS # BLD: 0 %
BASOPHILS ABSOLUTE: 0 K/UL (ref 0–0.2)
DIFFERENTIAL TYPE: ABNORMAL
EOSINOPHILS RELATIVE PERCENT: 0 % (ref 1–4)
HCT VFR BLD CALC: 23.2 % (ref 36.3–47.1)
HEMOGLOBIN: 7.4 G/DL (ref 11.9–15.1)
IMMATURE GRANULOCYTES: 1 %
LYMPHOCYTES # BLD: 42 % (ref 24–44)
MCH RBC QN AUTO: 33.5 PG (ref 25.2–33.5)
MCHC RBC AUTO-ENTMCNC: 31.9 G/DL (ref 28.4–34.8)
MCV RBC AUTO: 105 FL (ref 82.6–102.9)
MONOCYTES # BLD: 15 % (ref 1–7)
MORPHOLOGY: ABNORMAL
NRBC AUTOMATED: 4.7 PER 100 WBC
NUCLEATED RED BLOOD CELLS: 4 PER 100 WBC
PDW BLD-RTO: 19.8 % (ref 11.8–14.4)
PLATELET # BLD: ABNORMAL K/UL (ref 138–453)
PLATELET ESTIMATE: ABNORMAL
PLATELET, FLUORESCENCE: 44 K/UL (ref 138–453)
PLATELET, IMMATURE FRACTION: 2.5 % (ref 1.1–10.3)
PMV BLD AUTO: ABNORMAL FL (ref 8.1–13.5)
RBC # BLD: 2.21 M/UL (ref 3.95–5.11)
RBC # BLD: ABNORMAL 10*6/UL
SEG NEUTROPHILS: 42 % (ref 36–66)
SEGMENTED NEUTROPHILS ABSOLUTE COUNT: 0.96 K/UL (ref 1.8–7.7)
WBC # BLD: 2.3 K/UL (ref 3.5–11.3)
WBC # BLD: ABNORMAL 10*3/UL

## 2020-07-24 PROCEDURE — 85055 RETICULATED PLATELET ASSAY: CPT

## 2020-07-24 PROCEDURE — 36415 COLL VENOUS BLD VENIPUNCTURE: CPT

## 2020-07-24 PROCEDURE — 85025 COMPLETE CBC W/AUTO DIFF WBC: CPT

## 2020-07-24 RX ORDER — DIPHENHYDRAMINE HYDROCHLORIDE 50 MG/ML
25 INJECTION INTRAMUSCULAR; INTRAVENOUS ONCE
Status: CANCELLED
Start: 2020-07-24

## 2020-07-24 RX ORDER — DEXAMETHASONE SODIUM PHOSPHATE 10 MG/ML
10 INJECTION INTRAMUSCULAR; INTRAVENOUS ONCE
Status: CANCELLED
Start: 2020-07-24

## 2020-07-24 RX ORDER — DIPHENHYDRAMINE HYDROCHLORIDE 50 MG/ML
25 INJECTION INTRAMUSCULAR; INTRAVENOUS ONCE
Status: DISCONTINUED | OUTPATIENT
Start: 2020-07-24 | End: 2020-07-25 | Stop reason: HOSPADM

## 2020-07-24 RX ORDER — DEXAMETHASONE SODIUM PHOSPHATE 10 MG/ML
10 INJECTION INTRAMUSCULAR; INTRAVENOUS ONCE
Status: DISCONTINUED | OUTPATIENT
Start: 2020-07-24 | End: 2020-07-25 | Stop reason: HOSPADM

## 2020-07-24 NOTE — PROGRESS NOTES
Patient arrive ambulatory to [facility] after lab draw. Labs reviewed; writer spoke with patient in [facility] and she states is feeling good. Call to Dr. Armani Mcfadden with lab results. No transfusions or other changes today. No need to order HLA platelets for next week at this time as it appears bone marrow is recovering. Returned to [facility]; informed patient who verbalizes understanding and she is discharged. Returning Tuesday for MD visit.

## 2020-07-28 ENCOUNTER — HOSPITAL ENCOUNTER (OUTPATIENT)
Dept: INFUSION THERAPY | Facility: MEDICAL CENTER | Age: 70
Discharge: HOME | End: 2020-07-28
Payer: MEDICARE

## 2020-07-28 ENCOUNTER — HOSPITAL ENCOUNTER (OUTPATIENT)
Facility: MEDICAL CENTER | Age: 70
Discharge: HOME | End: 2020-07-28
Payer: MEDICARE

## 2020-07-28 ENCOUNTER — OFFICE VISIT (OUTPATIENT)
Dept: ONCOLOGY | Age: 70
End: 2020-07-28
Payer: MEDICARE

## 2020-07-28 ENCOUNTER — TELEPHONE (OUTPATIENT)
Dept: ONCOLOGY | Age: 70
End: 2020-07-28

## 2020-07-28 VITALS
TEMPERATURE: 98.8 F | SYSTOLIC BLOOD PRESSURE: 124 MMHG | HEART RATE: 78 BPM | WEIGHT: 202.4 LBS | DIASTOLIC BLOOD PRESSURE: 69 MMHG | BODY MASS INDEX: 29.04 KG/M2

## 2020-07-28 DIAGNOSIS — C92.00 ACUTE MYELOID LEUKEMIA NOT HAVING ACHIEVED REMISSION (HCC): ICD-10-CM

## 2020-07-28 LAB
ABO/RH: NORMAL
ABSOLUTE EOS #: 0 K/UL (ref 0–0.44)
ABSOLUTE IMMATURE GRANULOCYTE: 0.04 K/UL (ref 0–0.3)
ABSOLUTE LYMPH #: 0.64 K/UL (ref 1.1–3.7)
ABSOLUTE MONO #: 0.4 K/UL (ref 0.1–1.2)
ALBUMIN SERPL-MCNC: 4.1 G/DL (ref 3.5–5.2)
ALBUMIN/GLOBULIN RATIO: ABNORMAL (ref 1–2.5)
ALP BLD-CCNC: 44 U/L (ref 35–104)
ALT SERPL-CCNC: 17 U/L (ref 5–33)
ANION GAP SERPL CALCULATED.3IONS-SCNC: 9 MMOL/L (ref 9–17)
ANTIBODY SCREEN: NEGATIVE
ARM BAND NUMBER: NORMAL
AST SERPL-CCNC: 17 U/L
BASOPHILS # BLD: 1 % (ref 0–2)
BASOPHILS ABSOLUTE: 0.02 K/UL (ref 0–0.2)
BILIRUB SERPL-MCNC: 0.16 MG/DL (ref 0.3–1.2)
BUN BLDV-MCNC: 21 MG/DL (ref 8–23)
BUN/CREAT BLD: 42 (ref 9–20)
CALCIUM SERPL-MCNC: 9.4 MG/DL (ref 8.6–10.4)
CHLORIDE BLD-SCNC: 104 MMOL/L (ref 98–107)
CO2: 27 MMOL/L (ref 20–31)
CREAT SERPL-MCNC: 0.5 MG/DL (ref 0.5–0.9)
DIFFERENTIAL TYPE: ABNORMAL
EOSINOPHILS RELATIVE PERCENT: 0 % (ref 1–4)
EXPIRATION DATE: NORMAL
GFR AFRICAN AMERICAN: >60 ML/MIN
GFR NON-AFRICAN AMERICAN: >60 ML/MIN
GFR SERPL CREATININE-BSD FRML MDRD: ABNORMAL ML/MIN/{1.73_M2}
GFR SERPL CREATININE-BSD FRML MDRD: ABNORMAL ML/MIN/{1.73_M2}
GLUCOSE BLD-MCNC: 92 MG/DL (ref 70–99)
HCT VFR BLD CALC: 23.4 % (ref 36.3–47.1)
HEMOGLOBIN: 7.5 G/DL (ref 11.9–15.1)
IMMATURE GRANULOCYTES: 2 %
LYMPHOCYTES # BLD: 32 % (ref 24–43)
MCH RBC QN AUTO: 33.8 PG (ref 25.2–33.5)
MCHC RBC AUTO-ENTMCNC: 32.1 G/DL (ref 28.4–34.8)
MCV RBC AUTO: 105.4 FL (ref 82.6–102.9)
MONOCYTES # BLD: 20 % (ref 3–12)
NRBC AUTOMATED: 4.5 PER 100 WBC
PDW BLD-RTO: 20.5 % (ref 11.8–14.4)
PLATELET # BLD: ABNORMAL K/UL (ref 138–453)
PLATELET ESTIMATE: ABNORMAL
PLATELET, FLUORESCENCE: 35 K/UL (ref 138–453)
PLATELET, IMMATURE FRACTION: 4.3 % (ref 1.1–10.3)
PMV BLD AUTO: ABNORMAL FL (ref 8.1–13.5)
POTASSIUM SERPL-SCNC: 4.1 MMOL/L (ref 3.7–5.3)
RBC # BLD: 2.22 M/UL (ref 3.95–5.11)
RBC # BLD: ABNORMAL 10*6/UL
SEG NEUTROPHILS: 45 % (ref 36–65)
SEGMENTED NEUTROPHILS ABSOLUTE COUNT: 0.9 K/UL (ref 1.5–8.1)
SODIUM BLD-SCNC: 140 MMOL/L (ref 135–144)
TOTAL PROTEIN: 6.5 G/DL (ref 6.4–8.3)
WBC # BLD: 2 K/UL (ref 3.5–11.3)
WBC # BLD: ABNORMAL 10*3/UL

## 2020-07-28 PROCEDURE — 85055 RETICULATED PLATELET ASSAY: CPT

## 2020-07-28 PROCEDURE — 86901 BLOOD TYPING SEROLOGIC RH(D): CPT

## 2020-07-28 PROCEDURE — 86900 BLOOD TYPING SEROLOGIC ABO: CPT

## 2020-07-28 PROCEDURE — 86850 RBC ANTIBODY SCREEN: CPT

## 2020-07-28 PROCEDURE — 36415 COLL VENOUS BLD VENIPUNCTURE: CPT

## 2020-07-28 PROCEDURE — 99214 OFFICE O/P EST MOD 30 MIN: CPT | Performed by: INTERNAL MEDICINE

## 2020-07-28 PROCEDURE — 80053 COMPREHEN METABOLIC PANEL: CPT

## 2020-07-28 PROCEDURE — 99211 OFF/OP EST MAY X REQ PHY/QHP: CPT | Performed by: INTERNAL MEDICINE

## 2020-07-28 PROCEDURE — 85025 COMPLETE CBC W/AUTO DIFF WBC: CPT

## 2020-07-28 PROCEDURE — 99211 OFF/OP EST MAY X REQ PHY/QHP: CPT

## 2020-07-28 RX ORDER — ACYCLOVIR 400 MG/1
400 TABLET ORAL 2 TIMES DAILY
Status: CANCELLED | OUTPATIENT
Start: 2020-07-28

## 2020-07-28 NOTE — TELEPHONE ENCOUNTER
WAYNE HERE FOR MD VISIT   CONTINUE LABS TWICE WEEKLY   NO TRANSFUSION TODAY   HOLD ACYCLOVIR AND LEVAQUIN   RV 2 WKS 8-

## 2020-07-29 NOTE — PROGRESS NOTES
_    Chief Complaint   Patient presents with    Follow-up     Review status of disease       DIAGNOSIS:       AML  Pancytopenia      CURRENT THERAPY:         Transfusion as needed, HLA matched platelets  Started Vidaza/ Venetoclax 4/20/2020, received total of 2 cycles. BRIEF CASE HISTORY:      The patient is a 78 y.o.  female who is admitted to the hospital for further management of severe pancytopenia. The patient was clinically fine. Actually she is busy doing boxes with house movement. She noticed increasing weakness and fatigue. This is getting worse over the last few weeks. She gets shortness of breath on exertion. No dizziness. She denies any active bleeding. She has increasing bruising. No fever or infections. No enlarged lymph nodes. No weight loss or decreased appetite. The patient had blood work which showed severe pancytopenia. She is receiving platelet transfusion. No history of smoking or alcohol drinking. BM test done. Results showed AML. INTERIM HISTORY:   The patient is seen for follow up AML. She had pancytopenia. She received blood and platelets. Patient was started on Vidaza and venetoclax. She received total of 2 cycles. Patient had good initial response. She started having severe cytopenias again so treatment dose was decreased and lately treatment was held. Clinically she is doing fine now. No fever or chills. No N/V. No active bleeding. She has multiple bruises. Repeated labs showed persistent leukopenia with severe thrombocytopenia. Patient continues follow[facility]up at [facility]. Discussed with Dr. Kayla Franco. 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 Recommendations to continue to observe and support her with blood products as needed.     Past Medical History:   has a past medical history of Arthritis, Cancer ([address] Hyperlipidemia, DAILY 5/23/19  Yes Nikunj Anglin MD   Multiple Vitamins-Minerals (ICAPS AREDS 2 PO) Take by mouth daily   Yes Historical Provider, MD   azelastine (ASTELIN) 137 MCG/SPRAY nasal spray 2 sprays by Nasal route 2 times daily as needed Use in each nostril as directed    Yes Historical Provider, MD   ondansetron (ZOFRAN-ODT) 4 MG disintegrating tablet Take 1 tablet by mouth 3 times daily as needed for Nausea or Vomiting  Patient not taking: Reported on 7/28/2020 4/24/20   Anthony Huff MD     Current Outpatient Medications   Medication Sig Dispense Refill    mirtazapine (REMERON) 15 MG tablet Take 15 mg by mouth nightly Taking 1/4  tablet at this time due to morning dizziness.       acyclovir (ZOVIRAX) 400 MG tablet Take 1 tablet by mouth 2 times daily      levoFLOXacin (LEVAQUIN) 500 MG tablet Take 1 tablet by mouth daily (Patient taking differently: Take 500 mg by mouth daily Takes per white blood cell count) 30 tablet 3    apixaban (ELIQUIS) 5 MG TABS tablet Take 1 tablet by mouth 2 times daily (Patient taking differently: Take 5 mg by mouth 2 times daily Takes when platelets are over 50) 60 tablet 3    vitamin D (CHOLECALCIFEROL) 25 MCG (1000 UT) TABS tablet Take 3,000 Units by mouth daily      Multiple Vitamins-Minerals (THERAPEUTIC MULTIVITAMIN-MINERALS) tablet Take 1 tablet by mouth daily      ammonium lactate (AMLACTIN) 12 % cream APPLY AFTER SHOWERING TO SKIN FOR DRYNESS      cetirizine (ZYRTEC ALLERGY) 10 MG tablet Take 10 mg by mouth daily      metoprolol tartrate (LOPRESSOR) 50 MG tablet 50mg once a day 90 tablet 3    pravastatin (PRAVACHOL) 40 MG tablet TAKE 1 TABLET DAILY 90 tablet 3    Multiple Vitamins-Minerals (ICAPS AREDS 2 PO) Take by mouth daily      azelastine (ASTELIN) 137 MCG/SPRAY nasal spray 2 sprays by Nasal route 2 times daily as needed Use in each nostril as directed       ondansetron (ZOFRAN-ODT) 4 MG disintegrating tablet Take 1 tablet by mouth 3 times daily as needed for Nausea or Vomiting (Patient not taking: Reported on 2020) 21 tablet 0     No current facility-administered medications for this visit. Allergies:  Amoxicillin-pot clavulanate; Cefuroxime axetil; Eraxis [anidulafungin]; Biaxin [clarithromycin]; and Sulfa antibiotics    REVIEW OF SYSTEMS:      · General: Positive for weakness and fatigue. . No unanticipated weight loss or decreased appetite. No fever or chills. · Eyes: No blurred vision, eye pain or double vision. · Ears: No hearing problems or drainage. No tinnitus. · Throat: No sore throat, problems with swallowing or dysphagia. · Respiratory: No cough, sputum or hemoptysis. No shortness of breath. No pleuritic chest pain. · Cardiovascular: No chest pain, orthopnea or PND. No lower extremity edema. No palpitation. · Gastrointestinal: No problems with swallowing. No abdominal pain or bloating. No nausea or vomiting. No diarrhea or constipation. No GI bleeding. · Genitourinary: No dysuria, hematuria, frequency or urgency. · Musculoskeletal: No muscle aches or pains. No limitation of movement. No back pain. No gait disturbance, No joint complaints. · Dermatologic: No skin rashes or pruritus. No skin lesions or discolorations. · Psychiatric: No depression, anxiety, or stress or signs of schizophrenia. No change in mood or affect. · Hematologic: As above. · Infectious disease: No fever, chills or frequent infections. · Endocrine: No problems with obesity. No polydipsia or polyuria. No temperature intolerance. · Neurologic: No headaches or dizziness. No weakness or numbness of the extremities. No changes in balance, coordination,  memory, mentation, behavior. · Allergic/Immunologic: No nasal congestion or hives. No repeated infections.        PHYSICAL EXAM:      /69   Pulse 78   Temp 98.8 °F (37.1 °C) (Temporal)   Wt 202 lb 6.4 oz (91.8 kg)   BMI 29.04 kg/m²    Temp (24hrs), Av.3 °F (36.8 °C), Min:97.8 °F (36.6 °C), Max:99.1 °F (37.3 °C)      General appearance - well appearing, not in pain or distress  Mental status - good mood, alert and oriented  Eyes - pupils equal and reactive, extraocular eye movements intact  Ears - bilateral TM's and external ear canals normal  Nose - normal and patent, no erythema, discharge or polyps  Mouth - mucous membranes moist, pharynx normal without lesions  Neck - supple, no significant adenopathy  Lymphatics - no palpable lymphadenopathy, no hepatosplenomegaly  Chest - clear to auscultation, no wheezes, rales or rhonchi, symmetric air entry  Heart - normal rate, regular rhythm, normal S1, S2, no murmurs, rubs, clicks or gallops  Abdomen - soft, nontender, nondistended, no masses or organomegaly  Neurological - alert, oriented, normal speech, no focal findings or movement disorder noted  Musculoskeletal - no joint tenderness, deformity or swelling  Extremities - peripheral pulses normal, no pedal edema, no clubbing or cyanosis  Skin -multiple skin bruisings. DATA:      Labs:       CBC:   Recent Labs     07/28/20  1052   WBC 2.0*   HGB 7.5*   HCT 23.4*   PLT See Reflexed IPF Result       Chemistry        Component Value Date/Time     07/28/2020 1052    K 4.1 07/28/2020 1052     07/28/2020 1052    CO2 27 07/28/2020 1052    BUN 21 07/28/2020 1052    CREATININE 0.50 07/28/2020 1052        Component Value Date/Time    CALCIUM 9.4 07/28/2020 1052    ALKPHOS 44 07/28/2020 1052    AST 17 07/28/2020 1052    ALT 17 07/28/2020 1052    BILITOT 0.16 (L) 07/28/2020 1052                IMPRESSION:    AML  Pancytopenia ([facility] [address]    RECOMMENDATIONS:  1. Patient was evaluated at [address] [place] [address]  Discussed with Dr Winnie Hester. 2. Receiving supportive transfusion as needed. Continue using HLA matched platelets as needed. .    3. Labs from today reviewed and discussed. Labs are showing some recovery of the white blood cells. Platelets are improving as well.   No transfusion needed today.  4. Educated about neutropenic precautions. However due to possibility of side effects to acyclovir with thrombocytopenia, I will hold on acyclovir and monitor the platelet counts. 5. We will continue to hold Vidaza and Venetoclax due to cytopenia. 6. Patient had a repeated bone marrow at [facility]. Bone marrow is acellular possibly due to treatment. Recommendations from [facility] is to continue to hold treatment and to support her with blood products as needed. 7. Continue home meds. 8. Hold eliquis due to severe thrombocytopenia. 9. Patient's questions were answered to the best of her satisfaction and she verbalized full understanding and agreement. MD Kayla Degroot MD  Cell: [phone]    This note is created with the assistance of a speech recognition program.  While intending to generate a document that actually reflects the content of the visit, the document can still have some errors including those of syntax and sound a like substitutions which may escape proof reading. It such instances, actual meaning can be extrapolated by contextual diversion.

## 2020-07-31 ENCOUNTER — HOSPITAL ENCOUNTER (OUTPATIENT)
Facility: MEDICAL CENTER | Age: 70
Discharge: HOME | End: 2020-07-31
Payer: MEDICARE

## 2020-07-31 ENCOUNTER — HOSPITAL ENCOUNTER (OUTPATIENT)
Dept: INFUSION THERAPY | Facility: MEDICAL CENTER | Age: 70
Discharge: HOME | End: 2020-07-31
Payer: MEDICARE

## 2020-07-31 DIAGNOSIS — C92.00 ACUTE MYELOID LEUKEMIA NOT HAVING ACHIEVED REMISSION (HCC): ICD-10-CM

## 2020-07-31 LAB
ABSOLUTE EOS #: 0 K/UL (ref 0–0.44)
ABSOLUTE IMMATURE GRANULOCYTE: 0 K/UL (ref 0–0.3)
ABSOLUTE LYMPH #: 0.66 K/UL (ref 1.1–3.7)
ABSOLUTE MONO #: 0.39 K/UL (ref 0.1–1.2)
BASOPHILS # BLD: 0 % (ref 0–2)
BASOPHILS ABSOLUTE: 0 K/UL (ref 0–0.2)
DIFFERENTIAL TYPE: ABNORMAL
EOSINOPHILS RELATIVE PERCENT: 0 % (ref 1–4)
HCT VFR BLD CALC: 23.6 % (ref 36.3–47.1)
HEMOGLOBIN: 7.5 G/DL (ref 11.9–15.1)
IMMATURE GRANULOCYTES: 0 %
LYMPHOCYTES # BLD: 39 % (ref 24–43)
MCH RBC QN AUTO: 33.9 PG (ref 25.2–33.5)
MCHC RBC AUTO-ENTMCNC: 31.8 G/DL (ref 28.4–34.8)
MCV RBC AUTO: 106.8 FL (ref 82.6–102.9)
MONOCYTES # BLD: 23 % (ref 3–12)
NRBC AUTOMATED: 2.4 PER 100 WBC
PDW BLD-RTO: 21.1 % (ref 11.8–14.4)
PLATELET # BLD: ABNORMAL K/UL (ref 138–453)
PLATELET ESTIMATE: ABNORMAL
PLATELET, FLUORESCENCE: 37 K/UL (ref 138–453)
PLATELET, IMMATURE FRACTION: 3.6 % (ref 1.1–10.3)
PMV BLD AUTO: ABNORMAL FL (ref 8.1–13.5)
RBC # BLD: 2.21 M/UL (ref 3.95–5.11)
RBC # BLD: ABNORMAL 10*6/UL
SEG NEUTROPHILS: 38 % (ref 36–65)
SEGMENTED NEUTROPHILS ABSOLUTE COUNT: 0.65 K/UL (ref 1.5–8.1)
WBC # BLD: 1.7 K/UL (ref 3.5–11.3)
WBC # BLD: ABNORMAL 10*3/UL

## 2020-07-31 PROCEDURE — 85055 RETICULATED PLATELET ASSAY: CPT

## 2020-07-31 PROCEDURE — 85025 COMPLETE CBC W/AUTO DIFF WBC: CPT

## 2020-07-31 PROCEDURE — 36415 COLL VENOUS BLD VENIPUNCTURE: CPT

## 2020-07-31 NOTE — PROGRESS NOTES
Pt arrives per ambulatory per self and md notified of lab results PLT CT 37, HGb 7.5, WBC 1.7, ANC 0.65, pt stable , no need for platelet transfusion or blood transfusion, per Dr Alberto Henriquez. Pt discharged and has appt to return next week for CDP check.

## 2020-08-04 ENCOUNTER — HOSPITAL ENCOUNTER (OUTPATIENT)
Dept: INFUSION THERAPY | Facility: MEDICAL CENTER | Age: 70
Discharge: HOME | End: 2020-08-04
Payer: MEDICARE

## 2020-08-04 ENCOUNTER — HOSPITAL ENCOUNTER (OUTPATIENT)
Facility: MEDICAL CENTER | Age: 70
Discharge: HOME | End: 2020-08-04
Payer: MEDICARE

## 2020-08-04 DIAGNOSIS — C92.00 ACUTE MYELOID LEUKEMIA NOT HAVING ACHIEVED REMISSION (HCC): ICD-10-CM

## 2020-08-04 LAB
ABSOLUTE EOS #: 0 K/UL (ref 0–0.44)
ABSOLUTE IMMATURE GRANULOCYTE: 0.02 K/UL (ref 0–0.3)
ABSOLUTE LYMPH #: 0.68 K/UL (ref 1.1–3.7)
ABSOLUTE MONO #: 0.42 K/UL (ref 0.1–1.2)
BASOPHILS # BLD: 1 % (ref 0–2)
BASOPHILS ABSOLUTE: 0.02 K/UL (ref 0–0.2)
DIFFERENTIAL TYPE: ABNORMAL
EOSINOPHILS RELATIVE PERCENT: 0 % (ref 1–4)
HCT VFR BLD CALC: 24.2 % (ref 36.3–47.1)
HEMOGLOBIN: 7.7 G/DL (ref 11.9–15.1)
IMMATURE GRANULOCYTES: 1 %
LYMPHOCYTES # BLD: 31 % (ref 24–43)
MCH RBC QN AUTO: 34.2 PG (ref 25.2–33.5)
MCHC RBC AUTO-ENTMCNC: 31.8 G/DL (ref 28.4–34.8)
MCV RBC AUTO: 107.6 FL (ref 82.6–102.9)
MONOCYTES # BLD: 19 % (ref 3–12)
NRBC AUTOMATED: 1.4 PER 100 WBC
PDW BLD-RTO: 21.7 % (ref 11.8–14.4)
PLATELET # BLD: ABNORMAL K/UL (ref 138–453)
PLATELET ESTIMATE: ABNORMAL
PLATELET, FLUORESCENCE: 45 K/UL (ref 138–453)
PLATELET, IMMATURE FRACTION: 4.3 % (ref 1.1–10.3)
PMV BLD AUTO: ABNORMAL FL (ref 8.1–13.5)
RBC # BLD: 2.25 M/UL (ref 3.95–5.11)
RBC # BLD: ABNORMAL 10*6/UL
SEG NEUTROPHILS: 48 % (ref 36–65)
SEGMENTED NEUTROPHILS ABSOLUTE COUNT: 1.06 K/UL (ref 1.5–8.1)
WBC # BLD: 2.2 K/UL (ref 3.5–11.3)
WBC # BLD: ABNORMAL 10*3/UL

## 2020-08-04 PROCEDURE — 36415 COLL VENOUS BLD VENIPUNCTURE: CPT

## 2020-08-04 PROCEDURE — 85025 COMPLETE CBC W/AUTO DIFF WBC: CPT

## 2020-08-04 PROCEDURE — 85055 RETICULATED PLATELET ASSAY: CPT

## 2020-08-04 NOTE — PROGRESS NOTES
Pt in waiting area and lab results shown to Dr Madera , PLT CT 45 and WBC 2.2, ANC 1.06, and HGB 7.7. No transfusion of blood or platelets needed. Pt notified of dr Ya Rucker instructions of no transfusion and pt given copy of lab results and calendar, to return on Friday and then labs and to see md in one week, next Tuesday. Pt discharged per ambulatory per self from waiting area.

## 2020-08-07 ENCOUNTER — HOSPITAL ENCOUNTER (OUTPATIENT)
Dept: INFUSION THERAPY | Facility: MEDICAL CENTER | Age: 70
Discharge: HOME | End: 2020-08-07
Payer: MEDICARE

## 2020-08-07 ENCOUNTER — HOSPITAL ENCOUNTER (OUTPATIENT)
Facility: MEDICAL CENTER | Age: 70
End: 2020-08-07
Payer: MEDICARE

## 2020-08-07 ENCOUNTER — HOSPITAL ENCOUNTER (OUTPATIENT)
Facility: MEDICAL CENTER | Age: 70
Discharge: HOME | End: 2020-08-07
Payer: MEDICARE

## 2020-08-07 DIAGNOSIS — C92.00 ACUTE MYELOID LEUKEMIA NOT HAVING ACHIEVED REMISSION (HCC): ICD-10-CM

## 2020-08-07 LAB
ABSOLUTE EOS #: 0.02 K/UL (ref 0–0.4)
ABSOLUTE IMMATURE GRANULOCYTE: 0 K/UL (ref 0–0.3)
ABSOLUTE LYMPH #: 0.55 K/UL (ref 1–4.8)
ABSOLUTE MONO #: 0.23 K/UL (ref 0.2–0.8)
BASOPHILS # BLD: 1 %
BASOPHILS ABSOLUTE: 0.02 K/UL (ref 0–0.2)
DIFFERENTIAL TYPE: ABNORMAL
EOSINOPHILS RELATIVE PERCENT: 1 % (ref 1–4)
HCT VFR BLD CALC: 25.4 % (ref 36.3–47.1)
HEMOGLOBIN: 8 G/DL (ref 11.9–15.1)
IMMATURE GRANULOCYTES: 0 %
LYMPHOCYTES # BLD: 26 % (ref 24–44)
MCH RBC QN AUTO: 34.3 PG (ref 25.2–33.5)
MCHC RBC AUTO-ENTMCNC: 31.5 G/DL (ref 28.4–34.8)
MCV RBC AUTO: 109 FL (ref 82.6–102.9)
MONOCYTES # BLD: 11 % (ref 1–7)
NRBC AUTOMATED: 0.9 PER 100 WBC
PDW BLD-RTO: 22.1 % (ref 11.8–14.4)
PLATELET # BLD: 51 K/UL (ref 138–453)
PLATELET ESTIMATE: ABNORMAL
PMV BLD AUTO: 10.8 FL (ref 8.1–13.5)
RBC # BLD: 2.33 M/UL (ref 3.95–5.11)
RBC # BLD: ABNORMAL 10*6/UL
SEG NEUTROPHILS: 61 % (ref 36–66)
SEGMENTED NEUTROPHILS ABSOLUTE COUNT: 1.28 K/UL (ref 1.8–7.7)
WBC # BLD: 2.1 K/UL (ref 3.5–11.3)
WBC # BLD: ABNORMAL 10*3/UL

## 2020-08-07 PROCEDURE — 36415 COLL VENOUS BLD VENIPUNCTURE: CPT

## 2020-08-07 PROCEDURE — 85025 COMPLETE CBC W/AUTO DIFF WBC: CPT

## 2020-08-07 RX ORDER — PRAVASTATIN SODIUM 40 MG
TABLET ORAL
Qty: 90 TABLET | Refills: 3 | Status: SHIPPED | OUTPATIENT
Start: 2020-08-07 | End: 2021-10-27 | Stop reason: SDUPTHER

## 2020-08-11 ENCOUNTER — HOSPITAL ENCOUNTER (OUTPATIENT)
Facility: MEDICAL CENTER | Age: 70
End: 2020-08-11
Payer: MEDICARE

## 2020-08-11 ENCOUNTER — HOSPITAL ENCOUNTER (OUTPATIENT)
Dept: INFUSION THERAPY | Facility: MEDICAL CENTER | Age: 70
Discharge: HOME | End: 2020-08-11
Payer: MEDICARE

## 2020-08-11 ENCOUNTER — OFFICE VISIT (OUTPATIENT)
Dept: ONCOLOGY | Age: 70
End: 2020-08-11
Payer: MEDICARE

## 2020-08-11 ENCOUNTER — TELEPHONE (OUTPATIENT)
Dept: ONCOLOGY | Age: 70
End: 2020-08-11

## 2020-08-11 ENCOUNTER — HOSPITAL ENCOUNTER (OUTPATIENT)
Facility: MEDICAL CENTER | Age: 70
Discharge: HOME | End: 2020-08-11
Payer: MEDICARE

## 2020-08-11 VITALS
BODY MASS INDEX: 29.24 KG/M2 | WEIGHT: 203.8 LBS | DIASTOLIC BLOOD PRESSURE: 78 MMHG | HEART RATE: 67 BPM | TEMPERATURE: 98.9 F | SYSTOLIC BLOOD PRESSURE: 135 MMHG

## 2020-08-11 DIAGNOSIS — C92.00 ACUTE MYELOID LEUKEMIA NOT HAVING ACHIEVED REMISSION (HCC): ICD-10-CM

## 2020-08-11 LAB
ABSOLUTE EOS #: 0 K/UL (ref 0–0.44)
ABSOLUTE IMMATURE GRANULOCYTE: 0 K/UL (ref 0–0.3)
ABSOLUTE LYMPH #: 0.76 K/UL (ref 1.1–3.7)
ABSOLUTE MONO #: 0.48 K/UL (ref 0.1–1.2)
BASOPHILS # BLD: 1 % (ref 0–2)
BASOPHILS ABSOLUTE: 0.03 K/UL (ref 0–0.2)
DIFFERENTIAL TYPE: ABNORMAL
EOSINOPHILS RELATIVE PERCENT: 0 % (ref 1–4)
HCT VFR BLD CALC: 26.8 % (ref 36.3–47.1)
HEMOGLOBIN: 8.6 G/DL (ref 11.9–15.1)
IMMATURE GRANULOCYTES: 0 %
LYMPHOCYTES # BLD: 27 % (ref 24–43)
MCH RBC QN AUTO: 35.2 PG (ref 25.2–33.5)
MCHC RBC AUTO-ENTMCNC: 32.1 G/DL (ref 28.4–34.8)
MCV RBC AUTO: 109.8 FL (ref 82.6–102.9)
MONOCYTES # BLD: 17 % (ref 3–12)
MORPHOLOGY: ABNORMAL
NRBC AUTOMATED: 0.7 PER 100 WBC
PDW BLD-RTO: 21.8 % (ref 11.8–14.4)
PLATELET # BLD: 54 K/UL (ref 138–453)
PLATELET ESTIMATE: ABNORMAL
PMV BLD AUTO: 10.2 FL (ref 8.1–13.5)
RBC # BLD: 2.44 M/UL (ref 3.95–5.11)
RBC # BLD: ABNORMAL 10*6/UL
SEG NEUTROPHILS: 55 % (ref 36–65)
SEGMENTED NEUTROPHILS ABSOLUTE COUNT: 1.53 K/UL (ref 1.5–8.1)
WBC # BLD: 2.8 K/UL (ref 3.5–11.3)
WBC # BLD: ABNORMAL 10*3/UL

## 2020-08-11 PROCEDURE — 99214 OFFICE O/P EST MOD 30 MIN: CPT | Performed by: INTERNAL MEDICINE

## 2020-08-11 PROCEDURE — 85025 COMPLETE CBC W/AUTO DIFF WBC: CPT

## 2020-08-11 PROCEDURE — 99211 OFF/OP EST MAY X REQ PHY/QHP: CPT | Performed by: INTERNAL MEDICINE

## 2020-08-11 PROCEDURE — 36415 COLL VENOUS BLD VENIPUNCTURE: CPT

## 2020-08-11 NOTE — TELEPHONE ENCOUNTER
Ellis Holstein MD VISIT & POSSIBLE TRANSFUSION  DR Timothy Ortiz IN TO SEE PATIENT  ORDERS RECEIVED  CHANGE LABS & POSSIBLE TRANSFUSION TO ONCE A WEEK ON TUESDAYS 8/25, 9/1  RV 2-3 WEEKS  MD VISIT 9/1/20 @ 8:45 AM  AVS PRINTED AND GIVEN TO PATIENT W/ INSTRUCTIONS  PATIENT DISCHARGED AMBULATORY

## 2020-08-11 NOTE — PROGRESS NOTES
_    Chief Complaint   Patient presents with    Follow-up     Review status of disease     Discuss Labs       DIAGNOSIS:       AML  Pancytopenia      CURRENT THERAPY:         Transfusion as needed, HLA matched platelets  Started Vidaza/ Venetoclax 4/20/2020, received total of 2 cycles. BRIEF CASE HISTORY:      The patient is a 78 y.o.  female who is admitted to the hospital for further management of severe pancytopenia. The patient was clinically fine. Actually she is busy doing boxes with house movement. She noticed increasing weakness and fatigue. This is getting worse over the last few weeks. She gets shortness of breath on exertion. No dizziness. She denies any active bleeding. She has increasing bruising. No fever or infections. No enlarged lymph nodes. No weight loss or decreased appetite. The patient had blood work which showed severe pancytopenia. She is receiving platelet transfusion. No history of smoking or alcohol drinking. BM test done. Results showed AML. INTERIM HISTORY:   The patient is seen for follow up AML. She had pancytopenia. She received blood and platelets. Patient was started on Vidaza and venetoclax. She received total of 2 cycles. Patient had good initial response. She started having severe cytopenias again so treatment dose was decreased and lately treatment was held. Clinically she is doing fine now. No fever or chills. No N/V. No active bleeding. She has multiple bruises. Repeated labs showed persistent leukopenia with severe thrombocytopenia. Patient continues follow-up at [facility] clinic. Discussed with Dr. Omer Haddad. 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 Recommendations to continue to observe and support her with blood products as needed. Labs are showing very nice recovery over the last 1 to 2 weeks.   No transfusion given for the last 10 days. Labs continue to show further improvement. Clinically she feels better. Less weakness and fatigue. No fever or infections. No active bleeding. Less bruising. Past Medical History:   has a past medical history of Arthritis, Cancer ([facility] [address] Hyperlipidemia, Hypertension, Leukemia ([facility] [address] Pneumonia, and Seasonal allergies. Past Surgical History:   has a past surgical history that includes Colonoscopy; joint replacement; Ovary removal; and Colonoscopy (03/10/2017). Family History: family history includes Alzheimer's Disease in her mother; Cancer in her brother; Diabetes in her father; Heart Disease in her father. Social History:   reports that she has never smoked. She has never used smokeless tobacco. She reports current alcohol use. She reports that she does not use drugs. Medications:    Prior to Admission medications    Medication Sig Start Date End Date Taking?  Authorizing Provider   pravastatin (PRAVACHOL) 40 MG tablet TAKE 1 TABLET DAILY 8/7/20  Yes Madison Tellez MD   vitamin D (CHOLECALCIFEROL) 25 MCG (1000 UT) TABS tablet Take 3,000 Units by mouth daily   Yes Historical Provider, MD   Multiple Vitamins-Minerals (THERAPEUTIC MULTIVITAMIN-MINERALS) tablet Take 1 tablet by mouth daily   Yes Historical Provider, MD   ammonium lactate (AMLACTIN) 12 % cream APPLY AFTER SHOWERING TO SKIN FOR DRYNESS 11/11/19  Yes Historical Provider, MD   cetirizine (ZYRTEC ALLERGY) 10 MG tablet Take 10 mg by mouth daily   Yes Historical Provider, MD   metoprolol tartrate (LOPRESSOR) 50 MG tablet 50mg once a day 10/9/19  Yes Madison Tellez MD   Multiple Vitamins-Minerals (ICAPS AREDS 2 PO) Take by mouth daily   Yes Historical Provider, MD   azelastine (ASTELIN) 137 MCG/SPRAY nasal spray 2 sprays by Nasal route 2 times daily as needed Use in each nostril as directed    Yes Historical Provider, MD   acyclovir (ZOVIRAX) 400 MG tablet Take 1 tablet by mouth 2 times daily 6/15/20   Historical hepatosplenomegaly  Chest - clear to auscultation, no wheezes, rales or rhonchi, symmetric air entry  Heart - normal rate, regular rhythm, normal S1, S2, no murmurs, rubs, clicks or gallops  Abdomen - soft, nontender, nondistended, no masses or organomegaly  Neurological - alert, oriented, normal speech, no focal findings or movement disorder noted  Musculoskeletal - no joint tenderness, deformity or swelling  Extremities - peripheral pulses normal, no pedal edema, no clubbing or cyanosis  Skin -multiple skin bruisings. DATA:      Labs:       CBC:   Recent Labs     08/11/20  1045   WBC 2.8*   HGB 8.6*   HCT 26.8*   PLT 54*       Chemistry        Component Value Date/Time     07/28/2020 1052    K 4.1 07/28/2020 1052     07/28/2020 1052    CO2 27 07/28/2020 1052    BUN 21 07/28/2020 1052    CREATININE 0.50 07/28/2020 1052        Component Value Date/Time    CALCIUM 9.4 07/28/2020 1052    ALKPHOS 44 07/28/2020 1052    AST 17 07/28/2020 1052    ALT 17 07/28/2020 1052    BILITOT 0.16 (L) 07/28/2020 1052                IMPRESSION:    AML  Pancytopenia ([facility] [address]    RECOMMENDATIONS:  1. Patient was evaluated at [address] [place] [address]  Discussed with Dr Dallin Mayorga. 2. Receiving supportive transfusion as needed. Continue using HLA matched platelets as needed. .    3. Labs from today reviewed and discussed. Labs are showing some recovery of the white blood cells. Platelets are improving as well. No transfusion needed today. No transfusion was given for the last 10 days. 4. We will continue to hold Vidaza and Venetoclax due to cytopenia. However we will communicate with Dr. Dallin Mayorga at [facility] in regard to resuming active treatment. 5. Continue home meds. 6. Hold eliquis due to severe thrombocytopenia. 7. Patient's questions were answered to the best of her satisfaction and she verbalized full understanding and agreement.             MD Betina Doughertym Poster  Cell: [phone]    This note is created with the assistance of a speech recognition program.  While intending to generate a document that actually reflects the content of the visit, the document can still have some errors including those of syntax and sound a like substitutions which may escape proof reading. It such instances, actual meaning can be extrapolated by contextual diversion.

## 2020-08-18 ENCOUNTER — HOSPITAL ENCOUNTER (OUTPATIENT)
Facility: MEDICAL CENTER | Age: 70
Discharge: HOME | End: 2020-08-18
Payer: MEDICARE

## 2020-08-18 ENCOUNTER — HOSPITAL ENCOUNTER (OUTPATIENT)
Dept: INFUSION THERAPY | Facility: MEDICAL CENTER | Age: 70
Discharge: HOME | End: 2020-08-18
Payer: MEDICARE

## 2020-08-18 DIAGNOSIS — C92.00 ACUTE MYELOID LEUKEMIA NOT HAVING ACHIEVED REMISSION (HCC): ICD-10-CM

## 2020-08-18 LAB
ABSOLUTE EOS #: 0 K/UL (ref 0–0.44)
ABSOLUTE IMMATURE GRANULOCYTE: 0 K/UL (ref 0–0.3)
ABSOLUTE LYMPH #: 0.64 K/UL (ref 1.1–3.7)
ABSOLUTE MONO #: 0.38 K/UL (ref 0.1–1.2)
BASOPHILS # BLD: 1 % (ref 0–2)
BASOPHILS ABSOLUTE: 0.03 K/UL (ref 0–0.2)
DIFFERENTIAL TYPE: ABNORMAL
EOSINOPHILS RELATIVE PERCENT: 0 % (ref 1–4)
HCT VFR BLD CALC: 28.1 % (ref 36.3–47.1)
HEMOGLOBIN: 8.9 G/DL (ref 11.9–15.1)
IMMATURE GRANULOCYTES: 0 %
LYMPHOCYTES # BLD: 22 % (ref 24–43)
MCH RBC QN AUTO: 35.6 PG (ref 25.2–33.5)
MCHC RBC AUTO-ENTMCNC: 31.7 G/DL (ref 28.4–34.8)
MCV RBC AUTO: 112.4 FL (ref 82.6–102.9)
MONOCYTES # BLD: 13 % (ref 3–12)
MORPHOLOGY: ABNORMAL
MORPHOLOGY: ABNORMAL
NRBC AUTOMATED: 0 PER 100 WBC
PDW BLD-RTO: 20.4 % (ref 11.8–14.4)
PLATELET # BLD: 72 K/UL (ref 138–453)
PLATELET ESTIMATE: ABNORMAL
PMV BLD AUTO: 10.6 FL (ref 8.1–13.5)
RBC # BLD: 2.5 M/UL (ref 3.95–5.11)
RBC # BLD: ABNORMAL 10*6/UL
SEG NEUTROPHILS: 64 % (ref 36–65)
SEGMENTED NEUTROPHILS ABSOLUTE COUNT: 1.85 K/UL (ref 1.5–8.1)
WBC # BLD: 2.9 K/UL (ref 3.5–11.3)
WBC # BLD: ABNORMAL 10*3/UL

## 2020-08-18 PROCEDURE — 36415 COLL VENOUS BLD VENIPUNCTURE: CPT

## 2020-08-18 PROCEDURE — 85025 COMPLETE CBC W/AUTO DIFF WBC: CPT

## 2020-08-18 NOTE — PROGRESS NOTES
Labs reviewed and discussed with Dr Sukhdeep Mae in person, HGB 8.9 and PLT CT 72, ANC 1.85. Pt also given copy of lab results and calendar with next appts. Pt discharged from waiting area per ambulatory per self. Pt states deciding regarding transplant and more chemo as recommended per [facility].

## 2020-08-20 ENCOUNTER — TELEPHONE (OUTPATIENT)
Dept: ONCOLOGY | Age: 70
End: 2020-08-20

## 2020-08-20 NOTE — TELEPHONE ENCOUNTER
Writer discussed with Dr. Mt Kang. He does not want Eliquis started. This was the first lab showing above 50 and [facility] is preparing for transplant. Marissa Velásquez advised via phone and voiced understanding and will not take the Eliquis. Marissa Velásquez did share her transplant is tentatively scheduled at [facility] 10/06/2020.

## 2020-08-20 NOTE — TELEPHONE ENCOUNTER
Received a vm from Richy November inquiring if ok to start Eliquis since the last pltx= 72,000 and at one point it was said if above 50,000 she could restart?  \"I had several clots in my leg and I am nervous not being on this\"    Writer will discuss with md.

## 2020-08-25 ENCOUNTER — HOSPITAL ENCOUNTER (OUTPATIENT)
Facility: MEDICAL CENTER | Age: 70
Discharge: HOME | End: 2020-08-25
Payer: MEDICARE

## 2020-08-25 ENCOUNTER — HOSPITAL ENCOUNTER (OUTPATIENT)
Dept: INFUSION THERAPY | Facility: MEDICAL CENTER | Age: 70
Discharge: HOME | End: 2020-08-25
Payer: MEDICARE

## 2020-08-25 DIAGNOSIS — C92.00 ACUTE MYELOID LEUKEMIA NOT HAVING ACHIEVED REMISSION (HCC): ICD-10-CM

## 2020-08-25 LAB
ABSOLUTE EOS #: 0.04 K/UL (ref 0–0.4)
ABSOLUTE IMMATURE GRANULOCYTE: 0 K/UL (ref 0–0.3)
ABSOLUTE LYMPH #: 0.7 K/UL (ref 1–4.8)
ABSOLUTE MONO #: 0.35 K/UL (ref 0.2–0.8)
BASOPHILS # BLD: 0 %
BASOPHILS ABSOLUTE: 0 K/UL (ref 0–0.2)
DIFFERENTIAL TYPE: ABNORMAL
EOSINOPHILS RELATIVE PERCENT: 1 % (ref 1–4)
HCT VFR BLD CALC: 29.4 % (ref 36.3–47.1)
HEMOGLOBIN: 9.4 G/DL (ref 11.9–15.1)
IMMATURE GRANULOCYTES: 0 %
LYMPHOCYTES # BLD: 20 % (ref 24–44)
MCH RBC QN AUTO: 36 PG (ref 25.2–33.5)
MCHC RBC AUTO-ENTMCNC: 32 G/DL (ref 28.4–34.8)
MCV RBC AUTO: 112.6 FL (ref 82.6–102.9)
MONOCYTES # BLD: 10 % (ref 1–7)
MORPHOLOGY: ABNORMAL
MORPHOLOGY: ABNORMAL
NRBC AUTOMATED: 0.6 PER 100 WBC
PDW BLD-RTO: 19.1 % (ref 11.8–14.4)
PLATELET # BLD: 74 K/UL (ref 138–453)
PLATELET ESTIMATE: ABNORMAL
PMV BLD AUTO: 10.6 FL (ref 8.1–13.5)
RBC # BLD: 2.61 M/UL (ref 3.95–5.11)
RBC # BLD: ABNORMAL 10*6/UL
SEG NEUTROPHILS: 69 % (ref 36–66)
SEGMENTED NEUTROPHILS ABSOLUTE COUNT: 2.41 K/UL (ref 1.8–7.7)
WBC # BLD: 3.5 K/UL (ref 3.5–11.3)
WBC # BLD: ABNORMAL 10*3/UL

## 2020-08-25 PROCEDURE — 85025 COMPLETE CBC W/AUTO DIFF WBC: CPT

## 2020-08-25 PROCEDURE — 36415 COLL VENOUS BLD VENIPUNCTURE: CPT

## 2020-08-25 NOTE — PROGRESS NOTES
Labs reviewed with Dr Ange Kang and no treatment today  Gilda Barnes reports bone marrow transplant scheduled tentatively 10/6/20  Discharged per ambulatory

## 2020-08-27 ENCOUNTER — TELEPHONE (OUTPATIENT)
Dept: INFUSION THERAPY | Facility: MEDICAL CENTER | Age: 70
End: 2020-08-27

## 2020-08-27 NOTE — TELEPHONE ENCOUNTER
Lizzie Bustamante calling triage line reporting that she was at [facility] yesterday and was told by her physician that she would need to be started on Vidaza injections again starting 8/31/20  Writer reviews note from [address] [place] [address]. Writer calls and leaves voice mail with Dr Margarita Mercado calls back and will start Lizzie Bustamante back on [id number] starting 8/31/20.   Writer notified [facility] to call Lizzie Bustamante back and set up appointments for next week

## 2020-08-31 ENCOUNTER — HOSPITAL ENCOUNTER (OUTPATIENT)
Dept: INFUSION THERAPY | Facility: MEDICAL CENTER | Age: 70
Discharge: HOME | End: 2020-08-31
Payer: MEDICARE

## 2020-08-31 ENCOUNTER — HOSPITAL ENCOUNTER (OUTPATIENT)
Facility: MEDICAL CENTER | Age: 70
Discharge: HOME | End: 2020-08-31
Payer: MEDICARE

## 2020-08-31 VITALS
HEART RATE: 67 BPM | DIASTOLIC BLOOD PRESSURE: 87 MMHG | SYSTOLIC BLOOD PRESSURE: 135 MMHG | RESPIRATION RATE: 18 BRPM | TEMPERATURE: 98.7 F

## 2020-08-31 DIAGNOSIS — C92.00 ACUTE MYELOID LEUKEMIA NOT HAVING ACHIEVED REMISSION (HCC): Primary | ICD-10-CM

## 2020-08-31 DIAGNOSIS — C92.00 ACUTE MYELOID LEUKEMIA NOT HAVING ACHIEVED REMISSION (HCC): ICD-10-CM

## 2020-08-31 LAB
ABSOLUTE EOS #: 0 K/UL (ref 0–0.4)
ABSOLUTE IMMATURE GRANULOCYTE: 0 K/UL (ref 0–0.3)
ABSOLUTE LYMPH #: 0.67 K/UL (ref 1–4.8)
ABSOLUTE MONO #: 0.22 K/UL (ref 0.2–0.8)
ALBUMIN SERPL-MCNC: 4.6 G/DL (ref 3.5–5.2)
ALBUMIN/GLOBULIN RATIO: ABNORMAL (ref 1–2.5)
ALP BLD-CCNC: 56 U/L (ref 35–104)
ALT SERPL-CCNC: 18 U/L (ref 5–33)
ANION GAP SERPL CALCULATED.3IONS-SCNC: 10 MMOL/L (ref 9–17)
AST SERPL-CCNC: 20 U/L
BASOPHILS # BLD: 1 %
BASOPHILS ABSOLUTE: 0.04 K/UL (ref 0–0.2)
BILIRUB SERPL-MCNC: 0.26 MG/DL (ref 0.3–1.2)
BUN BLDV-MCNC: 18 MG/DL (ref 8–23)
BUN/CREAT BLD: 33 (ref 9–20)
CALCIUM SERPL-MCNC: 9.7 MG/DL (ref 8.6–10.4)
CHLORIDE BLD-SCNC: 102 MMOL/L (ref 98–107)
CO2: 29 MMOL/L (ref 20–31)
CREAT SERPL-MCNC: 0.55 MG/DL (ref 0.5–0.9)
DIFFERENTIAL TYPE: ABNORMAL
EOSINOPHILS RELATIVE PERCENT: 0 % (ref 1–4)
GFR AFRICAN AMERICAN: >60 ML/MIN
GFR NON-AFRICAN AMERICAN: >60 ML/MIN
GFR SERPL CREATININE-BSD FRML MDRD: ABNORMAL ML/MIN/{1.73_M2}
GFR SERPL CREATININE-BSD FRML MDRD: ABNORMAL ML/MIN/{1.73_M2}
GLUCOSE BLD-MCNC: 90 MG/DL (ref 70–99)
HCT VFR BLD CALC: 32.8 % (ref 36.3–47.1)
HEMOGLOBIN: 10.2 G/DL (ref 11.9–15.1)
IMMATURE GRANULOCYTES: 0 %
LYMPHOCYTES # BLD: 18 % (ref 24–44)
MCH RBC QN AUTO: 35.2 PG (ref 25.2–33.5)
MCHC RBC AUTO-ENTMCNC: 31.1 G/DL (ref 28.4–34.8)
MCV RBC AUTO: 113.1 FL (ref 82.6–102.9)
MONOCYTES # BLD: 6 % (ref 1–7)
MORPHOLOGY: ABNORMAL
NRBC AUTOMATED: 0 PER 100 WBC
PDW BLD-RTO: 17.5 % (ref 11.8–14.4)
PLATELET # BLD: 73 K/UL (ref 138–453)
PLATELET ESTIMATE: ABNORMAL
PMV BLD AUTO: 9.1 FL (ref 8.1–13.5)
POTASSIUM SERPL-SCNC: 4.2 MMOL/L (ref 3.7–5.3)
RBC # BLD: 2.9 M/UL (ref 3.95–5.11)
RBC # BLD: ABNORMAL 10*6/UL
SEG NEUTROPHILS: 75 % (ref 36–66)
SEGMENTED NEUTROPHILS ABSOLUTE COUNT: 2.77 K/UL (ref 1.8–7.7)
SODIUM BLD-SCNC: 141 MMOL/L (ref 135–144)
TOTAL PROTEIN: 6.8 G/DL (ref 6.4–8.3)
WBC # BLD: 3.7 K/UL (ref 3.5–11.3)
WBC # BLD: ABNORMAL 10*3/UL

## 2020-08-31 PROCEDURE — 6360000002 HC RX W HCPCS: Performed by: INTERNAL MEDICINE

## 2020-08-31 PROCEDURE — 36415 COLL VENOUS BLD VENIPUNCTURE: CPT

## 2020-08-31 PROCEDURE — 96401 CHEMO ANTI-NEOPL SQ/IM: CPT

## 2020-08-31 PROCEDURE — 85025 COMPLETE CBC W/AUTO DIFF WBC: CPT

## 2020-08-31 PROCEDURE — 2580000003 HC RX 258: Performed by: INTERNAL MEDICINE

## 2020-08-31 PROCEDURE — 80053 COMPREHEN METABOLIC PANEL: CPT

## 2020-08-31 RX ADMIN — WATER 160 MG: 1 INJECTION INTRAMUSCULAR; INTRAVENOUS; SUBCUTANEOUS at 10:09

## 2020-08-31 NOTE — PROGRESS NOTES
Gilda Barnes arrives ambulatory alone  Order for vidaza injection  Labs reviewed  vidaza given in divided doses to each arm   Band aid applied  Discharged per ambulatory

## 2020-09-01 ENCOUNTER — HOSPITAL ENCOUNTER (OUTPATIENT)
Dept: INFUSION THERAPY | Facility: MEDICAL CENTER | Age: 70
Discharge: HOME | End: 2020-09-01
Payer: MEDICARE

## 2020-09-01 ENCOUNTER — OFFICE VISIT (OUTPATIENT)
Dept: ONCOLOGY | Age: 70
End: 2020-09-01
Payer: MEDICARE

## 2020-09-01 ENCOUNTER — TELEPHONE (OUTPATIENT)
Dept: ONCOLOGY | Age: 70
End: 2020-09-01

## 2020-09-01 ENCOUNTER — HOSPITAL ENCOUNTER (OUTPATIENT)
Facility: MEDICAL CENTER | Age: 70
Discharge: HOME | End: 2020-09-01
Payer: MEDICARE

## 2020-09-01 VITALS
HEART RATE: 67 BPM | DIASTOLIC BLOOD PRESSURE: 77 MMHG | BODY MASS INDEX: 29.59 KG/M2 | RESPIRATION RATE: 18 BRPM | SYSTOLIC BLOOD PRESSURE: 131 MMHG | TEMPERATURE: 98.5 F | WEIGHT: 206.2 LBS

## 2020-09-01 DIAGNOSIS — C92.00 ACUTE MYELOID LEUKEMIA NOT HAVING ACHIEVED REMISSION (HCC): Primary | ICD-10-CM

## 2020-09-01 DIAGNOSIS — C92.00 ACUTE MYELOID LEUKEMIA NOT HAVING ACHIEVED REMISSION (HCC): ICD-10-CM

## 2020-09-01 PROCEDURE — 99214 OFFICE O/P EST MOD 30 MIN: CPT | Performed by: INTERNAL MEDICINE

## 2020-09-01 PROCEDURE — 96401 CHEMO ANTI-NEOPL SQ/IM: CPT

## 2020-09-01 PROCEDURE — 99211 OFF/OP EST MAY X REQ PHY/QHP: CPT | Performed by: INTERNAL MEDICINE

## 2020-09-01 PROCEDURE — 6360000002 HC RX W HCPCS: Performed by: INTERNAL MEDICINE

## 2020-09-01 PROCEDURE — 2580000003 HC RX 258: Performed by: INTERNAL MEDICINE

## 2020-09-01 RX ORDER — SODIUM CHLORIDE 0.9 % (FLUSH) 0.9 %
10 SYRINGE (ML) INJECTION PRN
Status: CANCELLED | OUTPATIENT
Start: 2020-09-28

## 2020-09-01 RX ORDER — SODIUM CHLORIDE 9 MG/ML
INJECTION, SOLUTION INTRAVENOUS CONTINUOUS
Status: CANCELLED | OUTPATIENT
Start: 2020-09-28

## 2020-09-01 RX ORDER — METHYLPREDNISOLONE SODIUM SUCCINATE 125 MG/2ML
125 INJECTION, POWDER, LYOPHILIZED, FOR SOLUTION INTRAMUSCULAR; INTRAVENOUS ONCE
Status: CANCELLED | OUTPATIENT
Start: 2020-09-28

## 2020-09-01 RX ORDER — HEPARIN SODIUM (PORCINE) LOCK FLUSH IV SOLN 100 UNIT/ML 100 UNIT/ML
500 SOLUTION INTRAVENOUS PRN
Status: CANCELLED | OUTPATIENT
Start: 2020-10-02

## 2020-09-01 RX ORDER — DIPHENHYDRAMINE HYDROCHLORIDE 50 MG/ML
50 INJECTION INTRAMUSCULAR; INTRAVENOUS ONCE
Status: CANCELLED | OUTPATIENT
Start: 2020-09-30

## 2020-09-01 RX ORDER — DIPHENHYDRAMINE HYDROCHLORIDE 50 MG/ML
50 INJECTION INTRAMUSCULAR; INTRAVENOUS ONCE
Status: CANCELLED | OUTPATIENT
Start: 2020-10-06

## 2020-09-01 RX ORDER — METHYLPREDNISOLONE SODIUM SUCCINATE 125 MG/2ML
125 INJECTION, POWDER, LYOPHILIZED, FOR SOLUTION INTRAMUSCULAR; INTRAVENOUS ONCE
Status: CANCELLED | OUTPATIENT
Start: 2020-09-29

## 2020-09-01 RX ORDER — HEPARIN SODIUM (PORCINE) LOCK FLUSH IV SOLN 100 UNIT/ML 100 UNIT/ML
500 SOLUTION INTRAVENOUS PRN
Status: CANCELLED | OUTPATIENT
Start: 2020-10-07

## 2020-09-01 RX ORDER — SODIUM CHLORIDE 0.9 % (FLUSH) 0.9 %
5 SYRINGE (ML) INJECTION PRN
Status: CANCELLED | OUTPATIENT
Start: 2020-09-28

## 2020-09-01 RX ORDER — HEPARIN SODIUM (PORCINE) LOCK FLUSH IV SOLN 100 UNIT/ML 100 UNIT/ML
500 SOLUTION INTRAVENOUS PRN
Status: CANCELLED | OUTPATIENT
Start: 2020-09-30

## 2020-09-01 RX ORDER — HEPARIN SODIUM (PORCINE) LOCK FLUSH IV SOLN 100 UNIT/ML 100 UNIT/ML
500 SOLUTION INTRAVENOUS PRN
Status: CANCELLED | OUTPATIENT
Start: 2020-09-28

## 2020-09-01 RX ORDER — EPINEPHRINE 1 MG/ML
0.3 INJECTION, SOLUTION, CONCENTRATE INTRAVENOUS PRN
Status: CANCELLED | OUTPATIENT
Start: 2020-10-06

## 2020-09-01 RX ORDER — HEPARIN SODIUM (PORCINE) LOCK FLUSH IV SOLN 100 UNIT/ML 100 UNIT/ML
500 SOLUTION INTRAVENOUS PRN
Status: CANCELLED | OUTPATIENT
Start: 2020-10-01

## 2020-09-01 RX ORDER — SODIUM CHLORIDE 0.9 % (FLUSH) 0.9 %
10 SYRINGE (ML) INJECTION PRN
Status: CANCELLED | OUTPATIENT
Start: 2020-09-29

## 2020-09-01 RX ORDER — SODIUM CHLORIDE 0.9 % (FLUSH) 0.9 %
5 SYRINGE (ML) INJECTION PRN
Status: CANCELLED | OUTPATIENT
Start: 2020-09-29

## 2020-09-01 RX ORDER — SODIUM CHLORIDE 9 MG/ML
20 INJECTION, SOLUTION INTRAVENOUS ONCE
Status: CANCELLED | OUTPATIENT
Start: 2020-10-02

## 2020-09-01 RX ORDER — SODIUM CHLORIDE 9 MG/ML
INJECTION, SOLUTION INTRAVENOUS CONTINUOUS
Status: CANCELLED | OUTPATIENT
Start: 2020-10-01

## 2020-09-01 RX ORDER — DIPHENHYDRAMINE HYDROCHLORIDE 50 MG/ML
50 INJECTION INTRAMUSCULAR; INTRAVENOUS ONCE
Status: CANCELLED | OUTPATIENT
Start: 2020-10-01

## 2020-09-01 RX ORDER — DIPHENHYDRAMINE HYDROCHLORIDE 50 MG/ML
50 INJECTION INTRAMUSCULAR; INTRAVENOUS ONCE
Status: CANCELLED | OUTPATIENT
Start: 2020-09-28

## 2020-09-01 RX ORDER — SODIUM CHLORIDE 0.9 % (FLUSH) 0.9 %
10 SYRINGE (ML) INJECTION PRN
Status: CANCELLED | OUTPATIENT
Start: 2020-10-02

## 2020-09-01 RX ORDER — SODIUM CHLORIDE 9 MG/ML
20 INJECTION, SOLUTION INTRAVENOUS ONCE
Status: CANCELLED | OUTPATIENT
Start: 2020-10-01

## 2020-09-01 RX ORDER — EPINEPHRINE 1 MG/ML
0.3 INJECTION, SOLUTION, CONCENTRATE INTRAVENOUS PRN
Status: CANCELLED | OUTPATIENT
Start: 2020-09-28

## 2020-09-01 RX ORDER — EPINEPHRINE 1 MG/ML
0.3 INJECTION, SOLUTION, CONCENTRATE INTRAVENOUS PRN
Status: CANCELLED | OUTPATIENT
Start: 2020-10-02

## 2020-09-01 RX ORDER — SODIUM CHLORIDE 9 MG/ML
20 INJECTION, SOLUTION INTRAVENOUS ONCE
Status: CANCELLED | OUTPATIENT
Start: 2020-10-07

## 2020-09-01 RX ORDER — METHYLPREDNISOLONE SODIUM SUCCINATE 125 MG/2ML
125 INJECTION, POWDER, LYOPHILIZED, FOR SOLUTION INTRAMUSCULAR; INTRAVENOUS ONCE
Status: CANCELLED | OUTPATIENT
Start: 2020-09-30

## 2020-09-01 RX ORDER — SODIUM CHLORIDE 0.9 % (FLUSH) 0.9 %
10 SYRINGE (ML) INJECTION PRN
Status: CANCELLED | OUTPATIENT
Start: 2020-10-07

## 2020-09-01 RX ORDER — EPINEPHRINE 1 MG/ML
0.3 INJECTION, SOLUTION, CONCENTRATE INTRAVENOUS PRN
Status: CANCELLED | OUTPATIENT
Start: 2020-10-01

## 2020-09-01 RX ORDER — SODIUM CHLORIDE 0.9 % (FLUSH) 0.9 %
5 SYRINGE (ML) INJECTION PRN
Status: CANCELLED | OUTPATIENT
Start: 2020-10-07

## 2020-09-01 RX ORDER — EPINEPHRINE 1 MG/ML
0.3 INJECTION, SOLUTION, CONCENTRATE INTRAVENOUS PRN
Status: CANCELLED | OUTPATIENT
Start: 2020-10-07

## 2020-09-01 RX ORDER — SODIUM CHLORIDE 0.9 % (FLUSH) 0.9 %
10 SYRINGE (ML) INJECTION PRN
Status: CANCELLED | OUTPATIENT
Start: 2020-10-01

## 2020-09-01 RX ORDER — DIPHENHYDRAMINE HYDROCHLORIDE 50 MG/ML
50 INJECTION INTRAMUSCULAR; INTRAVENOUS ONCE
Status: CANCELLED | OUTPATIENT
Start: 2020-09-29

## 2020-09-01 RX ORDER — HEPARIN SODIUM (PORCINE) LOCK FLUSH IV SOLN 100 UNIT/ML 100 UNIT/ML
500 SOLUTION INTRAVENOUS PRN
Status: CANCELLED | OUTPATIENT
Start: 2020-10-06

## 2020-09-01 RX ORDER — DIPHENHYDRAMINE HYDROCHLORIDE 50 MG/ML
50 INJECTION INTRAMUSCULAR; INTRAVENOUS ONCE
Status: CANCELLED | OUTPATIENT
Start: 2020-10-07

## 2020-09-01 RX ORDER — SODIUM CHLORIDE 9 MG/ML
20 INJECTION, SOLUTION INTRAVENOUS ONCE
Status: CANCELLED | OUTPATIENT
Start: 2020-09-28

## 2020-09-01 RX ORDER — METHYLPREDNISOLONE SODIUM SUCCINATE 125 MG/2ML
125 INJECTION, POWDER, LYOPHILIZED, FOR SOLUTION INTRAMUSCULAR; INTRAVENOUS ONCE
Status: CANCELLED | OUTPATIENT
Start: 2020-10-06

## 2020-09-01 RX ORDER — SODIUM CHLORIDE 9 MG/ML
INJECTION, SOLUTION INTRAVENOUS CONTINUOUS
Status: CANCELLED | OUTPATIENT
Start: 2020-09-30

## 2020-09-01 RX ORDER — EPINEPHRINE 1 MG/ML
0.3 INJECTION, SOLUTION, CONCENTRATE INTRAVENOUS PRN
Status: CANCELLED | OUTPATIENT
Start: 2020-09-30

## 2020-09-01 RX ORDER — SODIUM CHLORIDE 9 MG/ML
INJECTION, SOLUTION INTRAVENOUS CONTINUOUS
Status: CANCELLED | OUTPATIENT
Start: 2020-10-06

## 2020-09-01 RX ORDER — SODIUM CHLORIDE 9 MG/ML
20 INJECTION, SOLUTION INTRAVENOUS ONCE
Status: CANCELLED | OUTPATIENT
Start: 2020-10-06

## 2020-09-01 RX ORDER — METHYLPREDNISOLONE SODIUM SUCCINATE 125 MG/2ML
125 INJECTION, POWDER, LYOPHILIZED, FOR SOLUTION INTRAMUSCULAR; INTRAVENOUS ONCE
Status: CANCELLED | OUTPATIENT
Start: 2020-10-07

## 2020-09-01 RX ORDER — METHYLPREDNISOLONE SODIUM SUCCINATE 125 MG/2ML
125 INJECTION, POWDER, LYOPHILIZED, FOR SOLUTION INTRAMUSCULAR; INTRAVENOUS ONCE
Status: CANCELLED | OUTPATIENT
Start: 2020-10-02

## 2020-09-01 RX ORDER — SODIUM CHLORIDE 0.9 % (FLUSH) 0.9 %
10 SYRINGE (ML) INJECTION PRN
Status: CANCELLED | OUTPATIENT
Start: 2020-10-06

## 2020-09-01 RX ORDER — SODIUM CHLORIDE 0.9 % (FLUSH) 0.9 %
10 SYRINGE (ML) INJECTION PRN
Status: CANCELLED | OUTPATIENT
Start: 2020-09-30

## 2020-09-01 RX ORDER — SODIUM CHLORIDE 0.9 % (FLUSH) 0.9 %
5 SYRINGE (ML) INJECTION PRN
Status: CANCELLED | OUTPATIENT
Start: 2020-10-01

## 2020-09-01 RX ORDER — SODIUM CHLORIDE 0.9 % (FLUSH) 0.9 %
5 SYRINGE (ML) INJECTION PRN
Status: CANCELLED | OUTPATIENT
Start: 2020-09-30

## 2020-09-01 RX ORDER — SODIUM CHLORIDE 9 MG/ML
INJECTION, SOLUTION INTRAVENOUS CONTINUOUS
Status: CANCELLED | OUTPATIENT
Start: 2020-10-02

## 2020-09-01 RX ORDER — SODIUM CHLORIDE 0.9 % (FLUSH) 0.9 %
5 SYRINGE (ML) INJECTION PRN
Status: CANCELLED | OUTPATIENT
Start: 2020-10-06

## 2020-09-01 RX ORDER — SODIUM CHLORIDE 9 MG/ML
20 INJECTION, SOLUTION INTRAVENOUS ONCE
Status: CANCELLED | OUTPATIENT
Start: 2020-09-30

## 2020-09-01 RX ORDER — SODIUM CHLORIDE 0.9 % (FLUSH) 0.9 %
5 SYRINGE (ML) INJECTION PRN
Status: CANCELLED | OUTPATIENT
Start: 2020-10-02

## 2020-09-01 RX ORDER — EPINEPHRINE 1 MG/ML
0.3 INJECTION, SOLUTION, CONCENTRATE INTRAVENOUS PRN
Status: CANCELLED | OUTPATIENT
Start: 2020-09-29

## 2020-09-01 RX ORDER — METHYLPREDNISOLONE SODIUM SUCCINATE 125 MG/2ML
125 INJECTION, POWDER, LYOPHILIZED, FOR SOLUTION INTRAMUSCULAR; INTRAVENOUS ONCE
Status: CANCELLED | OUTPATIENT
Start: 2020-10-01

## 2020-09-01 RX ORDER — SODIUM CHLORIDE 9 MG/ML
INJECTION, SOLUTION INTRAVENOUS CONTINUOUS
Status: CANCELLED | OUTPATIENT
Start: 2020-10-07

## 2020-09-01 RX ORDER — DIPHENHYDRAMINE HYDROCHLORIDE 50 MG/ML
50 INJECTION INTRAMUSCULAR; INTRAVENOUS ONCE
Status: CANCELLED | OUTPATIENT
Start: 2020-10-02

## 2020-09-01 RX ORDER — SODIUM CHLORIDE 9 MG/ML
INJECTION, SOLUTION INTRAVENOUS CONTINUOUS
Status: CANCELLED | OUTPATIENT
Start: 2020-09-29

## 2020-09-01 RX ORDER — SODIUM CHLORIDE 9 MG/ML
20 INJECTION, SOLUTION INTRAVENOUS ONCE
Status: CANCELLED | OUTPATIENT
Start: 2020-09-29

## 2020-09-01 RX ORDER — HEPARIN SODIUM (PORCINE) LOCK FLUSH IV SOLN 100 UNIT/ML 100 UNIT/ML
500 SOLUTION INTRAVENOUS PRN
Status: CANCELLED | OUTPATIENT
Start: 2020-09-29

## 2020-09-01 RX ADMIN — AZACITIDINE 160 MG: 100 INJECTION, POWDER, LYOPHILIZED, FOR SOLUTION INTRAVENOUS; SUBCUTANEOUS at 09:42

## 2020-09-01 NOTE — PROGRESS NOTES
recovery over the last few weeks. No transfusion given for the last 2 to 3 weeks labs continue to show further improvement. Clinically she feels better. Less weakness and fatigue. No fever or infections. No active bleeding. Less bruising. Past Medical History:   has a past medical history of Arthritis, Cancer ([facility] [address] Hyperlipidemia, Hypertension, Leukemia ([facility] [address] Pneumonia, and Seasonal allergies. Past Surgical History:   has a past surgical history that includes Colonoscopy; joint replacement; Ovary removal; and Colonoscopy (03/10/2017). Family History: family history includes Alzheimer's Disease in her mother; Cancer in her brother; Diabetes in her father; Heart Disease in her father. Social History:   reports that she has never smoked. She has never used smokeless tobacco. She reports current alcohol use. She reports that she does not use drugs. Medications:    Prior to Admission medications    Medication Sig Start Date End Date Taking?  Authorizing Provider   pravastatin (PRAVACHOL) 40 MG tablet TAKE 1 TABLET DAILY 8/7/20  Yes Alejandro Franz MD   acyclovir (ZOVIRAX) 400 MG tablet Take 1 tablet by mouth 2 times daily 6/15/20  Yes Historical Provider, MD   ondansetron (ZOFRAN-ODT) 4 MG disintegrating tablet Take 1 tablet by mouth 3 times daily as needed for Nausea or Vomiting 4/24/20  Yes Wilmer Canela MD   vitamin D (CHOLECALCIFEROL) 25 MCG (1000 UT) TABS tablet Take 3,000 Units by mouth daily   Yes Historical Provider, MD   Multiple Vitamins-Minerals (THERAPEUTIC MULTIVITAMIN-MINERALS) tablet Take 1 tablet by mouth daily   Yes Historical Provider, MD   ammonium lactate (AMLACTIN) 12 % cream APPLY AFTER SHOWERING TO SKIN FOR DRYNESS 11/11/19  Yes Historical Provider, MD   cetirizine (ZYRTEC ALLERGY) 10 MG tablet Take 10 mg by mouth daily   Yes Historical Provider, MD   metoprolol tartrate (LOPRESSOR) 50 MG tablet 50mg once a day 10/9/19  Yes Alejandro Franz MD   Multiple Vitamins-Minerals (ICAPS AREDS 2 PO) Take by mouth daily   Yes Historical Provider, MD   azelastine (ASTELIN) 137 MCG/SPRAY nasal spray 2 sprays by Nasal route 2 times daily as needed Use in each nostril as directed    Yes Historical Provider, MD     Current Outpatient Medications   Medication Sig Dispense Refill    pravastatin (PRAVACHOL) 40 MG tablet TAKE 1 TABLET DAILY 90 tablet 3    acyclovir (ZOVIRAX) 400 MG tablet Take 1 tablet by mouth 2 times daily      ondansetron (ZOFRAN-ODT) 4 MG disintegrating tablet Take 1 tablet by mouth 3 times daily as needed for Nausea or Vomiting 21 tablet 0    vitamin D (CHOLECALCIFEROL) 25 MCG (1000 UT) TABS tablet Take 3,000 Units by mouth daily      Multiple Vitamins-Minerals (THERAPEUTIC MULTIVITAMIN-MINERALS) tablet Take 1 tablet by mouth daily      ammonium lactate (AMLACTIN) 12 % cream APPLY AFTER SHOWERING TO SKIN FOR DRYNESS      cetirizine (ZYRTEC ALLERGY) 10 MG tablet Take 10 mg by mouth daily      metoprolol tartrate (LOPRESSOR) 50 MG tablet 50mg once a day 90 tablet 3    Multiple Vitamins-Minerals (ICAPS AREDS 2 PO) Take by mouth daily      azelastine (ASTELIN) 137 MCG/SPRAY nasal spray 2 sprays by Nasal route 2 times daily as needed Use in each nostril as directed        No current facility-administered medications for this visit. Allergies:  Amoxicillin-pot clavulanate; Cefuroxime axetil; Eraxis [anidulafungin]; Biaxin [clarithromycin]; and Sulfa antibiotics    REVIEW OF SYSTEMS:      · General: Positive for weakness and fatigue. . No unanticipated weight loss or decreased appetite. No fever or chills. · Eyes: No blurred vision, eye pain or double vision. · Ears: No hearing problems or drainage. No tinnitus. · Throat: No sore throat, problems with swallowing or dysphagia. · Respiratory: No cough, sputum or hemoptysis. No shortness of breath. No pleuritic chest pain. · Cardiovascular: No chest pain, orthopnea or PND. No lower extremity edema.  No palpitation. · Gastrointestinal: No problems with swallowing. No abdominal pain or bloating. No nausea or vomiting. No diarrhea or constipation. No GI bleeding. · Genitourinary: No dysuria, hematuria, frequency or urgency. · Musculoskeletal: No muscle aches or pains. No limitation of movement. No back pain. No gait disturbance, No joint complaints. · Dermatologic: No skin rashes or pruritus. No skin lesions or discolorations. · Psychiatric: No depression, anxiety, or stress or signs of schizophrenia. No change in mood or affect. · Hematologic: As above. · Infectious disease: No fever, chills or frequent infections. · Endocrine: No problems with obesity. No polydipsia or polyuria. No temperature intolerance. · Neurologic: No headaches or dizziness. No weakness or numbness of the extremities. No changes in balance, coordination,  memory, mentation, behavior. · Allergic/Immunologic: No nasal congestion or hives. No repeated infections.        PHYSICAL EXAM:      /77   Pulse 67   Temp 98.5 °F (36.9 °C) (Temporal)   Resp 18   Wt 206 lb 3.2 oz (93.5 kg)   BMI 29.59 kg/m²    Temp (24hrs), Av.3 °F (36.8 °C), Min:97.8 °F (36.6 °C), Max:99.1 °F (37.3 °C)      General appearance - well appearing, not in pain or distress  Mental status - good mood, alert and oriented  Eyes - pupils equal and reactive, extraocular eye movements intact  Ears - bilateral TM's and external ear canals normal  Nose - normal and patent, no erythema, discharge or polyps  Mouth - mucous membranes moist, pharynx normal without lesions  Neck - supple, no significant adenopathy  Lymphatics - no palpable lymphadenopathy, no hepatosplenomegaly  Chest - clear to auscultation, no wheezes, rales or rhonchi, symmetric air entry  Heart - normal rate, regular rhythm, normal S1, S2, no murmurs, rubs, clicks or gallops  Abdomen - soft, nontender, nondistended, no masses or organomegaly  Neurological - alert, oriented, normal speech, no focal findings or movement disorder noted  Musculoskeletal - no joint tenderness, deformity or swelling  Extremities - peripheral pulses normal, no pedal edema, no clubbing or cyanosis  Skin -multiple skin bruisings. DATA:      Labs:       CBC:   Recent Labs     08/31/20 0917   WBC 3.7   HGB 10.2*   HCT 32.8*   PLT 73*       Chemistry        Component Value Date/Time     08/31/2020 0917    K 4.2 08/31/2020 0917     08/31/2020 0917    CO2 29 08/31/2020 0917    BUN 18 08/31/2020 0917    CREATININE 0.55 08/31/2020 0917        Component Value Date/Time    CALCIUM 9.7 08/31/2020 0917    ALKPHOS 56 08/31/2020 0917    AST 20 08/31/2020 0917    ALT 18 08/31/2020 0917    BILITOT 0.26 (L) 08/31/2020 0917                IMPRESSION:    AML  Pancytopenia (H[facility])    RECOMMENDATIONS:  1. Patient was evaluated at [address] [place] [address] . Discussed with Dr Dallin Mayorga. 2. Receiving supportive transfusion as needed. Continue using HLA matched platelets as needed. .    3. Labs from today reviewed and discussed. Labs are showing some recovery of the white blood cells. Platelets are improving as well. No transfusion needed today. No transfusion was given for the last 2 weeks. 4. Patient was reevaluated at [facility]. She will have bone marrow transplant done. However in the interim we will proceed with 1 more cycle of Vidaza. We will continue to hold Venetoclax due to cytopenia. We will communicate with Dr. Dallin Mayorga at [facility] in regard to further treatment. 5. We will do CBC twice weekly and will transfuse as needed. 6. Continue home meds. 7. Hold eliquis due to severe thrombocytopenia. 8. Patient's questions were answered to the best of her satisfaction and she verbalized full understanding and agreement.             Aubrey Barger MD    [facility] Poster  Cell: [phone]    This note is created with the assistance of a speech recognition program.  While intending to generate a document that actually reflects the content of the visit, the document can still have some errors including those of syntax and sound a like substitutions which may escape proof reading. It such instances, actual meaning can be extrapolated by contextual diversion.

## 2020-09-01 NOTE — PROGRESS NOTES
Harry Romero arrives ambulatory alone from MD visit  Order for C3 D1 reviewed  Vidaza injection given to each arm   Band aids applied  Discharged per ambulatory

## 2020-09-01 NOTE — TELEPHONE ENCOUNTER
Diandra Herrera FOR MD VISIT & [id number] [facility] IN TO SEE PATIENT  ORDERS RECEIVED  PROCEED W/CHRISTOPHER   CBC TWICE WEEKLY STARTING NEXT WEEK  RV WILL BE DETERMINED  CBC 9/8,9/11,9/15,9/18  MD VISIT TBD  AVS PRINTED AND GIVEN TO PATIENT WITH INSTRUCTIONS  PATIENT DISCHARGED TO [address] [place] [address]

## 2020-09-04 ENCOUNTER — HOSPITAL ENCOUNTER (OUTPATIENT)
Dept: INFUSION THERAPY | Facility: MEDICAL CENTER | Age: 70
Discharge: HOME | End: 2020-09-04
Payer: MEDICARE

## 2020-09-04 VITALS
SYSTOLIC BLOOD PRESSURE: 143 MMHG | DIASTOLIC BLOOD PRESSURE: 69 MMHG | TEMPERATURE: 98.6 F | RESPIRATION RATE: 18 BRPM | HEART RATE: 81 BPM

## 2020-09-04 DIAGNOSIS — C92.00 ACUTE MYELOID LEUKEMIA NOT HAVING ACHIEVED REMISSION (HCC): Primary | ICD-10-CM

## 2020-09-04 PROCEDURE — 2580000003 HC RX 258: Performed by: INTERNAL MEDICINE

## 2020-09-04 PROCEDURE — 6360000002 HC RX W HCPCS: Performed by: INTERNAL MEDICINE

## 2020-09-04 PROCEDURE — 96401 CHEMO ANTI-NEOPL SQ/IM: CPT

## 2020-09-04 RX ADMIN — AZACITIDINE 160 MG: 100 INJECTION, POWDER, LYOPHILIZED, FOR SOLUTION INTRAVENOUS; SUBCUTANEOUS at 15:21

## 2020-09-04 NOTE — PROGRESS NOTES
Pt arrives per ambulatory per self and labs and orders reviewed and pt tolerated injection well and no bleeding at site and no reactions or complaints and no bleeding at sites. Bandaids applied and pt states has next appts and pt discharged per ambulatory per self.

## 2020-09-08 ENCOUNTER — HOSPITAL ENCOUNTER (OUTPATIENT)
Facility: MEDICAL CENTER | Age: 70
Discharge: HOME | End: 2020-09-08
Payer: MEDICARE

## 2020-09-08 ENCOUNTER — HOSPITAL ENCOUNTER (OUTPATIENT)
Dept: INFUSION THERAPY | Facility: MEDICAL CENTER | Age: 70
Discharge: HOME | End: 2020-09-08
Payer: MEDICARE

## 2020-09-08 VITALS
DIASTOLIC BLOOD PRESSURE: 86 MMHG | SYSTOLIC BLOOD PRESSURE: 144 MMHG | RESPIRATION RATE: 16 BRPM | HEART RATE: 70 BPM | TEMPERATURE: 98.6 F

## 2020-09-08 DIAGNOSIS — C92.00 ACUTE MYELOID LEUKEMIA NOT HAVING ACHIEVED REMISSION (HCC): Primary | ICD-10-CM

## 2020-09-08 DIAGNOSIS — C92.00 ACUTE MYELOID LEUKEMIA NOT HAVING ACHIEVED REMISSION (HCC): ICD-10-CM

## 2020-09-08 LAB
ABSOLUTE EOS #: 0.07 K/UL (ref 0–0.4)
ABSOLUTE IMMATURE GRANULOCYTE: 0 K/UL (ref 0–0.3)
ABSOLUTE LYMPH #: 0.82 K/UL (ref 1–4.8)
ABSOLUTE MONO #: 0.27 K/UL (ref 0.2–0.8)
BASOPHILS # BLD: 0 %
BASOPHILS ABSOLUTE: 0 K/UL (ref 0–0.2)
DIFFERENTIAL TYPE: ABNORMAL
EOSINOPHILS RELATIVE PERCENT: 2 % (ref 1–4)
HCT VFR BLD CALC: 28.9 % (ref 36.3–47.1)
HEMOGLOBIN: 9.5 G/DL (ref 11.9–15.1)
IMMATURE GRANULOCYTES: 0 %
LYMPHOCYTES # BLD: 24 % (ref 24–44)
MCH RBC QN AUTO: 36.8 PG (ref 25.2–33.5)
MCHC RBC AUTO-ENTMCNC: 32.9 G/DL (ref 28.4–34.8)
MCV RBC AUTO: 112 FL (ref 82.6–102.9)
MONOCYTES # BLD: 8 % (ref 1–7)
MORPHOLOGY: ABNORMAL
NRBC AUTOMATED: 0 PER 100 WBC
PDW BLD-RTO: 16.1 % (ref 11.8–14.4)
PLATELET # BLD: 51 K/UL (ref 138–453)
PLATELET ESTIMATE: ABNORMAL
PMV BLD AUTO: 9.3 FL (ref 8.1–13.5)
RBC # BLD: 2.58 M/UL (ref 3.95–5.11)
RBC # BLD: ABNORMAL 10*6/UL
SEG NEUTROPHILS: 66 % (ref 36–66)
SEGMENTED NEUTROPHILS ABSOLUTE COUNT: 2.24 K/UL (ref 1.8–7.7)
WBC # BLD: 3.4 K/UL (ref 3.5–11.3)
WBC # BLD: ABNORMAL 10*3/UL

## 2020-09-08 PROCEDURE — 96401 CHEMO ANTI-NEOPL SQ/IM: CPT

## 2020-09-08 PROCEDURE — 6360000002 HC RX W HCPCS: Performed by: INTERNAL MEDICINE

## 2020-09-08 PROCEDURE — 36415 COLL VENOUS BLD VENIPUNCTURE: CPT

## 2020-09-08 PROCEDURE — 85025 COMPLETE CBC W/AUTO DIFF WBC: CPT

## 2020-09-08 PROCEDURE — 2580000003 HC RX 258: Performed by: INTERNAL MEDICINE

## 2020-09-08 RX ADMIN — WATER 160 MG: 1 INJECTION INTRAMUSCULAR; INTRAVENOUS; SUBCUTANEOUS at 09:11

## 2020-09-08 NOTE — PLAN OF CARE
Problem: SAFETY  Goal: Free from accidental physical injury  9/8/2020 0841 by Emi Quiroga RN  Outcome: Completed  9/8/2020 0840 by Emi Quiroga RN  Outcome: Met This Shift

## 2020-09-08 NOTE — PROGRESS NOTES
Patient arrived ambulatory for C3D6 Vidaza  injection. Denies any complaint or concerns  Labs reviewed . Vitals as charted. Patient tolerate injection well; band-aid applied.   Patient ambulatory  off unit  at discharge

## 2020-09-09 ENCOUNTER — HOSPITAL ENCOUNTER (OUTPATIENT)
Dept: INFUSION THERAPY | Facility: MEDICAL CENTER | Age: 70
Discharge: HOME | End: 2020-09-09
Payer: MEDICARE

## 2020-09-09 VITALS
RESPIRATION RATE: 16 BRPM | SYSTOLIC BLOOD PRESSURE: 143 MMHG | DIASTOLIC BLOOD PRESSURE: 72 MMHG | TEMPERATURE: 97.8 F | HEART RATE: 75 BPM

## 2020-09-09 DIAGNOSIS — C92.00 ACUTE MYELOID LEUKEMIA NOT HAVING ACHIEVED REMISSION (HCC): Primary | ICD-10-CM

## 2020-09-09 PROCEDURE — 6360000002 HC RX W HCPCS: Performed by: INTERNAL MEDICINE

## 2020-09-09 PROCEDURE — 2580000003 HC RX 258: Performed by: INTERNAL MEDICINE

## 2020-09-09 PROCEDURE — 96401 CHEMO ANTI-NEOPL SQ/IM: CPT

## 2020-09-09 RX ADMIN — WATER 160 MG: 1 INJECTION INTRAMUSCULAR; INTRAVENOUS; SUBCUTANEOUS at 10:04

## 2020-09-09 NOTE — PROGRESS NOTES
Patient arrived ambulatory for  C3 D7 Vidaza  injection. Denies any complaint or concerns  Labs reviewed . Vitals as charted. Patient tolerate injection well; band-aid applied.   Patient ambulatory  off unit  at discharge

## 2020-09-09 NOTE — PLAN OF CARE
Problem: SAFETY  Goal: Free from accidental physical injury  9/9/2020 0949 by Ryder Iyer RN  Outcome: Completed

## 2020-09-11 ENCOUNTER — HOSPITAL ENCOUNTER (OUTPATIENT)
Facility: MEDICAL CENTER | Age: 70
Discharge: HOME | End: 2020-09-11
Payer: MEDICARE

## 2020-09-11 ENCOUNTER — HOSPITAL ENCOUNTER (OUTPATIENT)
Dept: INFUSION THERAPY | Facility: MEDICAL CENTER | Age: 70
Discharge: HOME | End: 2020-09-11
Payer: MEDICARE

## 2020-09-11 DIAGNOSIS — C92.00 ACUTE MYELOID LEUKEMIA NOT HAVING ACHIEVED REMISSION (HCC): ICD-10-CM

## 2020-09-11 LAB
ABSOLUTE EOS #: 0.03 K/UL (ref 0–0.4)
ABSOLUTE IMMATURE GRANULOCYTE: 0 K/UL (ref 0–0.3)
ABSOLUTE LYMPH #: 0.82 K/UL (ref 1–4.8)
ABSOLUTE MONO #: 0.27 K/UL (ref 0.2–0.8)
BASOPHILS # BLD: 0 %
BASOPHILS ABSOLUTE: 0 K/UL (ref 0–0.2)
DIFFERENTIAL TYPE: ABNORMAL
EOSINOPHILS RELATIVE PERCENT: 1 % (ref 1–4)
HCT VFR BLD CALC: 29.8 % (ref 36.3–47.1)
HEMOGLOBIN: 9.4 G/DL (ref 11.9–15.1)
IMMATURE GRANULOCYTES: 0 %
LYMPHOCYTES # BLD: 24 % (ref 24–44)
MCH RBC QN AUTO: 36.2 PG (ref 25.2–33.5)
MCHC RBC AUTO-ENTMCNC: 31.5 G/DL (ref 28.4–34.8)
MCV RBC AUTO: 114.6 FL (ref 82.6–102.9)
MONOCYTES # BLD: 8 % (ref 1–7)
MORPHOLOGY: ABNORMAL
NRBC AUTOMATED: 0 PER 100 WBC
PDW BLD-RTO: 15.6 % (ref 11.8–14.4)
PLATELET # BLD: ABNORMAL K/UL (ref 138–453)
PLATELET ESTIMATE: ABNORMAL
PLATELET, FLUORESCENCE: 41 K/UL (ref 138–453)
PLATELET, IMMATURE FRACTION: 3.8 % (ref 1.1–10.3)
PMV BLD AUTO: ABNORMAL FL (ref 8.1–13.5)
RBC # BLD: 2.6 M/UL (ref 3.95–5.11)
RBC # BLD: ABNORMAL 10*6/UL
SEG NEUTROPHILS: 67 % (ref 36–66)
SEGMENTED NEUTROPHILS ABSOLUTE COUNT: 2.28 K/UL (ref 1.8–7.7)
WBC # BLD: 3.4 K/UL (ref 3.5–11.3)
WBC # BLD: ABNORMAL 10*3/UL

## 2020-09-11 PROCEDURE — 36415 COLL VENOUS BLD VENIPUNCTURE: CPT

## 2020-09-11 PROCEDURE — 85055 RETICULATED PLATELET ASSAY: CPT

## 2020-09-11 PROCEDURE — 85025 COMPLETE CBC W/AUTO DIFF WBC: CPT

## 2020-09-11 NOTE — PROGRESS NOTES
Enedina Rolling here for labs  She denies any complaints   HGB 9.4, plt ct 41  Return next Tuesday for labs

## 2020-09-15 ENCOUNTER — HOSPITAL ENCOUNTER (OUTPATIENT)
Facility: MEDICAL CENTER | Age: 70
Discharge: HOME | End: 2020-09-15
Payer: MEDICARE

## 2020-09-15 ENCOUNTER — HOSPITAL ENCOUNTER (OUTPATIENT)
Dept: INFUSION THERAPY | Facility: MEDICAL CENTER | Age: 70
Discharge: HOME | End: 2020-09-15
Payer: MEDICARE

## 2020-09-15 DIAGNOSIS — C92.00 ACUTE MYELOID LEUKEMIA NOT HAVING ACHIEVED REMISSION (HCC): ICD-10-CM

## 2020-09-15 LAB
ABSOLUTE EOS #: 0.03 K/UL (ref 0–0.44)
ABSOLUTE IMMATURE GRANULOCYTE: 0.03 K/UL (ref 0–0.3)
ABSOLUTE LYMPH #: 0.76 K/UL (ref 1.1–3.7)
ABSOLUTE MONO #: 0.2 K/UL (ref 0.1–1.2)
BASOPHILS # BLD: 0 % (ref 0–2)
BASOPHILS ABSOLUTE: 0 K/UL (ref 0–0.2)
DIFFERENTIAL TYPE: ABNORMAL
EOSINOPHILS RELATIVE PERCENT: 1 % (ref 1–4)
HCT VFR BLD CALC: 27.3 % (ref 36.3–47.1)
HEMOGLOBIN: 8.9 G/DL (ref 11.9–15.1)
IMMATURE GRANULOCYTES: 1 %
LYMPHOCYTES # BLD: 23 % (ref 24–43)
MCH RBC QN AUTO: 36.9 PG (ref 25.2–33.5)
MCHC RBC AUTO-ENTMCNC: 32.6 G/DL (ref 28.4–34.8)
MCV RBC AUTO: 113.3 FL (ref 82.6–102.9)
MONOCYTES # BLD: 6 % (ref 3–12)
MORPHOLOGY: ABNORMAL
NRBC AUTOMATED: 0 PER 100 WBC
PDW BLD-RTO: 15.4 % (ref 11.8–14.4)
PLATELET # BLD: ABNORMAL K/UL (ref 138–453)
PLATELET ESTIMATE: ABNORMAL
PLATELET, FLUORESCENCE: 25 K/UL (ref 138–453)
PLATELET, IMMATURE FRACTION: 4.2 % (ref 1.1–10.3)
PMV BLD AUTO: ABNORMAL FL (ref 8.1–13.5)
RBC # BLD: 2.41 M/UL (ref 3.95–5.11)
RBC # BLD: ABNORMAL 10*6/UL
SEG NEUTROPHILS: 69 % (ref 36–65)
SEGMENTED NEUTROPHILS ABSOLUTE COUNT: 2.28 K/UL (ref 1.5–8.1)
WBC # BLD: 3.3 K/UL (ref 3.5–11.3)
WBC # BLD: ABNORMAL 10*3/UL

## 2020-09-15 PROCEDURE — 85055 RETICULATED PLATELET ASSAY: CPT

## 2020-09-15 PROCEDURE — 36415 COLL VENOUS BLD VENIPUNCTURE: CPT

## 2020-09-15 PROCEDURE — 85025 COMPLETE CBC W/AUTO DIFF WBC: CPT

## 2020-09-15 NOTE — PROGRESS NOTES
Ras Rodriguez arrives ambulatory alone  Order for labs to be drawn  Labs reviewed  Dr Bob Duran reviewed labs and no new orders at this time  Ras Rodriguez inquires about a flu shot.   Dr Bob Duran does not want her to have one at this time  Adriana Counts understanding

## 2020-09-16 ENCOUNTER — TELEPHONE (OUTPATIENT)
Dept: ONCOLOGY | Age: 70
End: 2020-09-16

## 2020-09-16 ENCOUNTER — HOSPITAL ENCOUNTER (OUTPATIENT)
Facility: MEDICAL CENTER | Age: 70
End: 2020-09-16
Payer: MEDICARE

## 2020-09-16 NOTE — TELEPHONE ENCOUNTER
Spoke with Mrs. Mercer via phone. She had completed labs 09/15/2020  Request results send via fax to Dr. Suresh Ba office @ [facility]. Writer faxed 4 pages to [phone] w/ confirmation.

## 2020-09-18 ENCOUNTER — HOSPITAL ENCOUNTER (OUTPATIENT)
Dept: INFUSION THERAPY | Facility: MEDICAL CENTER | Age: 70
Discharge: HOME | End: 2020-09-18
Payer: MEDICARE

## 2020-09-18 ENCOUNTER — HOSPITAL ENCOUNTER (OUTPATIENT)
Facility: MEDICAL CENTER | Age: 70
Discharge: HOME | End: 2020-09-18
Payer: MEDICARE

## 2020-09-18 ENCOUNTER — TELEPHONE (OUTPATIENT)
Dept: ONCOLOGY | Age: 70
End: 2020-09-18

## 2020-09-18 DIAGNOSIS — C92.00 ACUTE MYELOID LEUKEMIA NOT HAVING ACHIEVED REMISSION (HCC): ICD-10-CM

## 2020-09-18 LAB
ABSOLUTE EOS #: 0.02 K/UL (ref 0–0.4)
ABSOLUTE IMMATURE GRANULOCYTE: 0.02 K/UL (ref 0–0.3)
ABSOLUTE LYMPH #: 0.6 K/UL (ref 1–4.8)
ABSOLUTE MONO #: 0.07 K/UL (ref 0.2–0.8)
BASOPHILS # BLD: 0 %
BASOPHILS ABSOLUTE: 0 K/UL (ref 0–0.2)
DIFFERENTIAL TYPE: ABNORMAL
EOSINOPHILS RELATIVE PERCENT: 1 % (ref 1–4)
HCT VFR BLD CALC: 27.7 % (ref 36.3–47.1)
HEMOGLOBIN: 8.8 G/DL (ref 11.9–15.1)
IMMATURE GRANULOCYTES: 1 %
LYMPHOCYTES # BLD: 26 % (ref 24–44)
MCH RBC QN AUTO: 36.5 PG (ref 25.2–33.5)
MCHC RBC AUTO-ENTMCNC: 31.8 G/DL (ref 28.4–34.8)
MCV RBC AUTO: 114.9 FL (ref 82.6–102.9)
MONOCYTES # BLD: 3 % (ref 1–7)
MORPHOLOGY: ABNORMAL
NRBC AUTOMATED: 0 PER 100 WBC
PDW BLD-RTO: 15.7 % (ref 11.8–14.4)
PLATELET # BLD: ABNORMAL K/UL (ref 138–453)
PLATELET ESTIMATE: ABNORMAL
PLATELET, FLUORESCENCE: 18 K/UL (ref 138–453)
PLATELET, IMMATURE FRACTION: 3.9 % (ref 1.1–10.3)
PMV BLD AUTO: ABNORMAL FL (ref 8.1–13.5)
RBC # BLD: 2.41 M/UL (ref 3.95–5.11)
RBC # BLD: ABNORMAL 10*6/UL
SEG NEUTROPHILS: 69 % (ref 36–66)
SEGMENTED NEUTROPHILS ABSOLUTE COUNT: 1.59 K/UL (ref 1.8–7.7)
WBC # BLD: 2.3 K/UL (ref 3.5–11.3)
WBC # BLD: ABNORMAL 10*3/UL

## 2020-09-18 PROCEDURE — 36415 COLL VENOUS BLD VENIPUNCTURE: CPT

## 2020-09-18 PROCEDURE — 99211 OFF/OP EST MAY X REQ PHY/QHP: CPT

## 2020-09-18 PROCEDURE — 86901 BLOOD TYPING SEROLOGIC RH(D): CPT

## 2020-09-18 PROCEDURE — 85055 RETICULATED PLATELET ASSAY: CPT

## 2020-09-18 PROCEDURE — 85025 COMPLETE CBC W/AUTO DIFF WBC: CPT

## 2020-09-18 PROCEDURE — 86900 BLOOD TYPING SEROLOGIC ABO: CPT

## 2020-09-18 PROCEDURE — 86850 RBC ANTIBODY SCREEN: CPT

## 2020-09-18 RX ORDER — 0.9 % SODIUM CHLORIDE 0.9 %
20 INTRAVENOUS SOLUTION INTRAVENOUS ONCE
Status: DISCONTINUED | OUTPATIENT
Start: 2020-09-18 | End: 2020-09-23 | Stop reason: HOSPADM

## 2020-09-18 NOTE — PROGRESS NOTES
Deepak Gamboa arrives ambulatory alone  Order for lab draw and to have results called to Dr Marla Banuelos reviewed and platelet count of 18 called to Dr Opal Mace to have one unit of HLA matched platelets to be infused on Monday 9/21/20  Lab called and writer speaks with Obie Valero in lab and confirms that HLA platelets should be available on Monday  [facility] [employer] has received order  Writer calls and speaks with Deepak Gamboa and she will call Monday morning at 9:00 am to see if platelets will be available  Deepak Gamboa added to [facility] schedule for 1130 on Monday

## 2020-09-18 NOTE — TELEPHONE ENCOUNTER
Andrade Garsia phoned to inquire if she is needed to come in for pltx transfusion on 09/21/2020. Writer spoke with treatment room RN Fidelina Rosenberg and clarified she is to come in for one unit of pltx 09/21/2020 @1130 and Fidelina Rosenberg will call her for more details. Andrade Garsia voiced understanding and will await call.

## 2020-09-21 ENCOUNTER — HOSPITAL ENCOUNTER (OUTPATIENT)
Dept: INFUSION THERAPY | Facility: MEDICAL CENTER | Age: 70
Discharge: HOME | End: 2020-09-21
Payer: MEDICARE

## 2020-09-21 ENCOUNTER — HOSPITAL ENCOUNTER (OUTPATIENT)
Facility: MEDICAL CENTER | Age: 70
End: 2020-09-21
Payer: MEDICARE

## 2020-09-21 ENCOUNTER — TELEPHONE (OUTPATIENT)
Dept: ONCOLOGY | Age: 70
End: 2020-09-21

## 2020-09-21 VITALS
DIASTOLIC BLOOD PRESSURE: 63 MMHG | RESPIRATION RATE: 16 BRPM | HEART RATE: 76 BPM | SYSTOLIC BLOOD PRESSURE: 118 MMHG | TEMPERATURE: 98.1 F

## 2020-09-21 DIAGNOSIS — C92.00 ACUTE MYELOID LEUKEMIA NOT HAVING ACHIEVED REMISSION (HCC): Primary | ICD-10-CM

## 2020-09-21 PROCEDURE — 36430 TRANSFUSION BLD/BLD COMPNT: CPT

## 2020-09-21 PROCEDURE — P9037 PLATE PHERES LEUKOREDU IRRAD: HCPCS

## 2020-09-21 PROCEDURE — 96374 THER/PROPH/DIAG INJ IV PUSH: CPT

## 2020-09-21 PROCEDURE — 96375 TX/PRO/DX INJ NEW DRUG ADDON: CPT

## 2020-09-21 PROCEDURE — 6360000002 HC RX W HCPCS: Performed by: INTERNAL MEDICINE

## 2020-09-21 PROCEDURE — 2580000003 HC RX 258: Performed by: INTERNAL MEDICINE

## 2020-09-21 RX ORDER — 0.9 % SODIUM CHLORIDE 0.9 %
20 INTRAVENOUS SOLUTION INTRAVENOUS ONCE
Status: COMPLETED | OUTPATIENT
Start: 2020-09-21 | End: 2020-09-21

## 2020-09-21 RX ORDER — DEXAMETHASONE SODIUM PHOSPHATE 100 MG/10ML
10 INJECTION INTRAMUSCULAR; INTRAVENOUS ONCE
Status: COMPLETED | OUTPATIENT
Start: 2020-09-21 | End: 2020-09-21

## 2020-09-21 RX ORDER — DIPHENHYDRAMINE HYDROCHLORIDE 50 MG/ML
25 INJECTION INTRAMUSCULAR; INTRAVENOUS ONCE
Status: CANCELLED
Start: 2020-09-21

## 2020-09-21 RX ORDER — DIPHENHYDRAMINE HYDROCHLORIDE 50 MG/ML
25 INJECTION INTRAMUSCULAR; INTRAVENOUS ONCE
Status: COMPLETED | OUTPATIENT
Start: 2020-09-21 | End: 2020-09-21

## 2020-09-21 RX ORDER — DEXAMETHASONE SODIUM PHOSPHATE 10 MG/ML
10 INJECTION INTRAMUSCULAR; INTRAVENOUS ONCE
Status: CANCELLED
Start: 2020-09-21

## 2020-09-21 RX ADMIN — SODIUM CHLORIDE 20 ML: 9 INJECTION, SOLUTION INTRAVENOUS at 12:22

## 2020-09-21 RX ADMIN — DIPHENHYDRAMINE HYDROCHLORIDE 25 MG: 50 INJECTION, SOLUTION INTRAMUSCULAR; INTRAVENOUS at 12:26

## 2020-09-21 RX ADMIN — Medication 10 MG: at 12:26

## 2020-09-21 NOTE — PROGRESS NOTES
Patient arrive ambulatory for platelet transfusion. Denies complaint or concern; does have various bruises to upper extremities. Peripheral IV established per policy. Patient premedicated. Platelets transfused with no sign of adverse reaction; line flushed. See flow sheet for vitals. Intact IV catheter removed with pressure dressing applied. Patient ambulate off unit per self at discharge - writer informed front office staff patient will not be here tomorrow as she has appointment with physician at [facility]; she will return end of week for labs and transfusion prn.

## 2020-09-21 NOTE — TELEPHONE ENCOUNTER
Spoke with Robin Torre via phone who was instructed to call office to confirm her HLA matched pltx had arrived prior to coming in for her 1130 appt for transfusion. Confirmed with La in [facility] Pltx have arrived. Robin Torre notified and confirmed will be here for her 1130 appt.

## 2020-09-23 ENCOUNTER — TELEPHONE (OUTPATIENT)
Dept: ONCOLOGY | Age: 70
End: 2020-09-23

## 2020-09-23 NOTE — TELEPHONE ENCOUNTER
Message from Crispin, she was at [facility] yesterday and the labs were as follows   HGB 8.8 plt 38 anc 0.86  Wbc 1.65  She will be in on Friday for labs but will not be staying as she knows we have to order plts if needed and that this takes time   Please call her on Friday

## 2020-09-25 ENCOUNTER — HOSPITAL ENCOUNTER (OUTPATIENT)
Dept: INFUSION THERAPY | Facility: MEDICAL CENTER | Age: 70
Discharge: HOME | End: 2020-09-25
Payer: MEDICARE

## 2020-09-25 ENCOUNTER — TELEPHONE (OUTPATIENT)
Dept: INFUSION THERAPY | Facility: MEDICAL CENTER | Age: 70
End: 2020-09-25

## 2020-09-25 ENCOUNTER — TELEPHONE (OUTPATIENT)
Dept: ONCOLOGY | Age: 70
End: 2020-09-25

## 2020-09-25 ENCOUNTER — HOSPITAL ENCOUNTER (OUTPATIENT)
Facility: MEDICAL CENTER | Age: 70
Discharge: HOME | End: 2020-09-25
Payer: MEDICARE

## 2020-09-25 DIAGNOSIS — C92.00 ACUTE MYELOID LEUKEMIA NOT HAVING ACHIEVED REMISSION (HCC): ICD-10-CM

## 2020-09-25 LAB
ABSOLUTE EOS #: 0.02 K/UL (ref 0–0.44)
ABSOLUTE IMMATURE GRANULOCYTE: 0 K/UL (ref 0–0.3)
ABSOLUTE LYMPH #: 0.59 K/UL (ref 1.1–3.7)
ABSOLUTE MONO #: 0.01 K/UL (ref 0.1–1.2)
BASOPHILS # BLD: 1 % (ref 0–2)
BASOPHILS ABSOLUTE: 0.01 K/UL (ref 0–0.2)
DIFFERENTIAL TYPE: ABNORMAL
EOSINOPHILS RELATIVE PERCENT: 2 % (ref 1–4)
HCT VFR BLD CALC: 28.1 % (ref 36.3–47.1)
HEMOGLOBIN: 8.9 G/DL (ref 11.9–15.1)
IMMATURE GRANULOCYTES: 0 %
LYMPHOCYTES # BLD: 66 % (ref 24–43)
MCH RBC QN AUTO: 36.6 PG (ref 25.2–33.5)
MCHC RBC AUTO-ENTMCNC: 31.7 G/DL (ref 28.4–34.8)
MCV RBC AUTO: 115.6 FL (ref 82.6–102.9)
MONOCYTES # BLD: 1 % (ref 3–12)
MORPHOLOGY: ABNORMAL
NRBC AUTOMATED: 0 PER 100 WBC
PDW BLD-RTO: 15.5 % (ref 11.8–14.4)
PLATELET # BLD: ABNORMAL K/UL (ref 138–453)
PLATELET ESTIMATE: ABNORMAL
PLATELET, FLUORESCENCE: 42 K/UL (ref 138–453)
PLATELET, IMMATURE FRACTION: 3.9 % (ref 1.1–10.3)
PMV BLD AUTO: ABNORMAL FL (ref 8.1–13.5)
RBC # BLD: 2.43 M/UL (ref 3.95–5.11)
RBC # BLD: ABNORMAL 10*6/UL
SEG NEUTROPHILS: 30 % (ref 36–65)
SEGMENTED NEUTROPHILS ABSOLUTE COUNT: 0.27 K/UL (ref 1.5–8.1)
WBC # BLD: 0.9 K/UL (ref 3.5–11.3)
WBC # BLD: ABNORMAL 10*3/UL

## 2020-09-25 PROCEDURE — 85055 RETICULATED PLATELET ASSAY: CPT

## 2020-09-25 PROCEDURE — 36415 COLL VENOUS BLD VENIPUNCTURE: CPT

## 2020-09-25 PROCEDURE — 85025 COMPLETE CBC W/AUTO DIFF WBC: CPT

## 2020-09-25 NOTE — TELEPHONE ENCOUNTER
Labs reviewed with Dr Sravan Thomas and no new orders at this time  Writer calls Rachael Gatica and left voice mail with labs and above message from Dr Sravan Thomas

## 2020-09-25 NOTE — TELEPHONE ENCOUNTER
Received a vm from [employer] requesting today's lab values be faxed to Sharonda Winters @ [address] 4 pages to [phone] w/ confirmation.

## 2021-02-01 ENCOUNTER — TELEPHONE (OUTPATIENT)
Dept: FAMILY MEDICINE CLINIC | Age: 71
End: 2021-02-01

## 2021-02-01 DIAGNOSIS — I48.91 ATRIAL FIBRILLATION, UNSPECIFIED TYPE (HCC): Primary | ICD-10-CM

## 2021-03-23 ENCOUNTER — TELEPHONE (OUTPATIENT)
Dept: FAMILY MEDICINE CLINIC | Age: 71
End: 2021-03-23

## 2021-03-26 ENCOUNTER — TELEMEDICINE (OUTPATIENT)
Dept: FAMILY MEDICINE CLINIC | Age: 71
End: 2021-03-26
Payer: MEDICARE

## 2021-03-26 DIAGNOSIS — J30.2 SEASONAL ALLERGIES: Primary | ICD-10-CM

## 2021-03-26 PROCEDURE — 99213 OFFICE O/P EST LOW 20 MIN: CPT | Performed by: FAMILY MEDICINE

## 2021-03-26 RX ORDER — FLUTICASONE PROPIONATE 50 MCG
1 SPRAY, SUSPENSION (ML) NASAL DAILY
Qty: 1 BOTTLE | Refills: 3 | Status: SHIPPED | OUTPATIENT
Start: 2021-03-26 | End: 2021-04-28

## 2021-03-26 NOTE — PROGRESS NOTES
Progress Note    Mary Anne Anderson is a 69 y.o.  female who presents today alone for evaluation of   Chief Complaint   Patient presents with    Sinus Problem           HPI:   Patient is here for sick visit today. Patient reports sinus pressure over her eyes and chest. Patient states she has seen her oncologist and allergist. Patient does check her BP and temp twice daily due to her history of leukemia. Patient states she knows her temp to call is 100.4F. Patient reports recurrent cough at night and first thing in the morning. Patient states she does have a history of seasonal allergies. Patient states she is taking nasal AH and zyrtec. Patient is transplant recipient. Oncologist and A/I are aware of her symptoms.      Health Maintenance Due   Topic Date Due    Annual Wellness Visit (AWV)  Never done    Breast cancer screen  09/13/2019    DTaP/Tdap/Td vaccine (2 - Td) 06/09/2020    Lipid screen  04/03/2021        Current Medications:     Current Outpatient Medications   Medication Sig Dispense Refill    fluticasone (FLONASE) 50 MCG/ACT nasal spray 1 spray by Each Nostril route daily 1 Bottle 3    pravastatin (PRAVACHOL) 40 MG tablet TAKE 1 TABLET DAILY 90 tablet 3    acyclovir (ZOVIRAX) 400 MG tablet Take 1 tablet by mouth 2 times daily      ondansetron (ZOFRAN-ODT) 4 MG disintegrating tablet Take 1 tablet by mouth 3 times daily as needed for Nausea or Vomiting 21 tablet 0    vitamin D (CHOLECALCIFEROL) 25 MCG (1000 UT) TABS tablet Take 3,000 Units by mouth daily      Multiple Vitamins-Minerals (THERAPEUTIC MULTIVITAMIN-MINERALS) tablet Take 1 tablet by mouth daily      ammonium lactate (AMLACTIN) 12 % cream APPLY AFTER SHOWERING TO SKIN FOR DRYNESS      cetirizine (ZYRTEC ALLERGY) 10 MG tablet Take 10 mg by mouth daily      metoprolol tartrate (LOPRESSOR) 50 MG tablet 50mg once a day 90 tablet 3    Multiple Vitamins-Minerals (ICAPS AREDS 2 PO) Take by mouth daily      azelastine (ASTELIN) 137 MCG/SPRAY nasal spray 2 sprays by Nasal route 2 times daily as needed Use in each nostril as directed        No current facility-administered medications for this visit. Allergies: Allergies   Allergen Reactions    Amoxicillin-Pot Clavulanate      Stomach ache    Cefuroxime Axetil Hives    Eraxis [Anidulafungin] Other (See Comments)     Flushing, lower back pain, chest tightness    Biaxin [Clarithromycin] Rash    Sulfa Antibiotics Hives        Medical History:     Past Medical History:   Diagnosis Date    Arthritis     Cancer ([address]     Hyperlipidemia     Hypertension     Leukemia ([address]     Pneumonia     Seasonal allergies        Past Surgical History:   Procedure Laterality Date    COLONOSCOPY      COLONOSCOPY  03/10/2017    diverticulosis and wytlxh-[id number]-[id number] and hyperplastic and adenoma . Has spasms ,redundant colon    JOINT REPLACEMENT      OVARY REMOVAL         Family History   Problem Relation Age of Onset    Alzheimer's Disease Mother     Heart Disease Father     Diabetes Father     Cancer Brother         Social History:     Social History     Socioeconomic History    Marital status:      Spouse name: Not on file    Number of children: Not on file    Years of education: Not on file    Highest education level: Not on file   Occupational History    Not on file   Social Needs    Financial resource strain: Not on file    Food insecurity     Worry: Not on file     Inability: Not on file    Transportation needs     Medical: Not on file     Non-medical: Not on file   Tobacco Use    Smoking status: Never Smoker    Smokeless tobacco: Never Used   Substance and Sexual Activity    Alcohol use:  Yes     Alcohol/week: 0.0 standard drinks     Comment: social    Drug use: No    Sexual activity: Not on file   Lifestyle    Physical activity     Days per week: Not on file     Minutes per session: Not on file    Stress: Not on file   Relationships    Social connections     Talks on phone: Not on file     Gets together: Not on file     Attends Oriental orthodox service: Not on file     Active member of club or organization: Not on file     Attends meetings of clubs or organizations: Not on file     Relationship status: Not on file    Intimate partner violence     Fear of current or ex partner: Not on file     Emotionally abused: Not on file     Physically abused: Not on file     Forced sexual activity: Not on file   Other Topics Concern    Not on file   Social History Narrative    Not on file        ROS:     Constitutional: No fevers, chills, fatigue. ENT: + nasal congestion & sinus pressure; no sore throat  Respiratory: No difficulty in breathing; + cough. Cardiovascular: No chest pain, palpitations or shortness of breath  Gastrointestinal: No abdominal pain or change in bowel movements. Genitourinary: No change in urinary frequency or dysuria. Skin: No rashes or skin lesions. Neurological: No weakness. No headaches. Last Filed Vitals: There were no vitals taken for this visit. Physical Examination:     GENERAL APPEARANCE: in no acute distress, well developed, well nourished. HEAD: normocephalic, atraumatic. EYES: extraocular movement intact (EOMI), pupils equal, round  EARS: normal, symmetric height, no skin lesions  NOSE: nares patent, no erythema, no rhinorrhea. ORAL CAVITY: mucosa moist, no lesions. NECK/THYROID: neck supple, full range of motion, no visible thyromegaly. PSYCH: good hygiene; appropriate mood/affect; no hallucinations.     Recent Labs/ In Office Testing/ Radiograph review:     Hospital Outpatient Visit on 09/25/2020   Component Date Value Ref Range Status    WBC 09/25/2020 0.9* 3.5 - 11.3 k/uL Final    RBC 09/25/2020 2.43* 3.95 - 5.11 m/uL Final    Hemoglobin 09/25/2020 8.9* 11.9 - 15.1 g/dL Final    Hematocrit 09/25/2020 28.1* 36.3 - 47.1 % Final    MCV 09/25/2020 115.6* 82.6 - 102.9 fL Final    MCH 09/25/2020 36.6* 25.2 - 33.5 pg Final    MCHC 09/25/2020 31.7  28.4 - 34.8 g/dL Final    RDW 09/25/2020 15.5* 11.8 - 14.4 % Final    Platelets 89/49/8706 See Reflexed IPF Result  138 - 453 k/uL Final    MPV 09/25/2020 NOT REPORTED  8.1 - 13.5 fL Final    NRBC Automated 09/25/2020 0.0  0.0 per 100 WBC Final    Differential Type 09/25/2020 NOT REPORTED   Final    WBC Morphology 09/25/2020 NOT REPORTED   Final    RBC Morphology 09/25/2020 NOT REPORTED   Final    Platelet Estimate 09/96/4491 NOT REPORTED   Final    Seg Neutrophils 09/25/2020 30* 36 - 65 % Final    Lymphocytes 09/25/2020 66* 24 - 43 % Final    Monocytes 09/25/2020 1* 3 - 12 % Final    Eosinophils % 09/25/2020 2  1 - 4 % Final    Basophils 09/25/2020 1  0 - 2 % Final    Immature Granulocytes 09/25/2020 0  0 % Final    Segs Absolute 09/25/2020 0.27* 1.50 - 8.10 k/uL Final    Absolute Lymph # 09/25/2020 0.59* 1.10 - 3.70 k/uL Final    Absolute Mono # 09/25/2020 0.01* 0.10 - 1.20 k/uL Final    Absolute Eos # 09/25/2020 0.02  0.00 - 0.44 k/uL Final    Basophils Absolute 09/25/2020 0.01  0.00 - 0.20 k/uL Final    Absolute Immature Granulocyte 09/25/2020 0.00  0.00 - 0.30 k/uL Final    Morphology 09/25/2020 MACROCYTOSIS PRESENT   Final    Platelet, Immature Fraction 09/25/2020 3.9  1.1 - 10.3 % Final    Platelet, Fluorescence 09/25/2020 42* 138 - 453 k/uL Final       No results found for this visit on 03/26/21. Assessment/Plan:     Toni Proctor was seen today for sinus problem. Diagnoses and all orders for this visit:    Seasonal allergies  -     fluticasone (FLONASE) 50 MCG/ACT nasal spray; 1 spray by Each Nostril route daily    Add nasal steroid to her regimen. Asked her to try zyrtec D for one week and switch back to regular zyrtec. Asked her to f/u with DD. No temps above 100.4F. Asked her to f/u with A/I. Doubt bacterial infection as patient is otherwise asymptomatic. No red flag signs and this is a chronic issue.     All questions answered and addressed to patient satisfaction. Patient understands and agrees to the plan. The patient was evaluated and treated today based on the osteopathic principle that each person is a unit of body, mind, and spirit, the body is capable of self-regulation, self-healing, and health maintenance and that structure and function are reciprocally interrelated. Follow-up:   Return if symptoms worsen or fail to improve. FRANCK Chan, was evaluated through a synchronous (real-time) audio-video encounter. The patient (or guardian if applicable) is aware that this is a billable service. Verbal consent to proceed has been obtained within the past 12 months. The visit was conducted pursuant to the emergency declaration under the [address] [place] [address] authority and the [employer] General Act. Patient identification was verified, and a caregiver was present when appropriate. The patient was located in a state where the provider was credentialed to provide care. Total time spent for this encounter: Not billed by time    --Kamryn Benitez DO on 3/26/2021 at 7:45 AM    An electronic signature was used to authenticate this note.

## 2021-03-30 ENCOUNTER — TELEPHONE (OUTPATIENT)
Dept: FAMILY MEDICINE CLINIC | Age: 71
End: 2021-03-30

## 2021-04-28 ENCOUNTER — OFFICE VISIT (OUTPATIENT)
Dept: FAMILY MEDICINE CLINIC | Age: 71
End: 2021-04-28
Payer: MEDICARE

## 2021-04-28 VITALS
DIASTOLIC BLOOD PRESSURE: 80 MMHG | BODY MASS INDEX: 29.61 KG/M2 | WEIGHT: 206.38 LBS | SYSTOLIC BLOOD PRESSURE: 120 MMHG | HEART RATE: 80 BPM | OXYGEN SATURATION: 95 %

## 2021-04-28 DIAGNOSIS — D61.818 PANCYTOPENIA (HCC): ICD-10-CM

## 2021-04-28 DIAGNOSIS — I48.91 ATRIAL FIBRILLATION, UNSPECIFIED TYPE (HCC): ICD-10-CM

## 2021-04-28 DIAGNOSIS — Z12.31 ENCOUNTER FOR SCREENING MAMMOGRAM FOR BREAST CANCER: ICD-10-CM

## 2021-04-28 DIAGNOSIS — I10 ESSENTIAL HYPERTENSION: Primary | ICD-10-CM

## 2021-04-28 DIAGNOSIS — C92.00 ACUTE MYELOID LEUKEMIA NOT HAVING ACHIEVED REMISSION (HCC): ICD-10-CM

## 2021-04-28 DIAGNOSIS — I82.512 CHRONIC DEEP VEIN THROMBOSIS (DVT) OF FEMORAL VEIN OF LEFT LOWER EXTREMITY (HCC): ICD-10-CM

## 2021-04-28 DIAGNOSIS — H26.9 CATARACT OF BOTH EYES, UNSPECIFIED CATARACT TYPE: ICD-10-CM

## 2021-04-28 PROCEDURE — 99214 OFFICE O/P EST MOD 30 MIN: CPT | Performed by: FAMILY MEDICINE

## 2021-04-28 RX ORDER — ESTRADIOL 0.1 MG/G
1 CREAM VAGINAL
COMMUNITY
Start: 2021-03-31

## 2021-04-28 RX ORDER — METOPROLOL SUCCINATE 25 MG/1
TABLET, EXTENDED RELEASE ORAL DAILY
COMMUNITY
Start: 2021-01-28

## 2021-04-28 RX ORDER — AMOXICILLIN 250 MG/1
CAPSULE ORAL 2 TIMES DAILY
COMMUNITY
Start: 2020-10-08 | End: 2022-04-27

## 2021-04-28 ASSESSMENT — PATIENT HEALTH QUESTIONNAIRE - PHQ9
2. FEELING DOWN, DEPRESSED OR HOPELESS: 1
SUM OF ALL RESPONSES TO PHQ QUESTIONS 1-9: 1
SUM OF ALL RESPONSES TO PHQ9 QUESTIONS 1 & 2: 1
SUM OF ALL RESPONSES TO PHQ QUESTIONS 1-9: 1

## 2021-04-28 NOTE — PROGRESS NOTES
Subjective:  Misa Stratton presents for   Chief Complaint   Patient presents with    Hypertension    Hyperlipidemia    Pre-op Exam     bilateral cataract extraction in may with [facility]      Is caring for self    [facility]    Will be having catatract surgery    Last chemo was 10/20    Feels she is 90% percent back to normal    Is going to have cataract surgery at [facility]- they didn't say anything to her about what to do with the eliquis. Patient Active Problem List   Diagnosis    Essential hypertension    Hyperlipidemia    Primary osteoarthritis involving multiple joints    Hyperplastic polyp of intestine    Acute myeloid leukemia not having achieved remission ([address]    Fever    Neutropenic fever (HCC)    Chronic deep vein thrombosis (DVT) of femoral vein of left lower extremity ([address]    Drug reaction         Review of Systems:  · General: no significant weight changes. · Respiratory: no cough, pleuritic chest pain, dyspnea, or wheezing  · Cardiovascular: no pain, NJ, orthopnea, palpitations or claudication  · Gastrointestinal: no chronic nausea, vomiting, heartburn, diarrhea, constipation, bloating, or abdominal pain. No bloody or black stools. Objective:  Physical Exam   Vitals:   Vitals:    04/28/21 1412   BP: 120/80   Pulse: 80   SpO2: 95%   Weight: 206 lb 6 oz (93.6 kg)     Wt Readings from Last 3 Encounters:   04/28/21 206 lb 6 oz (93.6 kg)   09/01/20 206 lb 3.2 oz (93.5 kg)   08/11/20 203 lb 12.8 oz (92.4 kg)     Ht Readings from Last 3 Encounters:   05/21/20 5' 10\" (1.778 m)   04/26/20 5' 10\" (1.778 m)   04/03/20 5' 10\" (1.778 m)     Body mass index is 29.61 kg/m². Constitutional: She is oriented to person, place, and time. She appears well-developed and well-nourished and in no acute distress. Answers all my questions appropriately. Head: Normocephalic and atraumatic. Eyes:conjunctiva appear normal.  Nose: pink, non-edematous mucosa. No polyps.   No septal deviation  Throat: no erythema, tonsillar hypertrophy or exudate. No ulcerations noted. Lips/Teeth/Gums all appear normal.  Neck: Normal range of motion. Neck supple. No tracheal deviation present. No abnormal lymphadenopathy. No JVD noted. Carotids are clear bilaterally. No thyroid masses noted. Heart: RRR without murmur. No S3, S4, or gallop noted. Chest: Clear to auscultation bilaterally. Good breath sounds noted. No rales, wheezes, or rhonchi noted. No respiratory retractions noted. Wall has symmetrical movement with respirations. Slight pedal edema  Assessment:   Encounter Diagnoses   Name Primary?  Encounter for screening mammogram for breast cancer     Essential hypertension Yes    Acute myeloid leukemia not having achieved remission ([facility]     Pancytopenia ([facility]     Chronic deep vein thrombosis (DVT) of femoral vein of left lower extremity (HCC)     Atrial fibrillation, unspecified type (HCC)     Cataract of both eyes, unspecified cataract type          Plan:   Medications Discontinued During This Encounter   Medication Reason    metoprolol tartrate (LOPRESSOR) 50 MG tablet     fluticasone (FLONASE) 50 MCG/ACT nasal spray     ondansetron (ZOFRAN-ODT) 4 MG disintegrating tablet LIST CLEANUP    Multiple Vitamins-Minerals (THERAPEUTIC MULTIVITAMIN-MINERALS) tablet     ammonium lactate (AMLACTIN) 12 % cream      THE ABOVE NOTED DISCONTINUED MEDS MAY ONLY BE FROM 'CLEANING UP' THE MED LIST AND WERE NOT ACTUALLY CANCELED;  SEE CHART FOR DETAILS! No orders of the defined types were placed in this encounter. No orders of the defined types were placed in this encounter. Return in about 6 months (around 10/28/2021). There are no Patient Instructions on file for this visit. Data Unavailable    Due to the Eliquis that she is on and her afib dx :she will need cardiac clearance and instructions on when to stop and then restart the med after the procedure.   Need oncology

## 2021-06-23 ENCOUNTER — TELEPHONE (OUTPATIENT)
Dept: FAMILY MEDICINE CLINIC | Age: 71
End: 2021-06-23

## 2021-06-23 DIAGNOSIS — R30.0 DYSURIA: Primary | ICD-10-CM

## 2021-06-23 RX ORDER — NITROFURANTOIN 25; 75 MG/1; MG/1
100 CAPSULE ORAL 2 TIMES DAILY
Qty: 14 CAPSULE | Refills: 0 | Status: SHIPPED | OUTPATIENT
Start: 2021-06-23 | End: 2021-07-01 | Stop reason: ALTCHOICE

## 2021-06-23 NOTE — TELEPHONE ENCOUNTER
----- Message from Walter Eng sent at 6/23/2021 10:23 AM EDT -----  Subject: Appointment Request    Reason for Call: Urgent Adult Urinary Problem    QUESTIONS  Type of Appointment? Established Patient  Reason for appointment request? No appointments available during search  Additional Information for Provider? Pt is requesting an appt for UTI   symptoms, but there were no available appts. Pt is asking if we can call   her in some medication if we can't get her an appt please advise.   ---------------------------------------------------------------------------  --------------  CALL BACK INFO  What is the best way for the office to contact you? OK to leave message on   voicemail  Preferred Call Back Phone Number? [phone]  ---------------------------------------------------------------------------  --------------  SCRIPT ANSWERS  Relationship to Patient? Self  Appointment reason? Symptomatic  Select script based on patient symptoms? Adult Urinary Symptoms   [Urine-related, UTI, Bladder Infection]  Are you having severe back pain with your urinary symptoms? No  Are you having vomiting or nausea? No  Is there blood in your urine? No  Are you having fevers (100.4), chills, or sweats? No  Have you been seen by a provider for this symptom before? No  Have you been diagnosed with, awaiting test results for, or told that you   are suspected of having COVID-19 (Coronavirus)? (If patient has tested   negative or was tested as a requirement for work, school, or travel and   not based on symptoms, answer no)? No  Do you currently have flu-like symptoms including fever or chills, cough,   shortness of breath, difficulty breathing, or new loss of taste or smell? No  Have you had close contact with someone with COVID-19 in the last 14 days? No  (Service Expert  click yes below to proceed with [facility] As Usual   Scheduling)?  Yes

## 2021-06-24 ENCOUNTER — HOSPITAL ENCOUNTER (OUTPATIENT)
Age: 71
Setting detail: SPECIMEN
Discharge: HOME | End: 2021-06-24
Payer: MEDICARE

## 2021-06-24 DIAGNOSIS — I10 ESSENTIAL HYPERTENSION: ICD-10-CM

## 2021-06-24 DIAGNOSIS — R30.0 DYSURIA: ICD-10-CM

## 2021-06-24 LAB
-: ABNORMAL
ABSOLUTE EOS #: 1.24 K/UL (ref 0–0.44)
ABSOLUTE IMMATURE GRANULOCYTE: <0.03 K/UL (ref 0–0.3)
ABSOLUTE LYMPH #: 1.18 K/UL (ref 1.1–3.7)
ABSOLUTE MONO #: 1.01 K/UL (ref 0.1–1.2)
ALBUMIN SERPL-MCNC: 4.2 G/DL (ref 3.5–5.2)
ALBUMIN/GLOBULIN RATIO: 1.8 (ref 1–2.5)
ALP BLD-CCNC: 64 U/L (ref 35–104)
ALT SERPL-CCNC: 24 U/L (ref 5–33)
AMORPHOUS: ABNORMAL
ANION GAP SERPL CALCULATED.3IONS-SCNC: 8 MMOL/L (ref 9–17)
AST SERPL-CCNC: 27 U/L
BACTERIA: ABNORMAL
BASOPHILS # BLD: 1 % (ref 0–2)
BASOPHILS ABSOLUTE: 0.06 K/UL (ref 0–0.2)
BILIRUB SERPL-MCNC: 0.25 MG/DL (ref 0.3–1.2)
BILIRUBIN URINE: NEGATIVE
BUN BLDV-MCNC: 18 MG/DL (ref 8–23)
BUN/CREAT BLD: ABNORMAL (ref 9–20)
CALCIUM SERPL-MCNC: 9.7 MG/DL (ref 8.6–10.4)
CASTS UA: ABNORMAL /LPF (ref 0–8)
CHLORIDE BLD-SCNC: 105 MMOL/L (ref 98–107)
CHOLESTEROL/HDL RATIO: 2.9
CHOLESTEROL: 182 MG/DL
CO2: 26 MMOL/L (ref 20–31)
COLOR: YELLOW
COMMENT UA: ABNORMAL
CREAT SERPL-MCNC: 0.59 MG/DL (ref 0.5–0.9)
CRYSTALS, UA: ABNORMAL /HPF
DIFFERENTIAL TYPE: ABNORMAL
EOSINOPHILS RELATIVE PERCENT: 18 % (ref 1–4)
EPITHELIAL CELLS UA: ABNORMAL /HPF (ref 0–5)
GFR AFRICAN AMERICAN: >60 ML/MIN
GFR NON-AFRICAN AMERICAN: >60 ML/MIN
GFR SERPL CREATININE-BSD FRML MDRD: ABNORMAL ML/MIN/{1.73_M2}
GFR SERPL CREATININE-BSD FRML MDRD: ABNORMAL ML/MIN/{1.73_M2}
GLUCOSE BLD-MCNC: 97 MG/DL (ref 70–99)
GLUCOSE URINE: NEGATIVE
HCT VFR BLD CALC: 38.1 % (ref 36.3–47.1)
HDLC SERPL-MCNC: 63 MG/DL
HEMOGLOBIN: 11.7 G/DL (ref 11.9–15.1)
IMMATURE GRANULOCYTES: 0 %
KETONES, URINE: NEGATIVE
LDL CHOLESTEROL: 105 MG/DL (ref 0–130)
LEUKOCYTE ESTERASE, URINE: ABNORMAL
LYMPHOCYTES # BLD: 17 % (ref 24–43)
MCH RBC QN AUTO: 28.4 PG (ref 25.2–33.5)
MCHC RBC AUTO-ENTMCNC: 30.7 G/DL (ref 28.4–34.8)
MCV RBC AUTO: 92.5 FL (ref 82.6–102.9)
MONOCYTES # BLD: 14 % (ref 3–12)
MUCUS: ABNORMAL
NITRITE, URINE: NEGATIVE
NRBC AUTOMATED: 0 PER 100 WBC
OTHER OBSERVATIONS UA: ABNORMAL
PDW BLD-RTO: 18.1 % (ref 11.8–14.4)
PH UA: 6 (ref 5–8)
PLATELET # BLD: 363 K/UL (ref 138–453)
PLATELET ESTIMATE: ABNORMAL
PMV BLD AUTO: 9.7 FL (ref 8.1–13.5)
POTASSIUM SERPL-SCNC: 4.4 MMOL/L (ref 3.7–5.3)
PROTEIN UA: ABNORMAL
RBC # BLD: 4.12 M/UL (ref 3.95–5.11)
RBC # BLD: ABNORMAL 10*6/UL
RBC UA: ABNORMAL /HPF (ref 0–4)
RENAL EPITHELIAL, UA: ABNORMAL /HPF
SEG NEUTROPHILS: 50 % (ref 36–65)
SEGMENTED NEUTROPHILS ABSOLUTE COUNT: 3.49 K/UL (ref 1.5–8.1)
SODIUM BLD-SCNC: 139 MMOL/L (ref 135–144)
SPECIFIC GRAVITY UA: 1.02 (ref 1–1.03)
TOTAL PROTEIN: 6.5 G/DL (ref 6.4–8.3)
TRICHOMONAS: ABNORMAL
TRIGL SERPL-MCNC: 68 MG/DL
TSH SERPL DL<=0.05 MIU/L-ACNC: 2.26 MIU/L (ref 0.3–5)
TURBIDITY: ABNORMAL
URINE HGB: ABNORMAL
UROBILINOGEN, URINE: NORMAL
VLDLC SERPL CALC-MCNC: NORMAL MG/DL (ref 1–30)
WBC # BLD: 7 K/UL (ref 3.5–11.3)
WBC # BLD: ABNORMAL 10*3/UL
WBC UA: ABNORMAL /HPF (ref 0–5)
YEAST: ABNORMAL

## 2021-06-25 ENCOUNTER — TELEPHONE (OUTPATIENT)
Dept: FAMILY MEDICINE CLINIC | Age: 71
End: 2021-06-25

## 2021-06-26 LAB
CULTURE: ABNORMAL
Lab: ABNORMAL
SPECIMEN DESCRIPTION: ABNORMAL

## 2021-07-01 ENCOUNTER — TELEPHONE (OUTPATIENT)
Dept: FAMILY MEDICINE CLINIC | Age: 71
End: 2021-07-01

## 2021-07-01 RX ORDER — CIPROFLOXACIN 500 MG/1
500 TABLET, FILM COATED ORAL 2 TIMES DAILY
Qty: 10 TABLET | Refills: 0 | Status: SHIPPED | OUTPATIENT
Start: 2021-07-01 | End: 2021-07-06

## 2021-08-25 ENCOUNTER — TELEPHONE (OUTPATIENT)
Dept: FAMILY MEDICINE CLINIC | Age: 71
End: 2021-08-25

## 2021-10-27 ENCOUNTER — OFFICE VISIT (OUTPATIENT)
Dept: FAMILY MEDICINE CLINIC | Age: 71
End: 2021-10-27
Payer: MEDICARE

## 2021-10-27 VITALS
WEIGHT: 205 LBS | SYSTOLIC BLOOD PRESSURE: 114 MMHG | OXYGEN SATURATION: 95 % | BODY MASS INDEX: 29.41 KG/M2 | DIASTOLIC BLOOD PRESSURE: 70 MMHG | HEART RATE: 84 BPM

## 2021-10-27 DIAGNOSIS — E78.2 MIXED HYPERLIPIDEMIA: ICD-10-CM

## 2021-10-27 DIAGNOSIS — K63.5 POLYP OF COLON, UNSPECIFIED PART OF COLON, UNSPECIFIED TYPE: ICD-10-CM

## 2021-10-27 DIAGNOSIS — I82.512 CHRONIC DEEP VEIN THROMBOSIS (DVT) OF FEMORAL VEIN OF LEFT LOWER EXTREMITY (HCC): ICD-10-CM

## 2021-10-27 DIAGNOSIS — I10 ESSENTIAL HYPERTENSION: Primary | ICD-10-CM

## 2021-10-27 DIAGNOSIS — I48.91 ATRIAL FIBRILLATION, UNSPECIFIED TYPE (HCC): ICD-10-CM

## 2021-10-27 PROCEDURE — 99214 OFFICE O/P EST MOD 30 MIN: CPT | Performed by: FAMILY MEDICINE

## 2021-10-27 RX ORDER — PRAVASTATIN SODIUM 40 MG
TABLET ORAL
Qty: 90 TABLET | Refills: 1 | Status: SHIPPED | OUTPATIENT
Start: 2021-10-27 | End: 2021-11-11 | Stop reason: SDUPTHER

## 2021-10-27 RX ORDER — CLOTRIMAZOLE AND BETAMETHASONE DIPROPIONATE 10; .64 MG/G; MG/G
CREAM TOPICAL 2 TIMES DAILY
COMMUNITY
Start: 2021-10-21 | End: 2021-11-20

## 2021-10-27 RX ORDER — PANTOPRAZOLE SODIUM 40 MG/1
40 TABLET, DELAYED RELEASE ORAL DAILY
COMMUNITY
End: 2022-04-27

## 2021-10-27 SDOH — ECONOMIC STABILITY: FOOD INSECURITY: WITHIN THE PAST 12 MONTHS, THE FOOD YOU BOUGHT JUST DIDN'T LAST AND YOU DIDN'T HAVE MONEY TO GET MORE.: NEVER TRUE

## 2021-10-27 SDOH — ECONOMIC STABILITY: TRANSPORTATION INSECURITY
IN THE PAST 12 MONTHS, HAS LACK OF TRANSPORTATION KEPT YOU FROM MEETINGS, WORK, OR FROM GETTING THINGS NEEDED FOR DAILY LIVING?: NO

## 2021-10-27 SDOH — ECONOMIC STABILITY: FOOD INSECURITY: WITHIN THE PAST 12 MONTHS, YOU WORRIED THAT YOUR FOOD WOULD RUN OUT BEFORE YOU GOT MONEY TO BUY MORE.: NEVER TRUE

## 2021-10-27 SDOH — ECONOMIC STABILITY: TRANSPORTATION INSECURITY
IN THE PAST 12 MONTHS, HAS THE LACK OF TRANSPORTATION KEPT YOU FROM MEDICAL APPOINTMENTS OR FROM GETTING MEDICATIONS?: NO

## 2021-10-27 ASSESSMENT — PATIENT HEALTH QUESTIONNAIRE - PHQ9
SUM OF ALL RESPONSES TO PHQ QUESTIONS 1-9: 0
2. FEELING DOWN, DEPRESSED OR HOPELESS: 0
SUM OF ALL RESPONSES TO PHQ9 QUESTIONS 1 & 2: 0
SUM OF ALL RESPONSES TO PHQ QUESTIONS 1-9: 0
1. LITTLE INTEREST OR PLEASURE IN DOING THINGS: 0
SUM OF ALL RESPONSES TO PHQ QUESTIONS 1-9: 0

## 2021-10-27 ASSESSMENT — SOCIAL DETERMINANTS OF HEALTH (SDOH): HOW HARD IS IT FOR YOU TO PAY FOR THE VERY BASICS LIKE FOOD, HOUSING, MEDICAL CARE, AND HEATING?: NOT HARD AT ALL

## 2021-10-27 NOTE — PROGRESS NOTES
Subjective:  Radha Mcneal presents for   Chief Complaint   Patient presents with    Hyperlipidemia    Hypertension     [place]. Feeling well. Tolerating the meds    Still see cardio and transplant doc. Needs a colonoscopy    Patient Active Problem List   Diagnosis    Essential hypertension    Hyperlipidemia    Primary osteoarthritis involving multiple joints    Hyperplastic polyp of intestine    Acute myeloid leukemia not having achieved remission ([facility]    Fever    Neutropenic fever (HCC)    Chronic deep vein thrombosis (DVT) of femoral vein of left lower extremity ([address]    Drug reaction         Review of Systems:  · General: no significant weight changes. · Respiratory: no cough, pleuritic chest pain, dyspnea, or wheezing  · Cardiovascular: no pain, NJ, orthopnea, palpitations or claudication  · Gastrointestinal: no chronic nausea, vomiting, heartburn, diarrhea, constipation, bloating, or abdominal pain. No bloody or black stools. Objective:  Physical Exam   Vitals:   Vitals:    10/27/21 1505   BP: 114/70   Pulse: 84   SpO2: 95%   Weight: 205 lb (93 kg)     Wt Readings from Last 3 Encounters:   10/27/21 205 lb (93 kg)   04/28/21 206 lb 6 oz (93.6 kg)   09/01/20 206 lb 3.2 oz (93.5 kg)     Ht Readings from Last 3 Encounters:   05/21/20 5' 10\" (1.778 m)   04/26/20 5' 10\" (1.778 m)   04/03/20 5' 10\" (1.778 m)     Body mass index is 29.41 kg/m². Constitutional: She is oriented to person, place, and time. She appears well-developed and well-nourished and in no acute distress. Answers all my questions appropriately. Head: Normocephalic and atraumatic. Eyes:conjunctiva appear normal.    Nose: pink, non-edematous mucosa. No polyps. No septal deviation    Throat: no erythema, tonsillar hypertrophy or exudate. No ulcerations noted. Lips/Teeth/Gums all appear normal.    Neck: Normal range of motion. Neck supple. No tracheal deviation present.    No abnormal lymphadenopathy. No JVD noted. Carotids are clear bilaterally. No thyroid masses noted. Heart: RRR without murmur. No S3, S4, or gallop noted. Chest:   Good breath sounds noted. Clear to auscultation bilaterally. No rales, wheezes, or rhonchi noted. No respiratory retractions noted. Wall has symmetrical movement with respirations. Assessment:   Encounter Diagnoses   Name Primary?  Mixed hyperlipidemia     Essential hypertension Yes    Chronic deep vein thrombosis (DVT) of femoral vein of left lower extremity (HCC)     Atrial fibrillation, unspecified type (HCC)     Polyp of colon, unspecified part of colon, unspecified type          Plan:   Medications Discontinued During This Encounter   Medication Reason    pravastatin (PRAVACHOL) 40 MG tablet REORDER     THE ABOVE NOTED DISCONTINUED MEDS MAY ONLY BE FROM 'CLEANING UP' THE MED LIST AND WERE NOT ACTUALLY CANCELED;  SEE CHART FOR DETAILS! Orders Placed This Encounter   Medications    pravastatin (PRAVACHOL) 40 MG tablet     Sig: TAKE 1 TABLET DAILY     Dispense:  90 tablet     Refill:  1     Orders Placed This Encounter   Procedures    External Referral To Gastroenterology     Referral Priority:   Routine     Referral Type:   Consult for Advice and Opinion     Referral Reason:   Specialty Services Required     Requested Specialty:   Gastroenterology     Number of Visits Requested:   1     No follow-ups on file. There are no Patient Instructions on file for this visit.   Data Unavailable    Will need word form cardio and heme about gastonh she needs the eliquyis    When she gets the colonoslcopy, she will need to be off the eiliquis

## 2021-10-28 DIAGNOSIS — Z12.31 ENCOUNTER FOR SCREENING MAMMOGRAM FOR BREAST CANCER: ICD-10-CM

## 2021-11-10 ENCOUNTER — PATIENT MESSAGE (OUTPATIENT)
Dept: FAMILY MEDICINE CLINIC | Age: 71
End: 2021-11-10

## 2021-11-11 RX ORDER — PRAVASTATIN SODIUM 40 MG
TABLET ORAL
Qty: 90 TABLET | Refills: 1 | Status: SHIPPED | OUTPATIENT
Start: 2021-11-11 | End: 2022-06-08 | Stop reason: SDUPTHER

## 2021-11-11 NOTE — TELEPHONE ENCOUNTER
[facility] is calling to request a refill on the following medication(s):    Medication Request:  Requested Prescriptions     Pending Prescriptions Disp Refills    pravastatin (PRAVACHOL) 40 MG tablet 90 tablet 1     Sig: TAKE 1 TABLET DAILY       Last Visit Date (If Applicable):  49/57/6149    Next Visit Date:    Visit date not found

## 2021-11-11 NOTE — TELEPHONE ENCOUNTER
From: Julita Escalante  To: Dr. Will Fix: 11/10/2021 7:58 PM EST  Subject: Prescription Question    [facility] did not get the prescription renewal for pravastatin. Ive checked with them twice. I am just starting the last refill but I will run out in February and I dont see Dr Zeenat Martinez until April 27.    Thanks ,  General Starr

## 2021-12-27 ENCOUNTER — TELEPHONE (OUTPATIENT)
Dept: ONCOLOGY | Age: 71
End: 2021-12-27

## 2022-04-15 ENCOUNTER — TELEPHONE (OUTPATIENT)
Dept: ONCOLOGY | Age: 72
End: 2022-04-15

## 2022-04-15 NOTE — TELEPHONE ENCOUNTER
PT CALLS ON 4/14/22 AND HAVING DIFFICULTY OBTAINING A PCP. Nawaf Ac ASKING FOR RECOMMENDATION FROM DR CAREY.     STATES NOTES BEING SENT FROM [facility], 18 MONTHS POST BONE MARROW TRANSPLANT

## 2022-04-22 ENCOUNTER — TELEPHONE (OUTPATIENT)
Dept: ONCOLOGY | Age: 72
End: 2022-04-22

## 2022-04-22 NOTE — TELEPHONE ENCOUNTER
DR CAREY STATES \"ANYBODY\" FOR PT TO SEE FOR PCP. DID GIVE PT THREE NAMES AND SHE WILL CHECK TO SEE IF ACCEPTING NEW PT'S.

## 2022-04-27 ENCOUNTER — OFFICE VISIT (OUTPATIENT)
Dept: FAMILY MEDICINE CLINIC | Age: 72
End: 2022-04-27
Payer: COMMERCIAL

## 2022-04-27 VITALS
HEIGHT: 70 IN | SYSTOLIC BLOOD PRESSURE: 130 MMHG | DIASTOLIC BLOOD PRESSURE: 70 MMHG | OXYGEN SATURATION: 98 % | WEIGHT: 210 LBS | BODY MASS INDEX: 30.06 KG/M2 | HEART RATE: 75 BPM | TEMPERATURE: 96.9 F

## 2022-04-27 DIAGNOSIS — I10 ESSENTIAL HYPERTENSION: Primary | ICD-10-CM

## 2022-04-27 DIAGNOSIS — I48.91 ATRIAL FIBRILLATION, UNSPECIFIED TYPE (HCC): ICD-10-CM

## 2022-04-27 PROCEDURE — 99213 OFFICE O/P EST LOW 20 MIN: CPT | Performed by: STUDENT IN AN ORGANIZED HEALTH CARE EDUCATION/TRAINING PROGRAM

## 2022-04-27 RX ORDER — IVERMECTIN 10 MG/G
CREAM TOPICAL
COMMUNITY
Start: 2022-03-24

## 2022-04-27 RX ORDER — TRIAMCINOLONE ACETONIDE 1 MG/G
CREAM TOPICAL
COMMUNITY
Start: 2022-04-12

## 2022-04-28 PROBLEM — I48.91 ATRIAL FIBRILLATION, UNSPECIFIED TYPE (HCC): Status: ACTIVE | Noted: 2022-04-28

## 2022-04-28 NOTE — PROGRESS NOTES
Subjective:  Krissy Tolbert presents for   Chief Complaint   Patient presents with    Establish Care    6 Month Follow-Up       HPI   Here to establish care. Has history of acute myeloid leukemia. Underwent bone marrow transplant. Has been doing well since. Currently denies any issues. Does have history of hypertension, hyperlipidemia. Currently has no complaints or issues. Patient Active Problem List   Diagnosis    Essential hypertension    Hyperlipidemia    Primary osteoarthritis involving multiple joints    Hyperplastic polyp of intestine    Acute myeloid leukemia not having achieved remission ([facility]    Fever    Neutropenic fever (HCC)    Chronic deep vein thrombosis (DVT) of femoral vein of left lower extremity (HCC)    Drug reaction    Atrial fibrillation, unspecified type ([facility]         Review of Systems   All other systems reviewed and are negative. Objective:  Physical Exam   Vitals:   Vitals:    04/27/22 1515   BP: 130/70   Site: Left Upper Arm   Position: Sitting   Cuff Size: Large Adult   Pulse: 75   Temp: 96.9 °F (36.1 °C)   TempSrc: Temporal   SpO2: 98%   Weight: 210 lb (95.3 kg)   Height: 5' 10\" (1.778 m)     Wt Readings from Last 3 Encounters:   04/27/22 210 lb (95.3 kg)   10/27/21 205 lb (93 kg)   04/28/21 206 lb 6 oz (93.6 kg)     Ht Readings from Last 3 Encounters:   04/27/22 5' 10\" (1.778 m)   05/21/20 5' 10\" (1.778 m)   04/26/20 5' 10\" (1.778 m)     Body mass index is 30.13 kg/m². Physical Exam  Vitals reviewed. Constitutional:       General: She is not in acute distress. Appearance: Normal appearance. HENT:      Mouth/Throat:      Mouth: Mucous membranes are moist.   Eyes:      Conjunctiva/sclera: Conjunctivae normal.   Cardiovascular:      Rate and Rhythm: Normal rate and regular rhythm. Heart sounds: Normal heart sounds. Pulmonary:      Effort: Pulmonary effort is normal.      Breath sounds: Normal breath sounds. Abdominal:      General: Abdomen is flat. Bowel sounds are normal.      Palpations: Abdomen is soft. Skin:     General: Skin is warm and dry. Neurological:      Mental Status: She is alert and oriented to person, place, and time. Psychiatric:         Mood and Affect: Mood normal.            Assessment/Plan:    Diagnosis Orders   1. Essential hypertension     2. Atrial fibrillation, unspecified type ([facility]          Return in about 6 months (around 10/27/2022), or if symptoms worsen or fail to improve. Essential hypertensioncurrently controlled without medications.

## 2022-06-09 RX ORDER — PRAVASTATIN SODIUM 40 MG
TABLET ORAL
Qty: 90 TABLET | Refills: 1 | Status: SHIPPED | OUTPATIENT
Start: 2022-06-09

## 2022-06-09 NOTE — TELEPHONE ENCOUNTER
Emilie Osuna is calling to request a refill on the following medication(s):    Medication Request:  Requested Prescriptions     Pending Prescriptions Disp Refills    pravastatin (PRAVACHOL) 40 MG tablet 90 tablet 1     Sig: TAKE 1 TABLET DAILY       Last Visit Date (If Applicable):  6/80/26    Next Visit Date:    Visit date not found